# Patient Record
Sex: FEMALE | Race: WHITE | NOT HISPANIC OR LATINO | Employment: OTHER | URBAN - METROPOLITAN AREA
[De-identification: names, ages, dates, MRNs, and addresses within clinical notes are randomized per-mention and may not be internally consistent; named-entity substitution may affect disease eponyms.]

---

## 2023-08-17 ENCOUNTER — HOSPITAL ENCOUNTER (OUTPATIENT)
Dept: DATA CONVERSION | Facility: HOSPITAL | Age: 73
Discharge: HOME | End: 2023-08-17
Payer: MEDICARE

## 2023-08-17 DIAGNOSIS — M79.9 SOFT TISSUE DISORDER, UNSPECIFIED: ICD-10-CM

## 2023-08-31 PROBLEM — N60.91 ATYPICAL LOBULAR HYPERPLASIA OF RIGHT BREAST: Status: ACTIVE | Noted: 2017-01-31

## 2023-08-31 PROBLEM — M47.816 SPONDYLOSIS OF LUMBAR SPINE: Status: ACTIVE | Noted: 2023-08-31

## 2023-08-31 PROBLEM — H34.8192 CENTRAL RETINAL VEIN OCCLUSION (CMS-HCC): Status: ACTIVE | Noted: 2023-08-31

## 2023-08-31 PROBLEM — R92.8 ABNORMAL MAMMOGRAM: Status: ACTIVE | Noted: 2023-08-31

## 2023-08-31 PROBLEM — R93.7 ABNORMAL BONE DENSITY SCREENING: Status: ACTIVE | Noted: 2023-08-31

## 2023-08-31 PROBLEM — G62.9 NEUROPATHY: Status: ACTIVE | Noted: 2023-08-31

## 2023-08-31 PROBLEM — S33.6XXA SACROILIAC (LIGAMENT) SPRAIN: Status: ACTIVE | Noted: 2017-03-01

## 2023-08-31 PROBLEM — E55.9 VITAMIN D DEFICIENCY: Status: ACTIVE | Noted: 2023-08-31

## 2023-08-31 PROBLEM — K76.0 FATTY LIVER: Status: ACTIVE | Noted: 2023-08-31

## 2023-08-31 PROBLEM — H35.89 MACULAR ATROPHY, RETINAL: Status: ACTIVE | Noted: 2023-08-31

## 2023-08-31 PROBLEM — K21.9 GASTROESOPHAGEAL REFLUX DISEASE: Status: ACTIVE | Noted: 2023-08-31

## 2023-08-31 PROBLEM — I10 PRIMARY HYPERTENSION: Status: ACTIVE | Noted: 2023-08-31

## 2023-08-31 PROBLEM — M48.20 BAASTRUP'S SYNDROME: Status: ACTIVE | Noted: 2023-08-31

## 2023-08-31 PROBLEM — H47.20 OPTIC NERVE ATROPHY: Status: ACTIVE | Noted: 2023-08-31

## 2023-08-31 PROBLEM — G43.109 BASILAR MIGRAINE: Status: ACTIVE | Noted: 2023-08-31

## 2023-08-31 PROBLEM — H35.419 RETINAL LATTICE DEGENERATION: Status: ACTIVE | Noted: 2023-08-31

## 2023-08-31 PROBLEM — K22.70 BARRETT'S ESOPHAGUS: Status: ACTIVE | Noted: 2023-08-31

## 2023-08-31 PROBLEM — R73.9 BORDERLINE HYPERGLYCEMIA: Status: ACTIVE | Noted: 2023-08-31

## 2023-08-31 PROBLEM — E78.5 HYPERLIPIDEMIA: Status: ACTIVE | Noted: 2023-08-31

## 2023-08-31 PROBLEM — M47.814 DJD (DEGENERATIVE JOINT DISEASE), THORACIC: Status: ACTIVE | Noted: 2023-08-31

## 2023-08-31 RX ORDER — FLUTICASONE PROPIONATE 50 MCG
1-2 SPRAY, SUSPENSION (ML) NASAL DAILY
COMMUNITY
Start: 2017-05-08

## 2023-08-31 RX ORDER — ERGOCALCIFEROL 1.25 MG/1
1 CAPSULE ORAL
COMMUNITY
Start: 2023-01-23 | End: 2023-12-22 | Stop reason: ALTCHOICE

## 2023-08-31 RX ORDER — ATORVASTATIN CALCIUM 20 MG/1
1 TABLET, FILM COATED ORAL DAILY
COMMUNITY
Start: 2015-02-09 | End: 2024-04-09 | Stop reason: SDUPTHER

## 2023-08-31 RX ORDER — GABAPENTIN 100 MG/1
1 CAPSULE ORAL NIGHTLY
COMMUNITY
Start: 2023-02-02 | End: 2023-12-22 | Stop reason: SDUPTHER

## 2023-08-31 RX ORDER — AMLODIPINE AND BENAZEPRIL HYDROCHLORIDE 5; 10 MG/1; MG/1
1 CAPSULE ORAL DAILY
COMMUNITY
End: 2024-04-09 | Stop reason: SDUPTHER

## 2023-08-31 RX ORDER — OMEPRAZOLE 40 MG/1
1 CAPSULE, DELAYED RELEASE ORAL
COMMUNITY
Start: 2023-02-20 | End: 2023-12-22 | Stop reason: ALTCHOICE

## 2023-08-31 RX ORDER — METOPROLOL SUCCINATE 25 MG/1
25 TABLET, EXTENDED RELEASE ORAL NIGHTLY
COMMUNITY
End: 2024-04-09 | Stop reason: SDUPTHER

## 2023-08-31 RX ORDER — OMEPRAZOLE 20 MG/1
1 TABLET, DELAYED RELEASE ORAL DAILY
COMMUNITY

## 2023-10-09 ENCOUNTER — ANCILLARY PROCEDURE (OUTPATIENT)
Dept: RADIOLOGY | Facility: CLINIC | Age: 73
End: 2023-10-09
Payer: MEDICARE

## 2023-10-09 DIAGNOSIS — M79.671 RIGHT FOOT PAIN: ICD-10-CM

## 2023-10-09 PROCEDURE — 73630 X-RAY EXAM OF FOOT: CPT | Mod: RT,FY

## 2023-10-09 PROCEDURE — 73630 X-RAY EXAM OF FOOT: CPT | Mod: RIGHT SIDE | Performed by: RADIOLOGY

## 2023-10-23 ENCOUNTER — ANCILLARY PROCEDURE (OUTPATIENT)
Dept: RADIOLOGY | Facility: CLINIC | Age: 73
End: 2023-10-23
Payer: MEDICARE

## 2023-10-23 DIAGNOSIS — M79.671 PAIN IN RIGHT FOOT: ICD-10-CM

## 2023-10-23 PROCEDURE — 73630 X-RAY EXAM OF FOOT: CPT | Mod: RT

## 2023-10-23 PROCEDURE — 73630 X-RAY EXAM OF FOOT: CPT | Mod: RIGHT SIDE | Performed by: RADIOLOGY

## 2023-10-25 ENCOUNTER — CLINICAL SUPPORT (OUTPATIENT)
Dept: PRIMARY CARE | Facility: CLINIC | Age: 73
End: 2023-10-25
Payer: MEDICARE

## 2023-10-25 DIAGNOSIS — R73.9 HYPERGLYCEMIA: ICD-10-CM

## 2023-10-25 LAB — POC HEMOGLOBIN A1C: 5.5 % (ref 4.2–6.5)

## 2023-10-27 ENCOUNTER — TELEPHONE (OUTPATIENT)
Dept: PRIMARY CARE | Facility: CLINIC | Age: 73
End: 2023-10-27
Payer: MEDICARE

## 2023-10-27 NOTE — TELEPHONE ENCOUNTER
She wants to stay on it. She is also wondering if you will be send in the increased dose based on her A1C result?

## 2023-10-27 NOTE — TELEPHONE ENCOUNTER
Spoke with patient and she has tried the ginger ale, saltines, bland diet. She has also tried dramamine which sometimes helps. She said that the nausea does subside after 3 days but its getting through those 3 days that is hard. Please advise.

## 2023-10-30 DIAGNOSIS — E11.9 TYPE 2 DIABETES MELLITUS WITHOUT COMPLICATION, WITHOUT LONG-TERM CURRENT USE OF INSULIN (MULTI): ICD-10-CM

## 2023-10-30 RX ORDER — SEMAGLUTIDE 0.68 MG/ML
0.75 INJECTION, SOLUTION SUBCUTANEOUS
Qty: 3 ML | Refills: 1 | Status: SHIPPED | OUTPATIENT
Start: 2023-10-30 | End: 2023-12-22

## 2023-10-30 RX ORDER — SEMAGLUTIDE 0.68 MG/ML
0.5 INJECTION, SOLUTION SUBCUTANEOUS
COMMUNITY
End: 2023-10-30 | Stop reason: SDUPTHER

## 2023-11-21 ENCOUNTER — APPOINTMENT (OUTPATIENT)
Dept: OPHTHALMOLOGY | Facility: CLINIC | Age: 73
End: 2023-11-21
Payer: MEDICARE

## 2023-12-13 ENCOUNTER — OFFICE VISIT (OUTPATIENT)
Dept: OPHTHALMOLOGY | Facility: CLINIC | Age: 73
End: 2023-12-13
Payer: MEDICARE

## 2023-12-13 DIAGNOSIS — H47.20 OPTIC NERVE ATROPHY: ICD-10-CM

## 2023-12-13 DIAGNOSIS — H34.8112 STABLE CENTRAL RETINAL VEIN OCCLUSION OF RIGHT EYE (CMS-HCC): ICD-10-CM

## 2023-12-13 DIAGNOSIS — G43.109 MIGRAINE WITH AURA AND WITHOUT STATUS MIGRAINOSUS, NOT INTRACTABLE: ICD-10-CM

## 2023-12-13 DIAGNOSIS — H35.413 LATTICE DEGENERATION OF RETINA, BILATERAL: Primary | ICD-10-CM

## 2023-12-13 DIAGNOSIS — Z96.1 PSEUDOPHAKIA OF BOTH EYES: ICD-10-CM

## 2023-12-13 DIAGNOSIS — H04.123 DRY EYES: ICD-10-CM

## 2023-12-13 DIAGNOSIS — R73.9 BORDERLINE HYPERGLYCEMIA: ICD-10-CM

## 2023-12-13 PROCEDURE — 99214 OFFICE O/P EST MOD 30 MIN: CPT | Performed by: OPHTHALMOLOGY

## 2023-12-13 PROCEDURE — 92134 CPTRZ OPH DX IMG PST SGM RTA: CPT | Mod: 50 | Performed by: OPHTHALMOLOGY

## 2023-12-13 RX ORDER — EPINEPHRINE 0.3 MG/.3ML
1 INJECTION INTRAMUSCULAR ONCE AS NEEDED
COMMUNITY
Start: 2020-04-29

## 2023-12-13 ASSESSMENT — PATIENT HEALTH QUESTIONNAIRE - PHQ9
SUM OF ALL RESPONSES TO PHQ9 QUESTIONS 1 AND 2: 0
2. FEELING DOWN, DEPRESSED OR HOPELESS: NOT AT ALL
1. LITTLE INTEREST OR PLEASURE IN DOING THINGS: NOT AT ALL

## 2023-12-13 ASSESSMENT — ENCOUNTER SYMPTOMS
EYES NEGATIVE: 0
ALLERGIC/IMMUNOLOGIC NEGATIVE: 0
GASTROINTESTINAL NEGATIVE: 0
DEPRESSION: 0
MUSCULOSKELETAL NEGATIVE: 0
RESPIRATORY NEGATIVE: 0
HEMATOLOGIC/LYMPHATIC NEGATIVE: 0
PSYCHIATRIC NEGATIVE: 0
CONSTITUTIONAL NEGATIVE: 0
CARDIOVASCULAR NEGATIVE: 0
NEUROLOGICAL NEGATIVE: 0
OCCASIONAL FEELINGS OF UNSTEADINESS: 0
ENDOCRINE NEGATIVE: 0
LOSS OF SENSATION IN FEET: 0

## 2023-12-13 ASSESSMENT — REFRACTION_WEARINGRX
OS_SPHERE: +2.50
OD_CYLINDER: -0.50
OD_SPHERE: +2.25
OS_AXIS: 079
OD_AXIS: 134
OS_CYLINDER: -0.50
SPECS_TYPE: READERS

## 2023-12-13 ASSESSMENT — CUP TO DISC RATIO
OD_RATIO: 0.45
OS_RATIO: 0.35

## 2023-12-13 ASSESSMENT — VISUAL ACUITY
OD_SC+: -2
OS_SC: 20/25
METHOD: SNELLEN - SINGLE
OS_SC+: -2
OD_SC: 20/25

## 2023-12-13 ASSESSMENT — PAIN SCALES - GENERAL: PAINLEVEL: 0-NO PAIN

## 2023-12-13 ASSESSMENT — EXTERNAL EXAM - LEFT EYE: OS_EXAM: BROW PTOSIS

## 2023-12-13 ASSESSMENT — TONOMETRY
OD_IOP_MMHG: 16
OS_IOP_MMHG: 15
IOP_METHOD: GOLDMANN APPLANATION

## 2023-12-13 ASSESSMENT — EXTERNAL EXAM - RIGHT EYE: OD_EXAM: BROW PTOSIS

## 2023-12-13 ASSESSMENT — SLIT LAMP EXAM - LIDS
COMMENTS: 1+ BLEPHARITIS
COMMENTS: 1+ BLEPHARITIS

## 2023-12-13 NOTE — LETTER
December 13, 2023    Mary Lou Valerio, APRN-CNP  [address]  [facility], [address]  [facility] [address]     Patient: Uma Middleton   YOB: 1950   Date of Visit: 12/13/2023       Dear Dr. Mary Lou Valerio, APRN-CNP:    Thank you for referring Uma Middleton to me for evaluation. Here is my assessment and plan of care:    Assessment/Plan:  Uma was seen today for annual exam.  Diagnoses and all orders for this visit:  Lattice degeneration of retina, bilateral (Primary)  -     OCT, Retina - OU - Both Eyes  Stable central retinal vein occlusion of right eye  Optic nerve atrophy  Dry eyes  Pseudophakia of both eyes  Migraine with aura and without status migrainosus, not intractable  Borderline hyperglycemia    Below you will find my full exam findings. If you have questions, please do not hesitate to call me. I look forward to following Uma along with you.     No signs of diabetic retinopathy.  F/u in one year.     Sincerely,        Earl Banuelos MD        CC:   No Recipients        Base Eye Exam       Visual Acuity (Snellen - Single)         Right Left Both    Dist sc 20/25 -2 20/25 -2     Near cc   J1+              Tonometry (Goldmann Applanation, 9:12 AM)         Right Left    Pressure 16 15              Pupils         Dark Shape React APD    Right 4 Round 1 None    Left 4 Round 1 None              Extraocular Movement         Right Left     Full Full              Dilation       Both eyes: 1% Tropic 2.5% Phen @ 9:12 AM                  Slit Lamp and Fundus Exam       External Exam         Right Left    External Brow ptosis Brow ptosis              Slit Lamp Exam         Right Left    Lids/Lashes 1+ Blepharitis 1+ Blepharitis    Conjunctiva/Sclera normal bulbar and palepbral conjunctiva normal bulbar and palepbral conjunctiva    Cornea normal epi/stroma/endo and tear film normal epi/stroma/endo and tear film    Anterior Chamber normal anterior chamber, deep and quiet normal  anterior chamber, deep and quiet    Iris pupil miotic pupil miotic    Lens posterior capsule opacification, Centered posterior chamber intraocular lens PC IOL centered w/clear capsule    Anterior Vitreous vitreous floaters vitreous floaters              Fundus Exam         Right Left    Disc collateral vessels normal optic nerve    C/D Ratio 0.45 0.35    Macula micro-aneurysm normal macula    Vessels central retinal vein occlusion normal vessels    Periphery lattice degeneration lattice degeneration                  Refraction       Wearing Rx         Sphere Cylinder Axis    Right +2.25 -0.50 134    Left +2.50 -0.50 079      Type: readers

## 2023-12-13 NOTE — PROGRESS NOTES
Subjective   Patient ID: Uma Middleton is a 73 y.o. female.    Chief Complaint    Annual Exam       HPI    Difficulties driving at night  PATIENT DENIES REFRACTION    Complete and macular exam.  No new changes in health history or meds.  Vision is good and stable.  No new problems or complaints.  Had blepharoplasties about one month ago.      Last edited by Earl Banuelos MD on 12/13/2023  9:47 AM.        No current outpatient medications on file. (Ophthalmology pharm classes)       Current Outpatient Medications (Other)   Medication Sig Dispense Refill    amLODIPine-benazepriL (Lotrel) 5-10 mg capsule Take 1 capsule by mouth once daily.      atorvastatin (Lipitor) 20 mg tablet Take 1 tablet (20 mg) by mouth once daily.      EPINEPHrine (EpiPen 2-Darius) 0.3 mg/0.3 mL injection syringe Inject 0.3 mL (0.3 mg) into the muscle 1 time if needed.      fluticasone (Flonase) 50 mcg/actuation nasal spray Administer 1-2 sprays into each nostril once daily.      gabapentin (Neurontin) 100 mg capsule Take 1 capsule (100 mg) by mouth once daily at bedtime.      metoprolol succinate XL (Toprol-XL) 25 mg 24 hr tablet Take 1 tablet (25 mg) by mouth once daily at bedtime.      omeprazole OTC (PriLOSEC OTC) 20 mg EC tablet Take 1 tablet (20 mg) by mouth once daily.      Ozempic 0.25 mg or 0.5 mg (2 mg/3 mL) pen injector Inject 0.5 mg under the skin 1 (one) time per week. 3 mL 1    ergocalciferol (Vitamin D-2) 1.25 MG (66080 UT) capsule Take 1 capsule (1,250 mcg) by mouth 1 (one) time per week.      omeprazole (PriLOSEC) 40 mg DR capsule Take 1 capsule (40 mg) by mouth 2 times a day before meals. 30 MINUTES BEFORE BREAKFAST AND DINNER         Objective   Base Eye Exam       Visual Acuity (Snellen - Single)         Right Left Both    Dist sc 20/25 -2 20/25 -2     Near cc   J1+              Tonometry (Goldmann Applanation, 9:12 AM)         Right Left    Pressure 16 15              Pupils         Dark Shape React APD    Right 4 Round 1  None    Left 4 Round 1 None              Extraocular Movement         Right Left     Full Full              Dilation       Both eyes: 1% Tropic 2.5% Phen @ 9:12 AM                  Slit Lamp and Fundus Exam       External Exam         Right Left    External Brow ptosis Brow ptosis              Slit Lamp Exam         Right Left    Lids/Lashes 1+ Blepharitis 1+ Blepharitis    Conjunctiva/Sclera normal bulbar and palepbral conjunctiva normal bulbar and palepbral conjunctiva    Cornea normal epi/stroma/endo and tear film normal epi/stroma/endo and tear film    Anterior Chamber normal anterior chamber, deep and quiet normal anterior chamber, deep and quiet    Iris pupil miotic pupil miotic    Lens posterior capsule opacification, Centered posterior chamber intraocular lens PC IOL centered w/clear capsule    Anterior Vitreous vitreous floaters vitreous floaters              Fundus Exam         Right Left    Disc collateral vessels normal optic nerve    C/D Ratio 0.45 0.35    Macula micro-aneurysm normal macula    Vessels central retinal vein occlusion normal vessels    Periphery lattice degeneration lattice degeneration                  Refraction       Wearing Rx         Sphere Cylinder Axis    Right +2.25 -0.50 134    Left +2.50 -0.50 079      Type: readers                    Assessment/Plan   Problem List Items Addressed This Visit          Eye/Vision problems    Borderline hyperglycemia    Stable central retinal vein occlusion of right eye     F/u one year full with oct mac.          Optic nerve atrophy    Dry eyes     Use art tears.          Pseudophakia of both eyes       Other    Migraine with aura and without status migrainosus, not intractable     Other Visit Diagnoses       Lattice degeneration of retina, bilateral    -  Primary    Relevant Orders    OCT, Retina - OU - Both Eyes (Completed)

## 2023-12-14 ENCOUNTER — TELEPHONE (OUTPATIENT)
Dept: PRIMARY CARE | Facility: CLINIC | Age: 73
End: 2023-12-14
Payer: MEDICARE

## 2023-12-14 NOTE — TELEPHONE ENCOUNTER
Patient called and is wondering if you can send in an Rx for the increased dose of Ozempic? She has 3 weeks left of the current dose.

## 2023-12-22 ENCOUNTER — OFFICE VISIT (OUTPATIENT)
Dept: PRIMARY CARE | Facility: CLINIC | Age: 73
End: 2023-12-22
Payer: MEDICARE

## 2023-12-22 VITALS
RESPIRATION RATE: 16 BRPM | WEIGHT: 228.2 LBS | DIASTOLIC BLOOD PRESSURE: 77 MMHG | HEIGHT: 69 IN | SYSTOLIC BLOOD PRESSURE: 114 MMHG | BODY MASS INDEX: 33.8 KG/M2 | OXYGEN SATURATION: 96 % | HEART RATE: 70 BPM

## 2023-12-22 DIAGNOSIS — G62.9 NEUROPATHY: ICD-10-CM

## 2023-12-22 DIAGNOSIS — R41.3 MEMORY CHANGES: ICD-10-CM

## 2023-12-22 DIAGNOSIS — R73.9 HYPERGLYCEMIA: ICD-10-CM

## 2023-12-22 DIAGNOSIS — E11.65 TYPE 2 DIABETES MELLITUS WITH HYPERGLYCEMIA, WITHOUT LONG-TERM CURRENT USE OF INSULIN (MULTI): Primary | ICD-10-CM

## 2023-12-22 LAB — POC HEMOGLOBIN A1C: 5.6 % (ref 4.2–6.5)

## 2023-12-22 PROCEDURE — 1036F TOBACCO NON-USER: CPT

## 2023-12-22 PROCEDURE — 3078F DIAST BP <80 MM HG: CPT

## 2023-12-22 PROCEDURE — 99214 OFFICE O/P EST MOD 30 MIN: CPT

## 2023-12-22 PROCEDURE — 3074F SYST BP LT 130 MM HG: CPT

## 2023-12-22 PROCEDURE — 1157F ADVNC CARE PLAN IN RCRD: CPT

## 2023-12-22 PROCEDURE — 1126F AMNT PAIN NOTED NONE PRSNT: CPT

## 2023-12-22 PROCEDURE — 3044F HG A1C LEVEL LT 7.0%: CPT

## 2023-12-22 PROCEDURE — 1159F MED LIST DOCD IN RCRD: CPT

## 2023-12-22 RX ORDER — GABAPENTIN 100 MG/1
100 CAPSULE ORAL NIGHTLY
Qty: 30 CAPSULE | Refills: 1 | Status: SHIPPED | OUTPATIENT
Start: 2023-12-22

## 2023-12-22 ASSESSMENT — ENCOUNTER SYMPTOMS
LOSS OF SENSATION IN FEET: 0
DEPRESSION: 0
OCCASIONAL FEELINGS OF UNSTEADINESS: 0

## 2023-12-22 ASSESSMENT — PAIN SCALES - GENERAL: PAINLEVEL: 0-NO PAIN

## 2023-12-22 ASSESSMENT — PATIENT HEALTH QUESTIONNAIRE - PHQ9
1. LITTLE INTEREST OR PLEASURE IN DOING THINGS: NOT AT ALL
2. FEELING DOWN, DEPRESSED OR HOPELESS: NOT AT ALL
SUM OF ALL RESPONSES TO PHQ9 QUESTIONS 1 AND 2: 0

## 2023-12-22 ASSESSMENT — COLUMBIA-SUICIDE SEVERITY RATING SCALE - C-SSRS
6. HAVE YOU EVER DONE ANYTHING, STARTED TO DO ANYTHING, OR PREPARED TO DO ANYTHING TO END YOUR LIFE?: NO
1. IN THE PAST MONTH, HAVE YOU WISHED YOU WERE DEAD OR WISHED YOU COULD GO TO SLEEP AND NOT WAKE UP?: NO
2. HAVE YOU ACTUALLY HAD ANY THOUGHTS OF KILLING YOURSELF?: NO

## 2023-12-22 NOTE — PROGRESS NOTES
"Subjective   Patient ID: Uma Middleton is a 73 y.o. female who presents for Follow-up     HPI   Patient did have bilateral blepharoplasty notes she is feeling well.  Denies any issues with chest pain, chest pressure, shortness of breath, constipation, diarrhea, blood in stool, urinary urgency, frequency, blood in urine, muscle weakness in arms and legs, numbness and tingling in fingers or toes.  Denies any falls, urgent care, ER, hospitalization, new diagnoses since she was here last.  She does note that her  tells her she is having a hard time finding her words that he does notice that for herself.  She was referred to neurology and a previous visit for her neuropathy and she will discuss this with the neurologist at her appointment.  She does also note that she has vibration type tingling pain from the center of her chest down to the bottom of her stomach at times and she notices in a high anxiety state.  She says she will sit down and relax and it goes away.  She \"does not believe it is heart related\"     Review of Systems  Review of Systems negative except as noted in HPI and Chief complaint.    Current Outpatient Medications:     amLODIPine-benazepriL (Lotrel) 5-10 mg capsule, Take 1 capsule by mouth once daily., Disp: , Rfl:     atorvastatin (Lipitor) 20 mg tablet, Take 1 tablet (20 mg) by mouth once daily., Disp: , Rfl:     EPINEPHrine (EpiPen 2-Darius) 0.3 mg/0.3 mL injection syringe, Inject 0.3 mL (0.3 mg) into the muscle 1 time if needed., Disp: , Rfl:     fluticasone (Flonase) 50 mcg/actuation nasal spray, Administer 1-2 sprays into each nostril once daily., Disp: , Rfl:     gabapentin (Neurontin) 100 mg capsule, Take 1 capsule (100 mg) by mouth once daily at bedtime., Disp: , Rfl:     metoprolol succinate XL (Toprol-XL) 25 mg 24 hr tablet, Take 1 tablet (25 mg) by mouth once daily at bedtime., Disp: , Rfl:     omeprazole OTC (PriLOSEC OTC) 20 mg EC tablet, Take 1 tablet (20 mg) by mouth once daily., " "Disp: , Rfl:     Ozempic 0.25 mg or 0.5 mg (2 mg/3 mL) pen injector, Inject 0.5 mg under the skin 1 (one) time per week., Disp: 3 mL, Rfl: 1    Objective   /77 (BP Location: Left arm, Patient Position: Sitting, BP Cuff Size: Adult)   Pulse 70   Resp 16   Ht 1.753 m (5' 9\")   Wt 104 kg (228 lb 3.2 oz)   SpO2 96%   BMI 33.70 kg/m²     Physical Exam  Vitals reviewed.   Cardiovascular:      Rate and Rhythm: Normal rate.   Pulmonary:      Effort: Pulmonary effort is normal.      Breath sounds: Normal breath sounds.   Musculoskeletal:         General: Normal range of motion.   Neurological:      General: No focal deficit present.      Mental Status: She is alert and oriented to person, place, and time. Mental status is at baseline.   Psychiatric:         Mood and Affect: Mood normal.         Behavior: Behavior normal.         Thought Content: Thought content normal.         Judgment: Judgment normal.     Assessment/Plan   Diagnoses and all orders for this visit:  Type 2 diabetes mellitus with hyperglycemia, without long-term current use of insulin (CMS/[facility])  Hyperglycemia  -     POCT glycosylated hemoglobin (Hb A1C) manually resulted    Continue current medication.  Continue work on diet - recommend lots of fruits and vegetables, lean protein like chicken, turkey, fish, beans and Greek yogurt. Try to choose healthier carbohydrate options like oatmeal, wheat bread and pasta, sweet potatoes. Limit sugary treats.  Check a fasting sugar first thing in the AM twice daily and keep a log of the results to bring to your next office visit.  Please contact office if your sugars are consistently >140.  Reevaluate in 3 months.   Advanced care planning was discussed with Uma Middleton today. We reviewed code status, Medical Power of , and Living will. Pt has LW and POA  This note was dictated using DRAGON speech recognition software and was corrected for spelling or grammatical errors, but despite proofreading " several typographical errors might be present that might affect the meaning of the content.  Mary Lou Valerio, CNP

## 2023-12-22 NOTE — PATIENT INSTRUCTIONS
Continue current medication.  Continue work on diet - recommend lots of fruits and vegetables, lean protein like chicken, turkey, fish, beans and Greek yogurt. Try to choose healthier carbohydrate options like oatmeal, wheat bread and pasta, sweet potatoes. Limit sugary treats.  Check a fasting sugar first thing in the AM twice daily and keep a log of the results to bring to your next office visit.  Please contact office if your sugars are consistently >140.  Reevaluate in 3 months.

## 2023-12-26 ENCOUNTER — APPOINTMENT (OUTPATIENT)
Dept: PRIMARY CARE | Facility: CLINIC | Age: 73
End: 2023-12-26
Payer: MEDICARE

## 2024-01-25 ENCOUNTER — LAB (OUTPATIENT)
Dept: LAB | Facility: LAB | Age: 74
End: 2024-01-25
Payer: MEDICARE

## 2024-01-25 DIAGNOSIS — R41.3 OTHER AMNESIA: ICD-10-CM

## 2024-01-25 DIAGNOSIS — R47.01 APHASIA: Primary | ICD-10-CM

## 2024-01-25 DIAGNOSIS — R20.2 PARESTHESIA OF SKIN: ICD-10-CM

## 2024-01-25 LAB
CRP SERPL-MCNC: 0.5 MG/DL
ERYTHROCYTE [SEDIMENTATION RATE] IN BLOOD BY WESTERGREN METHOD: 14 MM/H (ref 0–30)
TSH SERPL-ACNC: 1.34 MIU/L (ref 0.44–3.98)
VIT B12 SERPL-MCNC: 291 PG/ML (ref 211–911)

## 2024-01-25 PROCEDURE — 82607 VITAMIN B-12: CPT

## 2024-01-25 PROCEDURE — 86140 C-REACTIVE PROTEIN: CPT

## 2024-01-25 PROCEDURE — 36415 COLL VENOUS BLD VENIPUNCTURE: CPT

## 2024-01-25 PROCEDURE — 85652 RBC SED RATE AUTOMATED: CPT

## 2024-01-25 PROCEDURE — 86038 ANTINUCLEAR ANTIBODIES: CPT

## 2024-01-25 PROCEDURE — 84443 ASSAY THYROID STIM HORMONE: CPT

## 2024-01-26 LAB — ANA SER QL HEP2 SUBST: NEGATIVE

## 2024-01-30 ENCOUNTER — HOSPITAL ENCOUNTER (OUTPATIENT)
Dept: RADIOLOGY | Facility: CLINIC | Age: 74
Discharge: HOME | End: 2024-01-30
Payer: MEDICARE

## 2024-01-30 DIAGNOSIS — R41.3 OTHER AMNESIA: ICD-10-CM

## 2024-01-30 DIAGNOSIS — R20.2 PARESTHESIA OF SKIN: ICD-10-CM

## 2024-01-30 DIAGNOSIS — R47.01 APHASIA: ICD-10-CM

## 2024-01-30 PROCEDURE — 70551 MRI BRAIN STEM W/O DYE: CPT

## 2024-02-20 DIAGNOSIS — E11.65 TYPE 2 DIABETES MELLITUS WITH HYPERGLYCEMIA, WITHOUT LONG-TERM CURRENT USE OF INSULIN (MULTI): ICD-10-CM

## 2024-02-26 ENCOUNTER — TELEPHONE (OUTPATIENT)
Dept: PRIMARY CARE | Facility: CLINIC | Age: 74
End: 2024-02-26
Payer: MEDICARE

## 2024-02-26 DIAGNOSIS — A08.11 NOROVIRUS: Primary | ICD-10-CM

## 2024-02-26 NOTE — TELEPHONE ENCOUNTER
Patient called and has the noro virus, that has been going around, she's had it since Friday, she has been taking immodium but it doesn't seem to be helping. Is there anything that you can prescribe and or something else she can take OTC? I advised that she keep up her fluid intake Gatoraid Zero/Poweraid Zero and water, small sips to decrease any stomach upset.

## 2024-02-28 RX ORDER — BISMUTH SUBSALICYLATE 525 MG/30ML
30 LIQUID ORAL EVERY 4 HOURS PRN
Qty: 360 ML | Refills: 0 | Status: SHIPPED | OUTPATIENT
Start: 2024-02-28 | End: 2024-03-09

## 2024-03-19 ENCOUNTER — APPOINTMENT (OUTPATIENT)
Dept: PRIMARY CARE | Facility: CLINIC | Age: 74
End: 2024-03-19
Payer: MEDICARE

## 2024-04-09 DIAGNOSIS — I10 PRIMARY HYPERTENSION: ICD-10-CM

## 2024-04-09 DIAGNOSIS — E78.2 MIXED HYPERLIPIDEMIA: Primary | ICD-10-CM

## 2024-04-09 RX ORDER — AMLODIPINE AND BENAZEPRIL HYDROCHLORIDE 5; 10 MG/1; MG/1
1 CAPSULE ORAL DAILY
Qty: 90 CAPSULE | Refills: 1 | Status: SHIPPED | OUTPATIENT
Start: 2024-04-09 | End: 2024-10-06

## 2024-04-09 RX ORDER — METOPROLOL SUCCINATE 25 MG/1
25 TABLET, EXTENDED RELEASE ORAL NIGHTLY
Qty: 90 TABLET | Refills: 1 | Status: SHIPPED | OUTPATIENT
Start: 2024-04-09 | End: 2024-10-06

## 2024-04-09 RX ORDER — ATORVASTATIN CALCIUM 20 MG/1
20 TABLET, FILM COATED ORAL DAILY
Qty: 90 TABLET | Refills: 1 | Status: SHIPPED | OUTPATIENT
Start: 2024-04-09 | End: 2024-10-06

## 2024-04-11 ENCOUNTER — APPOINTMENT (OUTPATIENT)
Dept: PRIMARY CARE | Facility: CLINIC | Age: 74
End: 2024-04-11
Payer: MEDICARE

## 2024-04-19 ENCOUNTER — OFFICE VISIT (OUTPATIENT)
Dept: PRIMARY CARE | Facility: CLINIC | Age: 74
End: 2024-04-19
Payer: MEDICARE

## 2024-04-19 ENCOUNTER — LAB (OUTPATIENT)
Dept: LAB | Facility: LAB | Age: 74
End: 2024-04-19
Payer: MEDICARE

## 2024-04-19 VITALS
HEIGHT: 69 IN | OXYGEN SATURATION: 97 % | DIASTOLIC BLOOD PRESSURE: 76 MMHG | SYSTOLIC BLOOD PRESSURE: 135 MMHG | RESPIRATION RATE: 16 BRPM | BODY MASS INDEX: 33.44 KG/M2 | WEIGHT: 225.8 LBS | HEART RATE: 51 BPM

## 2024-04-19 DIAGNOSIS — E78.2 MIXED HYPERLIPIDEMIA: ICD-10-CM

## 2024-04-19 DIAGNOSIS — R06.83 SNORING: ICD-10-CM

## 2024-04-19 DIAGNOSIS — Z12.31 ENCOUNTER FOR SCREENING MAMMOGRAM FOR MALIGNANT NEOPLASM OF BREAST: ICD-10-CM

## 2024-04-19 DIAGNOSIS — Z13.29 THYROID DISORDER SCREENING: ICD-10-CM

## 2024-04-19 DIAGNOSIS — E11.65 TYPE 2 DIABETES MELLITUS WITH HYPERGLYCEMIA, WITHOUT LONG-TERM CURRENT USE OF INSULIN (MULTI): ICD-10-CM

## 2024-04-19 DIAGNOSIS — E55.9 VITAMIN D DEFICIENCY: ICD-10-CM

## 2024-04-19 DIAGNOSIS — Z72.0 TOBACCO USE: Primary | ICD-10-CM

## 2024-04-19 DIAGNOSIS — I71.9 AORTIC ANEURYSM WITHOUT RUPTURE, UNSPECIFIED PORTION OF AORTA (CMS-HCC): ICD-10-CM

## 2024-04-19 DIAGNOSIS — I10 PRIMARY HYPERTENSION: ICD-10-CM

## 2024-04-19 DIAGNOSIS — H34.8112 STABLE CENTRAL RETINAL VEIN OCCLUSION OF RIGHT EYE (CMS-HCC): ICD-10-CM

## 2024-04-19 LAB
25(OH)D3 SERPL-MCNC: 17 NG/ML (ref 31–100)
ALBUMIN SERPL-MCNC: 4.4 G/DL (ref 3.5–5)
ALP BLD-CCNC: 135 U/L (ref 35–125)
ALT SERPL-CCNC: 51 U/L (ref 5–40)
ANION GAP SERPL CALC-SCNC: 12 MMOL/L
AST SERPL-CCNC: 34 U/L (ref 5–40)
BASOPHILS # BLD AUTO: 0.07 X10*3/UL (ref 0–0.1)
BASOPHILS NFR BLD AUTO: 1 %
BILIRUB SERPL-MCNC: 0.5 MG/DL (ref 0.1–1.2)
BUN SERPL-MCNC: 12 MG/DL (ref 8–25)
CALCIUM SERPL-MCNC: 9.3 MG/DL (ref 8.5–10.4)
CHLORIDE SERPL-SCNC: 103 MMOL/L (ref 97–107)
CHOLEST SERPL-MCNC: 185 MG/DL (ref 133–200)
CHOLEST/HDLC SERPL: 3.1 {RATIO}
CO2 SERPL-SCNC: 25 MMOL/L (ref 24–31)
CREAT SERPL-MCNC: 0.8 MG/DL (ref 0.4–1.6)
CREAT UR-MCNC: 335.8 MG/DL
EGFRCR SERPLBLD CKD-EPI 2021: 78 ML/MIN/1.73M*2
EOSINOPHIL # BLD AUTO: 0.12 X10*3/UL (ref 0–0.4)
EOSINOPHIL NFR BLD AUTO: 1.7 %
ERYTHROCYTE [DISTWIDTH] IN BLOOD BY AUTOMATED COUNT: 13.9 % (ref 11.5–14.5)
GLUCOSE SERPL-MCNC: 106 MG/DL (ref 65–99)
HCT VFR BLD AUTO: 44.1 % (ref 36–46)
HDLC SERPL-MCNC: 60 MG/DL
HGB BLD-MCNC: 14 G/DL (ref 12–16)
IMM GRANULOCYTES # BLD AUTO: 0.02 X10*3/UL (ref 0–0.5)
IMM GRANULOCYTES NFR BLD AUTO: 0.3 % (ref 0–0.9)
LDLC SERPL CALC-MCNC: 98 MG/DL (ref 65–130)
LYMPHOCYTES # BLD AUTO: 2.67 X10*3/UL (ref 0.8–3)
LYMPHOCYTES NFR BLD AUTO: 37.2 %
MCH RBC QN AUTO: 28 PG (ref 26–34)
MCHC RBC AUTO-ENTMCNC: 31.7 G/DL (ref 32–36)
MCV RBC AUTO: 88 FL (ref 80–100)
MICROALBUMIN UR-MCNC: 28 MG/L (ref 0–23)
MICROALBUMIN/CREAT UR: 8.3 UG/MG CREAT
MONOCYTES # BLD AUTO: 0.52 X10*3/UL (ref 0.05–0.8)
MONOCYTES NFR BLD AUTO: 7.2 %
NEUTROPHILS # BLD AUTO: 3.78 X10*3/UL (ref 1.6–5.5)
NEUTROPHILS NFR BLD AUTO: 52.6 %
NRBC BLD-RTO: 0 /100 WBCS (ref 0–0)
PLATELET # BLD AUTO: 249 X10*3/UL (ref 150–450)
POC HEMOGLOBIN A1C: 5.7 % (ref 4.2–6.5)
POTASSIUM SERPL-SCNC: 4.1 MMOL/L (ref 3.4–5.1)
PROT SERPL-MCNC: 7.1 G/DL (ref 5.9–7.9)
RBC # BLD AUTO: 5 X10*6/UL (ref 4–5.2)
SODIUM SERPL-SCNC: 140 MMOL/L (ref 133–145)
TRIGL SERPL-MCNC: 133 MG/DL (ref 40–150)
TSH SERPL DL<=0.05 MIU/L-ACNC: 1.32 MIU/L (ref 0.27–4.2)
WBC # BLD AUTO: 7.2 X10*3/UL (ref 4.4–11.3)

## 2024-04-19 PROCEDURE — 99213 OFFICE O/P EST LOW 20 MIN: CPT

## 2024-04-19 PROCEDURE — 82043 UR ALBUMIN QUANTITATIVE: CPT

## 2024-04-19 PROCEDURE — 80053 COMPREHEN METABOLIC PANEL: CPT

## 2024-04-19 PROCEDURE — 36415 COLL VENOUS BLD VENIPUNCTURE: CPT

## 2024-04-19 PROCEDURE — 99497 ADVNCD CARE PLAN 30 MIN: CPT

## 2024-04-19 PROCEDURE — 1159F MED LIST DOCD IN RCRD: CPT

## 2024-04-19 PROCEDURE — 84443 ASSAY THYROID STIM HORMONE: CPT

## 2024-04-19 PROCEDURE — 1126F AMNT PAIN NOTED NONE PRSNT: CPT

## 2024-04-19 PROCEDURE — 82570 ASSAY OF URINE CREATININE: CPT

## 2024-04-19 PROCEDURE — 3075F SYST BP GE 130 - 139MM HG: CPT

## 2024-04-19 PROCEDURE — 1123F ACP DISCUSS/DSCN MKR DOCD: CPT

## 2024-04-19 PROCEDURE — 80061 LIPID PANEL: CPT

## 2024-04-19 PROCEDURE — 82306 VITAMIN D 25 HYDROXY: CPT

## 2024-04-19 PROCEDURE — 3078F DIAST BP <80 MM HG: CPT

## 2024-04-19 PROCEDURE — 1157F ADVNC CARE PLAN IN RCRD: CPT

## 2024-04-19 PROCEDURE — 85025 COMPLETE CBC W/AUTO DIFF WBC: CPT

## 2024-04-19 PROCEDURE — 1036F TOBACCO NON-USER: CPT

## 2024-04-19 PROCEDURE — 1158F ADVNC CARE PLAN TLK DOCD: CPT

## 2024-04-19 PROCEDURE — 83036 HEMOGLOBIN GLYCOSYLATED A1C: CPT

## 2024-04-19 ASSESSMENT — ENCOUNTER SYMPTOMS
LOSS OF SENSATION IN FEET: 0
OCCASIONAL FEELINGS OF UNSTEADINESS: 0
DEPRESSION: 0

## 2024-04-19 ASSESSMENT — COLUMBIA-SUICIDE SEVERITY RATING SCALE - C-SSRS
6. HAVE YOU EVER DONE ANYTHING, STARTED TO DO ANYTHING, OR PREPARED TO DO ANYTHING TO END YOUR LIFE?: NO
2. HAVE YOU ACTUALLY HAD ANY THOUGHTS OF KILLING YOURSELF?: NO
1. IN THE PAST MONTH, HAVE YOU WISHED YOU WERE DEAD OR WISHED YOU COULD GO TO SLEEP AND NOT WAKE UP?: NO

## 2024-04-19 ASSESSMENT — PATIENT HEALTH QUESTIONNAIRE - PHQ9
2. FEELING DOWN, DEPRESSED OR HOPELESS: NOT AT ALL
SUM OF ALL RESPONSES TO PHQ9 QUESTIONS 1 AND 2: 0
1. LITTLE INTEREST OR PLEASURE IN DOING THINGS: NOT AT ALL

## 2024-04-19 ASSESSMENT — PAIN SCALES - GENERAL: PAINLEVEL: 0-NO PAIN

## 2024-04-19 NOTE — PROGRESS NOTES
Subjective   Patient ID: Uma Middleton is a 74 y.o. female who presents for Follow-up.    HPI   Patient denies any falls, urgent care, ER, hospitalization, new diagnoses, surgeries in the past year.  Denies any issues with chest pain, chest pressure, shortness of breath, constipation, diarrhea, blood in stool, urinary urgency, frequency, blood in urine, muscle weakness in arms and legs, numbness or tingling in fingers or toes.  She does not monitor her blood pressures she is no longer monitoring her blood sugars they generally did run 60-90 when she was doing that that is with food she did note she had a few moments where she felt lightheaded/dizzy she would check her blood sugars and it was running low at the time.  She did stop Ozempic at the beginning of March her  was hospitalized for pancreatitis that scared her and she did not want to remain on Ozempic.    Review of Systems  Review of Systems negative except as noted in HPI and Chief complaint.    Current Outpatient Medications:     amLODIPine-benazepriL (Lotrel) 5-10 mg capsule, Take 1 capsule by mouth once daily., Disp: 90 capsule, Rfl: 1    atorvastatin (Lipitor) 20 mg tablet, Take 1 tablet (20 mg) by mouth once daily., Disp: 90 tablet, Rfl: 1    EPINEPHrine (EpiPen 2-Darius) 0.3 mg/0.3 mL injection syringe, Inject 0.3 mL (0.3 mg) into the muscle 1 time if needed., Disp: , Rfl:     fluticasone (Flonase) 50 mcg/actuation nasal spray, Administer 1-2 sprays into each nostril once daily., Disp: , Rfl:     gabapentin (Neurontin) 100 mg capsule, Take 1 capsule (100 mg) by mouth once daily at bedtime., Disp: 30 capsule, Rfl: 1    metoprolol succinate XL (Toprol-XL) 25 mg 24 hr tablet, Take 1 tablet (25 mg) by mouth once daily at bedtime., Disp: 90 tablet, Rfl: 1    omeprazole OTC (PriLOSEC OTC) 20 mg EC tablet, Take 1 tablet (20 mg) by mouth once daily., Disp: , Rfl:     Objective   /76 (BP Location: Left arm, Patient Position: Sitting, BP Cuff Size:  "Adult)   Pulse 51   Resp 16   Ht 1.753 m (5' 9\")   Wt 102 kg (225 lb 12.8 oz)   SpO2 97%   BMI 33.34 kg/m²     Physical Exam  Vitals reviewed.   Cardiovascular:      Rate and Rhythm: Normal rate.   Pulmonary:      Effort: Pulmonary effort is normal.   Musculoskeletal:      Cervical back: Normal range of motion.   Neurological:      General: No focal deficit present.      Mental Status: She is alert.   Psychiatric:         Mood and Affect: Mood normal.       Assessment/Plan   Diagnoses and all orders for this visit:  Tobacco use  Type 2 diabetes mellitus with hyperglycemia, without long-term current use of insulin (Multi)  -     POCT glycosylated hemoglobin (Hb A1C) manually resulted  -     Albumin , Urine Random; Future  Encounter for screening mammogram for malignant neoplasm of breast  -     BI mammo bilateral screening tomosynthesis; Future  Mixed hyperlipidemia  -     Lipid Panel; Future  -     CBC and Auto Differential; Future  -     Comprehensive Metabolic Panel; Future  Primary hypertension  -     CBC and Auto Differential; Future  -     Comprehensive Metabolic Panel; Future  Vitamin D deficiency  -     Vitamin D 25-Hydroxy,Total; Future  Thyroid disorder screening  -     TSH with reflex to Free T4 if abnormal; Future  Aortic aneurysm without rupture, unspecified portion of aorta (CMS-HCC)  Stable central retinal vein occlusion of right eye (CMS-HCC)  Snoring  -     Referral to Adult Sleep Medicine; Future      Time Spent  Prep time on day of patient encounter: 5 minutes  Time spent directly with patient, family or caregiver: 15 minutes  Documentation Time: 5 minutes    LAB Order/ BLOOD TESTS   I have ordered lab work for you to get done. This should be fasting. Nothing to eat or drink after midnight besides black tea,  black coffee, or water. If you do not hear from this office within two days of having your labs done, please call for your results.   I spent more than 3 minutes (but less than 10 " minutes) counseling patient about the need for smoking/tobacco cessation and how I can support efforts when patient is ready to quit.  Discussed nicotinic replacement therapy, Bupropion, Varenicline, hypnosis, support groups, and acupuncture as potential options.  Patient currently has no signs or symptoms of tobacco related disease.    I have referred you to sleep medicine at this time due to snoring and waking up with headaches.  Please make an appointment as soon as possible.  MAMMOGRAM:  I have ordered you a mammogram to be done as soon as you are able.  We will call the results.    Advanced care planning was discussed with Uma Middleton today. We reviewed code status, Medical Power of , and Living will. Pt has LW or POA.     *This note was dictated using DRAGON speech recognition software and was corrected for spelling or grammatical errors, but despite proofreading several typographical errors might be present that might affect the meaning of the content.*  Mary Lou Valerio, CNP

## 2024-04-19 NOTE — PATIENT INSTRUCTIONS
LAB Order/ BLOOD TESTS   I have ordered lab work for you to get done. This should be fasting. Nothing to eat or drink after midnight besides black tea,  black coffee, or water. If you do not hear from this office within two days of having your labs done, please call for your results.

## 2024-04-21 DIAGNOSIS — E55.9 VITAMIN D DEFICIENCY: Primary | ICD-10-CM

## 2024-04-21 RX ORDER — ERGOCALCIFEROL 1.25 MG/1
50000 CAPSULE ORAL
Qty: 12 CAPSULE | Refills: 0 | Status: SHIPPED | OUTPATIENT
Start: 2024-04-21 | End: 2024-07-14

## 2024-05-01 ENCOUNTER — OFFICE VISIT (OUTPATIENT)
Dept: SLEEP MEDICINE | Facility: CLINIC | Age: 74
End: 2024-05-01
Payer: MEDICARE

## 2024-05-01 VITALS
HEART RATE: 63 BPM | HEIGHT: 69 IN | SYSTOLIC BLOOD PRESSURE: 110 MMHG | DIASTOLIC BLOOD PRESSURE: 68 MMHG | BODY MASS INDEX: 32.58 KG/M2 | WEIGHT: 220 LBS | OXYGEN SATURATION: 98 %

## 2024-05-01 DIAGNOSIS — G43.109 MIGRAINE WITH AURA AND WITHOUT STATUS MIGRAINOSUS, NOT INTRACTABLE: ICD-10-CM

## 2024-05-01 DIAGNOSIS — G47.30 SLEEP APNEA, UNSPECIFIED TYPE: Primary | ICD-10-CM

## 2024-05-01 DIAGNOSIS — R06.83 SNORING: ICD-10-CM

## 2024-05-01 DIAGNOSIS — H34.8112 STABLE CENTRAL RETINAL VEIN OCCLUSION OF RIGHT EYE (CMS-HCC): ICD-10-CM

## 2024-05-01 PROCEDURE — 3074F SYST BP LT 130 MM HG: CPT | Performed by: INTERNAL MEDICINE

## 2024-05-01 PROCEDURE — 1160F RVW MEDS BY RX/DR IN RCRD: CPT | Performed by: INTERNAL MEDICINE

## 2024-05-01 PROCEDURE — 3061F NEG MICROALBUMINURIA REV: CPT | Performed by: INTERNAL MEDICINE

## 2024-05-01 PROCEDURE — 1036F TOBACCO NON-USER: CPT | Performed by: INTERNAL MEDICINE

## 2024-05-01 PROCEDURE — 1159F MED LIST DOCD IN RCRD: CPT | Performed by: INTERNAL MEDICINE

## 2024-05-01 PROCEDURE — 99204 OFFICE O/P NEW MOD 45 MIN: CPT | Performed by: INTERNAL MEDICINE

## 2024-05-01 PROCEDURE — 1157F ADVNC CARE PLAN IN RCRD: CPT | Performed by: INTERNAL MEDICINE

## 2024-05-01 PROCEDURE — 3048F LDL-C <100 MG/DL: CPT | Performed by: INTERNAL MEDICINE

## 2024-05-01 PROCEDURE — 3078F DIAST BP <80 MM HG: CPT | Performed by: INTERNAL MEDICINE

## 2024-05-01 PROCEDURE — 1126F AMNT PAIN NOTED NONE PRSNT: CPT | Performed by: INTERNAL MEDICINE

## 2024-05-01 ASSESSMENT — SLEEP AND FATIGUE QUESTIONNAIRES
SLEEP_PROBLEM_INTERFERES_DAILY_ACTIVITIES: NOT AT ALL NOTICEABLE
HOW LIKELY ARE YOU TO NOD OFF OR FALL ASLEEP WHILE LYING DOWN TO REST IN THE AFTERNOON WHEN CIRCUMSTANCES PERMIT: SLIGHT CHANCE OF DOZING
HOW LIKELY ARE YOU TO NOD OFF OR FALL ASLEEP WHILE SITTING AND READING: WOULD NEVER DOZE
HOW LIKELY ARE YOU TO NOD OFF OR FALL ASLEEP WHILE WATCHING TV: WOULD NEVER DOZE
HOW LIKELY ARE YOU TO NOD OFF OR FALL ASLEEP WHILE SITTING QUIETLY AFTER LUNCH WITHOUT ALCOHOL: WOULD NEVER DOZE
WORRIED_DISTRESSED_DUE_TO_SLEEP: NOT AT ALL NOTICEABLE
SITING INACTIVE IN A PUBLIC PLACE LIKE A CLASS ROOM OR A MOVIE THEATER: WOULD NEVER DOZE
SLEEP_PROBLEM_NOTICEABLE_TO_OTHERS: NOT AT ALL NOTICEABLE
HOW LIKELY ARE YOU TO NOD OFF OR FALL ASLEEP WHEN YOU ARE A PASSENGER IN A CAR FOR AN HOUR WITHOUT A BREAK: WOULD NEVER DOZE
HOW LIKELY ARE YOU TO NOD OFF OR FALL ASLEEP IN A CAR, WHILE STOPPED FOR A FEW MINUTES IN TRAFFIC: WOULD NEVER DOZE
SATISFACTION_WITH_CURRENT_SLEEP_PATTERN: VERY SATISFIED
ESS-CHAD TOTAL SCORE: 1
HOW LIKELY ARE YOU TO NOD OFF OR FALL ASLEEP WHILE SITTING AND TALKING TO SOMEONE: WOULD NEVER DOZE
DIFFICULTY_FALLING_ASLEEP: MILD

## 2024-05-01 ASSESSMENT — PAIN SCALES - GENERAL: PAINLEVEL: 0-NO PAIN

## 2024-05-01 NOTE — PROGRESS NOTES
Patient: Uma Middleton    [id number]  : 1950 -- AGE 74 y.o.    Provider: Chao Turpin MD     Location [facility]   Service Date: 2024              [facility]  New Visit Note      Subjective     HISTORY OF PRESENT ILLNESS     The patient's referring provider is: Mary Lou Valerio, APR*    HISTORY OF PRESENT ILLNESS   Uma Middleton is a 74 y.o. female who presents to a [facility] for a sleep medicine evaluation with concerns of Dry Mouth.     The patient  has a past medical history of Adhesive capsulitis of right shoulder (10/21/2013), Central retinal vein occlusion, right eye (CMS-HCC), Dermatochalasis of both upper eyelids, Dry eye syndrome of bilateral lacrimal glands, Elevated blood-pressure reading, without diagnosis of hypertension, Encounter for screening for osteoporosis (2017), GERD (gastroesophageal reflux disease), Inconclusive mammogram (2017), Lattice degeneration of retina, bilateral, Other specified disorders of eustachian tube, bilateral (2017), Otitis media, unspecified, right ear (2015), Personal history of other diseases of the digestive system (2013), Personal history of other diseases of the digestive system, Personal history of other diseases of the respiratory system (10/18/2016), Primary osteoarthritis, unspecified hand (2015), Sacroiliitis, not elsewhere classified (CMS-HCC) (2016), Sciatica, right side (2016), and Unspecified optic atrophy..    PAST SLEEP HISTORY  Patient has the following sleep-related diagnoses:  none . Prior sleep study results: not done    CURRENT HISTORY    On today's visit, the patient reports issues with falling asleep.  She is referred by her primary due to history of central retinal vein occlusion and snoring with suspicion for possible obstructive sleep apnea  She is using afrin and flonase    Sleep schedule  on weekdays  / work days:  Usual Bedtime 11:30 PM  Falls asleep around 1 AM  Wake time 7 AM    Sleep schedule  on weekends/non work days : Same    Naps between 1 to 2 PM    Total sleep time average is 6 hours/day    Preferred sleeping position: Prone      REVIEW OF SYSTEMS     REVIEW OF SYSTEMS  Review of Systems    Sleep-related ROS:  snoring, nocturnal awakenings, nocturia, waking up sweaty/night sweats, sore throat in the morning, dry mouth in the morning, and headaches in the morning    Sleep environment:  Bed partner :  No   Pets in bed :   Yes   Bedroom temperature: BEDROOM TEMP: cool  Noise :   No   Issues with bed comfort :   No     Sleep Initiation: takes 15 minutes to fall asleep  Sleep Maintenance: denies problematic awakenings      RLS Screen:  - Sensations: Patient does not have unusual sensations in their extremities that cause an urge to move them     Sleep-related behaviors:   DENIES  dreams upon falling asleep  hypnagogic/hypnopompic hallucinations  sleep paralysis  symptoms of cataplexy  sleep walking  kicking, punching, or acting out dreams      Daytime Symptoms  On awakening patient reports: morning headaches, morning dry mouth, and morning sore throat    ESS 1   Insomnia Severity Index  1. Difficulty falling asleep: Mild  2. Difficulty staying asleep: None  3. Problems waking up too early: None  4. How SATISFIED/DISSATISFIED are you with your CURRENT sleep pattern?: Very Satisfied  5. How NOTICEABLE to others do you think your sleep problem is in terms of impairing the quality of your life?: Not at all Noticeable  6. How WORRIED/DISTRESSED are you about your current sleep problem?: Not at all Noticeable  7. To what extent do you consider your sleep problem to INTERFERE with your daily functioning (e.g. daytime fatigue, mood, ability to function at work/daily chores, concentration, memory, mood, etc.) CURRENTLY?: Not at all Noticeable  ASHLEE TS: 0-7 = No clinically significant insomnia 8-14 = Subthreshold  insomnia 15-21 = Clinical insomnia (moderate severity) 22-28 = Clinical insomnia (severe): 1     FOSQ 40      ALLERGIES AND MEDICATIONS     ALLERGIES  Allergies   Allergen Reactions    Fire Ant Shortness of breath    Erythromycin Itching     Itching with erythromycin ophthalmic ointment    Tea Tree Oil Hives       MEDICATIONS  Current Outpatient Medications   Medication Sig Dispense Refill    amLODIPine-benazepriL (Lotrel) 5-10 mg capsule Take 1 capsule by mouth once daily. 90 capsule 1    atorvastatin (Lipitor) 20 mg tablet Take 1 tablet (20 mg) by mouth once daily. 90 tablet 1    EPINEPHrine (EpiPen 2-Darius) 0.3 mg/0.3 mL injection syringe Inject 0.3 mL (0.3 mg) into the muscle 1 time if needed.      ergocalciferol (Vitamin D-2) 1.25 MG (72657 UT) capsule Take 1 capsule (50,000 Units) by mouth 1 (one) time per week. 12 capsule 0    fluticasone (Flonase) 50 mcg/actuation nasal spray Administer 1-2 sprays into each nostril once daily.      gabapentin (Neurontin) 100 mg capsule Take 1 capsule (100 mg) by mouth once daily at bedtime. 30 capsule 1    metoprolol succinate XL (Toprol-XL) 25 mg 24 hr tablet Take 1 tablet (25 mg) by mouth once daily at bedtime. 90 tablet 1    omeprazole OTC (PriLOSEC OTC) 20 mg EC tablet Take 1 tablet (20 mg) by mouth once daily.       No current facility-administered medications for this visit.       PAST HISTORY     PAST MEDICAL HISTORY  She  has a past medical history of Adhesive capsulitis of right shoulder (10/21/2013), Central retinal vein occlusion, right eye (CMS-[facility]), Dermatochalasis of both upper eyelids, Dry eye syndrome of bilateral lacrimal glands, Elevated blood-pressure reading, without diagnosis of hypertension, Encounter for screening for osteoporosis (05/08/2017), GERD (gastroesophageal reflux disease), Inconclusive mammogram (01/23/2017), Lattice degeneration of retina, bilateral, Other specified disorders of eustachian tube, bilateral (05/08/2017), Otitis media,  "unspecified, right ear (12/18/2015), Personal history of other diseases of the digestive system (09/30/2013), Personal history of other diseases of the digestive system, Personal history of other diseases of the respiratory system (10/18/2016), Primary osteoarthritis, unspecified hand (02/09/2015), Sacroiliitis, not elsewhere classified (CMS-HCC) (04/26/2016), Sciatica, right side (04/26/2016), and Unspecified optic atrophy.       PAST SURGICAL HISTORY:  Past Surgical History:   Procedure Laterality Date    CATARACT EXTRACTION  02/28/2014    Cataract Surgery    CATARACT EXTRACTION W/  INTRAOCULAR LENS IMPLANT Bilateral     OD 07/18/2013 +15.5D, OS 12/05/2013 +15.5D    OTHER SURGICAL HISTORY  06/25/2013    Treatment Of The Left Ankle    OTHER SURGICAL HISTORY  06/25/2013    History Of Prior Surgery    AR OSTEOTOMY MANDIBLE SEGMENTAL  1969    SHOULDER SURGERY  02/28/2014    Shoulder Surgery       FAMILY HISTORY  Family History   Problem Relation Name Age of Onset    Hypertension Mother Marilia     Heart disease Father Rd     Hypertension Father Rd          SOCIAL HISTORY  She  reports that she has quit smoking. Her smoking use included cigarettes. She has been exposed to tobacco smoke. She has never used smokeless tobacco. She reports current alcohol use. She reports that she does not use drugs. She currently lives with her .         PHYSICAL EXAM   Objective   VITAL SIGNS: /68   Pulse 63   Ht 1.753 m (5' 9\")   Wt 99.8 kg (220 lb)   SpO2 98%   BMI 32.49 kg/m²      CURRENT WEIGHT:   Vitals:    05/01/24 0851   Weight: 99.8 kg (220 lb)      BMI: Body mass index is 32.49 kg/m².   PREVIOUS WEIGHTS:  Wt Readings from Last 3 Encounters:   05/01/24 99.8 kg (220 lb)   04/19/24 102 kg (225 lb 12.8 oz)   12/22/23 104 kg (228 lb 3.2 oz)       Physical Exam  PHYSICAL EXAM: GENERAL: alert pleasant and cooperative no acute distress  edematous nasal turbinates  MODIFIED MACDONALD SCORE: III  MODIFIED " "MALLAMPATI SCORE: III (soft and hard palate and base of uvula visible)  UVULA: midline  TONSILLAR SCORE: 0  TONGUE SCALLOPING: midline  PSYCH EXAM: alert,oriented, in NAD with a full range of affect, normal behavior and no psychotic features      RESULTS/DATA     No results found for: \"IRON\", \"TRANSFERRIN\", \"IRONSAT\", \"TIBC\", \"FERRITIN\"        ASSESSMENT/PLAN     Ms. Middleton is a 74 y.o. female and  has a past medical history of Adhesive capsulitis of right shoulder (10/21/2013), Central retinal vein occlusion, right eye (CMS-HCC), Dermatochalasis of both upper eyelids, Dry eye syndrome of bilateral lacrimal glands, Elevated blood-pressure reading, without diagnosis of hypertension, Encounter for screening for osteoporosis (05/08/2017), GERD (gastroesophageal reflux disease), Inconclusive mammogram (01/23/2017), Lattice degeneration of retina, bilateral, Other specified disorders of eustachian tube, bilateral (05/08/2017), Otitis media, unspecified, right ear (12/18/2015), Personal history of other diseases of the digestive system (09/30/2013), Personal history of other diseases of the digestive system, Personal history of other diseases of the respiratory system (10/18/2016), Primary osteoarthritis, unspecified hand (02/09/2015), Sacroiliitis, not elsewhere classified (CMS-HCC) (04/26/2016), Sciatica, right side (04/26/2016), and Unspecified optic atrophy. She was referred to the [facility] for evaluation of possible sleep apnea    Problem List and Orders  Problem List Items Addressed This Visit             ICD-10-CM    Migraine with aura and without status migrainosus, not intractable G43.109     May be associated with untreated sleep apnea          Relevant Orders    In-Center Sleep Study (Sleep Provider Only)    Stable central retinal vein occlusion of right eye (CMS-HCC) H34.8112     Has been shown to be associated with untreated sleep apnea          Relevant Orders    In-Center " Sleep Study (Sleep Provider Only)    Sleep apnea, unspecified - Primary G47.30     Symptoms and physical exam are suggestive of sleep disordered breathing.    Uma needs further evaluation through sleep testing.    We will follow-up with management options once testing is completed  Uma verbalized understanding  Recommended follow-up 3-4 weeks after testing to discuss results and treatment options          Relevant Orders    In-Center Sleep Study (Sleep Provider Only)    Snoring R06.83     May be associated with untreated sleep apnea

## 2024-05-01 NOTE — ASSESSMENT & PLAN NOTE
Symptoms and physical exam are suggestive of sleep disordered breathing.    Uma needs further evaluation through sleep testing.    We will follow-up with management options once testing is completed  Uma verbalized understanding  Recommended follow-up 3-4 weeks after testing to discuss results and treatment options

## 2024-05-23 ENCOUNTER — TELEMEDICINE (OUTPATIENT)
Dept: PRIMARY CARE | Facility: CLINIC | Age: 74
End: 2024-05-23
Payer: MEDICARE

## 2024-05-23 VITALS — HEIGHT: 69 IN | WEIGHT: 220.6 LBS | BODY MASS INDEX: 32.67 KG/M2

## 2024-05-23 DIAGNOSIS — J01.10 ACUTE NON-RECURRENT FRONTAL SINUSITIS: Primary | ICD-10-CM

## 2024-05-23 PROCEDURE — 3048F LDL-C <100 MG/DL: CPT

## 2024-05-23 PROCEDURE — 1159F MED LIST DOCD IN RCRD: CPT

## 2024-05-23 PROCEDURE — 1036F TOBACCO NON-USER: CPT

## 2024-05-23 PROCEDURE — 99441 PR PHYS/QHP TELEPHONE EVALUATION 5-10 MIN: CPT

## 2024-05-23 PROCEDURE — 3061F NEG MICROALBUMINURIA REV: CPT

## 2024-05-23 PROCEDURE — 1125F AMNT PAIN NOTED PAIN PRSNT: CPT

## 2024-05-23 PROCEDURE — 1160F RVW MEDS BY RX/DR IN RCRD: CPT

## 2024-05-23 PROCEDURE — 1157F ADVNC CARE PLAN IN RCRD: CPT

## 2024-05-23 RX ORDER — AMOXICILLIN AND CLAVULANATE POTASSIUM 875; 125 MG/1; MG/1
875 TABLET, FILM COATED ORAL 2 TIMES DAILY
Qty: 20 TABLET | Refills: 0 | Status: SHIPPED | OUTPATIENT
Start: 2024-05-23 | End: 2024-06-02

## 2024-05-23 ASSESSMENT — COLUMBIA-SUICIDE SEVERITY RATING SCALE - C-SSRS
2. HAVE YOU ACTUALLY HAD ANY THOUGHTS OF KILLING YOURSELF?: NO
6. HAVE YOU EVER DONE ANYTHING, STARTED TO DO ANYTHING, OR PREPARED TO DO ANYTHING TO END YOUR LIFE?: NO
1. IN THE PAST MONTH, HAVE YOU WISHED YOU WERE DEAD OR WISHED YOU COULD GO TO SLEEP AND NOT WAKE UP?: NO

## 2024-05-23 ASSESSMENT — PAIN SCALES - GENERAL: PAINLEVEL: 7

## 2024-05-23 ASSESSMENT — ENCOUNTER SYMPTOMS
OCCASIONAL FEELINGS OF UNSTEADINESS: 0
LOSS OF SENSATION IN FEET: 0
DEPRESSION: 0

## 2024-05-23 ASSESSMENT — PATIENT HEALTH QUESTIONNAIRE - PHQ9
SUM OF ALL RESPONSES TO PHQ9 QUESTIONS 1 AND 2: 0
1. LITTLE INTEREST OR PLEASURE IN DOING THINGS: NOT AT ALL
2. FEELING DOWN, DEPRESSED OR HOPELESS: NOT AT ALL

## 2024-05-23 NOTE — PROGRESS NOTES
"With patients permission, this is a Telemedicine visit with video. Provider located at the office address. Patient located at their home address. The provider and patient participated in this telemedicine encounter.     Subjective   Patient ID: Uma Middleton is a 74 y.o. female who presents for URI    HPI   For 6 or more days patient has had complaints of headache facial pain ear pressure nasal congestion with rhinorrhea postnasal drip cough yellow sputum denies fever, chills, nausea, vomiting, diarrhea, teeth pain, chest congestion, wheezing, shortness of breath.  She is tried Tylenol DayQuil and NyQuil with very little relief.  Review of Systems  Review of Systems negative except as noted in HPI and Chief complaint.    Current Outpatient Medications:     amLODIPine-benazepriL (Lotrel) 5-10 mg capsule, Take 1 capsule by mouth once daily., Disp: 90 capsule, Rfl: 1    atorvastatin (Lipitor) 20 mg tablet, Take 1 tablet (20 mg) by mouth once daily., Disp: 90 tablet, Rfl: 1    EPINEPHrine (EpiPen 2-Darius) 0.3 mg/0.3 mL injection syringe, Inject 0.3 mL (0.3 mg) into the muscle 1 time if needed., Disp: , Rfl:     ergocalciferol (Vitamin D-2) 1.25 MG (99328 UT) capsule, Take 1 capsule (50,000 Units) by mouth 1 (one) time per week., Disp: 12 capsule, Rfl: 0    fluticasone (Flonase) 50 mcg/actuation nasal spray, Administer 1-2 sprays into each nostril once daily., Disp: , Rfl:     gabapentin (Neurontin) 100 mg capsule, Take 1 capsule (100 mg) by mouth once daily at bedtime., Disp: 30 capsule, Rfl: 1    metoprolol succinate XL (Toprol-XL) 25 mg 24 hr tablet, Take 1 tablet (25 mg) by mouth once daily at bedtime., Disp: 90 tablet, Rfl: 1    omeprazole OTC (PriLOSEC OTC) 20 mg EC tablet, Take 1 tablet (20 mg) by mouth once daily., Disp: , Rfl:     Objective   Ht 1.753 m (5' 9\")   Wt 100 kg (220 lb 9.6 oz)   BMI 32.58 kg/m²     Physical Exam  Physical exam not performed due to telemedicine encounter.   Assessment/Plan   Diagnoses " and all orders for this visit:  Acute non-recurrent frontal sinusitis  You are being treated for a sinus infection.  I have sent in the antibiotic ** to your pharmacy.  Please take with food.  Increase rest and fluids. Sleep with head of bed elevated to help with post nasal drip. Use a humidifier next to your bed to help with congestions. Take antibiotics as ordered. Blow your nose often, try not to sniff. Tylenol/motrin as needed for pain/discomfort.   Please call if symptoms worsen or do not improve.  Please go to the ER if you should have chest pain, pressure or extreme shortness of breath.  *This note was dictated using DRAGON speech recognition software and was corrected for spelling or grammatical errors, but despite proofreading several typographical errors might be present that might affect the meaning of the content.*  Mary Lou Vlaerio, CNP,

## 2024-05-23 NOTE — PATIENT INSTRUCTIONS
You are being treated for a sinus infection.  I have sent in the antibiotic ** to your pharmacy.  Please take with food.  Increase rest and fluids. Sleep with head of bed elevated to help with post nasal drip. Use a humidifier next to your bed to help with congestions. Take antibiotics as ordered. Blow your nose often, try not to sniff. Tylenol/motrin as needed for pain/discomfort.   Please call if symptoms worsen or do not improve.  Please go to the ER if you should have chest pain, pressure or extreme shortness of breath.  *This note was dictated using DRAGON speech recognition software and was corrected for spelling or grammatical errors, but despite proofreading several typographical errors might be present that might affect the meaning of the content.*  Mary Lou Valerio, ANGELA,

## 2024-07-01 ENCOUNTER — CLINICAL SUPPORT (OUTPATIENT)
Dept: SLEEP MEDICINE | Facility: HOSPITAL | Age: 74
End: 2024-07-01
Payer: MEDICARE

## 2024-07-01 DIAGNOSIS — H34.8112 STABLE CENTRAL RETINAL VEIN OCCLUSION OF RIGHT EYE (CMS-HCC): ICD-10-CM

## 2024-07-01 DIAGNOSIS — G47.30 SLEEP APNEA, UNSPECIFIED TYPE: ICD-10-CM

## 2024-07-01 DIAGNOSIS — G43.109 MIGRAINE WITH AURA AND WITHOUT STATUS MIGRAINOSUS, NOT INTRACTABLE: ICD-10-CM

## 2024-07-02 NOTE — PROGRESS NOTES
[facility] TECH NOTE:     Patient: Uma Middleton   MRN//AGE: [id number]  1950  74 y.o.   Technologist: JL Garcia   Room: 2   Service Date: 2024        [facility]    TECHNOLOGIST SLEEP STUDY PROCEDURE NOTE:   This sleep study is being conducted according to the policies and procedures outlined by the AAS accreditation standards.  The sleep study procedure and processes involved during this appointment was explained to the patient/patient’s family, questions were answered. The patient/family verbalized understanding.      The patient is a 74 y.o. year old female scheduled for a Diagnostic PSG Split night with montage of:  diagnostic PSG . she arrived for her appointment.      The study that was ultimately completed was a diagnostic PSG.      The full study Was completed.  Patient questionnaires completed?: yes     Consents signed? yes    Initial Fall Risk Screening:     Uma has not fallen in the last 6 months. Uma does not have a fear of falling. She does not need assistance with sitting, standing, or walking. she does not need assistance walking in her home. she does not need assistance in an unfamiliar setting. The patient is not using an assistive device.     Brief Study observations: Patient had a very difficult time falling asleep and staying asleep. Although pt. Did manage to sleep some, no REM sleep was observed. Patient needed use of bathroom twice.     Deviation to order/protocol and reason: no      If PAP, which was preferred mask/pressure/mode: n/a      Other:None    After the procedure, the patient/family was informed to ensure followup with ordering clinician for testing results.      Technologist: JL Garcia

## 2024-07-23 ENCOUNTER — HOSPITAL ENCOUNTER (OUTPATIENT)
Dept: RADIOLOGY | Facility: CLINIC | Age: 74
Discharge: HOME | End: 2024-07-23
Payer: MEDICARE

## 2024-07-23 ENCOUNTER — OFFICE VISIT (OUTPATIENT)
Dept: PRIMARY CARE | Facility: CLINIC | Age: 74
End: 2024-07-23
Payer: MEDICARE

## 2024-07-23 ENCOUNTER — LAB (OUTPATIENT)
Dept: LAB | Facility: LAB | Age: 74
End: 2024-07-23
Payer: MEDICARE

## 2024-07-23 VITALS
SYSTOLIC BLOOD PRESSURE: 136 MMHG | BODY MASS INDEX: 32.05 KG/M2 | WEIGHT: 217 LBS | OXYGEN SATURATION: 99 % | DIASTOLIC BLOOD PRESSURE: 70 MMHG | HEART RATE: 63 BPM

## 2024-07-23 DIAGNOSIS — R07.89 OTHER CHEST PAIN: ICD-10-CM

## 2024-07-23 DIAGNOSIS — R00.0 TACHYCARDIA: ICD-10-CM

## 2024-07-23 DIAGNOSIS — E11.65 TYPE 2 DIABETES MELLITUS WITH HYPERGLYCEMIA, WITHOUT LONG-TERM CURRENT USE OF INSULIN (MULTI): Primary | ICD-10-CM

## 2024-07-23 DIAGNOSIS — I25.110 ATHEROSCLEROSIS OF NATIVE CORONARY ARTERY OF NATIVE HEART WITH UNSTABLE ANGINA PECTORIS (MULTI): Primary | ICD-10-CM

## 2024-07-23 DIAGNOSIS — R68.84 JAW PAIN: ICD-10-CM

## 2024-07-23 DIAGNOSIS — I20.9 ANGINA PECTORIS (CMS-HCC): ICD-10-CM

## 2024-07-23 DIAGNOSIS — E55.9 VITAMIN D DEFICIENCY: ICD-10-CM

## 2024-07-23 LAB
25(OH)D3 SERPL-MCNC: 30 NG/ML (ref 31–100)
CREAT SERPL-MCNC: 0.79 MG/DL (ref 0.5–1.05)
EGFRCR SERPLBLD CKD-EPI 2021: 80 ML/MIN/1.73M*2
POC HEMOGLOBIN A1C: 6.1 % (ref 4.2–6.5)

## 2024-07-23 PROCEDURE — 82306 VITAMIN D 25 HYDROXY: CPT

## 2024-07-23 PROCEDURE — 1125F AMNT PAIN NOTED PAIN PRSNT: CPT

## 2024-07-23 PROCEDURE — 1036F TOBACCO NON-USER: CPT

## 2024-07-23 PROCEDURE — 83036 HEMOGLOBIN GLYCOSYLATED A1C: CPT

## 2024-07-23 PROCEDURE — 3061F NEG MICROALBUMINURIA REV: CPT

## 2024-07-23 PROCEDURE — 36415 COLL VENOUS BLD VENIPUNCTURE: CPT

## 2024-07-23 PROCEDURE — 1160F RVW MEDS BY RX/DR IN RCRD: CPT

## 2024-07-23 PROCEDURE — 1159F MED LIST DOCD IN RCRD: CPT

## 2024-07-23 PROCEDURE — 3048F LDL-C <100 MG/DL: CPT

## 2024-07-23 PROCEDURE — 3078F DIAST BP <80 MM HG: CPT

## 2024-07-23 PROCEDURE — 71260 CT THORAX DX C+: CPT

## 2024-07-23 PROCEDURE — 93010 ELECTROCARDIOGRAM REPORT: CPT

## 2024-07-23 PROCEDURE — 1157F ADVNC CARE PLAN IN RCRD: CPT

## 2024-07-23 PROCEDURE — 2550000001 HC RX 255 CONTRASTS

## 2024-07-23 PROCEDURE — 93005 ELECTROCARDIOGRAM TRACING: CPT

## 2024-07-23 PROCEDURE — 99214 OFFICE O/P EST MOD 30 MIN: CPT

## 2024-07-23 PROCEDURE — 82565 ASSAY OF CREATININE: CPT

## 2024-07-23 PROCEDURE — 3075F SYST BP GE 130 - 139MM HG: CPT

## 2024-07-23 RX ORDER — NITROGLYCERIN 0.4 MG/1
0.4 TABLET SUBLINGUAL EVERY 5 MIN PRN
Qty: 100 TABLET | Refills: 11 | Status: SHIPPED | OUTPATIENT
Start: 2024-07-23 | End: 2025-07-23

## 2024-07-23 ASSESSMENT — ENCOUNTER SYMPTOMS
OCCASIONAL FEELINGS OF UNSTEADINESS: 0
DEPRESSION: 0
LOSS OF SENSATION IN FEET: 0

## 2024-07-23 ASSESSMENT — COLUMBIA-SUICIDE SEVERITY RATING SCALE - C-SSRS
2. HAVE YOU ACTUALLY HAD ANY THOUGHTS OF KILLING YOURSELF?: NO
1. IN THE PAST MONTH, HAVE YOU WISHED YOU WERE DEAD OR WISHED YOU COULD GO TO SLEEP AND NOT WAKE UP?: NO
6. HAVE YOU EVER DONE ANYTHING, STARTED TO DO ANYTHING, OR PREPARED TO DO ANYTHING TO END YOUR LIFE?: NO

## 2024-07-23 ASSESSMENT — PATIENT HEALTH QUESTIONNAIRE - PHQ9
2. FEELING DOWN, DEPRESSED OR HOPELESS: NOT AT ALL
1. LITTLE INTEREST OR PLEASURE IN DOING THINGS: NOT AT ALL
SUM OF ALL RESPONSES TO PHQ9 QUESTIONS 1 AND 2: 0

## 2024-07-23 ASSESSMENT — PAIN SCALES - GENERAL: PAINLEVEL: 8

## 2024-07-23 NOTE — PROGRESS NOTES
Subjective   Patient ID: Uma Middleton is a 74 y.o. female who presents for 3 MONTH FOLLOW-UP.    HPI   Patient denies any falls, urgent care, ER, hospitalization, new diagnoses, surgeries in the past year.  Denies any issues with chest pain, chest pressure, shortness of breath, constipation, diarrhea, blood in stool, urinary urgency, frequency, blood in urine, muscle weakness in arms and legs, numbness or tingling in fingers or toes.  BS 70-80 when has dizzy spell  Chest pain/jaw pain x one month 4-5 days per week. Lasting 3-5 minutes. GI told her to increase Prilosec BID, Sips H2O to feel better. Increased stress.  Most recent episode this morning while getting ready to come here.  Does not believe that it is cardiac related as she has had multiple stress test and followed up with cardiology for the same things in the past.  Last saw GI- virtually February.  Patient notes she will be traveling to [place] for 3 weeks with her cousin on Monday.  Review of Systems  Review of Systems negative except as noted in HPI and Chief complaint.    Current Outpatient Medications:     amLODIPine-benazepriL (Lotrel) 5-10 mg capsule, Take 1 capsule by mouth once daily., Disp: 90 capsule, Rfl: 1    atorvastatin (Lipitor) 20 mg tablet, Take 1 tablet (20 mg) by mouth once daily., Disp: 90 tablet, Rfl: 1    EPINEPHrine (EpiPen 2-Darius) 0.3 mg/0.3 mL injection syringe, Inject 0.3 mL (0.3 mg) into the muscle 1 time if needed., Disp: , Rfl:     fluticasone (Flonase) 50 mcg/actuation nasal spray, Administer 1-2 sprays into each nostril once daily., Disp: , Rfl:     gabapentin (Neurontin) 100 mg capsule, Take 1 capsule (100 mg) by mouth once daily at bedtime., Disp: 30 capsule, Rfl: 1    metoprolol succinate XL (Toprol-XL) 25 mg 24 hr tablet, Take 1 tablet (25 mg) by mouth once daily at bedtime., Disp: 90 tablet, Rfl: 1    omeprazole OTC (PriLOSEC OTC) 20 mg EC tablet, Take 1 tablet (20 mg) by mouth once daily., Disp: , Rfl:     Objective   BP  136/70 (BP Location: Left arm, Patient Position: Sitting, BP Cuff Size: Large adult)   Pulse 63   Wt 98.4 kg (217 lb)   SpO2 99%   BMI 32.05 kg/m²     Physical Exam  Vitals reviewed.   Cardiovascular:      Rate and Rhythm: Normal rate.   Pulmonary:      Effort: Pulmonary effort is normal.   Musculoskeletal:         General: Normal range of motion.      Cervical back: Normal range of motion.   Neurological:      General: No focal deficit present.      Mental Status: She is alert.   Psychiatric:         Mood and Affect: Mood normal.       Assessment/Plan   Diagnoses and all orders for this visit:  Type 2 diabetes mellitus with hyperglycemia, without long-term current use of insulin (Multi)  -     POCT glycosylated hemoglobin (Hb A1C) manually resulted  Other chest pain  -     CT chest w and wo IV contrast; Future  -     ECG 12 lead (Clinic Performed)  -     Creatinine; Future  Jaw pain  -     CT chest w and wo IV contrast; Future  -     Creatinine; Future    Diabetes Type 2  A1C is now 6.1 from 5.7   Continue current medication.  Continue work on diet - recommend lots of fruits and vegetables, lean protein like chicken, turkey, fish, beans and Greek yogurt. Try to choose healthier carbohydrate options like oatmeal, wheat bread and pasta, sweet potatoes. Limit sugary treats.  Check a fasting sugar first thing in the AM once daily and keep a log of the results to bring to your next office visit.  Please contact office if your sugars are consistently >140.  Reevaluate in 3 months.     Chest pain/jaw pain  Stat CT of your chest ordered for ongoing chest pain/jaw pain.  Remain in the area until you hear from this office with the result.  You will need to have blood work done first as I am ordering the test with IV contrast.  If you should have increasing chest pain, shortness of breath, chest pressure I highly advised that you call 911 to be taken to the emergency room.    *This note was dictated using DRAGON speech  recognition software and was corrected for spelling or grammatical errors, but despite proofreading several typographical errors might be present that might affect the meaning of the content.*  Mary Lou Valerio CNP  Advanced care planning was discussed with Uma Middleton today. We reviewed code status, Medical Power of , and Living will. Pt has LW or POA.       Time Spent  Prep time on day of patient encounter: 5 minutes  Time spent directly with patient, family or caregiver: 20 minutes  Documentation Time: 5 minutes

## 2024-07-23 NOTE — PATIENT INSTRUCTIONS
Diabetes Type 2  A1C is now 6.1 from 5.7   Continue current medication.  Continue work on diet - recommend lots of fruits and vegetables, lean protein like chicken, turkey, fish, beans and Greek yogurt. Try to choose healthier carbohydrate options like oatmeal, wheat bread and pasta, sweet potatoes. Limit sugary treats.  Check a fasting sugar first thing in the AM once daily and keep a log of the results to bring to your next office visit.  Please contact office if your sugars are consistently >140.  Reevaluate in 3 months.     Chest pain/jaw pain  Stat CT of your chest ordered for ongoing chest pain/jaw pain.  Remain in the area until you hear from this office with the result.  You will need to have blood work done first as I am ordering the test with IV contrast.  If you should have increasing chest pain, shortness of breath, chest pressure I highly advised that you call 911 to be taken to the emergency room.

## 2024-07-24 DIAGNOSIS — R00.0 TACHYCARDIA, UNSPECIFIED: ICD-10-CM

## 2024-07-24 DIAGNOSIS — R07.89 OTHER CHEST PAIN: Primary | ICD-10-CM

## 2024-07-24 DIAGNOSIS — R68.84 JAW PAIN: ICD-10-CM

## 2024-07-24 DIAGNOSIS — I25.110 ATHEROSCLEROSIS OF NATIVE CORONARY ARTERY OF NATIVE HEART WITH UNSTABLE ANGINA PECTORIS (MULTI): ICD-10-CM

## 2024-07-31 ENCOUNTER — APPOINTMENT (OUTPATIENT)
Dept: CARDIOLOGY | Facility: CLINIC | Age: 74
End: 2024-07-31
Payer: MEDICARE

## 2024-08-25 NOTE — PROGRESS NOTES
Subjective      Chief Complaint   Patient presents with    Establish Care     Referred by Mary Lou Valerio NP for jaw pain, lightheadedness and shortness of breath.    Shortness of Breath        74-year-old female presents for cardiac evaluation.  She was seen in her primary care physician's office about a month ago complaining of chest and jaw pain that she has had in the past.  She mentioned that she has had negative stress test for the same complaint, most recent in 2020.  She had an EKG in the office showing sinus rhythm without any ischemic changes.  She underwent a CT scan of her chest with contrast that only commented on coronary artery calcifications.    She has been having pain in her chest intermittently with radiation to her neck/jaw occasionally, has had it for years but more intense/frequent over the last month. She was given ntg by her PCP which has helped. She has no other associated symptoms         Review of Systems   Constitutional: Negative for diaphoresis, fever, weight gain and weight loss.   Eyes:  Negative for visual disturbance.   Cardiovascular:  Positive for chest pain and dyspnea on exertion. Negative for claudication, irregular heartbeat, leg swelling, near-syncope, orthopnea, palpitations, paroxysmal nocturnal dyspnea and syncope.   Respiratory:  Negative for cough, shortness of breath and wheezing.    Musculoskeletal:  Negative for muscle weakness and myalgias.   Neurological:  Negative for dizziness and weakness.   All other systems reviewed and are negative.       Past Medical History:   Diagnosis Date    Adhesive capsulitis of right shoulder 10/21/2013    Adhesive capsulitis of right shoulder    Central retinal vein occlusion, right eye (CMS-HCC)     Dermatochalasis of both upper eyelids     Dry eye syndrome of bilateral lacrimal glands     Elevated blood-pressure reading, without diagnosis of hypertension     Elevated blood pressure reading without diagnosis of hypertension     Encounter for screening for osteoporosis 05/08/2017    Osteoporosis screening    GERD (gastroesophageal reflux disease)     Inconclusive mammogram 01/23/2017    Breast density    Lattice degeneration of retina, bilateral     Other specified disorders of eustachian tube, bilateral 05/08/2017    Dysfunction of Eustachian tube, bilateral    Otitis media, unspecified, right ear 12/18/2015    Acute right otitis media    Personal history of other diseases of the digestive system 09/30/2013    History of esophageal reflux    Personal history of other diseases of the digestive system     History of cholelithiasis    Personal history of other diseases of the respiratory system 10/18/2016    History of acute bronchitis with bronchospasm    Primary osteoarthritis, unspecified hand 02/09/2015    Osteoarthritis, hand    Sacroiliitis, not elsewhere classified (CMS-HCC) 04/26/2016    Sacroiliitis    Sciatica, right side 04/26/2016    Sciatica, right    Unspecified optic atrophy         Past Surgical History:   Procedure Laterality Date    CATARACT EXTRACTION  02/28/2014    Cataract Surgery    CATARACT EXTRACTION W/  INTRAOCULAR LENS IMPLANT Bilateral     OD 07/18/2013 +15.5D, OS 12/05/2013 +15.5D    OTHER SURGICAL HISTORY  06/25/2013    Treatment Of The Left Ankle    OTHER SURGICAL HISTORY  06/25/2013    History Of Prior Surgery    [place] OSTEOTOMY MANDIBLE SEGMENTAL  1969    SHOULDER SURGERY  02/28/2014    Shoulder Surgery        Social History     Socioeconomic History    Marital status:      Spouse name: Not on file    Number of children: Not on file    Years of education: Not on file    Highest education level: Not on file   Occupational History    Not on file   Tobacco Use    Smoking status: Former     Types: Cigarettes     Passive exposure: Past    Smokeless tobacco: Never    Tobacco comments:     Quit 2011   Vaping Use    Vaping status: Never Used   Substance and Sexual Activity    Alcohol use: Not Currently     Comment:  1 cocktail/ mo maybe    Drug use: Never    Sexual activity: Defer   Other Topics Concern    Not on file   Social History Narrative    Not on file     Social Determinants of Health     Financial Resource Strain: Not on file   Food Insecurity: Not on file   Transportation Needs: Not on file   Physical Activity: Not on file   Stress: Not on file   Social Connections: Not on file   Intimate Partner Violence: Not on file   Housing Stability: Not on file        Family History   Problem Relation Name Age of Onset    Hypertension Mother Marilia     Heart disease Father Rd     Hypertension Father Rd         OBJECTIVE:    Vitals:    08/26/24 1430   BP: 130/60   Pulse: 77   SpO2: 99%        Vitals reviewed.   Constitutional:       Appearance: Normal and healthy appearance. Not in distress.   Pulmonary:      Effort: Pulmonary effort is normal.      Breath sounds: Normal breath sounds.   Cardiovascular:      Normal rate. Regular rhythm. Normal S1. Normal S2.       Murmurs: There is no murmur.      No gallop.  No click.   Pulses:     Intact distal pulses.   Edema:     Peripheral edema absent.   Skin:     General: Skin is warm and dry.   Neurological:      General: No focal deficit present.          Lab Review:   Lab Results   Component Value Date     04/19/2024    K 4.1 04/19/2024     04/19/2024    CO2 25 04/19/2024    BUN 12 04/19/2024    CREATININE 0.79 07/23/2024    GLUCOSE 106 (H) 04/19/2024    CALCIUM 9.3 04/19/2024     Lab Results   Component Value Date    CHOL 185 04/19/2024    TRIG 133 04/19/2024    HDL 60.0 04/19/2024       Lab Results   Component Value Date    LDLCALC 98 04/19/2024        Angina pectoris (CMS-[facility])  Typical features, presumed ASHD. Will arrange for an exercise SPECT ST for further risk stratification    ASHD (arteriosclerotic heart disease)  Presumed, lipid panel reviewed LDL should be <70, atorvastatin to 40mg    Primary hypertension  Controlled, continue current medical therapy

## 2024-08-26 ENCOUNTER — OFFICE VISIT (OUTPATIENT)
Dept: CARDIOLOGY | Facility: CLINIC | Age: 74
End: 2024-08-26
Payer: MEDICARE

## 2024-08-26 VITALS
SYSTOLIC BLOOD PRESSURE: 130 MMHG | HEART RATE: 77 BPM | BODY MASS INDEX: 33.37 KG/M2 | OXYGEN SATURATION: 99 % | WEIGHT: 226 LBS | DIASTOLIC BLOOD PRESSURE: 60 MMHG

## 2024-08-26 DIAGNOSIS — E78.2 MIXED HYPERLIPIDEMIA: ICD-10-CM

## 2024-08-26 DIAGNOSIS — I25.10 ASHD (ARTERIOSCLEROTIC HEART DISEASE): Primary | ICD-10-CM

## 2024-08-26 DIAGNOSIS — I10 PRIMARY HYPERTENSION: ICD-10-CM

## 2024-08-26 DIAGNOSIS — I20.9 ANGINA PECTORIS (CMS-HCC): ICD-10-CM

## 2024-08-26 PROCEDURE — 1036F TOBACCO NON-USER: CPT | Performed by: INTERNAL MEDICINE

## 2024-08-26 PROCEDURE — 3048F LDL-C <100 MG/DL: CPT | Performed by: INTERNAL MEDICINE

## 2024-08-26 PROCEDURE — 99204 OFFICE O/P NEW MOD 45 MIN: CPT | Performed by: INTERNAL MEDICINE

## 2024-08-26 PROCEDURE — 3061F NEG MICROALBUMINURIA REV: CPT | Performed by: INTERNAL MEDICINE

## 2024-08-26 PROCEDURE — 99214 OFFICE O/P EST MOD 30 MIN: CPT | Performed by: INTERNAL MEDICINE

## 2024-08-26 PROCEDURE — 3075F SYST BP GE 130 - 139MM HG: CPT | Performed by: INTERNAL MEDICINE

## 2024-08-26 PROCEDURE — 1159F MED LIST DOCD IN RCRD: CPT | Performed by: INTERNAL MEDICINE

## 2024-08-26 PROCEDURE — 1126F AMNT PAIN NOTED NONE PRSNT: CPT | Performed by: INTERNAL MEDICINE

## 2024-08-26 PROCEDURE — 3078F DIAST BP <80 MM HG: CPT | Performed by: INTERNAL MEDICINE

## 2024-08-26 PROCEDURE — 1157F ADVNC CARE PLAN IN RCRD: CPT | Performed by: INTERNAL MEDICINE

## 2024-08-26 RX ORDER — ATORVASTATIN CALCIUM 40 MG/1
40 TABLET, FILM COATED ORAL DAILY
Qty: 90 TABLET | Refills: 1 | Status: SHIPPED | OUTPATIENT
Start: 2024-08-26 | End: 2024-08-26

## 2024-08-26 RX ORDER — ATORVASTATIN CALCIUM 40 MG/1
40 TABLET, FILM COATED ORAL DAILY
Qty: 90 TABLET | Refills: 3 | Status: SHIPPED | OUTPATIENT
Start: 2024-08-26 | End: 2025-02-22

## 2024-08-26 ASSESSMENT — ENCOUNTER SYMPTOMS
NEAR-SYNCOPE: 0
DIZZINESS: 0
MYALGIAS: 0
WEAKNESS: 0
SYNCOPE: 0
IRREGULAR HEARTBEAT: 0
DYSPNEA ON EXERTION: 1
COUGH: 0
DIAPHORESIS: 0
WHEEZING: 0
PALPITATIONS: 0
PND: 0
WEIGHT LOSS: 0
OCCASIONAL FEELINGS OF UNSTEADINESS: 1
CLAUDICATION: 0
WEIGHT GAIN: 0
DEPRESSION: 0
ORTHOPNEA: 0
LOSS OF SENSATION IN FEET: 0
SHORTNESS OF BREATH: 0
FEVER: 0

## 2024-08-26 ASSESSMENT — PAIN SCALES - GENERAL: PAINLEVEL: 0-NO PAIN

## 2024-08-26 NOTE — ASSESSMENT & PLAN NOTE
Typical features, presumed ASHD. Will arrange for an exercise SPECT ST for further risk stratification

## 2024-09-12 ENCOUNTER — APPOINTMENT (OUTPATIENT)
Dept: SLEEP MEDICINE | Facility: CLINIC | Age: 74
End: 2024-09-12
Payer: MEDICARE

## 2024-09-12 VITALS
DIASTOLIC BLOOD PRESSURE: 76 MMHG | SYSTOLIC BLOOD PRESSURE: 128 MMHG | OXYGEN SATURATION: 98 % | WEIGHT: 220 LBS | HEIGHT: 69 IN | HEART RATE: 63 BPM | BODY MASS INDEX: 32.58 KG/M2

## 2024-09-12 DIAGNOSIS — G47.30 SLEEP APNEA, UNSPECIFIED TYPE: Primary | ICD-10-CM

## 2024-09-12 DIAGNOSIS — G47.00 INSOMNIA, UNSPECIFIED TYPE: ICD-10-CM

## 2024-09-12 DIAGNOSIS — H34.8112 STABLE CENTRAL RETINAL VEIN OCCLUSION OF RIGHT EYE (CMS-HCC): ICD-10-CM

## 2024-09-12 PROCEDURE — 99214 OFFICE O/P EST MOD 30 MIN: CPT | Performed by: INTERNAL MEDICINE

## 2024-09-12 RX ORDER — ZOLPIDEM TARTRATE 5 MG/1
5 TABLET ORAL NIGHTLY PRN
Qty: 1 TABLET | Refills: 0 | Status: SHIPPED | OUTPATIENT
Start: 2024-09-12 | End: 2024-09-13

## 2024-09-12 ASSESSMENT — SLEEP AND FATIGUE QUESTIONNAIRES
HOW LIKELY ARE YOU TO NOD OFF OR FALL ASLEEP WHILE SITTING QUIETLY AFTER LUNCH WITHOUT ALCOHOL: WOULD NEVER DOZE
ESS-CHAD TOTAL SCORE: 3
HOW LIKELY ARE YOU TO NOD OFF OR FALL ASLEEP WHILE SITTING AND TALKING TO SOMEONE: WOULD NEVER DOZE
SITING INACTIVE IN A PUBLIC PLACE LIKE A CLASS ROOM OR A MOVIE THEATER: WOULD NEVER DOZE
HOW LIKELY ARE YOU TO NOD OFF OR FALL ASLEEP WHILE LYING DOWN TO REST IN THE AFTERNOON WHEN CIRCUMSTANCES PERMIT: HIGH CHANCE OF DOZING
HOW LIKELY ARE YOU TO NOD OFF OR FALL ASLEEP WHEN YOU ARE A PASSENGER IN A CAR FOR AN HOUR WITHOUT A BREAK: WOULD NEVER DOZE
HOW LIKELY ARE YOU TO NOD OFF OR FALL ASLEEP WHILE SITTING AND READING: WOULD NEVER DOZE
HOW LIKELY ARE YOU TO NOD OFF OR FALL ASLEEP WHILE WATCHING TV: WOULD NEVER DOZE
HOW LIKELY ARE YOU TO NOD OFF OR FALL ASLEEP IN A CAR, WHILE STOPPED FOR A FEW MINUTES IN TRAFFIC: WOULD NEVER DOZE

## 2024-09-12 ASSESSMENT — PAIN SCALES - GENERAL: PAINLEVEL: 0-NO PAIN

## 2024-09-12 NOTE — PATIENT INSTRUCTIONS
Call  the  Sleep Center ([phone]) to speak with a sleep testing center  to book your overnight sleep study procedure at one of our adult and pediatric-friendly sleep labs. Overnight sleep studies may be scheduled on a weekday or weekend.      We have child-life services on a case-by-case basis at the [facility] location. We also perform daytime testing for shift workers on a case by case basis.     For the study: Bring usual medications and nightly routine items for your sleep study.  You may wish to bring a snack and nightly routine items you feel would be important to help you sleep (e.g. favorite pillow). Bring your sleep logs/requested paperwork to your sleep study appointment.     Results of your sleep study will be given to the ordering clinician. Please contact their office for results or followup as directed by your clinician. For additional information about the sleep medicine services, please call [phone].     Locations for sleep studies are [facility], [place], [place], [place], [place], [place], and [place].

## 2024-09-12 NOTE — ASSESSMENT & PLAN NOTE
She did not sleep at all during her in lab PSG. Will order HSAT and provide a sleep aid. Recommended to not mix with her gabapentin, or alcohol. Also to be aware of potential balance issues and risk for falls with increased potential risk of hip fracture due to falls in geriatric population. Uma verbalized awareness.  Recommend autoPAP 5-15 cmH2O if we due identify ALMA due to eye issues

## 2024-09-12 NOTE — PROGRESS NOTES
"  Subjective   Patient ID: Uma Middleton is a 74 y.o. female who presents for Review Sleep Study Results.  HPI    Prior Sleep History:  5/1/2024: Office Visit: Dr. Chao Turpin: Referred due to central retinal vein occlusion snoring with suspicion of ALMA.  Using yamileth      Current Sleep History:  Uma presents to review the results of their sleep study and discuss management.  7/1/2024: Diagnostic polysomnography: First night effect of the device in bed and TV on until after midnight.  AHI 3% 45.8, AHI 4% 8.7, AYDIN 0 O2 nitin 90%.  Insufficient sleep to get an accurate diagnosis with less than 30 minutes of sleep time.  Poor sleep hygiene was observed during testing  ESS: 3     Review of Systems  Review of systems negative except as per HPI  Objective   /76   Pulse 63   Ht 1.753 m (5' 9\")   Wt 99.8 kg (220 lb)   SpO2 98%   BMI 32.49 kg/m²    PREVIOUS WEIGHTS:  Wt Readings from Last 3 Encounters:   09/12/24 99.8 kg (220 lb)   08/26/24 103 kg (226 lb)   07/23/24 98.4 kg (217 lb)     Physical Exam  PHYSICAL EXAM: GENERAL: alert pleasant and cooperative no acute distress  PSYCH EXAM: alert,oriented, in NAD with a full range of affect, normal behavior and no psychotic features    No results found for: \"IRON\", \"TIBC\", \"FERRITIN\"     Assessment/Plan   Problem List Items Addressed This Visit             ICD-10-CM    Stable central retinal vein occlusion of right eye (CMS-HCC) H34.8112    Sleep apnea, unspecified - Primary G47.30     She did not sleep at all during her in lab PSG. Will order HSAT and provide a sleep aid. Recommended to not mix with her gabapentin, or alcohol. Also to be aware of potential balance issues and risk for falls with increased potential risk of hip fracture due to falls in geriatric population. Uma verbalized awareness.  Recommend autoPAP 5-15 cmH2O if we due identify ALMA due to eye issues         Relevant Orders    Home sleep apnea test (HSAT)    Insomnia G47.00     First night " effect, poor sleep hygiene and delayed circadian rhythm likely contributory to poor sleep during testing         Relevant Medications    zolpidem (Ambien) 5 mg tablet

## 2024-09-12 NOTE — ASSESSMENT & PLAN NOTE
First night effect, poor sleep hygiene and delayed circadian rhythm likely contributory to poor sleep during testing

## 2024-09-13 ENCOUNTER — HOSPITAL ENCOUNTER (OUTPATIENT)
Dept: CARDIOLOGY | Facility: HOSPITAL | Age: 74
Discharge: HOME | End: 2024-09-13
Payer: MEDICARE

## 2024-09-13 ENCOUNTER — HOSPITAL ENCOUNTER (OUTPATIENT)
Dept: RADIOLOGY | Facility: HOSPITAL | Age: 74
Discharge: HOME | End: 2024-09-13
Payer: MEDICARE

## 2024-09-13 DIAGNOSIS — I20.9 ANGINA PECTORIS (CMS-HCC): ICD-10-CM

## 2024-09-13 PROCEDURE — 3430000001 HC RX 343 DIAGNOSTIC RADIOPHARMACEUTICALS: Performed by: INTERNAL MEDICINE

## 2024-09-13 PROCEDURE — 93017 CV STRESS TEST TRACING ONLY: CPT

## 2024-09-13 PROCEDURE — 78452 HT MUSCLE IMAGE SPECT MULT: CPT

## 2024-09-13 PROCEDURE — A9502 TC99M TETROFOSMIN: HCPCS | Performed by: INTERNAL MEDICINE

## 2024-09-24 ENCOUNTER — OFFICE VISIT (OUTPATIENT)
Dept: CARDIOLOGY | Facility: CLINIC | Age: 74
End: 2024-09-24
Payer: MEDICARE

## 2024-09-24 ENCOUNTER — PREP FOR PROCEDURE (OUTPATIENT)
Dept: CARDIOLOGY | Facility: CLINIC | Age: 74
End: 2024-09-24
Payer: MEDICARE

## 2024-09-24 VITALS
DIASTOLIC BLOOD PRESSURE: 80 MMHG | HEART RATE: 64 BPM | SYSTOLIC BLOOD PRESSURE: 122 MMHG | OXYGEN SATURATION: 98 % | BODY MASS INDEX: 33.37 KG/M2 | WEIGHT: 226 LBS

## 2024-09-24 DIAGNOSIS — E78.5 HYPERLIPIDEMIA, UNSPECIFIED HYPERLIPIDEMIA TYPE: ICD-10-CM

## 2024-09-24 DIAGNOSIS — I25.10 ASHD (ARTERIOSCLEROTIC HEART DISEASE): Primary | ICD-10-CM

## 2024-09-24 DIAGNOSIS — I20.9 ANGINA PECTORIS: ICD-10-CM

## 2024-09-24 DIAGNOSIS — R93.1 ABNORMAL FINDINGS ON DIAGNOSTIC IMAGING OF HEART AND CORONARY CIRCULATION: ICD-10-CM

## 2024-09-24 DIAGNOSIS — I20.9 ANGINA PECTORIS: Primary | ICD-10-CM

## 2024-09-24 PROCEDURE — 3048F LDL-C <100 MG/DL: CPT | Performed by: INTERNAL MEDICINE

## 2024-09-24 PROCEDURE — 1157F ADVNC CARE PLAN IN RCRD: CPT | Performed by: INTERNAL MEDICINE

## 2024-09-24 PROCEDURE — 1126F AMNT PAIN NOTED NONE PRSNT: CPT | Performed by: INTERNAL MEDICINE

## 2024-09-24 PROCEDURE — 1159F MED LIST DOCD IN RCRD: CPT | Performed by: INTERNAL MEDICINE

## 2024-09-24 PROCEDURE — 3074F SYST BP LT 130 MM HG: CPT | Performed by: INTERNAL MEDICINE

## 2024-09-24 PROCEDURE — 3061F NEG MICROALBUMINURIA REV: CPT | Performed by: INTERNAL MEDICINE

## 2024-09-24 PROCEDURE — 1036F TOBACCO NON-USER: CPT | Performed by: INTERNAL MEDICINE

## 2024-09-24 PROCEDURE — 3079F DIAST BP 80-89 MM HG: CPT | Performed by: INTERNAL MEDICINE

## 2024-09-24 PROCEDURE — 99212 OFFICE O/P EST SF 10 MIN: CPT | Performed by: INTERNAL MEDICINE

## 2024-09-24 ASSESSMENT — ENCOUNTER SYMPTOMS
DEPRESSION: 0
OCCASIONAL FEELINGS OF UNSTEADINESS: 0
LOSS OF SENSATION IN FEET: 0

## 2024-09-24 ASSESSMENT — PAIN SCALES - GENERAL: PAINLEVEL: 0-NO PAIN

## 2024-09-24 NOTE — PROGRESS NOTES
Subjective      Chief Complaint   Patient presents with    Follow-up     ST results        74-year-old female seen last month for cardiac evaluation.  She was seen in her primary care physician's office about a month ago complaining of chest and jaw pain that she has had in the past.  She mentioned that she has had negative stress test for the same complaint, most recent in 2020.  She had an EKG in the office showing sinus rhythm without any ischemic changes.  She underwent a CT scan of her chest with contrast that only commented on coronary artery calcifications. We arranged for a SPECT ST. I also increased her atorvastatin, goal LDL is <70.         ROS     Past Medical History:   Diagnosis Date    Adhesive capsulitis of right shoulder 10/21/2013    Adhesive capsulitis of right shoulder    Central retinal vein occlusion, right eye (CMS-HCC)     Dermatochalasis of both upper eyelids     Dry eye syndrome of bilateral lacrimal glands     Elevated blood-pressure reading, without diagnosis of hypertension     Elevated blood pressure reading without diagnosis of hypertension    Encounter for screening for osteoporosis 05/08/2017    Osteoporosis screening    GERD (gastroesophageal reflux disease)     Inconclusive mammogram 01/23/2017    Breast density    Lattice degeneration of retina, bilateral     Other specified disorders of eustachian tube, bilateral 05/08/2017    Dysfunction of Eustachian tube, bilateral    Otitis media, unspecified, right ear 12/18/2015    Acute right otitis media    Personal history of other diseases of the digestive system 09/30/2013    History of esophageal reflux    Personal history of other diseases of the digestive system     History of cholelithiasis    Personal history of other diseases of the respiratory system 10/18/2016    History of acute bronchitis with bronchospasm    Primary osteoarthritis, unspecified hand 02/09/2015    Osteoarthritis, hand    Sacroiliitis, not elsewhere classified  (CMS-[facility]) 04/26/2016    Sacroiliitis    Sciatica, right side 04/26/2016    Sciatica, right    Unspecified optic atrophy         Past Surgical History:   Procedure Laterality Date    CATARACT EXTRACTION  02/28/2014    Cataract Surgery    CATARACT EXTRACTION W/  INTRAOCULAR LENS IMPLANT Bilateral     OD 07/18/2013 +15.5D, OS 12/05/2013 +15.5D    OTHER SURGICAL HISTORY  06/25/2013    Treatment Of The Left Ankle    OTHER SURGICAL HISTORY  06/25/2013    History Of Prior Surgery    [place] OSTE[place] MANDIBLE SEGMENTAL  1969    SHOULDER SURGERY  02/28/2014    Shoulder Surgery        Social History     Socioeconomic History    Marital status:      Spouse name: Not on file    Number of children: Not on file    Years of education: Not on file    Highest education level: Not on file   Occupational History    Not on file   Tobacco Use    Smoking status: Former     Types: Cigarettes     Passive exposure: Past    Smokeless tobacco: Never    Tobacco comments:     Quit 2011   Vaping Use    Vaping status: Never Used   Substance and Sexual Activity    Alcohol use: Not Currently     Comment: 1 cocktail/ mo maybe    Drug use: Never    Sexual activity: Defer   Other Topics Concern    Not on file   Social History Narrative    Not on file     Social Determinants of Health     Financial Resource Strain: Not on file   Food Insecurity: Not on file   Transportation Needs: Not on file   Physical Activity: Not on file   Stress: Not on file   Social Connections: Not on file   Intimate Partner Violence: Not on file   Housing Stability: Not on file        Family History   Problem Relation Name Age of Onset    Hypertension Mother Marilia     Heart disease Father Rd     Hypertension Father Rd         OBJECTIVE:    Vitals:    09/24/24 1604   BP: 122/80   Pulse: 64   SpO2: 98%        Physical Exam     Lab Review:   Lab Results   Component Value Date     04/19/2024    K 4.1 04/19/2024     04/19/2024    CO2 25 04/19/2024    BUN 12  04/19/2024    CREATININE 0.79 07/23/2024    GLUCOSE 106 (H) 04/19/2024    CALCIUM 9.3 04/19/2024     Lab Results   Component Value Date    CHOL 185 04/19/2024    TRIG 133 04/19/2024    HDL 60.0 04/19/2024       Lab Results   Component Value Date    LDLCALC 98 04/19/2024        ASHD (arteriosclerotic heart disease)  Presumed based on CAC. Her SPECT ST shows a fixed apical perfusion abnormality with some adjacent reversibility    Angina pectoris (CMS-HCC)  Apical perfusion abnormalities seen on SPECT imaging. CCS class III angina on 2-3 anti-anginals including a BB. I would recommend coronary angiography with possible percutaneous coronary intervention if appropriate.

## 2024-09-24 NOTE — ASSESSMENT & PLAN NOTE
Presumed based on CAC. Her SPECT ST shows a fixed apical perfusion abnormality with some adjacent reversibility

## 2024-09-24 NOTE — ASSESSMENT & PLAN NOTE
Apical perfusion abnormalities seen on SPECT imaging. CCS class III angina on 2-3 anti-anginals including a BB. I would recommend coronary angiography with possible percutaneous coronary intervention if appropriate.

## 2024-09-25 PROBLEM — R93.1 ABNORMAL FINDINGS ON DIAGNOSTIC IMAGING OF HEART AND CORONARY CIRCULATION: Status: ACTIVE | Noted: 2024-09-24

## 2024-10-07 DIAGNOSIS — I10 PRIMARY HYPERTENSION: ICD-10-CM

## 2024-10-08 RX ORDER — METOPROLOL SUCCINATE 25 MG/1
25 TABLET, EXTENDED RELEASE ORAL NIGHTLY
Qty: 90 TABLET | Refills: 1 | Status: SHIPPED | OUTPATIENT
Start: 2024-10-08 | End: 2025-04-06

## 2024-10-08 RX ORDER — AMLODIPINE AND BENAZEPRIL HYDROCHLORIDE 5; 10 MG/1; MG/1
1 CAPSULE ORAL DAILY
Qty: 90 CAPSULE | Refills: 1 | Status: SHIPPED | OUTPATIENT
Start: 2024-10-08 | End: 2025-04-06

## 2024-10-10 NOTE — H&P
History Of Present Illness  Uma Middleton is a 74 y.o. female presenting with history of angina pectoris, ASHD. She was seen in her primary care physician's office complaining of chest and jaw pain. 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 Otherwise no myocardial ischemia, left ventricle is normal sized, EF greater than 65%.  Prior symptoms of chest pain for which she underwent stress test 11/2020 showed Normal stress myocardial perfusion imaging and Well-maintained left ventricular function.  EKG in the office showing sinus rhythm without any ischemic changes. She underwent a CT scan of her chest with contrast that only commented on coronary artery calcifications. Pt endorses chest pain that radiates to neck, intermittent BERKOWITZ and dizziness.     Nuclear stress test 9/13/2024  Impression:  1. No evidence of inducible myocardial ischemia. 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  2. The left ventricle is normal in size.  3. Normal LV wall motion with a post-stress LV EF estimated greater  than 65%.            Past Medical History:  Past Medical History:   Diagnosis Date    Adhesive capsulitis of right shoulder 10/21/2013    Adhesive capsulitis of right shoulder    Central retinal vein occlusion, right eye (CMS-HCC)     Dermatochalasis of both upper eyelids     Dry eye syndrome of bilateral lacrimal glands     Elevated blood-pressure reading, without diagnosis of hypertension     Elevated blood pressure reading without diagnosis of hypertension    Encounter for screening for osteoporosis 05/08/2017    Osteoporosis screening    GERD (gastroesophageal reflux disease)     Inconclusive mammogram 01/23/2017    Breast  density    Lattice degeneration of retina, bilateral     Other specified disorders of eustachian tube, bilateral 05/08/2017    Dysfunction of Eustachian tube, bilateral    Otitis media, unspecified, right ear 12/18/2015    Acute right otitis media    Personal history of other diseases of the digestive system 09/30/2013    History of esophageal reflux    Personal history of other diseases of the digestive system     History of cholelithiasis    Personal history of other diseases of the respiratory system 10/18/2016    History of acute bronchitis with bronchospasm    Primary osteoarthritis, unspecified hand 02/09/2015    Osteoarthritis, hand    Sacroiliitis, not elsewhere classified (CMS-HCC) 04/26/2016    Sacroiliitis    Sciatica, right side 04/26/2016    Sciatica, right    Unspecified optic atrophy         Past Surgical History:  Past Surgical History:   Procedure Laterality Date    CATARACT EXTRACTION  02/28/2014    Cataract Surgery    CATARACT EXTRACTION W/  INTRAOCULAR LENS IMPLANT Bilateral     OD 07/18/2013 +15.5D, OS 12/05/2013 +15.5D    OTHER SURGICAL HISTORY  06/25/2013    Treatment Of The Left Ankle    OTHER SURGICAL HISTORY  06/25/2013    History Of Prior Surgery    OK OSTEOTOMY MANDIBLE SEGMENTAL  1969    SHOULDER SURGERY  02/28/2014    Shoulder Surgery          Social History:   reports that she has quit smoking. Her smoking use included cigarettes. She has been exposed to tobacco smoke. She has never used smokeless tobacco. She reports that she does not currently use alcohol. She reports that she does not use drugs.     Family History:  Family History   Problem Relation Name Age of Onset    Hypertension Mother Marilia     Heart disease Father Rd     Hypertension Father Rd         Allergies:  Allergies   Allergen Reactions    Fire Ant Shortness of breath    Erythromycin Itching     Itching with erythromycin ophthalmic ointment    Tea Tree Oil Hives        Home Medications:  Current Outpatient  Medications   Medication Instructions    amLODIPine-benazepriL (Lotrel) 5-10 mg capsule 1 capsule, oral, Daily    atorvastatin (LIPITOR) 40 mg, oral, Daily    EPINEPHrine (EpiPen 2-Darius) 0.3 mg/0.3 mL injection syringe 1 Syringe, intramuscular, Once as needed    fluticasone (Flonase) 50 mcg/actuation nasal spray 1-2 sprays, Each Nostril, Daily    gabapentin (NEURONTIN) 100 mg, oral, Nightly    metoprolol succinate XL (TOPROL-XL) 25 mg, oral, Nightly    nitroglycerin (NITROSTAT) 0.4 mg, sublingual, Every 5 min PRN, May repeat every 5 minutes for up to 3 doses. If second dose needed please call 911 when you take the second dose.    omeprazole OTC (PriLOSEC OTC) 20 mg EC tablet 1 tablet, oral, Daily    zolpidem (AMBIEN) 5 mg, oral, Nightly PRN       Inpatient Medications:            Review of Systems   Constitutional: Negative.  Negative for fatigue.   HENT: Negative.     Eyes: Negative.    Respiratory:  Positive for shortness of breath (intermittent BERKOWITZ).    Cardiovascular:  Positive for chest pain. Negative for leg swelling.   Gastrointestinal: Negative.    Endocrine: Negative.    Genitourinary: Negative.    Musculoskeletal: Negative.    Skin: Negative.    Allergic/Immunologic: Negative.    Neurological: Negative.    Hematological: Negative.    Psychiatric/Behavioral: Negative.            Physical Exam  Constitutional:       General: She is awake. She is not in acute distress.     Appearance: Normal appearance. She is not ill-appearing.   HENT:      Head: Normocephalic and atraumatic.      Mouth/Throat:      Mouth: Mucous membranes are moist.      Pharynx: Oropharynx is clear.   Cardiovascular:      Rate and Rhythm: Normal rate and regular rhythm.      Pulses:           Radial pulses are 2+ on the right side and 2+ on the left side.        Dorsalis pedis pulses are 2+ on the right side and 2+ on the left side.      Heart sounds: Normal heart sounds. No murmur heard.  Pulmonary:      Effort: Pulmonary effort is normal.       Breath sounds: Normal breath sounds.   Abdominal:      General: Bowel sounds are normal.      Palpations: Abdomen is soft.   Musculoskeletal:         General: Normal range of motion.      Cervical back: Normal range of motion and neck supple.      Right lower leg: No edema.      Left lower leg: No edema.   Skin:     General: Skin is warm and dry.      Capillary Refill: Capillary refill takes less than 2 seconds.   Neurological:      General: No focal deficit present.      Mental Status: She is alert and oriented to person, place, and time. Mental status is at baseline.      Cranial Nerves: Cranial nerves 2-12 are intact.   Psychiatric:         Mood and Affect: Mood and affect normal.         Behavior: Behavior normal.        Sedation Plan    ASA 3     Mallampati class: III.           NPO since 1900 on 10/13/2024    Last Recorded Vitals  Blood pressure 164/78, pulse 78, temperature 36.8 °C (98.3 °F), temperature source Temporal, resp. rate 18, SpO2 98%.         Vitals from the Past 24 Hours  Heart Rate:  [78]   Temp:  [36.8 °C (98.3 °F)]   Resp:  [18]   BP: (164)/(78)   SpO2:  [98 %]          Relevant Results    Labs    CBC:   Recent Labs     10/14/24  0955 04/19/24  1147 01/19/23  1124 09/30/21  1052 10/11/18  1444 11/01/17  1230   WBC 6.2 7.2 6.8 6.8 6.6 8.1   HGB 13.9 14.0 14.0 13.7 13.5 14.0   HCT 42.4 44.1 44.4* 43.4 42.4 43.0    249 274 272 258 300   MCV 86 88 87.1 91 94 88     BMP/CMP:   Recent Labs     07/23/24  1218 04/19/24  1147 01/19/23  1124 05/20/22  1110 09/30/21  1052 10/11/18  1444   NA  --  140 141 142 140 138   K  --  4.1 3.9 4.6 4.4 4.2   CL  --  103 107 106 104 103   BUN  --  12 14 15 14 21   CREATININE 0.79 0.80 0.6 0.8 0.82 0.78   CO2  --  25 19* 25 29 28   CALCIUM  --  9.3 9.4 9.3 9.4 9.6   PROT  --  7.1 7.6 7.6 7.3 7.0   BILITOT  --  0.5 0.4 0.3 0.5 0.4   ALKPHOS  --  135* 137* 116 105 96   ALT  --  51* 40 32 27 22   AST  --  34 30 22 20 17   GLUCOSE  --  106* 136* 111* 99 75     "  Lipid Panel:   Recent Labs     04/19/24  1147 01/19/23  1124 09/30/21  1052 10/11/18  1444   CHOL 185 166 188 168   HDL 60.0 54 68.2 55.4   CHHDL 3.1 3.1 2.8 3.0   VLDL  --   --  27 21   TRIG 133 156* 134 103     Cardiac       No lab exists for component: \"CK\", \"CKMBP\"   Hemoglobin A1C:   Recent Labs     07/23/24  1107 04/19/24  1105 12/22/23  0902 10/25/23  1113 01/19/23  1124 05/20/22  1110 09/30/21  1052 10/20/17  0945   HGBA1C 6.1 5.7 5.6 5.5 6.7* 5.8 5.4 6.0     TSH/ Free T4:   Recent Labs     04/19/24  1147 01/25/24  1040 01/19/23  1124 05/20/22  1110 09/30/21  1052   TSH 1.32 1.34 0.87 1.40 1.04       Past Cardiology Tests (Last 3 Years):    EKG:  No results for input(s): \"ATRRATE\", \"VENTRATE\", \"PRINT\", \"QRSDUR\", \"QTCFRED\", \"QTCCALCB\" in the last 93885 hours.  Encounter Date: 07/23/24   ECG 12 lead (Clinic Performed)    Narrative    NSR possible left atrial enlargement. No change from previous     STUDY:  CT CHEST W IV CONTRAST;  7/23/2024 1:14 pm      INDICATION:  Signs/Symptoms:CHEST PAIN, JAW PAIN.      COMPARISON:  None.      ACCESSION NUMBER(S):  [id number]      ORDERING CLINICIAN:  CLEO RADER      TECHNIQUE:  Helical data acquisition of the chest was obtained  without IV  contrast material.  Images were reformatted in axial, coronal, and  sagittal planes.      FINDINGS:  LUNGS and AIRWAYS:  The lungs are relatively clear without discrete infiltrate.      MEDIASTINUM and LEANA, LOWER NECK AND AXILLA:  The visualized thyroid gland is within normal limits.      No evidence of thoracic lymphadenopathy by CT criteria.      HEART and VESSELS:  The thoracic aorta is of normal course and caliber without  significant atherosclerotic calcification .      Main pulmonary artery and its branches are normal in caliber.      There is moderate coronary artery atherosclerotic calcification  primarily involving the LAD.      The cardiac chambers are not enlarged.      UPPER ABDOMEN:  The visualized " subdiaphragmatic structures demonstrate no remarkable  findings.      CHEST WALL, OSSEOUS STRUCTURES AND OTHER FINDINGS:  There are no suspicious osseous lesions.      IMPRESSION:  1.  Atherosclerotic coronary artery calcification. Otherwise  unremarkable without acute findings.      Signed by: Juan Keen 7/23/2024 1:40 PM  Dictation workstation:   [id number]    Stress Test:    STUDY:  NUCLEAR STRESS TEST;  9/13/2024 9:08 am      INDICATION:  Signs/Symptoms:angina.      ,I20.9 Angina pectoris, unspecified (CMS-[facility])      COMPARISON:  11/24/2020 cardiac stress test      ACCESSION NUMBER(S):  [id number]      ORDERING CLINICIAN:  JUAN ROMERO      TECHNIQUE:  DIVISION OF NUCLEAR MEDICINE  STRESS MYOCARDIAL PERFUSION SCAN, ONE DAY PROTOCOL      The patient received an intravenous injection of 10.8 mCi of Tc-99m  Myoview and resting emission tomographic (SPECT) images of the  myocardium were acquired. The patient then underwent treadmill stress  exercising to 97 % of MPHR and achieving 5.3 METS.  At peak stress an  additional injection of  31.5 mCi of Tc-99m Myoview was administered  and stress phase SPECT images of the myocardium were then acquired.  This acquisition included ECG-gated images to assess and quantify  ventricular function. Low dose CT was acquired for attenuation  correction.      FINDINGS:  Predominantly fixed small-sized moderate to severe perfusion defects  within the apex, extends to apical anterior and apical septal wall  with minimal reversibility in the apical anterior wall, likely  representing prior infarction with jagdish-infarct ischemia.  Otherwise,  both stress and rest studies demonstrate grossly normal perfusion  throughout the remaining left ventricle.      The left ventricle is normal in size.      Gated images demonstrate normal LV wall motion with a post-stress LV  EF estimated greater than 65%.      Attenuation correction CT images demonstrate aortic calcified  atherosclerosis.       IMPRESSION:  1. No evidence of inducible myocardial ischemia. 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  2. The left ventricle is normal in size.  3. Normal LV wall motion with a post-stress LV EF estimated greater  than 65%.      I personally reviewed the images/study and I agree with the findings  as stated.  This study was interpreted at [facility], [place], [place].      MACRO:  None      Signed by: Esteban Blakely 9/13/2024 2:30 PM  Dictation workstation:   [id number]              Nuclear:  NM CARDIAC STRESS REST (MYOCARDIAL PERFUSION MIBI) 11/24/2020    Narrative  MRN: [id number]  Patient Name: TADEO CRUM    STUDY:  CARDIAC STRESS/REST (MYOCARDIAL PERFUSION/MIBI);  11/24/2020 11:25 am    INDICATION:    R07.9 CHEST PAIN. This is a 70 year old female, former smoker, with a  past medical history notable for hypertension, hyperlipidemia. At a  recent outpatient visit with her primary care physician, the patient  reported a 1 week history of exertional substernal chest discomfort  radiating to the jaw.    COMPARISON:  None.    ACCESSION NUMBER(S):  [id number]    ORDERING CLINICIAN:  SANNA REYNA    TECHNIQUE:  DIVISION OF NUCLEAR MEDICINE  STRESS MYOCARDIAL PERFUSION SCAN, ONE DAY PROTOCOL    The patient received an intravenous dose of 10.6 mCi of Tc-99m  Myoview and resting emission tomographic (SPECT) images of the  myocardium were acquired. The patient then exercised via treadmill  stress to  97 % of MPHR and achieved 7.0 METS. At peak stress 33.8  mCi of Tc-99m  Myoview were administered and stress phase SPECT  images of the myocardium were then acquired. These included ECG-gated  images to assess and quantify ventricular function. A low dose, low  resolution CT scan  of the chest was obtained for attenuation purposes.    FINDINGS:  Stress and rest images both demonstrate a normal distribution of  perfusion throughout all LV segments with no sign of ischemia or  infarct.    ECG-gated images demonstrate normal LV size and myocardial  contractility with an LV ejection fraction of 65% (normal above 45  percent).    CT scan of the chest demonstrates vascular calcifications involving  the thoracic aorta and coronary arteries.    Impression  1. Normal stress myocardial perfusion imaging.  2. Well-maintained left ventricular function.      I personally reviewed the images/study and I agree with the findings  as stated. This study was interpreted at [facility], [place], [place].    Metabolic Stress: No results found for this or any previous visit from the past 1800 days.    Cardiac Imaging:  Cardiac Scoring: No results found for this or any previous visit from the past 1800 days.    Cardiac MRI: No results found for this or any previous visit from the past 1800 days.         Assessment/Plan  Assessment/Plan   Principal Problem:    Abnormal findings on diagnostic imaging of heart and coronary circulation  Active Problems:    Angina pectoris      ASHD  Angina pectoris  Apical perfusion abnormalties    Here for left heart cath with Dr. Cueto on 10/14/2024.          NP discussed with Dr. Cueto regarding plan of care/ discharge plan      I spent 35 minutes in the professional and overall care of this patient.      Tiffanie Fernandez, APRN-CNP

## 2024-10-14 ENCOUNTER — HOSPITAL ENCOUNTER (OUTPATIENT)
Facility: HOSPITAL | Age: 74
Setting detail: OUTPATIENT SURGERY
Discharge: HOME | End: 2024-10-14
Attending: INTERNAL MEDICINE | Admitting: INTERNAL MEDICINE
Payer: MEDICARE

## 2024-10-14 VITALS
HEART RATE: 62 BPM | DIASTOLIC BLOOD PRESSURE: 83 MMHG | BODY MASS INDEX: 33.63 KG/M2 | OXYGEN SATURATION: 100 % | RESPIRATION RATE: 15 BRPM | HEIGHT: 69 IN | TEMPERATURE: 98.3 F | SYSTOLIC BLOOD PRESSURE: 161 MMHG | WEIGHT: 227.07 LBS

## 2024-10-14 DIAGNOSIS — I20.9 ANGINA PECTORIS: Primary | ICD-10-CM

## 2024-10-14 DIAGNOSIS — I25.10 ASHD (ARTERIOSCLEROTIC HEART DISEASE): ICD-10-CM

## 2024-10-14 DIAGNOSIS — I25.110 ATHEROSCLEROSIS OF NATIVE CORONARY ARTERY OF NATIVE HEART WITH UNSTABLE ANGINA PECTORIS: ICD-10-CM

## 2024-10-14 DIAGNOSIS — R93.1 ABNORMAL FINDINGS ON DIAGNOSTIC IMAGING OF HEART AND CORONARY CIRCULATION: ICD-10-CM

## 2024-10-14 LAB
ANION GAP SERPL CALCULATED.3IONS-SCNC: 10 MMOL/L (ref 10–20)
BUN SERPL-MCNC: 14 MG/DL (ref 6–23)
CALCIUM SERPL-MCNC: 9.2 MG/DL (ref 8.6–10.3)
CHLORIDE SERPL-SCNC: 108 MMOL/L (ref 98–107)
CO2 SERPL-SCNC: 26 MMOL/L (ref 21–32)
CREAT SERPL-MCNC: 0.75 MG/DL (ref 0.5–1.05)
EGFRCR SERPLBLD CKD-EPI 2021: 85 ML/MIN/1.73M*2
ERYTHROCYTE [DISTWIDTH] IN BLOOD BY AUTOMATED COUNT: 13.3 % (ref 11.5–14.5)
GLUCOSE SERPL-MCNC: 128 MG/DL (ref 74–99)
HCT VFR BLD AUTO: 42.4 % (ref 36–46)
HGB BLD-MCNC: 13.9 G/DL (ref 12–16)
MCH RBC QN AUTO: 28.3 PG (ref 26–34)
MCHC RBC AUTO-ENTMCNC: 32.8 G/DL (ref 32–36)
MCV RBC AUTO: 86 FL (ref 80–100)
NRBC BLD-RTO: 0 /100 WBCS (ref 0–0)
PLATELET # BLD AUTO: 245 X10*3/UL (ref 150–450)
POTASSIUM SERPL-SCNC: 3.6 MMOL/L (ref 3.5–5.3)
RBC # BLD AUTO: 4.92 X10*6/UL (ref 4–5.2)
SODIUM SERPL-SCNC: 140 MMOL/L (ref 136–145)
WBC # BLD AUTO: 6.2 X10*3/UL (ref 4.4–11.3)

## 2024-10-14 PROCEDURE — 2720000007 HC OR 272 NO HCPCS: Performed by: INTERNAL MEDICINE

## 2024-10-14 PROCEDURE — 96373 THER/PROPH/DIAG INJ IA: CPT | Performed by: INTERNAL MEDICINE

## 2024-10-14 PROCEDURE — 2500000004 HC RX 250 GENERAL PHARMACY W/ HCPCS (ALT 636 FOR OP/ED): Performed by: INTERNAL MEDICINE

## 2024-10-14 PROCEDURE — 36415 COLL VENOUS BLD VENIPUNCTURE: CPT | Performed by: NURSE PRACTITIONER

## 2024-10-14 PROCEDURE — 99153 MOD SED SAME PHYS/QHP EA: CPT | Performed by: INTERNAL MEDICINE

## 2024-10-14 PROCEDURE — C1760 CLOSURE DEV, VASC: HCPCS | Performed by: INTERNAL MEDICINE

## 2024-10-14 PROCEDURE — 99152 MOD SED SAME PHYS/QHP 5/>YRS: CPT | Performed by: INTERNAL MEDICINE

## 2024-10-14 PROCEDURE — 80048 BASIC METABOLIC PNL TOTAL CA: CPT | Performed by: NURSE PRACTITIONER

## 2024-10-14 PROCEDURE — C1769 GUIDE WIRE: HCPCS | Performed by: INTERNAL MEDICINE

## 2024-10-14 PROCEDURE — 2550000001 HC RX 255 CONTRASTS: Performed by: INTERNAL MEDICINE

## 2024-10-14 PROCEDURE — C1894 INTRO/SHEATH, NON-LASER: HCPCS | Performed by: INTERNAL MEDICINE

## 2024-10-14 PROCEDURE — 7100000009 HC PHASE TWO TIME - INITIAL BASE CHARGE: Performed by: INTERNAL MEDICINE

## 2024-10-14 PROCEDURE — 2500000005 HC RX 250 GENERAL PHARMACY W/O HCPCS: Performed by: INTERNAL MEDICINE

## 2024-10-14 PROCEDURE — 85027 COMPLETE CBC AUTOMATED: CPT | Performed by: NURSE PRACTITIONER

## 2024-10-14 PROCEDURE — 93458 L HRT ARTERY/VENTRICLE ANGIO: CPT | Performed by: INTERNAL MEDICINE

## 2024-10-14 PROCEDURE — 7100000010 HC PHASE TWO TIME - EACH INCREMENTAL 1 MINUTE: Performed by: INTERNAL MEDICINE

## 2024-10-14 RX ORDER — NITROGLYCERIN 40 MG/100ML
INJECTION INTRAVENOUS AS NEEDED
Status: DISCONTINUED | OUTPATIENT
Start: 2024-10-14 | End: 2024-10-14 | Stop reason: HOSPADM

## 2024-10-14 RX ORDER — VERAPAMIL HYDROCHLORIDE 2.5 MG/ML
INJECTION, SOLUTION INTRAVENOUS AS NEEDED
Status: DISCONTINUED | OUTPATIENT
Start: 2024-10-14 | End: 2024-10-14 | Stop reason: HOSPADM

## 2024-10-14 RX ORDER — MIDAZOLAM HYDROCHLORIDE 1 MG/ML
INJECTION, SOLUTION INTRAMUSCULAR; INTRAVENOUS AS NEEDED
Status: DISCONTINUED | OUTPATIENT
Start: 2024-10-14 | End: 2024-10-14 | Stop reason: HOSPADM

## 2024-10-14 RX ORDER — HEPARIN SODIUM 1000 [USP'U]/ML
INJECTION, SOLUTION INTRAVENOUS; SUBCUTANEOUS AS NEEDED
Status: DISCONTINUED | OUTPATIENT
Start: 2024-10-14 | End: 2024-10-14 | Stop reason: HOSPADM

## 2024-10-14 RX ORDER — FENTANYL CITRATE 50 UG/ML
INJECTION, SOLUTION INTRAMUSCULAR; INTRAVENOUS AS NEEDED
Status: DISCONTINUED | OUTPATIENT
Start: 2024-10-14 | End: 2024-10-14 | Stop reason: HOSPADM

## 2024-10-14 RX ORDER — LIDOCAINE HYDROCHLORIDE 10 MG/ML
INJECTION, SOLUTION EPIDURAL; INFILTRATION; INTRACAUDAL; PERINEURAL AS NEEDED
Status: DISCONTINUED | OUTPATIENT
Start: 2024-10-14 | End: 2024-10-14 | Stop reason: HOSPADM

## 2024-10-14 RX ORDER — ACETAMINOPHEN 325 MG/1
650 TABLET ORAL EVERY 6 HOURS PRN
Status: DISCONTINUED | OUTPATIENT
Start: 2024-10-14 | End: 2024-10-14 | Stop reason: HOSPADM

## 2024-10-14 RX ORDER — NAPROXEN SODIUM 220 MG/1
81 TABLET, FILM COATED ORAL DAILY
Qty: 90 TABLET | Refills: 3 | Status: SHIPPED | OUTPATIENT
Start: 2024-10-14 | End: 2025-10-14

## 2024-10-14 RX ORDER — NAPROXEN SODIUM 220 MG/1
81 TABLET, FILM COATED ORAL DAILY
Qty: 90 TABLET | Refills: 3 | Status: SHIPPED | OUTPATIENT
Start: 2024-10-14 | End: 2024-10-14

## 2024-10-14 ASSESSMENT — COLUMBIA-SUICIDE SEVERITY RATING SCALE - C-SSRS
1. IN THE PAST MONTH, HAVE YOU WISHED YOU WERE DEAD OR WISHED YOU COULD GO TO SLEEP AND NOT WAKE UP?: NO
6. HAVE YOU EVER DONE ANYTHING, STARTED TO DO ANYTHING, OR PREPARED TO DO ANYTHING TO END YOUR LIFE?: NO
2. HAVE YOU ACTUALLY HAD ANY THOUGHTS OF KILLING YOURSELF?: NO

## 2024-10-14 ASSESSMENT — ENCOUNTER SYMPTOMS
GASTROINTESTINAL NEGATIVE: 1
ALLERGIC/IMMUNOLOGIC NEGATIVE: 1
SHORTNESS OF BREATH: 1
MUSCULOSKELETAL NEGATIVE: 1
FATIGUE: 0
HEMATOLOGIC/LYMPHATIC NEGATIVE: 1
CONSTITUTIONAL NEGATIVE: 1
PSYCHIATRIC NEGATIVE: 1
ENDOCRINE NEGATIVE: 1
NEUROLOGICAL NEGATIVE: 1
EYES NEGATIVE: 1

## 2024-10-14 ASSESSMENT — PAIN SCALES - GENERAL: PAINLEVEL_OUTOF10: 0 - NO PAIN

## 2024-10-14 ASSESSMENT — PAIN - FUNCTIONAL ASSESSMENT: PAIN_FUNCTIONAL_ASSESSMENT: 0-10

## 2024-10-14 NOTE — POST-PROCEDURE NOTE
Physician Transition of Care Summary  Invasive Cardiovascular Lab    Procedure Date: 10/14/2024  Attending:    * Juan Cueto - Primary  Resident/Fellow/Other Assistant: Surgeons and Role:  * No surgeons found with a matching role *    Indications:   Pre-op Diagnosis      * Angina pectoris [I20.9]     * Abnormal findings on diagnostic imaging of heart and coronary circulation [R93.1]    Post-procedure diagnosis:   Post-op Diagnosis     * Angina pectoris [I20.9]     * Abnormal findings on diagnostic imaging of heart and coronary circulation [R93.1]    Procedure(s):   Left Heart Cath  13123 - [place] CATH PLMT L HRT & ARTS W/NJX & ANGIO IMG S&I        Procedure Findings:   Significant multivessel and bifurcation disease involving the proximal LAD and first diagonal and second obtuse marginal.  Moderate nonobstructive disease otherwise      Description of the Procedure:   The patient was brought to the Cath Lab in fasting state and prepped and draped in sterile fashion with particular attention to the right wrist.  A formal timeout was performed to ensure proper patient as well as site indication procedure.  1% lidocaine solution was used to anesthetize the area overlying the right radial artery just proximal to wrist.  A standard radial needle was used for access and ultimately upsized to a 6 Barbadian radial sheath.  An intra-arterial admixture of 5 mg verapamil and 200 mcg nitroglycerin was injected via the sheath.  We then proceed with the diagnostic portion exam advancing a 6 Barbadian JR 5 diagnostic catheter over a hydrophilic baby J-wire to the level of the coronary cusps.  The wire was withdrawn and the catheter was aspirated and flushed.  We engaged the catheter into the right coronary artery performed angiography and disengage.  We then remove this catheter and exchanged it in standard exchange fashion for a 6 Barbadian JL 3.5 diagnostic catheter.  The wire was withdrawn and the catheter was aspirated and flushed.  We  then engaged the catheter into the left main performed angiography and disengaged.  We then exchanged this catheter in the same fashion for a 5 Wallisian 3 DRC then a 5 Wallisian JR5 diagnostic catheter for better engagement and angiography of the right coronary artery.  Following this the catheter was removed over a wire and a TR band was placed with adequate hemostasis upon removal of the sheath.    LMCA-large-caliber vessel that bifurcates the LAD left circumflex arteries.  There are luminal irregularities.    LAD-also large caliber vessel.  There is a 40% ostial LAD lesion.  The LAD gives rise to 1 significant branch to diagonal and a recurrent apical branch.  In the proximal LAD prior to the diagonal there is a 70 to 80% lesion.  The first diagonal has 80% ostial disease.  In the body of the first diagonal prior to its bifurcation there is an 80% lesion as well.    RCA-moderate nonobstructive disease throughout this vessel.  There is approximately 40 to 50% ostial disease and I suspect significant vasospasm at the ostium as well.    Complications:   None    Stents/Implants:   Implants       No implant documentation for this case.            Anticoagulation/Antiplatelet Plan:   Aspirin    Estimated Blood Loss:   10 mL    Anesthesia: Moderate Sedation Anesthesia Staff: No anesthesia staff entered.    Any Specimen(s) Removed:   Order Name Source Comment Collection Info Order Time   CBC Blood, Venous  Collected By: Yovana Treadwell RN 10/14/2024  9:27 AM     Release result to [facility]   Immediate        BASIC METABOLIC PANEL Blood, Venous  Collected By: Yovana Treadwell RN 10/14/2024  9:27 AM     Release result to [facility]   Immediate            Disposition:   [facility]      Electronically signed by: Juan Cueto DO, 10/14/2024 1:59 PM

## 2024-10-14 NOTE — DISCHARGE INSTRUCTIONS

## 2024-10-15 ENCOUNTER — OFFICE VISIT (OUTPATIENT)
Dept: CARDIAC SURGERY | Facility: CLINIC | Age: 74
End: 2024-10-15
Payer: MEDICARE

## 2024-10-15 ENCOUNTER — PREP FOR PROCEDURE (OUTPATIENT)
Dept: CARDIAC SURGERY | Facility: CLINIC | Age: 74
End: 2024-10-15

## 2024-10-15 VITALS
HEIGHT: 69 IN | WEIGHT: 228 LBS | RESPIRATION RATE: 18 BRPM | BODY MASS INDEX: 33.77 KG/M2 | OXYGEN SATURATION: 99 % | HEART RATE: 73 BPM | SYSTOLIC BLOOD PRESSURE: 105 MMHG | DIASTOLIC BLOOD PRESSURE: 64 MMHG

## 2024-10-15 DIAGNOSIS — I25.119 CORONARY ARTERY DISEASE INVOLVING NATIVE CORONARY ARTERY OF NATIVE HEART WITH ANGINA PECTORIS: Primary | ICD-10-CM

## 2024-10-15 DIAGNOSIS — R07.89 OTHER CHEST PAIN: ICD-10-CM

## 2024-10-15 DIAGNOSIS — I79.8 OTHER DISORDERS OF ARTERIES, ARTERIOLES AND CAPILLARIES IN DISEASES CLASSIFIED ELSEWHERE: ICD-10-CM

## 2024-10-15 DIAGNOSIS — I25.728 ATHEROSCLEROSIS OF AUTOLOGOUS ARTERY CORONARY ARTERY BYPASS GRAFT(S) WITH OTHER FORMS OF ANGINA PECTORIS: ICD-10-CM

## 2024-10-15 DIAGNOSIS — I25.10 CAD IN NATIVE ARTERY: Primary | ICD-10-CM

## 2024-10-15 DIAGNOSIS — I25.118 CORONARY ARTERY DISEASE OF NATIVE ARTERY OF NATIVE HEART WITH STABLE ANGINA PECTORIS: Primary | ICD-10-CM

## 2024-10-15 PROCEDURE — 3048F LDL-C <100 MG/DL: CPT | Performed by: THORACIC SURGERY (CARDIOTHORACIC VASCULAR SURGERY)

## 2024-10-15 PROCEDURE — 3061F NEG MICROALBUMINURIA REV: CPT | Performed by: THORACIC SURGERY (CARDIOTHORACIC VASCULAR SURGERY)

## 2024-10-15 PROCEDURE — 3008F BODY MASS INDEX DOCD: CPT | Performed by: THORACIC SURGERY (CARDIOTHORACIC VASCULAR SURGERY)

## 2024-10-15 PROCEDURE — 1159F MED LIST DOCD IN RCRD: CPT | Performed by: THORACIC SURGERY (CARDIOTHORACIC VASCULAR SURGERY)

## 2024-10-15 PROCEDURE — 3078F DIAST BP <80 MM HG: CPT | Performed by: THORACIC SURGERY (CARDIOTHORACIC VASCULAR SURGERY)

## 2024-10-15 PROCEDURE — 3074F SYST BP LT 130 MM HG: CPT | Performed by: THORACIC SURGERY (CARDIOTHORACIC VASCULAR SURGERY)

## 2024-10-15 PROCEDURE — 1036F TOBACCO NON-USER: CPT | Performed by: THORACIC SURGERY (CARDIOTHORACIC VASCULAR SURGERY)

## 2024-10-15 PROCEDURE — 1157F ADVNC CARE PLAN IN RCRD: CPT | Performed by: THORACIC SURGERY (CARDIOTHORACIC VASCULAR SURGERY)

## 2024-10-15 PROCEDURE — 1126F AMNT PAIN NOTED NONE PRSNT: CPT | Performed by: THORACIC SURGERY (CARDIOTHORACIC VASCULAR SURGERY)

## 2024-10-15 PROCEDURE — 99214 OFFICE O/P EST MOD 30 MIN: CPT | Performed by: THORACIC SURGERY (CARDIOTHORACIC VASCULAR SURGERY)

## 2024-10-15 PROCEDURE — 99204 OFFICE O/P NEW MOD 45 MIN: CPT | Performed by: THORACIC SURGERY (CARDIOTHORACIC VASCULAR SURGERY)

## 2024-10-15 ASSESSMENT — ENCOUNTER SYMPTOMS
LOSS OF SENSATION IN FEET: 0
DEPRESSION: 0
OCCASIONAL FEELINGS OF UNSTEADINESS: 0

## 2024-10-15 ASSESSMENT — LIFESTYLE VARIABLES
AUDIT-C TOTAL SCORE: 1
HOW OFTEN DO YOU HAVE SIX OR MORE DRINKS ON ONE OCCASION: NEVER
HOW MANY STANDARD DRINKS CONTAINING ALCOHOL DO YOU HAVE ON A TYPICAL DAY: 1 OR 2
HOW OFTEN DO YOU HAVE A DRINK CONTAINING ALCOHOL: MONTHLY OR LESS
SKIP TO QUESTIONS 9-10: 1

## 2024-10-15 ASSESSMENT — PAIN SCALES - GENERAL
PAINLEVEL: 0-NO PAIN
PAINLEVEL_OUTOF10: 0 - NO PAIN

## 2024-10-15 NOTE — PROGRESS NOTES
Subjective   Uma Middleton is a 74 y.o. female referred by Rom for coronary bypass grafting. She reports chest pain. She denies orthopnea and palpitations.      Past Medical History:   Diagnosis Date    Adhesive capsulitis of right shoulder 10/21/2013    Adhesive capsulitis of right shoulder    Central retinal vein occlusion, right eye (CMS-HCC)     Dermatochalasis of both upper eyelids     Dry eye syndrome of bilateral lacrimal glands     Elevated blood-pressure reading, without diagnosis of hypertension     Elevated blood pressure reading without diagnosis of hypertension    Encounter for screening for osteoporosis 05/08/2017    Osteoporosis screening    GERD (gastroesophageal reflux disease)     Inconclusive mammogram 01/23/2017    Breast density    Lattice degeneration of retina, bilateral     Other specified disorders of eustachian tube, bilateral 05/08/2017    Dysfunction of Eustachian tube, bilateral    Otitis media, unspecified, right ear 12/18/2015    Acute right otitis media    Personal history of other diseases of the digestive system 09/30/2013    History of esophageal reflux    Personal history of other diseases of the digestive system     History of cholelithiasis    Personal history of other diseases of the respiratory system 10/18/2016    History of acute bronchitis with bronchospasm    Primary osteoarthritis, unspecified hand 02/09/2015    Osteoarthritis, hand    Sacroiliitis, not elsewhere classified (CMS-HCC) 04/26/2016    Sacroiliitis    Sciatica, right side 04/26/2016    Sciatica, right    Unspecified optic atrophy      Past Surgical History:   Procedure Laterality Date    CATARACT EXTRACTION  02/28/2014    Cataract Surgery    CATARACT EXTRACTION W/  INTRAOCULAR LENS IMPLANT Bilateral     OD 07/18/2013 +15.5D, OS 12/05/2013 +15.5D    OTHER SURGICAL HISTORY  06/25/2013    Treatment Of The Left Ankle    OTHER SURGICAL HISTORY  06/25/2013    History Of Prior Surgery    [place] OSTEOTOMY MANDIBLE  SEGMENTAL  1969    SHOULDER SURGERY  02/28/2014    Shoulder Surgery     Family History   Problem Relation Name Age of Onset    Hypertension Mother Marilia     Heart disease Father Rd     Hypertension Father Rd        Allergies   Allergen Reactions    Fire Ant Shortness of breath    Erythromycin Itching     Itching with erythromycin ophthalmic ointment    Tea Tree Oil Hives         Current Outpatient Medications:     amLODIPine-benazepriL (Lotrel) 5-10 mg capsule, Take 1 capsule by mouth once daily., Disp: 90 capsule, Rfl: 1    aspirin 81 mg chewable tablet, Chew 1 tablet (81 mg) once daily., Disp: 90 tablet, Rfl: 3    atorvastatin (Lipitor) 40 mg tablet, Take 1 tablet (40 mg) by mouth once daily., Disp: 90 tablet, Rfl: 3    EPINEPHrine (EpiPen 2-Darius) 0.3 mg/0.3 mL injection syringe, Inject 0.3 mL (0.3 mg) into the muscle 1 time if needed., Disp: , Rfl:     fluticasone (Flonase) 50 mcg/actuation nasal spray, Administer 1-2 sprays into each nostril once daily., Disp: , Rfl:     gabapentin (Neurontin) 100 mg capsule, Take 1 capsule (100 mg) by mouth once daily at bedtime., Disp: 30 capsule, Rfl: 1    metoprolol succinate XL (Toprol-XL) 25 mg 24 hr tablet, Take 1 tablet (25 mg) by mouth once daily at bedtime., Disp: 90 tablet, Rfl: 1    nitroglycerin (Nitrostat) 0.4 mg SL tablet, Place 1 tablet (0.4 mg) under the tongue every 5 minutes if needed for chest pain. May repeat every 5 minutes for up to 3 doses. If second dose needed please call 911 when you take the second dose., Disp: 100 tablet, Rfl: 11    omeprazole OTC (PriLOSEC OTC) 20 mg EC tablet, Take 1 tablet (20 mg) by mouth once daily., Disp: , Rfl:     zolpidem (Ambien) 5 mg tablet, Take 1 tablet (5 mg) by mouth as needed at bedtime for sleep for up to 1 day., Disp: 1 tablet, Rfl: 0  No current facility-administered medications for this visit.    Review of systems negative .       Objective   Cardiac Studies  Cardiac catheterization revealed anterior  "ischemia.    EF: >50%  Vessels: Single vessel disease: LAD    Physical Exam  General: She is a pleasant female currently in no distress.  /64   Pulse 73   Resp 18   Ht 1.753 m (5' 9\")   Wt 103 kg (228 lb)   SpO2 99%   BMI 33.67 kg/m²    Body mass index is 33.67 kg/m².   HEENT: Normocephalic and atraumatic. PERRLA. EOMs are full. Dentition is unremarkable.  NECK: Supple without thyromegaly, masses, or carotid bruits.  CHEST: Clear.  HEART: Regular rate and rhythm.  ABDOMEN: Soft, flat, nontender without organomegaly or masses.  NEUROLOGIC: Unremarkable.  EXTREMITIES: Unremarkable. Pedal pulses are palpable.    Data Review:       Assessment/Plan   Anum is a 74-year-old patient that has been sent to our outpatient clinic after being diagnosed with single-vessel symptomatic disease.  She has been cath yesterday by Dr. Snyder where a complex LAD diagonal severe lesion was diagnosed.  She was sent to our outpatient care for further evaluation.  She clearly relate that she started having chest and jaw pain for least a few years become increasingly worse in the last few months.  Her primary physician sent her for further evaluation with Dr. Snyder that prompted her left heart cath with the diagnosis of LAD diagonal disease with moderate RCA and a 60% small circumflex lesion.  I think this patient have class I indication for CABG, she may benefit from a mini invasive approach with a LIMA to LAD and if the diagonal is big enough as a Y graft to the diagonal.  We extensive discussed with the patient the risk of the procedure considering the risk of the stroke and conversion and as well the benefit in terms of symptom relief and long-term prognosis and she agreed to proceed.  We will book the surgery for November 8 at the [facility] [place] as a robotic mini invasive CABG x 2.  All the preop test has been ordered.  The patient consented for the surgery.  Problem List Items Addressed This Visit    None      No orders of the " defined types were placed in this encounter.

## 2024-10-15 NOTE — PROGRESS NOTES
Subjective   Patient is a 74 y.o. female who presents with aortic valve disease secondary to { ETIOLOGY:33597}. Current symptoms that the patient reports are {SYMPTOMS:36013}. Echocardiography reveals {ECHO FINDINGS:09070}. Other cardiac history includes {diagnoses; cardiac:79487}. Additional pertinant findings include { FINDINGS:15584}. Cardiac risk factors include {findings; risks cardiac:88907}.    Patient Active Problem List    Diagnosis Date Noted    Insomnia 09/12/2024    ASHD (arteriosclerotic heart disease) 08/26/2024    Jaw pain 07/23/2024    Angina pectoris 07/23/2024    Atherosclerosis of native coronary artery of native heart with unstable angina pectoris 07/23/2024    Tachycardia 07/23/2024    Acute non-recurrent frontal sinusitis 05/23/2024    Sleep apnea, unspecified 05/01/2024    Snoring 05/01/2024    Aortic aneurysm without rupture, unspecified portion of aorta (CMS-HCC) 04/19/2024    Type 2 diabetes mellitus with hyperglycemia, without long-term current use of insulin 12/22/2023    Dry eyes 12/13/2023    Pseudophakia of both eyes 12/13/2023    Abnormal bone density screening 08/31/2023    Abnormal mammogram 08/31/2023    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    Aleman's esophagus 08/31/2023    Migraine with aura and without status migrainosus, not intractable 08/31/2023    Borderline hyperglycemia 08/31/2023    Stable central retinal vein occlusion of right eye (CMS-HCC) 08/31/2023    DJD (degenerative joint disease), thoracic 08/31/2023    Fatty liver 08/31/2023    Gastroesophageal reflux disease 08/31/2023    Hyperlipidemia 08/31/2023    Macular atrophy, retinal 08/31/2023    Neuropathy 08/31/2023    Optic nerve atrophy 08/31/2023    Primary hypertension 08/31/2023    Retinal lattice degeneration 08/31/2023    Spondylosis of lumbar spine 08/31/2023    Vitamin D deficiency 08/31/2023    Sacroiliac (ligament) sprain 03/01/2017    Atypical lobular hyperplasia of right breast 01/31/2017    Abnormal  findings on diagnostic imaging of heart and coronary circulation 09/24/2024     Past Medical History:   Diagnosis Date    Adhesive capsulitis of right shoulder 10/21/2013    Adhesive capsulitis of right shoulder    Central retinal vein occlusion, right eye (CMS-HCC)     Dermatochalasis of both upper eyelids     Dry eye syndrome of bilateral lacrimal glands     Elevated blood-pressure reading, without diagnosis of hypertension     Elevated blood pressure reading without diagnosis of hypertension    Encounter for screening for osteoporosis 05/08/2017    Osteoporosis screening    GERD (gastroesophageal reflux disease)     Inconclusive mammogram 01/23/2017    Breast density    Lattice degeneration of retina, bilateral     Other specified disorders of eustachian tube, bilateral 05/08/2017    Dysfunction of Eustachian tube, bilateral    Otitis media, unspecified, right ear 12/18/2015    Acute right otitis media    Personal history of other diseases of the digestive system 09/30/2013    History of esophageal reflux    Personal history of other diseases of the digestive system     History of cholelithiasis    Personal history of other diseases of the respiratory system 10/18/2016    History of acute bronchitis with bronchospasm    Primary osteoarthritis, unspecified hand 02/09/2015    Osteoarthritis, hand    Sacroiliitis, not elsewhere classified (CMS-HCC) 04/26/2016    Sacroiliitis    Sciatica, right side 04/26/2016    Sciatica, right    Unspecified optic atrophy       Past Surgical History:   Procedure Laterality Date    CATARACT EXTRACTION  02/28/2014    Cataract Surgery    CATARACT EXTRACTION W/  INTRAOCULAR LENS IMPLANT Bilateral     OD 07/18/2013 +15.5D, OS 12/05/2013 +15.5D    OTHER SURGICAL HISTORY  06/25/2013    Treatment Of The Left Ankle    OTHER SURGICAL HISTORY  06/25/2013    History Of Prior Surgery    [place] OSTEOTOMY MANDIBLE SEGMENTAL  1969    SHOULDER SURGERY  02/28/2014    Shoulder Surgery      (Not in a  hospital admission)      Review of systems negative except for ***.      Data Review: {icu labs:90815}    ECG: {EC}

## 2024-10-15 NOTE — H&P (VIEW-ONLY)
Subjective   Uma Middleton is a 74 y.o. female referred by Rom for coronary bypass grafting. She reports chest pain. She denies orthopnea and palpitations.      Past Medical History:   Diagnosis Date    Adhesive capsulitis of right shoulder 10/21/2013    Adhesive capsulitis of right shoulder    Central retinal vein occlusion, right eye (CMS-HCC)     Dermatochalasis of both upper eyelids     Dry eye syndrome of bilateral lacrimal glands     Elevated blood-pressure reading, without diagnosis of hypertension     Elevated blood pressure reading without diagnosis of hypertension    Encounter for screening for osteoporosis 05/08/2017    Osteoporosis screening    GERD (gastroesophageal reflux disease)     Inconclusive mammogram 01/23/2017    Breast density    Lattice degeneration of retina, bilateral     Other specified disorders of eustachian tube, bilateral 05/08/2017    Dysfunction of Eustachian tube, bilateral    Otitis media, unspecified, right ear 12/18/2015    Acute right otitis media    Personal history of other diseases of the digestive system 09/30/2013    History of esophageal reflux    Personal history of other diseases of the digestive system     History of cholelithiasis    Personal history of other diseases of the respiratory system 10/18/2016    History of acute bronchitis with bronchospasm    Primary osteoarthritis, unspecified hand 02/09/2015    Osteoarthritis, hand    Sacroiliitis, not elsewhere classified (CMS-HCC) 04/26/2016    Sacroiliitis    Sciatica, right side 04/26/2016    Sciatica, right    Unspecified optic atrophy      Past Surgical History:   Procedure Laterality Date    CATARACT EXTRACTION  02/28/2014    Cataract Surgery    CATARACT EXTRACTION W/  INTRAOCULAR LENS IMPLANT Bilateral     OD 07/18/2013 +15.5D, OS 12/05/2013 +15.5D    OTHER SURGICAL HISTORY  06/25/2013    Treatment Of The Left Ankle    OTHER SURGICAL HISTORY  06/25/2013    History Of Prior Surgery    [place] OSTEOTOMY MANDIBLE  SEGMENTAL  1969    SHOULDER SURGERY  02/28/2014    Shoulder Surgery     Family History   Problem Relation Name Age of Onset    Hypertension Mother Marilia     Heart disease Father Rd     Hypertension Father Rd        Allergies   Allergen Reactions    Fire Ant Shortness of breath    Erythromycin Itching     Itching with erythromycin ophthalmic ointment    Tea Tree Oil Hives         Current Outpatient Medications:     amLODIPine-benazepriL (Lotrel) 5-10 mg capsule, Take 1 capsule by mouth once daily., Disp: 90 capsule, Rfl: 1    aspirin 81 mg chewable tablet, Chew 1 tablet (81 mg) once daily., Disp: 90 tablet, Rfl: 3    atorvastatin (Lipitor) 40 mg tablet, Take 1 tablet (40 mg) by mouth once daily., Disp: 90 tablet, Rfl: 3    EPINEPHrine (EpiPen 2-Darius) 0.3 mg/0.3 mL injection syringe, Inject 0.3 mL (0.3 mg) into the muscle 1 time if needed., Disp: , Rfl:     fluticasone (Flonase) 50 mcg/actuation nasal spray, Administer 1-2 sprays into each nostril once daily., Disp: , Rfl:     gabapentin (Neurontin) 100 mg capsule, Take 1 capsule (100 mg) by mouth once daily at bedtime., Disp: 30 capsule, Rfl: 1    metoprolol succinate XL (Toprol-XL) 25 mg 24 hr tablet, Take 1 tablet (25 mg) by mouth once daily at bedtime., Disp: 90 tablet, Rfl: 1    nitroglycerin (Nitrostat) 0.4 mg SL tablet, Place 1 tablet (0.4 mg) under the tongue every 5 minutes if needed for chest pain. May repeat every 5 minutes for up to 3 doses. If second dose needed please call 911 when you take the second dose., Disp: 100 tablet, Rfl: 11    omeprazole OTC (PriLOSEC OTC) 20 mg EC tablet, Take 1 tablet (20 mg) by mouth once daily., Disp: , Rfl:     zolpidem (Ambien) 5 mg tablet, Take 1 tablet (5 mg) by mouth as needed at bedtime for sleep for up to 1 day., Disp: 1 tablet, Rfl: 0  No current facility-administered medications for this visit.    Review of systems negative .       Objective   Cardiac Studies  Cardiac catheterization revealed anterior  "ischemia.    EF: >50%  Vessels: Single vessel disease: LAD    Physical Exam  General: She is a pleasant female currently in no distress.  /64   Pulse 73   Resp 18   Ht 1.753 m (5' 9\")   Wt 103 kg (228 lb)   SpO2 99%   BMI 33.67 kg/m²    Body mass index is 33.67 kg/m².   HEENT: Normocephalic and atraumatic. PERRLA. EOMs are full. Dentition is unremarkable.  NECK: Supple without thyromegaly, masses, or carotid bruits.  CHEST: Clear.  HEART: Regular rate and rhythm.  ABDOMEN: Soft, flat, nontender without organomegaly or masses.  NEUROLOGIC: Unremarkable.  EXTREMITIES: Unremarkable. Pedal pulses are palpable.    Data Review:       Assessment/Plan   Anum is a 74-year-old patient that has been sent to our outpatient clinic after being diagnosed with single-vessel symptomatic disease.  She has been cath yesterday by Dr. Snyder where a complex LAD diagonal severe lesion was diagnosed.  She was sent to our outpatient care for further evaluation.  She clearly relate that she started having chest and jaw pain for least a few years become increasingly worse in the last few months.  Her primary physician sent her for further evaluation with Dr. Snyder that prompted her left heart cath with the diagnosis of LAD diagonal disease with moderate RCA and a 60% small circumflex lesion.  I think this patient have class I indication for CABG, she may benefit from a mini invasive approach with a LIMA to LAD and if the diagonal is big enough as a Y graft to the diagonal.  We extensive discussed with the patient the risk of the procedure considering the risk of the stroke and conversion and as well the benefit in terms of symptom relief and long-term prognosis and she agreed to proceed.  We will book the surgery for November 8 at the [facility] [place] as a robotic mini invasive CABG x 2.  All the preop test has been ordered.  The patient consented for the surgery.  Problem List Items Addressed This Visit    None      No orders of the " defined types were placed in this encounter.

## 2024-10-16 ENCOUNTER — TELEPHONE (OUTPATIENT)
Dept: CARDIAC SURGERY | Facility: CLINIC | Age: 74
End: 2024-10-16
Payer: COMMERCIAL

## 2024-10-16 NOTE — TELEPHONE ENCOUNTER
Called pt to review medications preoperatively. Instructed her to stop Lotrel 2 days prior to surgery due to combination of ACE property. Continue all other meds as scheduled.   Reports she takes all her pills in the evening.

## 2024-10-23 ENCOUNTER — OFFICE VISIT (OUTPATIENT)
Dept: PRIMARY CARE | Facility: CLINIC | Age: 74
End: 2024-10-23
Payer: MEDICARE

## 2024-10-23 VITALS
SYSTOLIC BLOOD PRESSURE: 117 MMHG | OXYGEN SATURATION: 97 % | DIASTOLIC BLOOD PRESSURE: 68 MMHG | HEIGHT: 69 IN | BODY MASS INDEX: 34.13 KG/M2 | RESPIRATION RATE: 16 BRPM | HEART RATE: 74 BPM | WEIGHT: 230.4 LBS

## 2024-10-23 DIAGNOSIS — Z00.00 MEDICARE ANNUAL WELLNESS VISIT, SUBSEQUENT: ICD-10-CM

## 2024-10-23 DIAGNOSIS — E11.65 TYPE 2 DIABETES MELLITUS WITH HYPERGLYCEMIA, WITHOUT LONG-TERM CURRENT USE OF INSULIN: ICD-10-CM

## 2024-10-23 DIAGNOSIS — I10 PRIMARY HYPERTENSION: Primary | ICD-10-CM

## 2024-10-23 DIAGNOSIS — K21.9 GASTROESOPHAGEAL REFLUX DISEASE, UNSPECIFIED WHETHER ESOPHAGITIS PRESENT: ICD-10-CM

## 2024-10-23 DIAGNOSIS — E78.2 MIXED HYPERLIPIDEMIA: ICD-10-CM

## 2024-10-23 DIAGNOSIS — E55.9 VITAMIN D DEFICIENCY: ICD-10-CM

## 2024-10-23 DIAGNOSIS — H61.22 IMPACTED CERUMEN OF LEFT EAR: ICD-10-CM

## 2024-10-23 DIAGNOSIS — Z23 ENCOUNTER FOR IMMUNIZATION: ICD-10-CM

## 2024-10-23 LAB — POC HEMOGLOBIN A1C: 6.4 % (ref 4.2–6.5)

## 2024-10-23 PROCEDURE — 3078F DIAST BP <80 MM HG: CPT

## 2024-10-23 PROCEDURE — 1157F ADVNC CARE PLAN IN RCRD: CPT

## 2024-10-23 PROCEDURE — 90662 IIV NO PRSV INCREASED AG IM: CPT

## 2024-10-23 PROCEDURE — 1159F MED LIST DOCD IN RCRD: CPT

## 2024-10-23 PROCEDURE — 1124F ACP DISCUSS-NO DSCNMKR DOCD: CPT

## 2024-10-23 PROCEDURE — 3061F NEG MICROALBUMINURIA REV: CPT

## 2024-10-23 PROCEDURE — G0439 PPPS, SUBSEQ VISIT: HCPCS

## 2024-10-23 PROCEDURE — 3074F SYST BP LT 130 MM HG: CPT

## 2024-10-23 PROCEDURE — 3008F BODY MASS INDEX DOCD: CPT

## 2024-10-23 PROCEDURE — 3048F LDL-C <100 MG/DL: CPT

## 2024-10-23 PROCEDURE — 99215 OFFICE O/P EST HI 40 MIN: CPT

## 2024-10-23 PROCEDURE — 83036 HEMOGLOBIN GLYCOSYLATED A1C: CPT | Mod: MUE

## 2024-10-23 PROCEDURE — 1126F AMNT PAIN NOTED NONE PRSNT: CPT

## 2024-10-23 PROCEDURE — 1036F TOBACCO NON-USER: CPT

## 2024-10-23 ASSESSMENT — PAIN SCALES - GENERAL: PAINLEVEL_OUTOF10: 0-NO PAIN

## 2024-10-23 ASSESSMENT — ENCOUNTER SYMPTOMS
OCCASIONAL FEELINGS OF UNSTEADINESS: 0
DEPRESSION: 0
LOSS OF SENSATION IN FEET: 0

## 2024-10-23 NOTE — ASSESSMENT & PLAN NOTE
Decrease intake of saturated fats, fast food, sweets.  Increase intake of fresh fruit fresh vegetables and lean meats.  Increase healthy fats seeds, nuts, olive oil instead of butter.  walk 150 minutes/week for heart health.

## 2024-10-23 NOTE — ASSESSMENT & PLAN NOTE
HYPERTENSION:   Decrease intake of processed foods, fast foods, lunch meat, canned soups, canned veggies.  Increase intake of fresh fruits, veggies, and lean meats. Monitor blood pressure at home, keep a log and bring this with you to your next appointment. Call the office if your blood pressure runs 150/90 or higher consistently.  Blood Pressure Technique:  Sit quietly in a chair for 5 minutes with back supported and feet on the floor  Then place left arm on a table or armrest so bicep area is at the same level of heart or left breast  Check three times in a row, disregard the highest reading and average the other two

## 2024-10-23 NOTE — ASSESSMENT & PLAN NOTE
Diabetes Type 2  Continue current medication.  Continue work on diet - recommend lots of fruits and vegetables, lean protein like chicken, turkey, fish, beans and Greek yogurt. Try to choose healthier carbohydrate options like oatmeal, wheat bread and pasta, sweet potatoes. Limit sugary treats.

## 2024-10-23 NOTE — PATIENT INSTRUCTIONS
Primary hypertension  HYPERTENSION:   Decrease intake of processed foods, fast foods, lunch meat, canned soups, canned veggies.  Increase intake of fresh fruits, veggies, and lean meats. Monitor blood pressure at home, keep a log and bring this with you to your next appointment. Call the office if your blood pressure runs 150/90 or higher consistently.  Blood Pressure Technique:  Sit quietly in a chair for 5 minutes with back supported and feet on the floor  Then place left arm on a table or armrest so bicep area is at the same level of heart or left breast  Check three times in a row, disregard the highest reading and average the other two    Mixed hyperlipidemia    Decrease intake of saturated fats, fast food, sweets.  Increase intake of fresh fruit fresh vegetables and lean meats.  Increase healthy fats seeds, nuts, olive oil instead of butter.  walk 150 minutes/week for heart health.      Vitamin D deficiency      Type 2 diabetes mellitus with hyperglycemia, without long-term current use of insulin    Diabetes Type 2  Continue current medication.  Continue work on diet - recommend lots of fruits and vegetables, lean protein like chicken, turkey, fish, beans and Greek yogurt. Try to choose healthier carbohydrate options like oatmeal, wheat bread and pasta, sweet potatoes. Limit sugary treats.    Gastroesophageal reflux disease, unspecified whether esophagitis present    Lifestyle interventions:  Avoiding foods that may be irritating such as chocolate, caffeine, alcohol, acid/spicy foods, carbonated beverages, and fatty foods.  Avoiding eating within 3 hours of bedtime. May try raising the head of bed at night.  Try to eat 3-5 smaller meals per day.   Weight loss.  Smoking/nicotine cessation.     Medicare annual wellness visit, subsequent         Encounter for immunization    Orders:    Flu vaccine, trivalent, preservative free, HIGH-DOSE, age 65y+ (Fluzone)    Impacted cerumen of left ear    Orders:    Ear  cerumen removal; Future

## 2024-10-23 NOTE — ASSESSMENT & PLAN NOTE
Lifestyle interventions:  Avoiding foods that may be irritating such as chocolate, caffeine, alcohol, acid/spicy foods, carbonated beverages, and fatty foods.  Avoiding eating within 3 hours of bedtime. May try raising the head of bed at night.  Try to eat 3-5 smaller meals per day.   Weight loss.  Smoking/nicotine cessation.

## 2024-10-23 NOTE — PROGRESS NOTES
Subjective   Reason for Visit: Uma Middleton is an 74 y.o. female here for a Medicare Wellness visit.     Past Medical, Surgical, and Family History reviewed and updated in chart.    Reviewed all medications by prescribing practitioner or clinical pharmacist (such as prescriptions, OTCs, herbal therapies and supplements) and documented in the medical record.    HPI Accompanied by spouse  Patient admits to increasing anxiety. She will be having robotic repair of CAD 11/8/24 with Dr. Alexandra Pulido.   Patient denies any falls, urgent care, ER, hospitalization, new diagnoses, surgeries since they were here last.  Denies any issues with chest pain, chest pressure, shortness of breath, constipation, diarrhea, blood in stool, urinary urgency, frequency, blood in urine, muscle weakness in arms and legs, numbness or tingling in fingers or toes.  BS- Does not monitor  BP does not monitor.     Patient Care Team:  MANUEL Gamino-CNP as PCP - General (Internal Medicine)  Meño Helm MD as Primary Care Provider     Review of Systems  Review of Systems negative except as noted in HPI and Chief complaint.    Current Outpatient Medications:     amLODIPine-benazepriL (Lotrel) 5-10 mg capsule, Take 1 capsule by mouth once daily., Disp: 90 capsule, Rfl: 1    aspirin 81 mg chewable tablet, Chew 1 tablet (81 mg) once daily., Disp: 90 tablet, Rfl: 3    atorvastatin (Lipitor) 40 mg tablet, Take 1 tablet (40 mg) by mouth once daily., Disp: 90 tablet, Rfl: 3    fluticasone (Flonase) 50 mcg/actuation nasal spray, Administer 1-2 sprays into each nostril once daily., Disp: , Rfl:     gabapentin (Neurontin) 100 mg capsule, Take 1 capsule (100 mg) by mouth once daily at bedtime., Disp: 30 capsule, Rfl: 1    metoprolol succinate XL (Toprol-XL) 25 mg 24 hr tablet, Take 1 tablet (25 mg) by mouth once daily at bedtime., Disp: 90 tablet, Rfl: 1    nitroglycerin (Nitrostat) 0.4 mg SL tablet, Place 1 tablet (0.4 mg) under the tongue every 5  "minutes if needed for chest pain. May repeat every 5 minutes for up to 3 doses. If second dose needed please call 911 when you take the second dose., Disp: 100 tablet, Rfl: 11    omeprazole OTC (PriLOSEC OTC) 20 mg EC tablet, Take 1 tablet (20 mg) by mouth once daily., Disp: , Rfl:     Objective   Vitals:  /68 (BP Location: Left arm, Patient Position: Sitting, BP Cuff Size: Adult)   Pulse 74   Resp 16   Ht 1.753 m (5' 9\")   Wt 105 kg (230 lb 6.4 oz)   SpO2 97%   BMI 34.02 kg/m²       Physical Exam  Vitals reviewed.   Constitutional:       General: She is not in acute distress.     Appearance: Normal appearance.   HENT:      Head: Normocephalic.      Right Ear: Tympanic membrane normal.      Left Ear: Tympanic membrane normal. There is impacted cerumen.      Nose: Nose normal.      Mouth/Throat:      Mouth: Mucous membranes are moist.      Pharynx: Oropharynx is clear.   Eyes:      Extraocular Movements: Extraocular movements intact.      Conjunctiva/sclera: Conjunctivae normal.      Pupils: Pupils are equal, round, and reactive to light.   Cardiovascular:      Rate and Rhythm: Normal rate.      Pulses: Normal pulses.      Heart sounds: Normal heart sounds.   Pulmonary:      Effort: Pulmonary effort is normal.      Breath sounds: Normal breath sounds.   Abdominal:      General: Abdomen is flat. Bowel sounds are normal.      Palpations: Abdomen is soft.   Musculoskeletal:         General: Normal range of motion.   Skin:     General: Skin is warm and dry.      Capillary Refill: Capillary refill takes 2 to 3 seconds.   Neurological:      General: No focal deficit present.      Mental Status: She is alert and oriented to person, place, and time. Mental status is at baseline.   Psychiatric:         Mood and Affect: Mood normal.         Behavior: Behavior is cooperative.         Assessment & Plan  Primary hypertension  HYPERTENSION:   Decrease intake of processed foods, fast foods, lunch meat, canned soups, " canned veggies.  Increase intake of fresh fruits, veggies, and lean meats. Monitor blood pressure at home, keep a log and bring this with you to your next appointment. Call the office if your blood pressure runs 150/90 or higher consistently.  Blood Pressure Technique:  Sit quietly in a chair for 5 minutes with back supported and feet on the floor  Then place left arm on a table or armrest so bicep area is at the same level of heart or left breast  Check three times in a row, disregard the highest reading and average the other two    Mixed hyperlipidemia    Decrease intake of saturated fats, fast food, sweets.  Increase intake of fresh fruit fresh vegetables and lean meats.  Increase healthy fats seeds, nuts, olive oil instead of butter.  walk 150 minutes/week for heart health.      Vitamin D deficiency      Type 2 diabetes mellitus with hyperglycemia, without long-term current use of insulin    Diabetes Type 2  Continue current medication.  Continue work on diet - recommend lots of fruits and vegetables, lean protein like chicken, turkey, fish, beans and Greek yogurt. Try to choose healthier carbohydrate options like oatmeal, wheat bread and pasta, sweet potatoes. Limit sugary treats.    Gastroesophageal reflux disease, unspecified whether esophagitis present    Lifestyle interventions:  Avoiding foods that may be irritating such as chocolate, caffeine, alcohol, acid/spicy foods, carbonated beverages, and fatty foods.  Avoiding eating within 3 hours of bedtime. May try raising the head of bed at night.  Try to eat 3-5 smaller meals per day.   Weight loss.  Smoking/nicotine cessation.     Medicare annual wellness visit, subsequent         Encounter for immunization    Orders:    Flu vaccine, trivalent, preservative free, HIGH-DOSE, age 65y+ (Fluzone)    Impacted cerumen of left ear    Orders:    Ear cerumen removal; Future       This note was dictated using DRAGON speech recognition software and was corrected for  spelling or grammatical errors, but despite proofreading several typographical errors might be present that might affect the meaning of the content.  Mary Lou Valerio CNP  Advanced care planning was discussed with Uma Middleton today. We reviewed code status, Medical Power of , and Living will. Pt has LW or POA.

## 2024-10-24 ENCOUNTER — CLINICAL SUPPORT (OUTPATIENT)
Dept: PREADMISSION TESTING | Facility: HOSPITAL | Age: 74
End: 2024-10-24
Payer: MEDICARE

## 2024-10-24 NOTE — CPM/PAT NURSE NOTE
CPM/PAT Nurse Note      Name: Uma Alexandria (Uma Emi)  /Age: 1950/74 y.o.       Past Medical History:   Diagnosis Date    Adhesive capsulitis of right shoulder 10/21/2013    Adhesive capsulitis of right shoulder    Angina pectoris     Anxiety     Aortic aneurysm without rupture, unspecified portion of aorta (CMS-HCC)     ASHD (arteriosclerotic heart disease)     Central retinal vein occlusion, right eye (CMS-HCC)     Chest pain     Cholelithiasis     Dermatochalasis of both upper eyelids     Dry eye syndrome of bilateral lacrimal glands     Elevated blood-pressure reading, without diagnosis of hypertension     Elevated blood pressure reading without diagnosis of hypertension    GERD (gastroesophageal reflux disease)     Hyperlipidemia     Hypertension     Inconclusive mammogram 2017    Breast density    Lattice degeneration of retina, bilateral     Other specified disorders of eustachian tube, bilateral 2017    Dysfunction of Eustachian tube, bilateral    Otitis media, unspecified, right ear 2015    Acute right otitis media    Personal history of other diseases of the respiratory system 10/18/2016    History of acute bronchitis with bronchospasm    Primary osteoarthritis, unspecified hand 2015    Osteoarthritis, hand    Sacroiliitis, not elsewhere classified (CMS-HCC) 2016    Sacroiliitis    Sciatica, right side 2016    Sciatica, right    Snoring     Type 2 diabetes mellitus     A1C 6.1 on 24    Unspecified optic atrophy     Vitamin D deficiency        Past Surgical History:   Procedure Laterality Date    CARDIAC CATHETERIZATION N/A 10/14/2024    Procedure: Left Heart Cath;  Surgeon: Juan Cueto DO;  Location: [facility];  Service: Cardiovascular;  Laterality: N/A;  no pre auth required    CATARACT EXTRACTION      CATARACT EXTRACTION W/  INTRAOCULAR LENS IMPLANT Bilateral     OD 2013 +15.5D, OS 2013 +15.5D    COLONOSCOPY       ESOPHAGOGASTRODUODENOSCOPY      OTHER SURGICAL HISTORY  06/25/2013    Treatment Of The Left Ankle    ID OSTEOTOMY MANDIBLE SEGMENTAL  1969    SHOULDER SURGERY         Patient Sexual activity questions deferred to the physician.    Family History   Problem Relation Name Age of Onset    Hypertension Mother Marilia     Heart disease Father Rd     Hypertension Father Rd        Allergies   Allergen Reactions    Fire Ant Shortness of breath    Erythromycin Itching     Itching with erythromycin ophthalmic ointment    Tea Tree Oil Hives       Prior to Admission medications    Medication Sig Start Date End Date Taking? Authorizing Provider   amLODIPine-benazepriL (Lotrel) 5-10 mg capsule Take 1 capsule by mouth once daily. 10/8/24 4/6/25  ANTHONY Gamino   aspirin 81 mg chewable tablet Chew 1 tablet (81 mg) once daily. 10/14/24 10/14/25  ANTHONY Gonsalves   atorvastatin (Lipitor) 40 mg tablet Take 1 tablet (40 mg) by mouth once daily. 8/26/24 2/22/25  Juan Cueto,    fluticasone (Flonase) 50 mcg/actuation nasal spray Administer 1-2 sprays into each nostril once daily. 5/8/17   Historical Provider, MD   gabapentin (Neurontin) 100 mg capsule Take 1 capsule (100 mg) by mouth once daily at bedtime. 12/22/23   ANTHONY Gamino   metoprolol succinate XL (Toprol-XL) 25 mg 24 hr tablet Take 1 tablet (25 mg) by mouth once daily at bedtime. 10/8/24 4/6/25  ANTHONY Gamino   nitroglycerin (Nitrostat) 0.4 mg SL tablet Place 1 tablet (0.4 mg) under the tongue every 5 minutes if needed for chest pain. May repeat every 5 minutes for up to 3 doses. If second dose needed please call 911 when you take the second dose. 7/23/24 7/23/25  ANTHONY Gamino   omeprazole OTC (PriLOSEC OTC) 20 mg EC tablet Take 1 tablet (20 mg) by mouth once daily.    Historical Provider, MD   EPINEPHrine (EpiPen 2-Darius) 0.3 mg/0.3 mL injection syringe Inject 0.3 mL (0.3 mg) into the muscle 1 time if  needed. 4/29/20 10/23/24  Historical Provider, MD   zolpidem (Ambien) 5 mg tablet Take 1 tablet (5 mg) by mouth as needed at bedtime for sleep for up to 1 day. 9/12/24 10/23/24  Chao Turpin MD        PAT ROS     DASI Risk Score    No data to display       Caprini DVT Assessment      Flowsheet Row Admission (Discharged) from 10/14/2024 in [facility] with Juan Cueto DO   DVT Score 5 filed at 10/14/2024 1409   BMI 31-40 (Obesity) filed at 10/14/2024 1409          Modified Frailty Index    No data to display       CHADS2 Stroke Risk  Current as of 2 hours ago        N/A 3 to 100%: High Risk   2 to < 3%: Medium Risk   0 to < 2%: Low Risk     Last Change: N/A          This score determines the patient's risk of having a stroke if the patient has atrial fibrillation.        This score is not applicable to this patient. Components are not calculated.          Revised Cardiac Risk Index    No data to display       Apfel Simplified Score    No data to display       Risk Analysis Index Results This Encounter    No data found in the last 10 encounters.       Prodigy: High Risk  Total Score: 17              Prodigy Age Score      Prodigy SDB Score          ARISCAT Score for Postoperative Pulmonary Complications    No data to display       Sena Perioperative Risk for Myocardial Infarction or Cardiac Arrest (JANET)    No data to display     Scheduled for MIDCAB, robot-assisted; LIMA and RADIAL- left with Dr. Alexandra Pulido on 11/8/24.    PCP: Mary Lou Valerio, APRN- CNP LOV 10/23/24  Cardiac Surgery: Kale Pulido MD LOV 10/15/24 referred by Rom for coronary bypass grafting. Her primary physician sent her for further evaluation with Dr. Snyder that prompted her left heart cath with the diagnosis of LAD diagonal disease with moderate RCA and a 60% small circumflex lesion.     Cardiology: Juan Cueto DO LOV 9/24/24  Sleep Medicine: Chao Turpin MD LOV 9/12/24 sleep medicine evaluation with  concerns of Dry Mouth. Referred by her primary due to history of central retinal vein occlusion and snoring with suspicion for possible obstructive sleep apnea.       -Cardiac Cath; 10/14/24  Recommendations:  Coronary artery bypass graft surgery.    -Nuclear Stress Test; 9/13/24  Impression   1. No evidence of inducible myocardial ischemia. 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  2. The left ventricle is normal in size.  3. Normal LV wall motion with a post-stress LV EF estimated greater  than 65%.         -ECG; 7/23/24  Normal Sinus Rhythm  Possible left atrial enlargement  Borderline ECG    -Nuclear Stress Test; 11/24/2020  IMPRESSION:  1. Normal stress myocardial perfusion imaging.  2. Well-maintained left ventricular function.    Nurse Plan of Action:    ONLY    Roslyn Barrientos RN  Pre-Admission Testing

## 2024-10-25 ENCOUNTER — HOSPITAL ENCOUNTER (OUTPATIENT)
Dept: RADIOLOGY | Facility: CLINIC | Age: 74
Discharge: HOME | End: 2024-10-25
Payer: MEDICARE

## 2024-10-25 ENCOUNTER — APPOINTMENT (OUTPATIENT)
Dept: CARDIOLOGY | Facility: CLINIC | Age: 74
End: 2024-10-25
Payer: MEDICARE

## 2024-10-25 DIAGNOSIS — I25.119 CORONARY ARTERY DISEASE INVOLVING NATIVE CORONARY ARTERY OF NATIVE HEART WITH ANGINA PECTORIS: ICD-10-CM

## 2024-10-25 DIAGNOSIS — I79.8 OTHER DISORDERS OF ARTERIES, ARTERIOLES AND CAPILLARIES IN DISEASES CLASSIFIED ELSEWHERE: ICD-10-CM

## 2024-10-25 PROCEDURE — 93880 EXTRACRANIAL BILAT STUDY: CPT

## 2024-10-28 ENCOUNTER — PRE-ADMISSION TESTING (OUTPATIENT)
Dept: PREADMISSION TESTING | Facility: HOSPITAL | Age: 74
End: 2024-10-28
Payer: MEDICARE

## 2024-10-28 ENCOUNTER — HOSPITAL ENCOUNTER (OUTPATIENT)
Dept: RADIOLOGY | Facility: HOSPITAL | Age: 74
Discharge: HOME | End: 2024-10-28
Payer: MEDICARE

## 2024-10-28 VITALS
HEIGHT: 69 IN | TEMPERATURE: 97.7 F | DIASTOLIC BLOOD PRESSURE: 73 MMHG | OXYGEN SATURATION: 98 % | SYSTOLIC BLOOD PRESSURE: 127 MMHG | BODY MASS INDEX: 34.02 KG/M2 | WEIGHT: 229.7 LBS

## 2024-10-28 DIAGNOSIS — Z01.818 PREOPERATIVE EXAMINATION: ICD-10-CM

## 2024-10-28 DIAGNOSIS — I25.119 CORONARY ARTERY DISEASE INVOLVING NATIVE CORONARY ARTERY OF NATIVE HEART WITH ANGINA PECTORIS: ICD-10-CM

## 2024-10-28 DIAGNOSIS — R07.89 OTHER CHEST PAIN: ICD-10-CM

## 2024-10-28 DIAGNOSIS — R82.998 OTHER ABNORMAL FINDINGS IN URINE: ICD-10-CM

## 2024-10-28 DIAGNOSIS — Z01.818 PREOPERATIVE EXAMINATION: Primary | ICD-10-CM

## 2024-10-28 LAB
ABO GROUP (TYPE) IN BLOOD: NORMAL
ANTIBODY SCREEN: NORMAL
APPEARANCE UR: CLEAR
APTT PPP: 32 SECONDS (ref 27–38)
BILIRUB UR STRIP.AUTO-MCNC: NEGATIVE MG/DL
COLOR UR: ABNORMAL
GLUCOSE UR STRIP.AUTO-MCNC: NORMAL MG/DL
INR PPP: 1 (ref 0.9–1.1)
KETONES UR STRIP.AUTO-MCNC: NEGATIVE MG/DL
LEUKOCYTE ESTERASE UR QL STRIP.AUTO: ABNORMAL
NITRITE UR QL STRIP.AUTO: NEGATIVE
PH UR STRIP.AUTO: 5 [PH]
PROT UR STRIP.AUTO-MCNC: NEGATIVE MG/DL
PROTHROMBIN TIME: 10.7 SECONDS (ref 9.8–12.8)
RBC # UR STRIP.AUTO: ABNORMAL /UL
RBC #/AREA URNS AUTO: NORMAL /HPF
RH FACTOR (ANTIGEN D): NORMAL
SP GR UR STRIP.AUTO: 1.02
UROBILINOGEN UR STRIP.AUTO-MCNC: NORMAL MG/DL
WBC #/AREA URNS AUTO: NORMAL /HPF

## 2024-10-28 PROCEDURE — 36415 COLL VENOUS BLD VENIPUNCTURE: CPT

## 2024-10-28 PROCEDURE — 99204 OFFICE O/P NEW MOD 45 MIN: CPT | Performed by: NURSE PRACTITIONER

## 2024-10-28 PROCEDURE — 87086 URINE CULTURE/COLONY COUNT: CPT

## 2024-10-28 PROCEDURE — 71046 X-RAY EXAM CHEST 2 VIEWS: CPT

## 2024-10-28 PROCEDURE — 86923 COMPATIBILITY TEST ELECTRIC: CPT

## 2024-10-28 PROCEDURE — 85730 THROMBOPLASTIN TIME PARTIAL: CPT

## 2024-10-28 PROCEDURE — 87081 CULTURE SCREEN ONLY: CPT | Mod: 59

## 2024-10-28 PROCEDURE — 86901 BLOOD TYPING SEROLOGIC RH(D): CPT

## 2024-10-28 PROCEDURE — 81001 URINALYSIS AUTO W/SCOPE: CPT

## 2024-10-28 RX ORDER — CHLORHEXIDINE GLUCONATE ORAL RINSE 1.2 MG/ML
SOLUTION DENTAL
Qty: 473 ML | Refills: 0 | Status: SHIPPED | OUTPATIENT
Start: 2024-10-28

## 2024-10-28 RX ORDER — CHLORHEXIDINE GLUCONATE 40 MG/ML
SOLUTION TOPICAL 2 TIMES DAILY
Qty: 473 ML | Refills: 0 | Status: SHIPPED | OUTPATIENT
Start: 2024-10-28 | End: 2024-11-02

## 2024-10-28 ASSESSMENT — DUKE ACTIVITY SCORE INDEX (DASI)
CAN YOU WALK INDOORS, SUCH AS AROUND YOUR HOUSE: YES
TOTAL_SCORE: 52.95
CAN YOU DO YARD WORK LIKE RAKING LEAVES, WEEDING OR PUSHING A MOWER: YES
CAN YOU PARTICIPATE IN MODERATE RECREATIONAL ACTIVITIES LIKE GOLF, BOWLING, DANCING, DOUBLES TENNIS OR THROWING A BASEBALL OR FOOTBALL: YES
CAN YOU WALK A BLOCK OR TWO ON LEVEL GROUND: YES
CAN YOU PARTICIPATE IN STRENOUS SPORTS LIKE SWIMMING, SINGLES TENNIS, FOOTBALL, BASKETBALL, OR SKIING: YES
CAN YOU TAKE CARE OF YOURSELF (EAT, DRESS, BATHE, OR USE TOILET): YES
CAN YOU DO HEAVY WORK AROUND THE HOUSE LIKE SCRUBBING FLOORS OR LIFTING AND MOVING HEAVY FURNITURE: YES
CAN YOU DO LIGHT WORK AROUND THE HOUSE LIKE DUSTING OR WASHING DISHES: YES
DASI METS SCORE: 9.2
CAN YOU CLIMB A FLIGHT OF STAIRS OR WALK UP A HILL: YES
CAN YOU RUN A SHORT DISTANCE: YES
CAN YOU HAVE SEXUAL RELATIONS: NO
CAN YOU DO MODERATE WORK AROUND THE HOUSE LIKE VACUUMING, SWEEPING FLOORS OR CARRYING GROCERIES: YES

## 2024-10-28 ASSESSMENT — LIFESTYLE VARIABLES: SMOKING_STATUS: NONSMOKER

## 2024-10-28 ASSESSMENT — PAIN - FUNCTIONAL ASSESSMENT: PAIN_FUNCTIONAL_ASSESSMENT: 0-10

## 2024-10-28 ASSESSMENT — ENCOUNTER SYMPTOMS
NECK NEGATIVE: 1
EYES NEGATIVE: 1
RESPIRATORY NEGATIVE: 1
MUSCULOSKELETAL NEGATIVE: 1
CARDIOVASCULAR NEGATIVE: 1
GASTROINTESTINAL NEGATIVE: 1
ENDOCRINE NEGATIVE: 1
CONSTITUTIONAL NEGATIVE: 1
NEUROLOGICAL NEGATIVE: 1

## 2024-10-29 ENCOUNTER — HOSPITAL ENCOUNTER (OUTPATIENT)
Dept: CARDIOLOGY | Facility: CLINIC | Age: 74
Discharge: HOME | End: 2024-10-29
Payer: MEDICARE

## 2024-10-29 DIAGNOSIS — I25.728 ATHEROSCLEROSIS OF AUTOLOGOUS ARTERY CORONARY ARTERY BYPASS GRAFT(S) WITH OTHER FORMS OF ANGINA PECTORIS: ICD-10-CM

## 2024-10-29 DIAGNOSIS — I25.119 CORONARY ARTERY DISEASE INVOLVING NATIVE CORONARY ARTERY OF NATIVE HEART WITH ANGINA PECTORIS: ICD-10-CM

## 2024-10-29 LAB
AORTIC VALVE PEAK VELOCITY: 1.27 M/S
AV PEAK GRADIENT: 6.5 MMHG
AVA (PEAK VEL): 2.06 CM2
BACTERIA UR CULT: NORMAL
EJECTION FRACTION APICAL 4 CHAMBER: 70.9
EJECTION FRACTION: 63 %
LEFT ATRIUM VOLUME AREA LENGTH INDEX BSA: 22 ML/M2
LEFT VENTRICLE INTERNAL DIMENSION DIASTOLE: 4.41 CM (ref 3.5–6)
LEFT VENTRICULAR OUTFLOW TRACT DIAMETER: 1.93 CM
MITRAL VALVE E/A RATIO: 0.7
RIGHT VENTRICLE FREE WALL PEAK S': 11.95 CM/S
RIGHT VENTRICLE PEAK SYSTOLIC PRESSURE: 23.7 MMHG
TRICUSPID ANNULAR PLANE SYSTOLIC EXCURSION: 1.5 CM

## 2024-10-29 PROCEDURE — 93306 TTE W/DOPPLER COMPLETE: CPT

## 2024-10-29 PROCEDURE — 93306 TTE W/DOPPLER COMPLETE: CPT | Performed by: INTERNAL MEDICINE

## 2024-10-30 ENCOUNTER — TELEMEDICINE (OUTPATIENT)
Dept: CARDIAC SURGERY | Facility: HOSPITAL | Age: 74
End: 2024-10-30
Payer: MEDICARE

## 2024-10-30 ENCOUNTER — APPOINTMENT (OUTPATIENT)
Dept: PREADMISSION TESTING | Facility: HOSPITAL | Age: 74
End: 2024-10-30
Payer: MEDICARE

## 2024-10-30 DIAGNOSIS — I25.10 CAD IN NATIVE ARTERY: Primary | ICD-10-CM

## 2024-10-30 LAB — STAPHYLOCOCCUS SPEC CULT: NORMAL

## 2024-10-30 PROCEDURE — 99214 OFFICE O/P EST MOD 30 MIN: CPT | Performed by: STUDENT IN AN ORGANIZED HEALTH CARE EDUCATION/TRAINING PROGRAM

## 2024-10-30 PROCEDURE — 1157F ADVNC CARE PLAN IN RCRD: CPT | Performed by: STUDENT IN AN ORGANIZED HEALTH CARE EDUCATION/TRAINING PROGRAM

## 2024-10-30 PROCEDURE — 3061F NEG MICROALBUMINURIA REV: CPT | Performed by: STUDENT IN AN ORGANIZED HEALTH CARE EDUCATION/TRAINING PROGRAM

## 2024-10-30 PROCEDURE — 3048F LDL-C <100 MG/DL: CPT | Performed by: STUDENT IN AN ORGANIZED HEALTH CARE EDUCATION/TRAINING PROGRAM

## 2024-11-07 RX ORDER — HYDROXYZINE HYDROCHLORIDE 25 MG/1
25 TABLET, FILM COATED ORAL NIGHTLY PRN
Status: ON HOLD | COMMUNITY

## 2024-11-07 RX ORDER — OMEPRAZOLE 40 MG/1
40 CAPSULE, DELAYED RELEASE ORAL DAILY
Status: ON HOLD | COMMUNITY

## 2024-11-07 NOTE — PROGRESS NOTES
Pharmacy Medication History Review    Uma Middleton is a 74 y.o. female who is planned to be admitted for Coronary artery disease of native artery of native heart with stable angina pectoris. Pharmacy called the patient prior to their scheduled procedure and reviewed the patient's [id number] medications for accuracy.    Medications ADDED:  Hydroxyzine HCL 25mg  Omeprazole 40mg  Medications CHANGED:  Omeprazole OTC 20mg to omeprazole 40mg  Medications REMOVED:   Omeprazole OTC 20mg    Please review updated prior to admission medication list and comments regarding how patient may be taking medications differently by going to Admission tab --> Admission Orders --> Admit Orders / Review prior to admission medications.     Preferred pharmacy, last doses of medications, and allergies to be confirmed with patient by nursing the day of procedure.     Sources used to complete the med history include spoke with patient, medication dispense report, oarrs, care everywhere    Below are additional concerns with the patient's PTA list.  Patient states they have gabapentin on hand, but rarely take it. Mostly is used for hand nerve pain. L.F. 12/22/23 #30/30d  Patient states they use their flonase at bedtime  Patient states they take the hydroxyzine at bedtime to help them sleep/calm down. Rx is written to take #1 tablet up to three times a day, but they only take it at bedtime if needed. L.F. 06/26/24 #60/20d  Patient states they take #1 capsule of omeprazole 40mg a day. (Rx states #1BID 40mg L.F. 04/05/24 #180/90d)    Yovana Rhodes   [facility]  Please reach out via Secure Chat for questions

## 2024-11-08 ENCOUNTER — HOSPITAL ENCOUNTER (INPATIENT)
Facility: HOSPITAL | Age: 74
End: 2024-11-08
Attending: STUDENT IN AN ORGANIZED HEALTH CARE EDUCATION/TRAINING PROGRAM | Admitting: STUDENT IN AN ORGANIZED HEALTH CARE EDUCATION/TRAINING PROGRAM
Payer: MEDICARE

## 2024-11-08 ENCOUNTER — APPOINTMENT (OUTPATIENT)
Dept: RADIOLOGY | Facility: HOSPITAL | Age: 74
End: 2024-11-08
Payer: MEDICARE

## 2024-11-08 ENCOUNTER — HOSPITAL ENCOUNTER (OUTPATIENT)
Dept: OPERATING ROOM | Facility: HOSPITAL | Age: 74
Discharge: HOME | End: 2024-11-08

## 2024-11-08 ENCOUNTER — APPOINTMENT (OUTPATIENT)
Dept: CARDIOLOGY | Facility: HOSPITAL | Age: 74
End: 2024-11-08
Payer: MEDICARE

## 2024-11-08 ENCOUNTER — ANESTHESIA (OUTPATIENT)
Dept: OPERATING ROOM | Facility: HOSPITAL | Age: 74
End: 2024-11-08
Payer: MEDICARE

## 2024-11-08 ENCOUNTER — ANESTHESIA EVENT (OUTPATIENT)
Dept: OPERATING ROOM | Facility: HOSPITAL | Age: 74
End: 2024-11-08
Payer: MEDICARE

## 2024-11-08 DIAGNOSIS — D62 ACUTE BLOOD LOSS ANEMIA: ICD-10-CM

## 2024-11-08 DIAGNOSIS — Z95.1 S/P CABG X 1: ICD-10-CM

## 2024-11-08 DIAGNOSIS — I25.110 ATHEROSCLEROTIC HEART DISEASE OF NATIVE CORONARY ARTERY WITH UNSTABLE ANGINA PECTORIS: ICD-10-CM

## 2024-11-08 DIAGNOSIS — G89.18 ACUTE POST-OPERATIVE PAIN: ICD-10-CM

## 2024-11-08 DIAGNOSIS — I25.118 CORONARY ARTERY DISEASE OF NATIVE ARTERY OF NATIVE HEART WITH STABLE ANGINA PECTORIS: Primary | ICD-10-CM

## 2024-11-08 DIAGNOSIS — G89.18 POST-OPERATIVE PAIN: ICD-10-CM

## 2024-11-08 PROBLEM — G47.33 OSA (OBSTRUCTIVE SLEEP APNEA): Status: ACTIVE | Noted: 2024-05-01

## 2024-11-08 LAB
ABO GROUP (TYPE) IN BLOOD: NORMAL
ABO GROUP (TYPE) IN BLOOD: NORMAL
ACT BLD: 101 SEC (ref 82–174)
ACT BLD: 403 SEC (ref 82–174)
ACT BLD: 499 SEC (ref 82–174)
ACT BLD: 93 SEC (ref 82–174)
ALBUMIN SERPL BCP-MCNC: 4.6 G/DL (ref 3.4–5)
ANION GAP BLDA CALCULATED.4IONS-SCNC: 10 MMO/L (ref 10–25)
ANION GAP BLDA CALCULATED.4IONS-SCNC: 11 MMO/L (ref 10–25)
ANION GAP BLDA CALCULATED.4IONS-SCNC: 11 MMO/L (ref 10–25)
ANION GAP BLDA CALCULATED.4IONS-SCNC: 13 MMO/L (ref 10–25)
ANION GAP BLDA CALCULATED.4IONS-SCNC: 13 MMO/L (ref 10–25)
ANION GAP SERPL CALC-SCNC: 15 MMOL/L (ref 10–20)
ANTIBODY SCREEN: NORMAL
APTT PPP: 29 SECONDS (ref 27–38)
BASE EXCESS BLDA CALC-SCNC: -1.2 MMOL/L (ref -2–3)
BASE EXCESS BLDA CALC-SCNC: -1.5 MMOL/L (ref -2–3)
BASE EXCESS BLDA CALC-SCNC: -1.9 MMOL/L (ref -2–3)
BASE EXCESS BLDA CALC-SCNC: -2.1 MMOL/L (ref -2–3)
BASE EXCESS BLDA CALC-SCNC: -2.5 MMOL/L (ref -2–3)
BLOOD EXPIRATION DATE: NORMAL
BODY TEMPERATURE: 37 DEGREES CELSIUS
BUN SERPL-MCNC: 13 MG/DL (ref 6–23)
CA-I BLD-SCNC: 1.14 MMOL/L (ref 1.1–1.33)
CA-I BLDA-SCNC: 1.07 MMOL/L (ref 1.1–1.33)
CA-I BLDA-SCNC: 1.15 MMOL/L (ref 1.1–1.33)
CA-I BLDA-SCNC: 1.15 MMOL/L (ref 1.1–1.33)
CA-I BLDA-SCNC: 1.16 MMOL/L (ref 1.1–1.33)
CA-I BLDA-SCNC: 1.21 MMOL/L (ref 1.1–1.33)
CALCIUM SERPL-MCNC: 8.7 MG/DL (ref 8.6–10.6)
CFT FORM KAOLIN IND BLD RES TEG: 1.2 MIN (ref 0.8–2.1)
CHLORIDE BLDA-SCNC: 102 MMOL/L (ref 98–107)
CHLORIDE BLDA-SCNC: 103 MMOL/L (ref 98–107)
CHLORIDE BLDA-SCNC: 104 MMOL/L (ref 98–107)
CHLORIDE BLDA-SCNC: 104 MMOL/L (ref 98–107)
CHLORIDE BLDA-SCNC: 106 MMOL/L (ref 98–107)
CHLORIDE SERPL-SCNC: 104 MMOL/L (ref 98–107)
CLOT ANGLE.KAOLIN INDUCED BLD RES TEG: 74 DEG (ref 63–78)
CLOT INIT KAO IND P HEP NEUT BLD RES TEG: 6.1 MIN (ref 4.3–8.3)
CLOT INIT KAO IND P HEP NEUT BLD RES TEG: 6.2 MIN (ref 4.6–9.1)
CO2 SERPL-SCNC: 24 MMOL/L (ref 21–32)
COHGB MFR BLDA: 0 %
COHGB MFR BLDA: 0.1 %
COHGB MFR BLDA: 0.2 %
COHGB MFR BLDA: 0.3 %
CREAT SERPL-MCNC: 0.74 MG/DL (ref 0.5–1.05)
DISPENSE STATUS: NORMAL
DO-HGB MFR BLDA: 1.9 % (ref 0–5)
DO-HGB MFR BLDA: 2.5 % (ref 0–5)
DO-HGB MFR BLDA: 3.6 % (ref 0–5)
DO-HGB MFR BLDA: 3.8 % (ref 0–5)
EGFRCR SERPLBLD CKD-EPI 2021: 86 ML/MIN/1.73M*2
ERYTHROCYTE [DISTWIDTH] IN BLOOD BY AUTOMATED COUNT: 13 % (ref 11.5–14.5)
FIBRINOGEN BLD CALC-MCNC: 471 MG/DL (ref 278–581)
FIBRINOGEN PPP-MCNC: 321 MG/DL (ref 200–400)
GLUCOSE BLD MANUAL STRIP-MCNC: 135 MG/DL (ref 74–99)
GLUCOSE BLD MANUAL STRIP-MCNC: 143 MG/DL (ref 74–99)
GLUCOSE BLDA-MCNC: 135 MG/DL (ref 74–99)
GLUCOSE BLDA-MCNC: 166 MG/DL (ref 74–99)
GLUCOSE BLDA-MCNC: 167 MG/DL (ref 74–99)
GLUCOSE BLDA-MCNC: 167 MG/DL (ref 74–99)
GLUCOSE BLDA-MCNC: 172 MG/DL (ref 74–99)
GLUCOSE SERPL-MCNC: 150 MG/DL (ref 74–99)
HCO3 BLDA-SCNC: 23.6 MMOL/L (ref 22–26)
HCO3 BLDA-SCNC: 23.7 MMOL/L (ref 22–26)
HCO3 BLDA-SCNC: 23.7 MMOL/L (ref 22–26)
HCO3 BLDA-SCNC: 25.4 MMOL/L (ref 22–26)
HCO3 BLDA-SCNC: 25.4 MMOL/L (ref 22–26)
HCT VFR BLD AUTO: 34.1 % (ref 36–46)
HCT VFR BLD EST: 33 % (ref 36–46)
HCT VFR BLD EST: 34 % (ref 36–46)
HCT VFR BLD EST: 35 % (ref 36–46)
HCT VFR BLD EST: 35 % (ref 36–46)
HCT VFR BLD EST: 37 % (ref 36–46)
HGB BLD-MCNC: 11.1 G/DL (ref 12–16)
HGB BLDA-MCNC: 11 G/DL (ref 12–16)
HGB BLDA-MCNC: 11 G/DL (ref 12–16)
HGB BLDA-MCNC: 11.4 G/DL (ref 12–16)
HGB BLDA-MCNC: 11.4 G/DL (ref 12–16)
HGB BLDA-MCNC: 11.5 G/DL (ref 12–16)
HGB BLDA-MCNC: 11.7 G/DL (ref 12–16)
HGB BLDA-MCNC: 11.7 G/DL (ref 12–16)
HGB BLDA-MCNC: 12.4 G/DL (ref 12–16)
HGB BLDA-MCNC: 12.4 G/DL (ref 12–16)
INHALED O2 CONCENTRATION: 44 %
INHALED O2 CONCENTRATION: 60 %
INHALED O2 CONCENTRATION: 60 %
INHALED O2 CONCENTRATION: 70 %
INHALED O2 CONCENTRATION: 75 %
INR PPP: 1.1 (ref 0.9–1.1)
LACTATE BLDA-SCNC: 1.1 MMOL/L (ref 0.4–2)
LACTATE BLDA-SCNC: 1.2 MMOL/L (ref 0.4–2)
LACTATE BLDA-SCNC: 1.5 MMOL/L (ref 0.4–2)
LACTATE BLDA-SCNC: 1.6 MMOL/L (ref 0.4–2)
LACTATE BLDA-SCNC: 2.1 MMOL/L (ref 0.4–2)
MA KAOLIN BLD RES TEG: 63 MM (ref 52–69)
MA KAOLIN+TF BLD RES TEG: 65 MM (ref 52–70)
MA TF IND+IIB-IIIA INH BLD RES TEG: 26 MM (ref 15–32)
MAGNESIUM SERPL-MCNC: 2.02 MG/DL (ref 1.6–2.4)
MCH RBC QN AUTO: 28.1 PG (ref 26–34)
MCHC RBC AUTO-ENTMCNC: 32.6 G/DL (ref 32–36)
MCV RBC AUTO: 86 FL (ref 80–100)
METHGB MFR BLDA: 0 % (ref 0–1.5)
NRBC BLD-RTO: 0 /100 WBCS (ref 0–0)
OXYHGB MFR BLDA: 95.9 % (ref 94–98)
OXYHGB MFR BLDA: 95.9 % (ref 94–98)
OXYHGB MFR BLDA: 96.2 % (ref 94–98)
OXYHGB MFR BLDA: 96.2 % (ref 94–98)
OXYHGB MFR BLDA: 97.3 % (ref 94–98)
OXYHGB MFR BLDA: 97.5 % (ref 94–98)
OXYHGB MFR BLDA: 97.5 % (ref 94–98)
OXYHGB MFR BLDA: 98.1 % (ref 94–98)
OXYHGB MFR BLDA: 98.1 % (ref 94–98)
PCO2 BLDA: 39 MM HG (ref 38–42)
PCO2 BLDA: 41 MM HG (ref 38–42)
PCO2 BLDA: 44 MM HG (ref 38–42)
PCO2 BLDA: 54 MM HG (ref 38–42)
PCO2 BLDA: 58 MM HG (ref 38–42)
PH BLDA: 7.25 PH (ref 7.38–7.42)
PH BLDA: 7.28 PH (ref 7.38–7.42)
PH BLDA: 7.34 PH (ref 7.38–7.42)
PH BLDA: 7.37 PH (ref 7.38–7.42)
PH BLDA: 7.39 PH (ref 7.38–7.42)
PHOSPHATE SERPL-MCNC: 3.8 MG/DL (ref 2.5–4.9)
PLATELET # BLD AUTO: 203 X10*3/UL (ref 150–450)
PO2 BLDA: 105 MM HG (ref 85–95)
PO2 BLDA: 163 MM HG (ref 85–95)
PO2 BLDA: 224 MM HG (ref 85–95)
PO2 BLDA: 90 MM HG (ref 85–95)
PO2 BLDA: 90 MM HG (ref 85–95)
POTASSIUM BLDA-SCNC: 3.8 MMOL/L (ref 3.5–5.3)
POTASSIUM BLDA-SCNC: 4.2 MMOL/L (ref 3.5–5.3)
POTASSIUM BLDA-SCNC: 4.5 MMOL/L (ref 3.5–5.3)
POTASSIUM SERPL-SCNC: 4.1 MMOL/L (ref 3.5–5.3)
PRODUCT BLOOD TYPE: 5100
PRODUCT CODE: NORMAL
PROTHROMBIN TIME: 12.1 SECONDS (ref 9.8–12.8)
RBC # BLD AUTO: 3.95 X10*6/UL (ref 4–5.2)
RH FACTOR (ANTIGEN D): NORMAL
RH FACTOR (ANTIGEN D): NORMAL
SAO2 % BLDA: 96 % (ref 94–100)
SAO2 % BLDA: 96 % (ref 94–100)
SAO2 % BLDA: 97 % (ref 94–100)
SAO2 % BLDA: 98 % (ref 94–100)
SAO2 % BLDA: 98 % (ref 94–100)
SODIUM BLDA-SCNC: 134 MMOL/L (ref 136–145)
SODIUM BLDA-SCNC: 135 MMOL/L (ref 136–145)
SODIUM BLDA-SCNC: 136 MMOL/L (ref 136–145)
SODIUM SERPL-SCNC: 139 MMOL/L (ref 136–145)
TEST COMMENT: NORMAL
UNIT ABO: NORMAL
UNIT NUMBER: NORMAL
UNIT RH: NORMAL
UNIT VOLUME: 275
UNIT VOLUME: 279
UNIT VOLUME: 350
UNIT VOLUME: 350
WBC # BLD AUTO: 14.1 X10*3/UL (ref 4.4–11.3)
XM INTEP: NORMAL

## 2024-11-08 PROCEDURE — 71045 X-RAY EXAM CHEST 1 VIEW: CPT | Performed by: RADIOLOGY

## 2024-11-08 PROCEDURE — 2500000004 HC RX 250 GENERAL PHARMACY W/ HCPCS (ALT 636 FOR OP/ED): Performed by: STUDENT IN AN ORGANIZED HEALTH CARE EDUCATION/TRAINING PROGRAM

## 2024-11-08 PROCEDURE — 84132 ASSAY OF SERUM POTASSIUM: CPT | Performed by: NURSE PRACTITIONER

## 2024-11-08 PROCEDURE — 2500000001 HC RX 250 WO HCPCS SELF ADMINISTERED DRUGS (ALT 637 FOR MEDICARE OP): Performed by: NURSE PRACTITIONER

## 2024-11-08 PROCEDURE — 2500000004 HC RX 250 GENERAL PHARMACY W/ HCPCS (ALT 636 FOR OP/ED)

## 2024-11-08 PROCEDURE — 2020000001 HC ICU ROOM DAILY

## 2024-11-08 PROCEDURE — 2720000007 HC OR 272 NO HCPCS: Performed by: STUDENT IN AN ORGANIZED HEALTH CARE EDUCATION/TRAINING PROGRAM

## 2024-11-08 PROCEDURE — 93005 ELECTROCARDIOGRAM TRACING: CPT

## 2024-11-08 PROCEDURE — 2500000005 HC RX 250 GENERAL PHARMACY W/O HCPCS: Performed by: NURSE PRACTITIONER

## 2024-11-08 PROCEDURE — 2500000005 HC RX 250 GENERAL PHARMACY W/O HCPCS: Performed by: STUDENT IN AN ORGANIZED HEALTH CARE EDUCATION/TRAINING PROGRAM

## 2024-11-08 PROCEDURE — 8E0W0CZ ROBOTIC ASSISTED PROCEDURE OF TRUNK REGION, OPEN APPROACH: ICD-10-PCS | Performed by: STUDENT IN AN ORGANIZED HEALTH CARE EDUCATION/TRAINING PROGRAM

## 2024-11-08 PROCEDURE — 3700000002 HC GENERAL ANESTHESIA TIME - EACH INCREMENTAL 1 MINUTE: Performed by: STUDENT IN AN ORGANIZED HEALTH CARE EDUCATION/TRAINING PROGRAM

## 2024-11-08 PROCEDURE — 82810 BLOOD GASES O2 SAT ONLY: CPT

## 2024-11-08 PROCEDURE — 99100 ANES PT EXTEME AGE<1 YR&>70: CPT | Performed by: STUDENT IN AN ORGANIZED HEALTH CARE EDUCATION/TRAINING PROGRAM

## 2024-11-08 PROCEDURE — 82375 ASSAY CARBOXYHB QUANT: CPT

## 2024-11-08 PROCEDURE — 3600000018 HC OR TIME - INITIAL BASE CHARGE - PROCEDURE LEVEL SIX: Performed by: STUDENT IN AN ORGANIZED HEALTH CARE EDUCATION/TRAINING PROGRAM

## 2024-11-08 PROCEDURE — 36415 COLL VENOUS BLD VENIPUNCTURE: CPT | Performed by: STUDENT IN AN ORGANIZED HEALTH CARE EDUCATION/TRAINING PROGRAM

## 2024-11-08 PROCEDURE — P9045 ALBUMIN (HUMAN), 5%, 250 ML: HCPCS | Mod: JZ | Performed by: NURSE PRACTITIONER

## 2024-11-08 PROCEDURE — 71045 X-RAY EXAM CHEST 1 VIEW: CPT

## 2024-11-08 PROCEDURE — 37799 UNLISTED PX VASCULAR SURGERY: CPT | Performed by: NURSE PRACTITIONER

## 2024-11-08 PROCEDURE — 76937 US GUIDE VASCULAR ACCESS: CPT

## 2024-11-08 PROCEDURE — 85347 COAGULATION TIME ACTIVATED: CPT

## 2024-11-08 PROCEDURE — 2500000001 HC RX 250 WO HCPCS SELF ADMINISTERED DRUGS (ALT 637 FOR MEDICARE OP): Performed by: STUDENT IN AN ORGANIZED HEALTH CARE EDUCATION/TRAINING PROGRAM

## 2024-11-08 PROCEDURE — 02100A9 BYPASS CORONARY ARTERY, ONE ARTERY FROM LEFT INTERNAL MAMMARY WITH AUTOLOGOUS ARTERIAL TISSUE, OPEN APPROACH: ICD-10-PCS | Performed by: STUDENT IN AN ORGANIZED HEALTH CARE EDUCATION/TRAINING PROGRAM

## 2024-11-08 PROCEDURE — 85610 PROTHROMBIN TIME: CPT | Performed by: NURSE PRACTITIONER

## 2024-11-08 PROCEDURE — 85027 COMPLETE CBC AUTOMATED: CPT | Performed by: NURSE PRACTITIONER

## 2024-11-08 PROCEDURE — 2500000002 HC RX 250 W HCPCS SELF ADMINISTERED DRUGS (ALT 637 FOR MEDICARE OP, ALT 636 FOR OP/ED): Performed by: NURSE PRACTITIONER

## 2024-11-08 PROCEDURE — 36620 INSERTION CATHETER ARTERY: CPT

## 2024-11-08 PROCEDURE — 3700000001 HC GENERAL ANESTHESIA TIME - INITIAL BASE CHARGE: Performed by: STUDENT IN AN ORGANIZED HEALTH CARE EDUCATION/TRAINING PROGRAM

## 2024-11-08 PROCEDURE — 36556 INSERT NON-TUNNEL CV CATH: CPT

## 2024-11-08 PROCEDURE — 86901 BLOOD TYPING SEROLOGIC RH(D): CPT | Performed by: NURSE PRACTITIONER

## 2024-11-08 PROCEDURE — 82947 ASSAY GLUCOSE BLOOD QUANT: CPT

## 2024-11-08 PROCEDURE — P9045 ALBUMIN (HUMAN), 5%, 250 ML: HCPCS | Mod: JZ,JG

## 2024-11-08 PROCEDURE — 2500000005 HC RX 250 GENERAL PHARMACY W/O HCPCS

## 2024-11-08 PROCEDURE — A33533 PR CABG, ARTERIAL, SINGLE: Performed by: STUDENT IN AN ORGANIZED HEALTH CARE EDUCATION/TRAINING PROGRAM

## 2024-11-08 PROCEDURE — 2780000003 HC OR 278 NO HCPCS: Performed by: STUDENT IN AN ORGANIZED HEALTH CARE EDUCATION/TRAINING PROGRAM

## 2024-11-08 PROCEDURE — A4312 CATH W/O BAG 2-WAY SILICONE: HCPCS | Performed by: STUDENT IN AN ORGANIZED HEALTH CARE EDUCATION/TRAINING PROGRAM

## 2024-11-08 PROCEDURE — 3600000017 HC OR TIME - EACH INCREMENTAL 1 MINUTE - PROCEDURE LEVEL SIX: Performed by: STUDENT IN AN ORGANIZED HEALTH CARE EDUCATION/TRAINING PROGRAM

## 2024-11-08 PROCEDURE — 83735 ASSAY OF MAGNESIUM: CPT | Performed by: NURSE PRACTITIONER

## 2024-11-08 PROCEDURE — 82330 ASSAY OF CALCIUM: CPT | Performed by: NURSE PRACTITIONER

## 2024-11-08 PROCEDURE — 99291 CRITICAL CARE FIRST HOUR: CPT | Performed by: NURSE PRACTITIONER

## 2024-11-08 PROCEDURE — 3600000011 HC PERFUSION TIME - INITIAL BASE CHARGE: Performed by: STUDENT IN AN ORGANIZED HEALTH CARE EDUCATION/TRAINING PROGRAM

## 2024-11-08 PROCEDURE — 85384 FIBRINOGEN ACTIVITY: CPT | Performed by: NURSE PRACTITIONER

## 2024-11-08 PROCEDURE — 99231 SBSQ HOSP IP/OBS SF/LOW 25: CPT

## 2024-11-08 PROCEDURE — 2500000004 HC RX 250 GENERAL PHARMACY W/ HCPCS (ALT 636 FOR OP/ED): Mod: JZ,JG

## 2024-11-08 PROCEDURE — 85384 FIBRINOGEN ACTIVITY: CPT

## 2024-11-08 PROCEDURE — 84132 ASSAY OF SERUM POTASSIUM: CPT

## 2024-11-08 PROCEDURE — 2500000004 HC RX 250 GENERAL PHARMACY W/ HCPCS (ALT 636 FOR OP/ED): Performed by: NURSE PRACTITIONER

## 2024-11-08 PROCEDURE — 3600000012 HC PERFUSION TIME - EACH INCREMENTAL 1 MINUTE: Performed by: STUDENT IN AN ORGANIZED HEALTH CARE EDUCATION/TRAINING PROGRAM

## 2024-11-08 RX ORDER — SODIUM CHLORIDE, SODIUM GLUCONATE, SODIUM ACETATE, POTASSIUM CHLORIDE AND MAGNESIUM CHLORIDE 30; 37; 368; 526; 502 MG/100ML; MG/100ML; MG/100ML; MG/100ML; MG/100ML
INJECTION, SOLUTION INTRAVENOUS CONTINUOUS PRN
Status: DISCONTINUED | OUTPATIENT
Start: 2024-11-08 | End: 2024-11-08

## 2024-11-08 RX ORDER — METHOCARBAMOL 500 MG/1
500 TABLET, FILM COATED ORAL EVERY 8 HOURS SCHEDULED
Status: DISCONTINUED | OUTPATIENT
Start: 2024-11-08 | End: 2024-11-09

## 2024-11-08 RX ORDER — OXYCODONE HYDROCHLORIDE 10 MG/1
10 TABLET ORAL EVERY 4 HOURS PRN
Status: DISPENSED | OUTPATIENT
Start: 2024-11-08

## 2024-11-08 RX ORDER — KETOROLAC TROMETHAMINE 30 MG/ML
15 INJECTION, SOLUTION INTRAMUSCULAR; INTRAVENOUS EVERY 6 HOURS PRN
Status: DISCONTINUED | OUTPATIENT
Start: 2024-11-08 | End: 2024-11-08

## 2024-11-08 RX ORDER — ROPIVACAINE HYDROCHLORIDE 5 MG/ML
INJECTION, SOLUTION EPIDURAL; INFILTRATION; PERINEURAL AS NEEDED
Status: DISCONTINUED | OUTPATIENT
Start: 2024-11-08 | End: 2024-11-08

## 2024-11-08 RX ORDER — SODIUM CHLORIDE, SODIUM LACTATE, POTASSIUM CHLORIDE, CALCIUM CHLORIDE 600; 310; 30; 20 MG/100ML; MG/100ML; MG/100ML; MG/100ML
5 INJECTION, SOLUTION INTRAVENOUS CONTINUOUS
Status: DISCONTINUED | OUTPATIENT
Start: 2024-11-08 | End: 2024-11-08

## 2024-11-08 RX ORDER — ONDANSETRON HYDROCHLORIDE 2 MG/ML
4 INJECTION, SOLUTION INTRAVENOUS EVERY 4 HOURS PRN
Status: DISPENSED | OUTPATIENT
Start: 2024-11-08

## 2024-11-08 RX ORDER — ALBUMIN HUMAN 50 G/1000ML
SOLUTION INTRAVENOUS AS NEEDED
Status: DISCONTINUED | OUTPATIENT
Start: 2024-11-08 | End: 2024-11-08

## 2024-11-08 RX ORDER — INSULIN LISPRO 100 [IU]/ML
0-15 INJECTION, SOLUTION INTRAVENOUS; SUBCUTANEOUS EVERY 4 HOURS
Status: DISCONTINUED | OUTPATIENT
Start: 2024-11-08 | End: 2024-11-10

## 2024-11-08 RX ORDER — PANTOPRAZOLE SODIUM 40 MG/10ML
40 INJECTION, POWDER, LYOPHILIZED, FOR SOLUTION INTRAVENOUS
Status: DISCONTINUED | OUTPATIENT
Start: 2024-11-09 | End: 2024-11-10

## 2024-11-08 RX ORDER — COLCHICINE 0.6 MG/1
0.6 TABLET ORAL 2 TIMES DAILY
Status: DISPENSED | OUTPATIENT
Start: 2024-11-08

## 2024-11-08 RX ORDER — PHENYLEPHRINE HCL IN 0.9% NACL 0.4MG/10ML
SYRINGE (ML) INTRAVENOUS AS NEEDED
Status: DISCONTINUED | OUTPATIENT
Start: 2024-11-08 | End: 2024-11-08

## 2024-11-08 RX ORDER — NALOXONE HYDROCHLORIDE 0.4 MG/ML
0.2 INJECTION, SOLUTION INTRAMUSCULAR; INTRAVENOUS; SUBCUTANEOUS EVERY 5 MIN PRN
Status: ACTIVE | OUTPATIENT
Start: 2024-11-08

## 2024-11-08 RX ORDER — METOPROLOL TARTRATE 25 MG/1
12.5 TABLET, FILM COATED ORAL 2 TIMES DAILY
Status: DISCONTINUED | OUTPATIENT
Start: 2024-11-08 | End: 2024-11-09

## 2024-11-08 RX ORDER — ROCURONIUM BROMIDE 10 MG/ML
INJECTION, SOLUTION INTRAVENOUS AS NEEDED
Status: DISCONTINUED | OUTPATIENT
Start: 2024-11-08 | End: 2024-11-08

## 2024-11-08 RX ORDER — ONDANSETRON HYDROCHLORIDE 2 MG/ML
INJECTION, SOLUTION INTRAVENOUS
Status: COMPLETED
Start: 2024-11-08 | End: 2024-11-08

## 2024-11-08 RX ORDER — PROCHLORPERAZINE EDISYLATE 5 MG/ML
10 INJECTION INTRAMUSCULAR; INTRAVENOUS ONCE
Status: COMPLETED | OUTPATIENT
Start: 2024-11-08 | End: 2024-11-08

## 2024-11-08 RX ORDER — DEXTROSE MONOHYDRATE 100 MG/ML
20 INJECTION, SOLUTION INTRAVENOUS AS NEEDED
Status: DISCONTINUED | OUTPATIENT
Start: 2024-11-08 | End: 2024-11-08

## 2024-11-08 RX ORDER — ONDANSETRON HYDROCHLORIDE 2 MG/ML
4 INJECTION, SOLUTION INTRAVENOUS EVERY 4 HOURS PRN
Status: DISCONTINUED | OUTPATIENT
Start: 2024-11-08 | End: 2024-11-08

## 2024-11-08 RX ORDER — LIDOCAINE HYDROCHLORIDE 20 MG/ML
INJECTION, SOLUTION INFILTRATION; PERINEURAL AS NEEDED
Status: DISCONTINUED | OUTPATIENT
Start: 2024-11-08 | End: 2024-11-08

## 2024-11-08 RX ORDER — CEFAZOLIN 1 G/1
INJECTION, POWDER, FOR SOLUTION INTRAVENOUS AS NEEDED
Status: DISCONTINUED | OUTPATIENT
Start: 2024-11-08 | End: 2024-11-08

## 2024-11-08 RX ORDER — PAPAVERINE HYDROCHLORIDE 30 MG/ML
INJECTION INTRAMUSCULAR; INTRAVENOUS AS NEEDED
Status: DISCONTINUED | OUTPATIENT
Start: 2024-11-08 | End: 2024-11-08 | Stop reason: HOSPADM

## 2024-11-08 RX ORDER — HEPARIN SODIUM 1000 [USP'U]/ML
INJECTION, SOLUTION INTRAVENOUS; SUBCUTANEOUS AS NEEDED
Status: DISCONTINUED | OUTPATIENT
Start: 2024-11-08 | End: 2024-11-08

## 2024-11-08 RX ORDER — SODIUM CHLORIDE 0.9 G/100ML
IRRIGANT IRRIGATION AS NEEDED
Status: DISCONTINUED | OUTPATIENT
Start: 2024-11-08 | End: 2024-11-08 | Stop reason: HOSPADM

## 2024-11-08 RX ORDER — PROTAMINE SULFATE 10 MG/ML
INJECTION, SOLUTION INTRAVENOUS AS NEEDED
Status: DISCONTINUED | OUTPATIENT
Start: 2024-11-08 | End: 2024-11-08

## 2024-11-08 RX ORDER — ATORVASTATIN CALCIUM 80 MG/1
80 TABLET, FILM COATED ORAL DAILY
Status: DISPENSED | OUTPATIENT
Start: 2024-11-08

## 2024-11-08 RX ORDER — PHENYLEPHRINE 10 MG/250 ML(40 MCG/ML)IN 0.9 % SOD.CHLORIDE INTRAVENOUS
CONTINUOUS PRN
Status: DISCONTINUED | OUTPATIENT
Start: 2024-11-08 | End: 2024-11-08

## 2024-11-08 RX ORDER — GLUCAGON 1 MG/ML
1 KIT INJECTION
Status: DISCONTINUED | OUTPATIENT
Start: 2024-11-08 | End: 2024-11-08

## 2024-11-08 RX ORDER — NITROGLYCERIN 20 MG/100ML
INJECTION INTRAVENOUS
Status: COMPLETED
Start: 2024-11-08 | End: 2024-11-08

## 2024-11-08 RX ORDER — AMOXICILLIN 250 MG
2 CAPSULE ORAL 2 TIMES DAILY
Status: DISPENSED | OUTPATIENT
Start: 2024-11-08

## 2024-11-08 RX ORDER — HYDRALAZINE HYDROCHLORIDE 25 MG/1
25 TABLET, FILM COATED ORAL 3 TIMES DAILY
Status: DISPENSED | OUTPATIENT
Start: 2024-11-08

## 2024-11-08 RX ORDER — CEFAZOLIN SODIUM 2 G/100ML
2 INJECTION, SOLUTION INTRAVENOUS EVERY 8 HOURS
Status: DISCONTINUED | OUTPATIENT
Start: 2024-11-08 | End: 2024-11-09

## 2024-11-08 RX ORDER — CALCIUM CHLORIDE INJECTION 100 MG/ML
INJECTION, SOLUTION INTRAVENOUS AS NEEDED
Status: DISCONTINUED | OUTPATIENT
Start: 2024-11-08 | End: 2024-11-08

## 2024-11-08 RX ORDER — PROCHLORPERAZINE EDISYLATE 5 MG/ML
10 INJECTION INTRAMUSCULAR; INTRAVENOUS EVERY 6 HOURS PRN
Status: DISCONTINUED | OUTPATIENT
Start: 2024-11-08 | End: 2024-11-10

## 2024-11-08 RX ORDER — NAPROXEN SODIUM 220 MG/1
81 TABLET, FILM COATED ORAL DAILY
Status: DISCONTINUED | OUTPATIENT
Start: 2024-11-08 | End: 2024-11-10

## 2024-11-08 RX ORDER — MIDAZOLAM HYDROCHLORIDE 1 MG/ML
INJECTION INTRAMUSCULAR; INTRAVENOUS AS NEEDED
Status: DISCONTINUED | OUTPATIENT
Start: 2024-11-08 | End: 2024-11-08

## 2024-11-08 RX ORDER — PANTOPRAZOLE SODIUM 40 MG/1
40 TABLET, DELAYED RELEASE ORAL
Status: DISCONTINUED | OUTPATIENT
Start: 2024-11-09 | End: 2024-11-08

## 2024-11-08 RX ORDER — NITROGLYCERIN 20 MG/100ML
5-200 INJECTION INTRAVENOUS CONTINUOUS
Status: DISCONTINUED | OUTPATIENT
Start: 2024-11-08 | End: 2024-11-09

## 2024-11-08 RX ORDER — ROPIVACAINE IN 0.9% SOD CHL/PF 0.2 %
10 PLASTIC BAG, INJECTION (ML) EPIDURAL CONTINUOUS
Status: ACTIVE | OUTPATIENT
Start: 2024-11-08

## 2024-11-08 RX ORDER — PANTOPRAZOLE SODIUM 40 MG/10ML
40 INJECTION, POWDER, LYOPHILIZED, FOR SOLUTION INTRAVENOUS
Status: DISCONTINUED | OUTPATIENT
Start: 2024-11-09 | End: 2024-11-08

## 2024-11-08 RX ORDER — FENTANYL CITRATE 50 UG/ML
INJECTION, SOLUTION INTRAMUSCULAR; INTRAVENOUS AS NEEDED
Status: DISCONTINUED | OUTPATIENT
Start: 2024-11-08 | End: 2024-11-08

## 2024-11-08 RX ORDER — METHOCARBAMOL 100 MG/ML
1000 INJECTION, SOLUTION INTRAMUSCULAR; INTRAVENOUS EVERY 8 HOURS
Status: DISCONTINUED | OUTPATIENT
Start: 2024-11-08 | End: 2024-11-08

## 2024-11-08 RX ORDER — ACETAMINOPHEN 325 MG/1
650 TABLET ORAL EVERY 6 HOURS
Status: DISCONTINUED | OUTPATIENT
Start: 2024-11-08 | End: 2024-11-09

## 2024-11-08 RX ORDER — HYDROMORPHONE HYDROCHLORIDE 0.2 MG/ML
0.2 INJECTION INTRAMUSCULAR; INTRAVENOUS; SUBCUTANEOUS
Status: DISCONTINUED | OUTPATIENT
Start: 2024-11-08 | End: 2024-11-08

## 2024-11-08 RX ORDER — POLYETHYLENE GLYCOL 3350 17 G/17G
17 POWDER, FOR SOLUTION ORAL DAILY
Status: DISCONTINUED | OUTPATIENT
Start: 2024-11-08 | End: 2024-11-10

## 2024-11-08 RX ORDER — ALBUMIN HUMAN 50 G/1000ML
250 SOLUTION INTRAVENOUS ONCE
Status: COMPLETED | OUTPATIENT
Start: 2024-11-08 | End: 2024-11-08

## 2024-11-08 RX ORDER — METHADONE IN SOD CHLOR,ISO-OSM 10 MG/ML
SYRINGE (ML) INTRAVENOUS AS NEEDED
Status: DISCONTINUED | OUTPATIENT
Start: 2024-11-08 | End: 2024-11-08

## 2024-11-08 RX ORDER — DEXMEDETOMIDINE HYDROCHLORIDE 4 UG/ML
INJECTION, SOLUTION INTRAVENOUS CONTINUOUS PRN
Status: DISCONTINUED | OUTPATIENT
Start: 2024-11-08 | End: 2024-11-08

## 2024-11-08 RX ORDER — HYDROMORPHONE HYDROCHLORIDE 0.2 MG/ML
0.2 INJECTION INTRAMUSCULAR; INTRAVENOUS; SUBCUTANEOUS
Status: DISCONTINUED | OUTPATIENT
Start: 2024-11-08 | End: 2024-11-09

## 2024-11-08 RX ORDER — PANTOPRAZOLE SODIUM 40 MG/1
40 TABLET, DELAYED RELEASE ORAL
Status: DISPENSED | OUTPATIENT
Start: 2024-11-09

## 2024-11-08 RX ORDER — KETOROLAC TROMETHAMINE 30 MG/ML
15 INJECTION, SOLUTION INTRAMUSCULAR; INTRAVENOUS ONCE
Status: COMPLETED | OUTPATIENT
Start: 2024-11-08 | End: 2024-11-08

## 2024-11-08 RX ORDER — DEXTROSE 50 % IN WATER (D50W) INTRAVENOUS SYRINGE
10-50
Status: DISCONTINUED | OUTPATIENT
Start: 2024-11-08 | End: 2024-11-08

## 2024-11-08 RX ORDER — PROPOFOL 10 MG/ML
INJECTION, EMULSION INTRAVENOUS AS NEEDED
Status: DISCONTINUED | OUTPATIENT
Start: 2024-11-08 | End: 2024-11-08

## 2024-11-08 RX ORDER — OXYCODONE HYDROCHLORIDE 5 MG/1
5 TABLET ORAL EVERY 4 HOURS PRN
Status: DISPENSED | OUTPATIENT
Start: 2024-11-08

## 2024-11-08 RX ORDER — HEPARIN SODIUM 5000 [USP'U]/ML
5000 INJECTION, SOLUTION INTRAVENOUS; SUBCUTANEOUS EVERY 8 HOURS
Status: DISPENSED | OUTPATIENT
Start: 2024-11-09

## 2024-11-08 RX ORDER — PROPOFOL 10 MG/ML
0-50 INJECTION, EMULSION INTRAVENOUS CONTINUOUS
Status: CANCELLED | OUTPATIENT
Start: 2024-11-08

## 2024-11-08 RX ORDER — ESMOLOL HYDROCHLORIDE 10 MG/ML
INJECTION INTRAVENOUS AS NEEDED
Status: DISCONTINUED | OUTPATIENT
Start: 2024-11-08 | End: 2024-11-08

## 2024-11-08 RX ORDER — ROPIVACAINE IN 0.9% SOD CHL/PF 0.2 %
PLASTIC BAG, INJECTION (ML) EPIDURAL AS NEEDED
Status: DISCONTINUED | OUTPATIENT
Start: 2024-11-08 | End: 2024-11-08

## 2024-11-08 SDOH — HEALTH STABILITY: MENTAL HEALTH: CURRENT SMOKER: 0

## 2024-11-08 ASSESSMENT — PAIN DESCRIPTION - LOCATION
LOCATION: CHEST
LOCATION: CHEST
LOCATION: BACK
LOCATION: CHEST
LOCATION: BACK

## 2024-11-08 ASSESSMENT — PAIN - FUNCTIONAL ASSESSMENT
PAIN_FUNCTIONAL_ASSESSMENT: 0-10

## 2024-11-08 ASSESSMENT — PAIN SCALES - GENERAL
PAINLEVEL_OUTOF10: 7
PAINLEVEL_OUTOF10: 10 - WORST POSSIBLE PAIN
PAINLEVEL_OUTOF10: 0 - NO PAIN
PAINLEVEL_OUTOF10: 0 - NO PAIN
PAINLEVEL_OUTOF10: 10 - WORST POSSIBLE PAIN
PAIN_LEVEL: 3
PAINLEVEL_OUTOF10: 10 - WORST POSSIBLE PAIN
PAINLEVEL_OUTOF10: 10 - WORST POSSIBLE PAIN
PAINLEVEL_OUTOF10: 5 - MODERATE PAIN
PAINLEVEL_OUTOF10: 7
PAINLEVEL_OUTOF10: 10 - WORST POSSIBLE PAIN

## 2024-11-08 ASSESSMENT — PAIN DESCRIPTION - ORIENTATION
ORIENTATION: LEFT

## 2024-11-08 NOTE — ANESTHESIA PROCEDURE NOTES
Peripheral Block    Patient location during procedure: pre-op  Start time: 11/8/2024 6:33 AM  End time: 11/8/2024 6:46 AM  Reason for block: at surgeon's request and post-op pain management  Staffing  Performed: resident   Authorized by: Lorena Richardson MD    Performed by: Lama Cecille MD  Preanesthetic Checklist  Completed: patient identified, IV checked, site marked, risks and benefits discussed, surgical consent, monitors and equipment checked, pre-op evaluation and timeout performed   Timeout performed at: 11/8/2024 6:32 AM  Peripheral Block  Patient position: sitting  Prep: ChloraPrep  Patient monitoring: heart rate and continuous pulse ox  Block type: ARTI  Injection technique: catheter  Guidance: ultrasound guided  Local infiltration: lidocaine  Infiltration strength: 1 %  Dose: 1 mL  Needle  Needle type: Tuohy   Needle gauge: 22 G  Needle length: 8 cm  Needle localization: ultrasound guidance     image stored in chart  Assessment  Injection assessment: negative aspiration for heme, no paresthesia on injection, incremental injection and local visualized surrounding nerve on ultrasound  Heart rate change: no  Slow fractionated injection: yes  Additional Notes  Erector spinae plane block: Informed consent obtained.  Risks, benefits, and alternatives discussed.  ASA monitors placed, and timeout performed.  Patient positioned, prepped with chlorhexidine, and draped with sterile towels. Anatomical landmarks clearly marked.    Ultrasound guidance used to visualize the erector spine a muscle above the TP/costal junction at T6.  Good visualization of the needle throughout duration of the procedure.  Aspiration was negative. A total of Type of Local:  20 cc of 0.5% ropivacaine was divided and injected unilaterally.  Catheters threaded and secured. Patient tolerated procedure well.    Timeout by Scar SINGER

## 2024-11-08 NOTE — BRIEF OP NOTE
Date: 2024  OR Location: [facility] OR    Name: Uma Middleton : 1950, Age: 74 y.o., MRN: [id number], Sex: female    Diagnosis  Pre-op Diagnosis      * Coronary artery disease of native artery of native heart with stable angina pectoris [I25.118] Post-op Diagnosis     * Coronary artery disease of native artery of native heart with stable angina pectoris [I25.118]     Procedures  MIDCAB, ROBOT-ASSISTED; LIMA AND RADIAL  48935 - MO CABG W/ARTERIAL GRAFT SINGLE ARTERIAL GRAFT      Surgeons      * Judi Arias - Primary    Resident/Fellow/Other Assistant:  Surgeons and Role:     * Marti Cuenca MD - Fellow    Staff:   Circulator: Christiano  Circulator: Joann Garcia Person: Sharifa  Surgical Assistant: Jacky  Surgical Assistant: David Harris Circulator: Lian Harris Scrub: Patt  Surgical Assistant: María    Anesthesia Staff: Anesthesiologist: Alban Bedoya MD  Anesthesia Resident: Shana Galaivz MD; Marilia Jaramillo DO  Perfusionist: Jaime Valdes    Procedure Summary  Anesthesia: Regional, General  ASA: III  Estimated Blood Loss: 50mL  Intra-op Medications:   Administrations occurring from 0650 to 1220 on 24:   Medication Name Total Dose   sodium chloride 0.9 % irrigation solution 1,000 mL   papaverine injection 120 mg   albumin human bottle 5% 1,000 mL   calcium chloride 100 mg/mL syringe 0.5 g   ceFAZolin (Ancef) vial 1 g 2 g   dexmedeTOMIDine 4 mcg/mL in 100 mL NS infusion 61.92 mcg   electrolyte-A (Plasmalyte-A) solution Cannot be calculated   esmolol (Brevibloc) injection 30 mg   fentaNYL (Sublimaze) injection 50 mcg/mL 500 mcg   heparin injection 1,000 units/mL 20,000 Units   LR bolus Cannot be calculated   lidocaine (Xylocaine) injection 2 % 100 mg   methadone (Dolophine) injection 15 mg   midazolam PF (Versed) injection 1 mg/mL 2 mg   norepinephrine (Levophed) 16 mcg/mL syringe for Anesthesia 24 mcg   phenylephrine (Gaetano-Synephrine) 10 mg in sodium chloride 0.9% 250 mL  (0.04 mg/mL) infusion (premix) 1.82 mg   phenylephrine 40 mcg/mL syringe 10 mL 320 mcg   propofol (Diprivan) injection 10 mg/mL 100 mg   protamine injection 140 mg   rocuronium (ZeMuron) 50 mg/5 mL injection 180 mg   ropivacaine 0.2 % (PF) (Naropin) epidural pump infusion in NS 17.5 mL   sugammadex (Bridion) 200 mg/2 mL injection 200 mg              Anesthesia Record               Intraprocedure I/O Totals          Intake    Dexmedetomidine 0.00 mL    The total shown is the total volume documented since Anesthesia Start was filed.    LR bolus 1000.00 mL    electrolyte-A 1500.00 mL    Phenylephrine Drip 0.00 mL    The total shown is the total volume documented since Anesthesia Start was filed.    ropivacaine (PF) in NS cmpd 1000 mg/500 mL (0.2%) 15.00 mL    albumin human bottle 5% 1000.00 mL    Total Intake 3515 mL       Output    Urine 345 mL    Est. Blood Loss 50 mL    NG/OG Tube Output 50 mL    Total Output 445 mL       Net    Net Volume 3070 mL          Specimen: No specimens collected     Findings:   For LIMA-LAD graft:  Flow 30mL/min  PI: 2.1  JOSIE: 58%     Single CT in the lef  pleural space.    Complications:  None; patient tolerated the procedure well.     Disposition: ICU- hemodynamically stable  Condition: stable  Specimens Collected: No specimens collected  Attending Attestation: I was present and scrubbed for the entire procedure.    Judi Arias  Phone Number: [phone]

## 2024-11-08 NOTE — ANESTHESIA PROCEDURE NOTES
Arterial Line:    Date/Time: 11/8/2024 7:55 AM    Staffing  Performed: resident and attending   Authorized by: Alban Bedoya MD    Performed by: Marilia Jaramillo DO    An arterial line was placed. Procedure performed using surface landmarks.in the OR for the following indication(s): continuous blood pressure monitoring and blood sampling needed.    A 20 gauge (size), 5 cm (length), Arrow (type) catheter was placed into the Left brachial artery, secured by Tegadetiffani   Seldingcalderon technique used.  Events:  patient tolerated procedure well with no complications.      Additional notes:  Difficulty passing micropuncture wire at initial radial site- removed and pressure dressing applied. Successful placement left brachial.

## 2024-11-08 NOTE — ANESTHESIA PROCEDURE NOTES
Airway  Date/Time: 11/8/2024 8:01 AM  Urgency: elective    Airway not difficult    Staffing  Performed: resident   Authorized by: Alban Bedoya MD    Performed by: Marilia Jaramillo DO  Patient location during procedure: OR    Indications and Patient Condition  Indications for airway management: anesthesia  Spontaneous Ventilation: absent  Sedation level: deep  Preoxygenated: yes  Patient position: sniffing  Mask difficulty assessment: 1 - vent by mask  Planned trial extubation    Final Airway Details  Final airway type: endotracheal airway      Successful airway: ETT - double lumen left  Cuffed: yes   Successful intubation technique: video laryngoscopy  Facilitating devices/methods: intubating stylet  Endotracheal tube insertion site: oral  Blade: Dago  Blade size: #3  ETT DL size (fr): 37  Cormack-Lehane Classification: grade I - full view of glottis  Placement verified by: bronchoscopy and capnometry   Measured from: lips  ETT to lips (cm): 29  Number of attempts at approach: 1

## 2024-11-08 NOTE — ANESTHESIA POSTPROCEDURE EVALUATION
Patient: Uma Middleton    Procedure Summary       Date: 11/08/24 Room / Location: [facility] OR 18 / [facility] OR    Anesthesia Start: 0720 Anesthesia Stop: 1300    Procedure: MIDCAB, ROBOT-ASSISTED; LIMA AND RADIAL (Left: Chest) Diagnosis:       Coronary artery disease of native artery of native heart with stable angina pectoris      (Coronary artery disease of native artery of native heart with stable angina pectoris [I25.118])    Surgeons: Judi Arias MD Responsible Provider: Alban Bedoya MD    Anesthesia Type: general, regional ASA Status: 3            Anesthesia Type: general, regional    Vitals Value Taken Time   /57 11/08/24 1245   Temp 36.3 °C (97.34 °F) 11/08/24 1300   Pulse 66 11/08/24 1300   Resp 14 11/08/24 1300   SpO2 96 % 11/08/24 1300   Vitals shown include unfiled device data.    Anesthesia Post Evaluation    Patient location during evaluation: ICU  Patient participation: complete - patient participated  Level of consciousness: sleepy but conscious  Pain score: 3  Pain management: adequate  Airway patency: patent  Two or more strategies used to mitigate risk of obstructive sleep apnea  Cardiovascular status: acceptable  Respiratory status: acceptable and face mask  Hydration status: acceptable  Postoperative Nausea and Vomiting: none        There were no known notable events for this encounter.

## 2024-11-08 NOTE — ANESTHESIA PROCEDURE NOTES
Central Venous Line:    Date/Time: 11/8/2024 8:25 AM    A central venous line was placed in the OR for the following indication(s): central venous access and CVP monitoring.  Staffing  Performed: resident   Authorized by: Alban Bedoya MD    Performed by: Marilia Jaramillo DO    Sterility preparation included the following: provider hand hygiene performed prior to central venous catheter insertion, all 5 sterile barriers used (gloves, gown, cap, mask, large sterile drape) during central venous catheter insertion, antiseptic used during central venous catheter insertion and skin prep agent completely dried prior to procedure.  Medical reason for not performing maximal sterile barrier technique: no  The patient was placed in Trendelenburg position.    Right internal jugular vein was prepped.    The site was prepped with Chlorhexidine.  Size: 9 Fr (8 Fr)   Length: 11.5  Catheter type: introducer   Number of Lumens: double lumen    This catheter was not an oximetric catheter.    During the procedure, the following specific steps were taken: target vein identified, needle advanced into vein and blood aspirated and guidewire advanced into vein.  Seldinger technique used.  Procedure performed using ultrasound guidance.  Sterile gel and probe cover used in ultrasound-guided central venous catheter insertion.    Intravenous verification was obtained by ultrasound, venous blood return and manometry.      Post insertion care included: all ports aspirated, all ports flushed easily, guidewire removed intact, Biopatch applied, line sutured in place and dressing applied.    During the procedure the patient experienced: patient tolerated procedure well with no complications.           images stored in chart

## 2024-11-08 NOTE — ANESTHESIA PROCEDURE NOTES
Peripheral IV  Date/Time: 11/8/2024 8:00 AM      Placement  Needle size: 16 G  Laterality: left  Location: wrist  Site prep: chlorhexidine  Technique: anatomical landmarks  Attempts: 1

## 2024-11-08 NOTE — ANESTHESIA PREPROCEDURE EVALUATION
Patient: Uma Middleton    Procedure Information       Date/Time: 11/08/24 0650    Procedure: MIDCAB, ROBOT-ASSISTED; LIMA AND RADIAL (Left)    [place] [facility] [place] [facility]    Surgeons: Judi Arias MD            Relevant Problems   Anesthesia (within normal limits)      Cardiac   (+) ASHD (arteriosclerotic heart disease)   (+) Angina pectoris   (+) Aortic aneurysm without rupture, unspecified portion of aorta (CMS-HCC)   (+) Atherosclerosis of native coronary artery of native heart with unstable angina pectoris   (+) Coronary artery disease of native artery of native heart with stable angina pectoris   (+) Hyperlipidemia   (+) Primary hypertension      Pulmonary   (+) ALMA (obstructive sleep apnea)      GI   (+) Gastroesophageal reflux disease      /Renal (within normal limits)      Liver   (+) Fatty liver      Endocrine   (+) Type 2 diabetes mellitus with hyperglycemia, without long-term current use of insulin      Hematology (within normal limits)      Musculoskeletal   (+) DJD (degenerative joint disease), thoracic   (+) Spondylosis of lumbar spine      HEENT   (+) Acute non-recurrent frontal sinusitis      Nervous   (+) Neuropathy      Digestive   (+) Aleman's esophagus       Clinical information reviewed:   Tobacco  Allergies  Meds   Med Hx  Surg Hx  OB Status  Fam Hx  Soc   Hx        NPO Detail:  NPO/Void Status  Carbohydrate Drink Given Prior to Surgery? : Y  Date of Last Liquid: 11/08/24  Time of Last Liquid: 0300  Date of Last Solid: 11/07/24  Time of Last Solid: 1800  Last Intake Type: Clear fluids; Carbohydrate drink; Solid meal         Vitals:    11/08/24 0602   BP: (!) 184/89   Pulse: 77   Resp: 18   Temp: 36.7 °C (98.1 °F)   SpO2: 98%       Chemistry    Lab Results   Component Value Date/Time     10/14/2024 0955    K 3.6 10/14/2024 0955     (H) 10/14/2024 0955    CO2 26 10/14/2024 0955    BUN 14 10/14/2024 0955    CREATININE 0.75 10/14/2024 0955    Lab  Results   Component Value Date/Time    CALCIUM 9.2 10/14/2024 0955    ALKPHOS 135 (H) 04/19/2024 1147    AST 34 04/19/2024 1147    ALT 51 (H) 04/19/2024 1147    BILITOT 0.5 04/19/2024 1147          Lab Results   Component Value Date/Time    WBC 6.2 10/14/2024 0955    HGB 13.9 10/14/2024 0955    HCT 42.4 10/14/2024 0955     10/14/2024 0955     Lab Results   Component Value Date/Time    PROTIME 10.7 10/28/2024 1337    INR 1.0 10/28/2024 1337     Encounter Date: 07/23/24   ECG 12 lead (Clinic Performed)    Narrative    NSR possible left atrial enlargement. No change from previous       Transthoracic Echo (TTE) Complete    Result Date: 10/29/2024      PHYSICIAN INTERPRETATION: Left Ventricle: The left ventricular systolic function is normal, with a visually estimated ejection fraction of 60-65%. There are no regional left ventricular wall motion abnormalities. The left ventricular cavity size is normal. Spectral Doppler shows an impaired relaxation pattern of left ventricular diastolic filling. Left Atrium: The left atrium is upper limits of normal in size. Right Ventricle: The right ventricle is normal in size. There is normal right ventriclar wall thickness. There is normal right ventricular global systolic function. Right Atrium: The right atrium is normal in size. Aortic Valve: The aortic valve is trileaflet. There is no aortic valve cusp calcification noted. There is no evidence of reduced aortic valve leaflet excursion. There is no evidence of aortic valve regurgitation. The peak instantaneous gradient of the aortic valve is 6.5 mmHg. Mitral Valve: The mitral valve is normal in structure. There is normal mitral valve leaflet mobility. There is mild mitral annular calcification. There is no evidence of mitral valve regurgitation. Tricuspid Valve: The tricuspid valve is structurally normal. There is normal tricuspid valve leaflet mobility. There is trace to mild tricuspid regurgitation. Pulmonic Valve: The  pulmonic valve is structurally normal. There is trace pulmonic valve regurgitation. Pericardium: There is no pericardial effusion noted. There is a pericardial fat pad present. Aorta: The aortic root is normal. The Ao Sinus is 3.00 cm. The Asc Ao is 3.30 cm. Pulmonary Artery: The pulmonary artery is normal in size. The tricuspid regurgitant velocity is 2.28 m/s, and with an estimated right atrial pressure of 3 mmHg, the estimated pulmonary artery pressure is normal with the RVSP at 23.7 mmHg. The estimated PASP is 24 mmHg.    CONCLUSIONS:  1. The left ventricular systolic function is normal, with a visually estimated ejection fraction of 60-65%.  2. Spectral Doppler shows an impaired relaxation pattern of left ventricular diastolic filling.  3. There is normal right ventricular global systolic function.      Physical Exam    Airway  Mallampati: III     Cardiovascular   Rhythm: regular  Rate: normal     Dental    Pulmonary   Breath sounds clear to auscultation     Abdominal   (+) obese             Anesthesia Plan    History of general anesthesia?: yes  History of complications of general anesthesia?: no    ASA 3     general and regional     The patient is not a current smoker.    intravenous induction   Postoperative administration of opioids is intended.  Trial extubation is planned.  Anesthetic plan and risks discussed with patient.  Use of blood products discussed with patient who.    Plan discussed with attending and resident.

## 2024-11-08 NOTE — H&P
CTICU History & Physical    Subjective   HPI:  Anum Middleton is a 74-year-old patient who presents with a diagnosis of complex LAD diagonal severe lesion.  She previously reported chest and jaw pain for least a few years become increasingly worse in the last few months.  Her primary physician sent her for further evaluation that prompted her left heart cath with the diagnosis of LAD diagonal disease with moderate RCA and a 60% small circumflex lesion. Today, she presents to CTICU s/p  Robotic MIDCAB LIMA to LAD.    Cardiac Testing:     TTE:  CONCLUSIONS:   1. The left ventricular systolic function is normal, with a visually estimated ejection fraction of 60-65%.   2. Spectral Doppler shows an impaired relaxation pattern of left ventricular diastolic filling.   3. There is normal right ventricular global systolic function.    LHC:  Coronary Lesion Summary:  Vessel         Stenosis   Vessel Segment  LAD          80% stenosis    proximal  1st Diagonal 80% stenosis     ostial  1st Diagonal 80% stenosis      mid  OM 2         60% stenosis      mid     Procedure/Surgeon: MIDCAB - Arias  Frontliner/Anesthesia: Aureliano  Out of OR Time (document on ventilator card): 1245     OR Course/Issues: L radial hematoma from attempted melissa      CPB time: n/a  Cross clamp time: n/a  Echo Pre/Post: 60%, normal BIV fxn  Chest Tubes/Drains: L pleural CT   Temporary wires location/setting: n/a      Fluids  Crystalloid: n/a  Colloid: 1L albumin  EBL: 50ml  UOP: 400ml     Anesthesia  Intubation: easy ALON  Intravenous Access: RJ MAC   Regional anesthesia: L ARTI cath  Benzodiazepine dose/last administration: 2mg midazolam total  Extubated in OR  Antibiotic time: 0820  Temperature on admission to ICU: 36.3C    Past Medical History:   Diagnosis Date    Adhesive capsulitis of right shoulder 10/21/2013    Adhesive capsulitis of right shoulder    Angina pectoris     Anxiety     Aortic aneurysm without rupture, unspecified portion of aorta (CMS-HCC)      ASHD (arteriosclerotic heart disease)     Central retinal vein occlusion, right eye (CMS-[facility])     Chest pain     Cholelithiasis     Dermatochalasis of both upper eyelids     Dry eye syndrome of bilateral lacrimal glands     Elevated blood-pressure reading, without diagnosis of hypertension     Elevated blood pressure reading without diagnosis of hypertension    GERD (gastroesophageal reflux disease)     Hyperlipidemia     Hypertension     Inconclusive mammogram 01/23/2017    Breast density    Lattice degeneration of retina, bilateral     Other specified disorders of eustachian tube, bilateral 05/08/2017    Dysfunction of Eustachian tube, bilateral    Otitis media, unspecified, right ear 12/18/2015    Acute right otitis media    Personal history of other diseases of the respiratory system 10/18/2016    History of acute bronchitis with bronchospasm    Primary osteoarthritis, unspecified hand 02/09/2015    Osteoarthritis, hand    Sacroiliitis, not elsewhere classified (CMS-HCC) 04/26/2016    Sacroiliitis    Sciatica, right side 04/26/2016    Sciatica, right    Snoring     Type 2 diabetes mellitus     A1C 6.1 on 7/23/24    Unspecified optic atrophy     Vitamin D deficiency      Past Surgical History:   Procedure Laterality Date    CARDIAC CATHETERIZATION N/A 10/14/2024    Procedure: Left Heart Cath;  Surgeon: Juan Cueto DO;  Location: [facility] Cardiac Cath Lab;  Service: Cardiovascular;  Laterality: N/A;  no pre auth required    CATARACT EXTRACTION      CATARACT EXTRACTION W/  INTRAOCULAR LENS IMPLANT Bilateral     OD 07/18/2013 +15.5D, OS 12/05/2013 +15.5D    CHOLECYSTECTOMY      COLONOSCOPY      ESOPHAGOGASTRODUODENOSCOPY      OTHER SURGICAL HISTORY  06/25/2013    Treatment Of The Left Ankle    WA OSTEOTOMY MANDIBLE SEGMENTAL  1969    SHOULDER SURGERY       Medications Prior to Admission   Medication Sig Dispense Refill Last Dose/Taking    amLODIPine-benazepriL (Lotrel) 5-10 mg capsule Take 1 capsule by mouth once  daily. 90 capsule 1 2024 Evening    aspirin 81 mg chewable tablet Chew 1 tablet (81 mg) once daily. 90 tablet 3 2024 Evening    atorvastatin (Lipitor) 40 mg tablet Take 1 tablet (40 mg) by mouth once daily. 90 tablet 3 2024    chlorhexidine (Peridex) 0.12 % solution Swish and spit 15 mL night before surgery and morning of surgery 473 mL 0 2024 Morning    fluticasone (Flonase) 50 mcg/actuation nasal spray Administer 1-2 sprays into each nostril once daily. Uses at bedtime   2024    gabapentin (Neurontin) 100 mg capsule Take 1 capsule (100 mg) by mouth once daily at bedtime. 30 capsule 1 Past Month    hydrOXYzine HCL (Atarax) 25 mg tablet Take 1 tablet (25 mg) by mouth as needed at bedtime for anxiety.   2024 Bedtime    metoprolol succinate XL (Toprol-XL) 25 mg 24 hr tablet Take 1 tablet (25 mg) by mouth once daily at bedtime. 90 tablet 1 2024 Evening    nitroglycerin (Nitrostat) 0.4 mg SL tablet Place 1 tablet (0.4 mg) under the tongue every 5 minutes if needed for chest pain. May repeat every 5 minutes for up to 3 doses. If second dose needed please call 911 when you take the second dose. 100 tablet 11 Past Week    omeprazole (PriLOSEC) 40 mg DR capsule Take 1 capsule (40 mg) by mouth once daily. Do not crush or chew.   2024 Evening    [] chlorhexidine (Hibiclens) 4 % external liquid Apply topically 2 times a day for 5 days. 473 mL 0      Fire ant, Erythromycin, and Tea tree oil  Social History     Tobacco Use    Smoking status: Former     Types: Cigarettes     Passive exposure: Past    Smokeless tobacco: Never    Tobacco comments:     Quit    Vaping Use    Vaping status: Never Used   Substance Use Topics    Alcohol use: Not Currently     Comment: 1 cocktail/ mo maybe    Drug use: Never     Family History   Problem Relation Name Age of Onset    Hypertension Mother Marilia     Heart disease Father Rd     Hypertension Father Rd        Review of Systems:  Unable  to be completed due to immediate postoperative anesthetic recovery status.    Objective   Vitals:  Most Recent:  Vitals:    11/08/24 0602   BP: (!) 184/89   Pulse: 77   Resp: 18   Temp: 36.7 °C (98.1 °F)   SpO2: 98%       24hr Min/Max:  Temp  Min: 36.7 °C (98.1 °F)  Max: 36.7 °C (98.1 °F)  Pulse  Min: 77  Max: 77  BP  Min: 184/89  Max: 184/89  Resp  Min: 18  Max: 18  SpO2  Min: 98 %  Max: 98 %    I/O:  No intake/output data recorded.    LDA:  CVC 11/08/24 Double lumen Right Internal jugular (Active)   Placement Date/Time: 11/08/24 (c) 0825   Hand Hygiene Performed Prior to CVC Insertion: Yes  Site Prep: Chlorhexidine   Site Prep Agent has Completely Dried Before Insertion: Yes  All 5 Sterile Barriers Used (Gloves, Gown, Cap, Mask, Large Sterile Debora...   Number of days: 0       Arterial Line 11/08/24 Left Brachial (Active)   Placement Date/Time: 11/08/24 (c) 0755   Size: 20 G  Orientation: Left  Location: Brachial  Securement Method: Transparent dressing  Patient Tolerance: Tolerated well   Number of days: 0       ETT  37 Fr (Active)   Placement Date/Time: 11/08/24 (c) 0801   Mask Ventilation: Vent by mask  Technique: Video laryngoscopy  ETT Type: ETT - double lumen left  Double Lumen Tube Size: 37 Fr  Cuffed: Yes  Laryngoscope: Dago  Blade Size: 3  Location: Oral  Grade View: ...   Number of days: 0       Urethral Catheter Non-latex 14 Fr. (Active)   Placement Date/Time: 11/08/24 0755   Placed by: KEISHA ZHONG  Hand Hygiene Completed: Yes  Catheter Type: Non-latex  Tube Size (Fr.): 14 Fr.  Catheter Balloon Size: 10 mL  Urine Returned: Yes   Number of days: 0       Physical Exam:   Constitutional: female patient in ICU bed. In no acute distress, but not actively participating in care at this time  Skin: Warm and dry throughout.    Eyes: Anicteric sclera, PERRL, clear  Head/Neck: Right IJ MAC, dressing c,d,i - no hematoma or bleeding  Respiratory/Thorax: Orally intubated.  Clear breath sounds throughout.  "Minimal ETT /oral secretions - clear.   Left pleural chest tube to -20 suction, each to separate atrium, serosanguinous drainage.     Cardiovascular:  RRR. No murmurs appreciated. NSR on tele with HR 90s. Peripheral pulses intact. L chest - dressing CDI    Gastrointestinal: Abdomen soft, nontender, nondistended.  Genitourinary: Santos in place draining darker yellow urine  Musculoskeletal: PITT, no joint edema or erythema noted  Extremities: Palpable radial pulses bilaterally 2+. 1+ pulses to bilateral PT/DP. SCDs to BLE. No clubbing or cyanosis to nailbeds, warm throughout.  No pitting edema.  Left brachial arterial line, dressing c,d,i - no hematoma  Neurological:  does move all 4 extremities, following commands. PERRL - 2mm bilaterally.        Lab Review:            No lab exists for component: \"LABALBU\"      Results from last 7 days   Lab Units 11/08/24  1045   POCT PH, ARTERIAL pH 7.25*   POCT PCO2, ARTERIAL mm Hg 58*   POCT PO2, ARTERIAL mm Hg 90   POCT HCO3 CALCULATED, ARTERIAL mmol/L 25.4   POCT BASE EXCESS, ARTERIAL mmol/L -2.5*       Most recent labs and imaging reviewed.    Daily Risk Screen  Intubated:  Central line:  Santos:    Assessment/Plan     Assessment:  Anum Middleton is a 74-year-old patient who presents with a diagnosis of complex LAD diagonal severe lesion.  She previously reported chest and jaw pain for least a few years become increasingly worse in the last few months.  Her primary physician sent her for further evaluation that prompted her left heart cath with the diagnosis of LAD diagonal disease with moderate RCA and a 60% small circumflex lesion. She presents to CTICU 11/8  s/p  Robotic MIDCAB LIMA to LAD.     Plan:  NEURO:  PMH of migraines and sciatica. Acute post operative pain.   -->  - Serial neuro and pain assessments   - Scheduled Tylenol   - PRN oxycodone  - PRN dilaudid for pain   - L ARTI cath with Ambit pain pump  - PT Consult, OOB to chair as tolerated, chair position if not " tolerated   - CAM ICU score qshift  - Sleep/wake cycle hygiene  - Hold home gabapentin     CV:  Patient has a history of complex LAD lesion and iss now status post Robotic MIDCAB FRYE to LAD Pre/Post EF: 60% Arrived to CTICU on no infusions , no pacing wires.-->  - Maintain goal MAP 70-90  - Volume resuscitate as clinically indicated  - nitroglycerin infusion for HTN  - Start ASA& statin  - Hold home metoprolol, amLODIPine-benazepriL      PULM: History of ALMA.  Chest tube L pleural.-->  - F/u post op CXR  - Arrived extubated   - Wean FiO2 maintaining SpO2 >92%.   - IS q1h and OOB to chair when extubated  - Chest tube to wall suction.    GI:  PMH of GERD & Aleman's esophagus. -->  - On PPI at home, will continue   - NPO, will perform bedside swallow eval in 4 hrs  - Colace/senna BID and miralax BID      :  CSA-JOSE ENRIQUE Risk Score Low.  No history of renal disease, baseline creatinine 0.75. Creatinine stable post-op. Mallory in place and making adequate UOP. -->  - Continue mallory catheter for strict I/Os.  - Goal UOP 0.5ml/kg/hr  - RFP as clinically indicated  - Replete electrolytes per CTICU protocol     ENDO:  PMH of DM2 and obesity A1c: 6.4-->  - Maintain BG <180, insulin per protocol     HEME:  Acute blood loss anemia and thrombocytopenia.-->    - Monitor drain output volume and characteristics  - CBC, coags, and fibrinogen post op and as clinically indicated  - Start ASA 6hrs post-op for CABG  - SQH tomorrow   - SCDs for DVT prophylaxis.  - Last type and screen: 10/28, resent postop     ID:  Afebrile, no current indications of infection. MRSA negative.-->  - Trend temp q4h  - Periop cefazolin x 48hrs     Skin:  No active skin issues.  - preventative Mepilex dressings in place on sacrum and heels  - change preventative Mepilex weekly or more frequently as indicated (when moist/soiled)   - every shift skin assessment per nursing and weekly ICU skin rounds  - moisture barrier to be applied with jagdish care  - active skin  problems addressed with nursing on daily rounds     Proph:  SCDs  PPI     G:  Line  Right IJ MAC w Minimac placed 11/8  Left brachial a-line placed 11/8    F: Family: will update at bedside postoperatively.     A,B,C,D,E,F,G: reviewed    I personally spent 35 minutes of critical care time directly and personally managing the patient exclusive of separately billable procedures.     Dispo: CTICU care for now.  OREN Galeana  CTICU TEAM PHONE [phone]

## 2024-11-08 NOTE — CONSULTS
Uma Middleton is a 74 y.o. year old female patient who presents for Procedure(s):  MIDCAB, ROBOT-ASSISTED; LIMA AND RADIAL with Judi Arias MD on 11/8/2024.  Acute Pain consulted for assistance with pain control.     Anticipated Postop Pain Issues -   Palliative: typically relieved with IV analgesics and regional local anesthetics  Provocative: typically with movement  Quality: typically burning and aching  Radiation: typically none  Severity: typically severe 8-10/10  Timing: typically constant    Past Medical History:   Diagnosis Date    Adhesive capsulitis of right shoulder 10/21/2013    Adhesive capsulitis of right shoulder    Angina pectoris     Anxiety     Aortic aneurysm without rupture, unspecified portion of aorta (CMS-HCC)     ASHD (arteriosclerotic heart disease)     Central retinal vein occlusion, right eye (CMS-HCC)     Chest pain     Cholelithiasis     Dermatochalasis of both upper eyelids     Dry eye syndrome of bilateral lacrimal glands     Elevated blood-pressure reading, without diagnosis of hypertension     Elevated blood pressure reading without diagnosis of hypertension    GERD (gastroesophageal reflux disease)     Hyperlipidemia     Hypertension     Inconclusive mammogram 01/23/2017    Breast density    Lattice degeneration of retina, bilateral     Other specified disorders of eustachian tube, bilateral 05/08/2017    Dysfunction of Eustachian tube, bilateral    Otitis media, unspecified, right ear 12/18/2015    Acute right otitis media    Personal history of other diseases of the respiratory system 10/18/2016    History of acute bronchitis with bronchospasm    Primary osteoarthritis, unspecified hand 02/09/2015    Osteoarthritis, hand    Sacroiliitis, not elsewhere classified (CMS-HCC) 04/26/2016    Sacroiliitis    Sciatica, right side 04/26/2016    Sciatica, right    Snoring     Type 2 diabetes mellitus     A1C 6.1 on 7/23/24    Unspecified optic atrophy     Vitamin D deficiency         Past  Surgical History:   Procedure Laterality Date    CARDIAC CATHETERIZATION N/A 10/14/2024    Procedure: Left Heart Cath;  Surgeon: Juan uCeto DO;  Location: [facility];  Service: Cardiovascular;  Laterality: N/A;  no pre auth required    CATARACT EXTRACTION      CATARACT EXTRACTION W/  INTRAOCULAR LENS IMPLANT Bilateral     OD 07/18/2013 +15.5D, OS 12/05/2013 +15.5D    CHOLECYSTECTOMY      COLONOSCOPY      ESOPHAGOGASTRODUODENOSCOPY      OTHER SURGICAL HISTORY  06/25/2013    Treatment Of The Left Ankle    [place] OSTEOTOMY MANDIBLE SEGMENTAL  1969    SHOULDER SURGERY          Family History   Problem Relation Name Age of Onset    Hypertension Mother Marilia     Heart disease Father [address]     Hypertension Father Rd         Social History     Socioeconomic History    Marital status:      Spouse name: Not on file    Number of children: Not on file    Years of education: Not on file    Highest education level: Not on file   Occupational History    Not on file   Tobacco Use    Smoking status: Former     Types: Cigarettes     Passive exposure: Past    Smokeless tobacco: Never    Tobacco comments:     Quit 2011   Vaping Use    Vaping status: Never Used   Substance and Sexual Activity    Alcohol use: Not Currently     Comment: 1 cocktail/ mo maybe    Drug use: Never    Sexual activity: Defer   Other Topics Concern    Not on file   Social History Narrative    Not on file     Social Drivers of Health     Financial Resource Strain: Not on file   Food Insecurity: Not on file   Transportation Needs: Not on file   Physical Activity: Not on file   Stress: Not on file   Social Connections: Not on file   Intimate Partner Violence: Not on file   Housing Stability: Not on file        Allergies   Allergen Reactions    Fire Ant Shortness of breath    Erythromycin Itching     Itching with erythromycin ophthalmic ointment    Tea Tree Oil Hives         Review of Systems  Gen: No fatigue, anorexia, insomnia, fever.    Eyes: No vision loss, double vision, drainage, eye pain.   ENT: No pharyngitis, dry mouth, no hearing changes or ear discharge  Cardiac: No chest pain, palpitations, syncope, near syncope.   Pulmonary: No shortness of breath, cough, hemoptysis.   Heme/lymph: No swollen glands, fever, bleeding.   GI: No abdominal pain, change in bowel habits, melena, hematemesis, hematochezia, nausea, vomiting, diarrhea.   : No discharge, dysuria, frequency, urgency, hematuria.  Endo: No polyuria or weight loss.   Musculoskeletal: Negative for any pain or loss of ROM/weakness  Skin: No rashes or lesions  Neuro: Normal speech, no numbness or weakness. No gait difficulties  Review of systems is otherwise negative unless stated above or in history of present illness.    Physical Exam:  Constitutional:  no distress, alert and cooperative  Eyes: clear sclera  Head/Neck: No apparent injury, trachea midline  Respiratory/Thorax: Patent airways, thorax symmetric, breathing comfortably  Cardiovascular: no pitting edema  Gastrointestinal: Nondistended  Musculoskeletal: ROM intact  Extremities: no clubbing  Neurological: alert, oliver x4  Psychological: Appropriate affect    Results for orders placed or performed during the hospital encounter of 11/08/24 (from the past 24 hours)   Verify ABO/Rh Group Test (VERAB)   Result Value Ref Range    ABO TYPE O     Rh TYPE POS         Uma Middleton is a 74 y.o. year old female patient who presents for Procedure(s):  MIDCAB, ROBOT-ASSISTED; LIMA AND RADIAL with Judi Arias MD on 11/8/2024. Acute Pain consulted for assistance with pain control.     Plan:    - Left erector spinae block with catheter performed pre-operatively on 11/8/2024  - Ambit ball with Ropivacaine 0.2%/NaCl 0.9% 500mL, Rate 10 cc/hr unilaterally  - Ambit medication will not interfere with pain medication prescribed by the primary team.   - Please be aware of local anesthetic toxic dose and absorption variability before considering  lidocaine patches  - Acute pain service will follow while catheters in place  - Rest of pain management per primary team    Acute Pain Resident  pg [phone]  [phone]

## 2024-11-09 ENCOUNTER — APPOINTMENT (OUTPATIENT)
Dept: RADIOLOGY | Facility: HOSPITAL | Age: 74
End: 2024-11-09
Payer: MEDICARE

## 2024-11-09 PROBLEM — G89.18 POST-OPERATIVE PAIN: Status: ACTIVE | Noted: 2024-11-09

## 2024-11-09 LAB
ALBUMIN SERPL BCP-MCNC: 4.3 G/DL (ref 3.4–5)
ALBUMIN SERPL BCP-MCNC: 4.5 G/DL (ref 3.4–5)
ANION GAP SERPL CALC-SCNC: 11 MMOL/L (ref 10–20)
ANION GAP SERPL CALC-SCNC: 13 MMOL/L (ref 10–20)
BUN SERPL-MCNC: 11 MG/DL (ref 6–23)
BUN SERPL-MCNC: 13 MG/DL (ref 6–23)
CA-I BLD-SCNC: 1.11 MMOL/L (ref 1.1–1.33)
CA-I BLD-SCNC: 1.11 MMOL/L (ref 1.1–1.33)
CALCIUM SERPL-MCNC: 8.4 MG/DL (ref 8.6–10.6)
CALCIUM SERPL-MCNC: 8.7 MG/DL (ref 8.6–10.6)
CHLORIDE SERPL-SCNC: 101 MMOL/L (ref 98–107)
CHLORIDE SERPL-SCNC: 102 MMOL/L (ref 98–107)
CO2 SERPL-SCNC: 27 MMOL/L (ref 21–32)
CO2 SERPL-SCNC: 27 MMOL/L (ref 21–32)
CREAT SERPL-MCNC: 0.65 MG/DL (ref 0.5–1.05)
CREAT SERPL-MCNC: 0.78 MG/DL (ref 0.5–1.05)
EGFRCR SERPLBLD CKD-EPI 2021: 81 ML/MIN/1.73M*2
EGFRCR SERPLBLD CKD-EPI 2021: >90 ML/MIN/1.73M*2
ERYTHROCYTE [DISTWIDTH] IN BLOOD BY AUTOMATED COUNT: 13.2 % (ref 11.5–14.5)
ERYTHROCYTE [DISTWIDTH] IN BLOOD BY AUTOMATED COUNT: 13.2 % (ref 11.5–14.5)
GLUCOSE BLD MANUAL STRIP-MCNC: 119 MG/DL (ref 74–99)
GLUCOSE BLD MANUAL STRIP-MCNC: 120 MG/DL (ref 74–99)
GLUCOSE BLD MANUAL STRIP-MCNC: 125 MG/DL (ref 74–99)
GLUCOSE BLD MANUAL STRIP-MCNC: 127 MG/DL (ref 74–99)
GLUCOSE BLD MANUAL STRIP-MCNC: 143 MG/DL (ref 74–99)
GLUCOSE BLD MANUAL STRIP-MCNC: 146 MG/DL (ref 74–99)
GLUCOSE SERPL-MCNC: 115 MG/DL (ref 74–99)
GLUCOSE SERPL-MCNC: 117 MG/DL (ref 74–99)
HCT VFR BLD AUTO: 30.8 % (ref 36–46)
HCT VFR BLD AUTO: 32.4 % (ref 36–46)
HGB BLD-MCNC: 10.1 G/DL (ref 12–16)
HGB BLD-MCNC: 10.5 G/DL (ref 12–16)
MAGNESIUM SERPL-MCNC: 1.88 MG/DL (ref 1.6–2.4)
MAGNESIUM SERPL-MCNC: 2.36 MG/DL (ref 1.6–2.4)
MCH RBC QN AUTO: 28.1 PG (ref 26–34)
MCH RBC QN AUTO: 28.5 PG (ref 26–34)
MCHC RBC AUTO-ENTMCNC: 32.4 G/DL (ref 32–36)
MCHC RBC AUTO-ENTMCNC: 32.8 G/DL (ref 32–36)
MCV RBC AUTO: 86 FL (ref 80–100)
MCV RBC AUTO: 88 FL (ref 80–100)
NRBC BLD-RTO: 0 /100 WBCS (ref 0–0)
NRBC BLD-RTO: 0 /100 WBCS (ref 0–0)
PHOSPHATE SERPL-MCNC: 2.4 MG/DL (ref 2.5–4.9)
PHOSPHATE SERPL-MCNC: 3 MG/DL (ref 2.5–4.9)
PLATELET # BLD AUTO: 198 X10*3/UL (ref 150–450)
PLATELET # BLD AUTO: 201 X10*3/UL (ref 150–450)
POTASSIUM SERPL-SCNC: 3.6 MMOL/L (ref 3.5–5.3)
POTASSIUM SERPL-SCNC: 3.7 MMOL/L (ref 3.5–5.3)
RBC # BLD AUTO: 3.59 X10*6/UL (ref 4–5.2)
RBC # BLD AUTO: 3.69 X10*6/UL (ref 4–5.2)
SODIUM SERPL-SCNC: 135 MMOL/L (ref 136–145)
SODIUM SERPL-SCNC: 138 MMOL/L (ref 136–145)
WBC # BLD AUTO: 10.6 X10*3/UL (ref 4.4–11.3)
WBC # BLD AUTO: 10.7 X10*3/UL (ref 4.4–11.3)

## 2024-11-09 PROCEDURE — 71045 X-RAY EXAM CHEST 1 VIEW: CPT | Performed by: RADIOLOGY

## 2024-11-09 PROCEDURE — 1100000001 HC PRIVATE ROOM DAILY

## 2024-11-09 PROCEDURE — 2500000002 HC RX 250 W HCPCS SELF ADMINISTERED DRUGS (ALT 637 FOR MEDICARE OP, ALT 636 FOR OP/ED)

## 2024-11-09 PROCEDURE — 2500000004 HC RX 250 GENERAL PHARMACY W/ HCPCS (ALT 636 FOR OP/ED): Performed by: NURSE PRACTITIONER

## 2024-11-09 PROCEDURE — 2500000002 HC RX 250 W HCPCS SELF ADMINISTERED DRUGS (ALT 637 FOR MEDICARE OP, ALT 636 FOR OP/ED): Performed by: NURSE PRACTITIONER

## 2024-11-09 PROCEDURE — 2500000001 HC RX 250 WO HCPCS SELF ADMINISTERED DRUGS (ALT 637 FOR MEDICARE OP): Performed by: NURSE PRACTITIONER

## 2024-11-09 PROCEDURE — 82330 ASSAY OF CALCIUM: CPT | Performed by: NURSE PRACTITIONER

## 2024-11-09 PROCEDURE — 71045 X-RAY EXAM CHEST 1 VIEW: CPT

## 2024-11-09 PROCEDURE — 2500000001 HC RX 250 WO HCPCS SELF ADMINISTERED DRUGS (ALT 637 FOR MEDICARE OP)

## 2024-11-09 PROCEDURE — 37799 UNLISTED PX VASCULAR SURGERY: CPT | Performed by: NURSE PRACTITIONER

## 2024-11-09 PROCEDURE — 97161 PT EVAL LOW COMPLEX 20 MIN: CPT | Mod: GP

## 2024-11-09 PROCEDURE — 99291 CRITICAL CARE FIRST HOUR: CPT | Performed by: NURSE PRACTITIONER

## 2024-11-09 PROCEDURE — 2500000004 HC RX 250 GENERAL PHARMACY W/ HCPCS (ALT 636 FOR OP/ED)

## 2024-11-09 PROCEDURE — 85027 COMPLETE CBC AUTOMATED: CPT | Performed by: NURSE PRACTITIONER

## 2024-11-09 PROCEDURE — 99231 SBSQ HOSP IP/OBS SF/LOW 25: CPT | Performed by: ANESTHESIOLOGY

## 2024-11-09 PROCEDURE — 83735 ASSAY OF MAGNESIUM: CPT | Performed by: NURSE PRACTITIONER

## 2024-11-09 PROCEDURE — 84100 ASSAY OF PHOSPHORUS: CPT | Performed by: NURSE PRACTITIONER

## 2024-11-09 PROCEDURE — 80069 RENAL FUNCTION PANEL: CPT | Performed by: NURSE PRACTITIONER

## 2024-11-09 PROCEDURE — 82947 ASSAY GLUCOSE BLOOD QUANT: CPT

## 2024-11-09 PROCEDURE — 2500000001 HC RX 250 WO HCPCS SELF ADMINISTERED DRUGS (ALT 637 FOR MEDICARE OP): Performed by: STUDENT IN AN ORGANIZED HEALTH CARE EDUCATION/TRAINING PROGRAM

## 2024-11-09 RX ORDER — POTASSIUM CHLORIDE 29.8 MG/ML
40 INJECTION INTRAVENOUS EVERY 6 HOURS PRN
Status: DISCONTINUED | OUTPATIENT
Start: 2024-11-09 | End: 2024-11-10

## 2024-11-09 RX ORDER — MAGNESIUM SULFATE HEPTAHYDRATE 40 MG/ML
2 INJECTION, SOLUTION INTRAVENOUS EVERY 6 HOURS PRN
Status: DISCONTINUED | OUTPATIENT
Start: 2024-11-09 | End: 2024-11-10

## 2024-11-09 RX ORDER — ACETAMINOPHEN 325 MG/1
1000 TABLET ORAL EVERY 8 HOURS
Status: DISCONTINUED | OUTPATIENT
Start: 2024-11-09 | End: 2024-11-09

## 2024-11-09 RX ORDER — ACETAMINOPHEN 10 MG/ML
1000 INJECTION, SOLUTION INTRAVENOUS EVERY 6 HOURS SCHEDULED
Status: DISCONTINUED | OUTPATIENT
Start: 2024-11-09 | End: 2024-11-09

## 2024-11-09 RX ORDER — POTASSIUM CHLORIDE 1.5 G/1.58G
20 POWDER, FOR SOLUTION ORAL EVERY 6 HOURS PRN
Status: DISCONTINUED | OUTPATIENT
Start: 2024-11-09 | End: 2024-11-10

## 2024-11-09 RX ORDER — GABAPENTIN 100 MG/1
100 CAPSULE ORAL 2 TIMES DAILY PRN
Status: DISPENSED | OUTPATIENT
Start: 2024-11-09

## 2024-11-09 RX ORDER — HYDRALAZINE HYDROCHLORIDE 10 MG/1
10 TABLET, FILM COATED ORAL EVERY 6 HOURS PRN
Status: DISPENSED | OUTPATIENT
Start: 2024-11-09

## 2024-11-09 RX ORDER — HYDROMORPHONE HYDROCHLORIDE 0.2 MG/ML
0.2 INJECTION INTRAMUSCULAR; INTRAVENOUS; SUBCUTANEOUS EVERY 2 HOUR PRN
Status: ACTIVE | OUTPATIENT
Start: 2024-11-09

## 2024-11-09 RX ORDER — METOPROLOL TARTRATE 25 MG/1
25 TABLET, FILM COATED ORAL 2 TIMES DAILY
Status: DISCONTINUED | OUTPATIENT
Start: 2024-11-09 | End: 2024-11-09

## 2024-11-09 RX ORDER — METHOCARBAMOL 500 MG/1
500 TABLET, FILM COATED ORAL EVERY 8 HOURS SCHEDULED
Status: DISPENSED | OUTPATIENT
Start: 2024-11-09

## 2024-11-09 RX ORDER — ACETAMINOPHEN 325 MG/1
975 TABLET ORAL EVERY 8 HOURS
Status: DISPENSED | OUTPATIENT
Start: 2024-11-09

## 2024-11-09 RX ORDER — METHOCARBAMOL 100 MG/ML
1000 INJECTION, SOLUTION INTRAMUSCULAR; INTRAVENOUS EVERY 8 HOURS
Status: DISCONTINUED | OUTPATIENT
Start: 2024-11-09 | End: 2024-11-09

## 2024-11-09 RX ORDER — METOPROLOL TARTRATE 25 MG/1
25 TABLET, FILM COATED ORAL 3 TIMES DAILY
Status: DISPENSED | OUTPATIENT
Start: 2024-11-09

## 2024-11-09 RX ORDER — POTASSIUM CHLORIDE 20 MEQ/1
20 TABLET, EXTENDED RELEASE ORAL EVERY 6 HOURS PRN
Status: DISCONTINUED | OUTPATIENT
Start: 2024-11-09 | End: 2024-11-10

## 2024-11-09 RX ORDER — FUROSEMIDE 10 MG/ML
40 INJECTION INTRAMUSCULAR; INTRAVENOUS ONCE
Status: COMPLETED | OUTPATIENT
Start: 2024-11-09 | End: 2024-11-09

## 2024-11-09 RX ORDER — HYDRALAZINE HYDROCHLORIDE 20 MG/ML
10 INJECTION INTRAMUSCULAR; INTRAVENOUS ONCE
Status: COMPLETED | OUTPATIENT
Start: 2024-11-09 | End: 2024-11-09

## 2024-11-09 RX ORDER — CALCIUM GLUCONATE 20 MG/ML
2 INJECTION, SOLUTION INTRAVENOUS EVERY 6 HOURS PRN
Status: DISCONTINUED | OUTPATIENT
Start: 2024-11-09 | End: 2024-11-10

## 2024-11-09 RX ORDER — GABAPENTIN 100 MG/1
100 CAPSULE ORAL AS NEEDED
Status: DISCONTINUED | OUTPATIENT
Start: 2024-11-09 | End: 2024-11-09

## 2024-11-09 RX ORDER — POTASSIUM CHLORIDE 14.9 MG/ML
20 INJECTION INTRAVENOUS EVERY 6 HOURS PRN
Status: DISCONTINUED | OUTPATIENT
Start: 2024-11-09 | End: 2024-11-10

## 2024-11-09 RX ORDER — POTASSIUM CHLORIDE 1.5 G/1.58G
40 POWDER, FOR SOLUTION ORAL EVERY 6 HOURS PRN
Status: DISCONTINUED | OUTPATIENT
Start: 2024-11-09 | End: 2024-11-10

## 2024-11-09 RX ORDER — SODIUM,POTASSIUM PHOSPHATES 280-250MG
1 POWDER IN PACKET (EA) ORAL 4 TIMES DAILY
Status: COMPLETED | OUTPATIENT
Start: 2024-11-09 | End: 2024-11-09

## 2024-11-09 RX ORDER — AMLODIPINE BESYLATE 5 MG/1
5 TABLET ORAL DAILY
Status: DISPENSED | OUTPATIENT
Start: 2024-11-09

## 2024-11-09 RX ORDER — METHOCARBAMOL 500 MG/1
1000 TABLET, FILM COATED ORAL EVERY 8 HOURS SCHEDULED
Status: DISCONTINUED | OUTPATIENT
Start: 2024-11-09 | End: 2024-11-09

## 2024-11-09 RX ORDER — MAGNESIUM SULFATE HEPTAHYDRATE 40 MG/ML
4 INJECTION, SOLUTION INTRAVENOUS EVERY 6 HOURS PRN
Status: DISCONTINUED | OUTPATIENT
Start: 2024-11-09 | End: 2024-11-10

## 2024-11-09 RX ORDER — CALCIUM GLUCONATE 20 MG/ML
1 INJECTION, SOLUTION INTRAVENOUS EVERY 6 HOURS PRN
Status: DISCONTINUED | OUTPATIENT
Start: 2024-11-09 | End: 2024-11-10

## 2024-11-09 RX ORDER — POTASSIUM CHLORIDE 20 MEQ/1
40 TABLET, EXTENDED RELEASE ORAL EVERY 6 HOURS PRN
Status: DISCONTINUED | OUTPATIENT
Start: 2024-11-09 | End: 2024-11-10

## 2024-11-09 ASSESSMENT — PAIN SCALES - GENERAL
PAINLEVEL_OUTOF10: 7
PAINLEVEL_OUTOF10: 2
PAINLEVEL_OUTOF10: 2
PAINLEVEL_OUTOF10: 8
PAINLEVEL_OUTOF10: 0 - NO PAIN
PAINLEVEL_OUTOF10: 5 - MODERATE PAIN
PAINLEVEL_OUTOF10: 8
PAINLEVEL_OUTOF10: 5 - MODERATE PAIN

## 2024-11-09 ASSESSMENT — COGNITIVE AND FUNCTIONAL STATUS - GENERAL
WALKING IN HOSPITAL ROOM: A LITTLE
MOVING TO AND FROM BED TO CHAIR: A LITTLE
TURNING FROM BACK TO SIDE WHILE IN FLAT BAD: A LITTLE
MOVING FROM LYING ON BACK TO SITTING ON SIDE OF FLAT BED WITH BEDRAILS: A LITTLE
STANDING UP FROM CHAIR USING ARMS: A LITTLE
MOBILITY SCORE: 17
CLIMB 3 TO 5 STEPS WITH RAILING: A LOT

## 2024-11-09 ASSESSMENT — PAIN - FUNCTIONAL ASSESSMENT
PAIN_FUNCTIONAL_ASSESSMENT: 0-10

## 2024-11-09 ASSESSMENT — ACTIVITIES OF DAILY LIVING (ADL): ADL_ASSISTANCE: INDEPENDENT

## 2024-11-09 NOTE — HOSPITAL COURSE
Past Medical History:   Diagnosis Date    Adhesive capsulitis of right shoulder 10/21/2013     Adhesive capsulitis of right shoulder    Angina pectoris      Anxiety      Aortic aneurysm without rupture, unspecified portion of aorta (CMS-HCC)      ASHD (arteriosclerotic heart disease)      Central retinal vein occlusion, right eye (CMS-HCC)      Chest pain      Cholelithiasis      Dermatochalasis of both upper eyelids      Dry eye syndrome of bilateral lacrimal glands      Elevated blood-pressure reading, without diagnosis of hypertension       Elevated blood pressure reading without diagnosis of hypertension    GERD (gastroesophageal reflux disease)      Hyperlipidemia      Hypertension      Inconclusive mammogram 01/23/2017     Breast density    Lattice degeneration of retina, bilateral      Other specified disorders of eustachian tube, bilateral 05/08/2017     Dysfunction of Eustachian tube, bilateral    Otitis media, unspecified, right ear 12/18/2015     Acute right otitis media    Personal history of other diseases of the respiratory system 10/18/2016     History of acute bronchitis with bronchospasm    Primary osteoarthritis, unspecified hand 02/09/2015     Osteoarthritis, hand    Sacroiliitis, not elsewhere classified (CMS-HCC) 04/26/2016     Sacroiliitis    Sciatica, right side 04/26/2016     Sciatica, right    Snoring      Type 2 diabetes mellitus       A1C 6.1 on 7/23/24    Unspecified optic atrophy      Vitamin D deficiency           Surgical History         Past Surgical History:   Procedure Laterality Date    CARDIAC CATHETERIZATION N/A 10/14/2024     Procedure: Left Heart Cath;  Surgeon: Juan Cueto DO;  Location: [facility];  Service: Cardiovascular;  Laterality: N/A;  no pre auth required    CATARACT EXTRACTION        CATARACT EXTRACTION W/  INTRAOCULAR LENS IMPLANT Bilateral       OD 07/18/2013 +15.5D, OS 12/05/2013 +15.5D    CHOLECYSTECTOMY        COLONOSCOPY         ESOPHAGOGASTRODUODENOSCOPY        OTHER SURGICAL HISTORY   2013     Treatment Of The Left Ankle    [place] OSTEOTOMY MANDIBLE SEGMENTAL   1969    SHOULDER SURGERY             Prescriptions Prior to Admission           Medications Prior to Admission   Medication Sig Dispense Refill Last Dose/Taking    amLODIPine-benazepriL (Lotrel) 5-10 mg capsule Take 1 capsule by mouth once daily. 90 capsule 1 2024 Evening    aspirin 81 mg chewable tablet Chew 1 tablet (81 mg) once daily. 90 tablet 3 2024 Evening    atorvastatin (Lipitor) 40 mg tablet Take 1 tablet (40 mg) by mouth once daily. 90 tablet 3 2024    chlorhexidine (Peridex) 0.12 % solution Swish and spit 15 mL night before surgery and morning of surgery 473 mL 0 2024 Morning    fluticasone (Flonase) 50 mcg/actuation nasal spray Administer 1-2 sprays into each nostril once daily. Uses at bedtime     2024    gabapentin (Neurontin) 100 mg capsule Take 1 capsule (100 mg) by mouth once daily at bedtime. 30 capsule 1 Past Month    hydrOXYzine HCL (Atarax) 25 mg tablet Take 1 tablet (25 mg) by mouth as needed at bedtime for anxiety.     2024 Bedtime    metoprolol succinate XL (Toprol-XL) 25 mg 24 hr tablet Take 1 tablet (25 mg) by mouth once daily at bedtime. 90 tablet 1 2024 Evening    nitroglycerin (Nitrostat) 0.4 mg SL tablet Place 1 tablet (0.4 mg) under the tongue every 5 minutes if needed for chest pain. May repeat every 5 minutes for up to 3 doses. If second dose needed please call 911 when you take the second dose. 100 tablet 11 Past Week    omeprazole (PriLOSEC) 40 mg DR capsule Take 1 capsule (40 mg) by mouth once daily. Do not crush or chew.     2024 Evening    [] chlorhexidine (Hibiclens) 4 % external liquid Apply topically 2 times a day for 5 days. 473 mL 0           Fire ant, Erythromycin, and Tea tree oil  Social History   Social History            Tobacco Use    Smoking status: Former       Types: Cigarettes        Passive exposure: Past    Smokeless tobacco: Never    Tobacco comments:       Quit 2011   Vaping Use    Vaping status: Never Used   Substance Use Topics    Alcohol use: Not Currently       Comment: 1 cocktail/ mo maybe    Drug use: Never         Family History          Family History   Problem Relation Name Age of Onset    Hypertension Mother Marilia      Heart disease Father Rd      Hypertension Father Rd

## 2024-11-09 NOTE — PROGRESS NOTES
Postop Pain HPI -   Palliative: relieved with IV analgesics and regional local anesthetics  Provocative: movement  Quality:  burning and aching  Radiation:  none  Severity:  4/10  Timing: constant    24-HOUR OPIOID CONSUMPTION:  Tylenol 650 x1  Dilaudid 0.8  Robaxin 500 x1  Oxycodone 20    Scheduled medications  acetaminophen, 650 mg, oral, q6h  aspirin, 81 mg, oral, Daily  atorvastatin, 80 mg, oral, Daily  ceFAZolin, 2 g, intravenous, q8h  colchicine, 0.6 mg, oral, BID  heparin (porcine), 5,000 Units, subcutaneous, q8h  hydrALAZINE, 25 mg, oral, TID  insulin lispro, 0-15 Units, subcutaneous, q4h  methocarbamol, 500 mg, oral, q8h KEISHA  metoprolol tartrate, 12.5 mg, oral, BID  pantoprazole, 40 mg, oral, Daily before breakfast   Or  pantoprazole, 40 mg, intravenous, Daily before breakfast  polyethylene glycol, 17 g, oral, Daily  sennosides-docusate sodium, 2 tablet, oral, BID      Continuous medications  nitroglycerin, 5-200 mcg/min, Last Rate: 140 mcg/min (11/09/24 0500)  ropivacaine (PF) in NS cmpd, 10 mL/hr, Last Rate: 10 mL/hr (11/08/24 1900)      PRN medications  PRN medications: calcium gluconate, calcium gluconate, HYDROmorphone, magnesium sulfate, magnesium sulfate, naloxone, ondansetron, oxyCODONE, oxyCODONE, potassium chloride CR **OR** potassium chloride, potassium chloride CR **OR** potassium chloride, potassium chloride, potassium chloride, prochlorperazine     Physical Exam:  Constitutional:  no distress, alert and cooperative  Eyes: clear sclera  Head/Neck: No apparent injury, trachea midline  Respiratory/Thorax: Patent airways, thorax symmetric, breathing comfortably  Cardiovascular: no pitting edema  Gastrointestinal: Nondistended  Musculoskeletal: ROM intact  Extremities: no clubbing  Neurological: alert, oliver x4  Psychological: Appropriate affect    Results for orders placed or performed during the hospital encounter of 11/08/24 (from the past 24 hours)   Verify ABO/Rh Group Test (VERAB)   Result  Value Ref Range    ABO TYPE O     Rh TYPE POS    Prepare RBC: 4 Units   Result Value Ref Range    PRODUCT CODE W5389O26     Unit Number [id number]     Unit ABO O     Unit RH POS     XM INTEP COMP     Dispense Status XM     Blood Expiration Date 12/6/2024 11:59:00 PM EST     PRODUCT BLOOD TYPE 5100     UNIT VOLUME 350     PRODUCT CODE C8247R63     Unit Number [id number]     Unit ABO O     Unit RH POS     XM INTEP COMP     Dispense Status XM     Blood Expiration Date 12/7/2024 11:59:00 PM EST     PRODUCT BLOOD TYPE 5100     UNIT VOLUME 350     PRODUCT CODE A2733L79     Unit Number [id number]     Unit ABO O     Unit RH POS     XM INTEP COMP     Dispense Status XM     Blood Expiration Date 11/27/2024 11:59:00 PM EST     PRODUCT BLOOD TYPE 5100     UNIT VOLUME 275     PRODUCT CODE I0537C61     Unit Number [id number]     Unit ABO O     Unit RH POS     XM INTEP COMP     Dispense Status XM     Blood Expiration Date 12/3/2024 11:59:00 PM EST     PRODUCT BLOOD TYPE 5100     UNIT VOLUME 279    Blood Gas Arterial Full Panel Unsolicited   Result Value Ref Range    POCT pH, Arterial 7.37 (L) 7.38 - 7.42 pH    POCT pCO2, Arterial 41 38 - 42 mm Hg    POCT pO2, Arterial 163 (H) 85 - 95 mm Hg    POCT SO2, Arterial 98 94 - 100 %    POCT Oxy Hemoglobin, Arterial 97.5 94.0 - 98.0 %    POCT Hematocrit Calculated, Arterial 37.0 36.0 - 46.0 %    POCT Sodium, Arterial 134 (L) 136 - 145 mmol/L    POCT Potassium, Arterial 3.8 3.5 - 5.3 mmol/L    POCT Chloride, Arterial 103 98 - 107 mmol/L    POCT Ionized Calcium, Arterial 1.15 1.10 - 1.33 mmol/L    POCT Glucose, Arterial 135 (H) 74 - 99 mg/dL    POCT Lactate, Arterial 1.6 0.4 - 2.0 mmol/L    POCT Base Excess, Arterial -1.5 -2.0 - 3.0 mmol/L    POCT HCO3 Calculated, Arterial 23.7 22.0 - 26.0 mmol/L    POCT Hemoglobin, Arterial 12.4 12.0 - 16.0 g/dL    POCT Anion Gap, Arterial 11 10 - 25 mmo/L    Patient Temperature 37.0 degrees Celsius    FiO2 60 %   Coox Panel, Arterial  Unsolicited   Result Value Ref Range    POCT Hemoglobin, Arterial 12.4 12.0 - 16.0 g/dL    POCT Oxy Hemoglobin, Arterial 97.5 94.0 - 98.0 %    POCT Carboxyhemoglobin, Arterial 0.0 %    POCT Methemoglobin, Arterial 0.0 0.0 - 1.5 %    POCT Deoxy Hemoglobin, Arterial 2.5 0.0 - 5.0 %   Blood Gas Arterial Full Panel Unsolicited   Result Value Ref Range    POCT pH, Arterial 7.25 (LL) 7.38 - 7.42 pH    POCT pCO2, Arterial 58 (H) 38 - 42 mm Hg    POCT pO2, Arterial 90 85 - 95 mm Hg    POCT SO2, Arterial 96 94 - 100 %    POCT Oxy Hemoglobin, Arterial 96.2 94.0 - 98.0 %    POCT Hematocrit Calculated, Arterial 34.0 (L) 36.0 - 46.0 %    POCT Sodium, Arterial 136 136 - 145 mmol/L    POCT Potassium, Arterial 4.5 3.5 - 5.3 mmol/L    POCT Chloride, Arterial 104 98 - 107 mmol/L    POCT Ionized Calcium, Arterial 1.07 (L) 1.10 - 1.33 mmol/L    POCT Glucose, Arterial 167 (H) 74 - 99 mg/dL    POCT Lactate, Arterial 1.2 0.4 - 2.0 mmol/L    POCT Base Excess, Arterial -2.5 (L) -2.0 - 3.0 mmol/L    POCT HCO3 Calculated, Arterial 25.4 22.0 - 26.0 mmol/L    POCT Hemoglobin, Arterial 11.4 (L) 12.0 - 16.0 g/dL    POCT Anion Gap, Arterial 11 10 - 25 mmo/L    Patient Temperature 37.0 degrees Celsius    FiO2 60 %   Coox Panel, Arterial Unsolicited   Result Value Ref Range    POCT Hemoglobin, Arterial 11.4 (L) 12.0 - 16.0 g/dL    POCT Oxy Hemoglobin, Arterial 96.2 94.0 - 98.0 %    POCT Carboxyhemoglobin, Arterial 0.2 %    POCT Methemoglobin, Arterial 0.0 0.0 - 1.5 %    POCT Deoxy Hemoglobin, Arterial 3.6 0.0 - 5.0 %   Blood Gas Arterial Full Panel Unsolicited   Result Value Ref Range    POCT pH, Arterial 7.28 (L) 7.38 - 7.42 pH    POCT pCO2, Arterial 54 (H) 38 - 42 mm Hg    POCT pO2, Arterial 90 85 - 95 mm Hg    POCT SO2, Arterial 96 94 - 100 %    POCT Oxy Hemoglobin, Arterial 95.9 94.0 - 98.0 %    POCT Hematocrit Calculated, Arterial 35.0 (L) 36.0 - 46.0 %    POCT Sodium, Arterial 136 136 - 145 mmol/L    POCT Potassium, Arterial 4.5 3.5 - 5.3  mmol/L    POCT Chloride, Arterial 102 98 - 107 mmol/L    POCT Ionized Calcium, Arterial 1.21 1.10 - 1.33 mmol/L    POCT Glucose, Arterial 172 (H) 74 - 99 mg/dL    POCT Lactate, Arterial 1.1 0.4 - 2.0 mmol/L    POCT Base Excess, Arterial -1.9 -2.0 - 3.0 mmol/L    POCT HCO3 Calculated, Arterial 25.4 22.0 - 26.0 mmol/L    POCT Hemoglobin, Arterial 11.7 (L) 12.0 - 16.0 g/dL    POCT Anion Gap, Arterial 13 10 - 25 mmo/L    Patient Temperature 37.0 degrees Celsius    FiO2 75 %   Coox Panel, Arterial Unsolicited   Result Value Ref Range    POCT Hemoglobin, Arterial 11.7 (L) 12.0 - 16.0 g/dL    POCT Oxy Hemoglobin, Arterial 95.9 94.0 - 98.0 %    POCT Carboxyhemoglobin, Arterial 0.3 %    POCT Methemoglobin, Arterial 0.0 0.0 - 1.5 %    POCT Deoxy Hemoglobin, Arterial 3.8 0.0 - 5.0 %   Blood Gas Arterial Full Panel Unsolicited   Result Value Ref Range    POCT pH, Arterial 7.39 7.38 - 7.42 pH    POCT pCO2, Arterial 39 38 - 42 mm Hg    POCT pO2, Arterial 224 (H) 85 - 95 mm Hg    POCT SO2, Arterial 98 94 - 100 %    POCT Oxy Hemoglobin, Arterial 98.1 (H) 94.0 - 98.0 %    POCT Hematocrit Calculated, Arterial 33.0 (L) 36.0 - 46.0 %    POCT Sodium, Arterial 136 136 - 145 mmol/L    POCT Potassium, Arterial 4.5 3.5 - 5.3 mmol/L    POCT Chloride, Arterial 104 98 - 107 mmol/L    POCT Ionized Calcium, Arterial 1.15 1.10 - 1.33 mmol/L    POCT Glucose, Arterial 167 (H) 74 - 99 mg/dL    POCT Lactate, Arterial 1.5 0.4 - 2.0 mmol/L    POCT Base Excess, Arterial -1.2 -2.0 - 3.0 mmol/L    POCT HCO3 Calculated, Arterial 23.6 22.0 - 26.0 mmol/L    POCT Hemoglobin, Arterial 11.0 (L) 12.0 - 16.0 g/dL    POCT Anion Gap, Arterial 13 10 - 25 mmo/L    Patient Temperature 37.0 degrees Celsius    FiO2 70 %   Coox Panel, Arterial Unsolicited   Result Value Ref Range    POCT Hemoglobin, Arterial 11.0 (L) 12.0 - 16.0 g/dL    POCT Oxy Hemoglobin, Arterial 98.1 (H) 94.0 - 98.0 %    POCT Carboxyhemoglobin, Arterial 0.1 %    POCT Methemoglobin, Arterial 0.0 0.0 -  1.5 %    POCT Deoxy Hemoglobin, Arterial 1.9 0.0 - 5.0 %   Calcium, Ionized   Result Value Ref Range    POCT Calcium, Ionized 1.14 1.1 - 1.33 mmol/L   Magnesium   Result Value Ref Range    Magnesium 2.02 1.60 - 2.40 mg/dL   Coagulation Screen   Result Value Ref Range    Protime 12.1 9.8 - 12.8 seconds    INR 1.1 0.9 - 1.1    aPTT 29 27 - 38 seconds   Fibrinogen   Result Value Ref Range    Fibrinogen 321 200 - 400 mg/dL   CBC   Result Value Ref Range    WBC 14.1 (H) 4.4 - 11.3 x10*3/uL    nRBC 0.0 0.0 - 0.0 /100 WBCs    RBC 3.95 (L) 4.00 - 5.20 x10*6/uL    Hemoglobin 11.1 (L) 12.0 - 16.0 g/dL    Hematocrit 34.1 (L) 36.0 - 46.0 %    MCV 86 80 - 100 fL    MCH 28.1 26.0 - 34.0 pg    MCHC 32.6 32.0 - 36.0 g/dL    RDW 13.0 11.5 - 14.5 %    Platelets 203 150 - 450 x10*3/uL   Renal Function Panel   Result Value Ref Range    Glucose 150 (H) 74 - 99 mg/dL    Sodium 139 136 - 145 mmol/L    Potassium 4.1 3.5 - 5.3 mmol/L    Chloride 104 98 - 107 mmol/L    Bicarbonate 24 21 - 32 mmol/L    Anion Gap 15 10 - 20 mmol/L    Urea Nitrogen 13 6 - 23 mg/dL    Creatinine 0.74 0.50 - 1.05 mg/dL    eGFR 86 >60 mL/min/1.73m*2    Calcium 8.7 8.6 - 10.6 mg/dL    Phosphorus 3.8 2.5 - 4.9 mg/dL    Albumin 4.6 3.4 - 5.0 g/dL   Blood Gas Arterial Full Panel   Result Value Ref Range    POCT pH, Arterial 7.34 (L) 7.38 - 7.42 pH    POCT pCO2, Arterial 44 (H) 38 - 42 mm Hg    POCT pO2, Arterial 105 (H) 85 - 95 mm Hg    POCT SO2, Arterial 97 94 - 100 %    POCT Oxy Hemoglobin, Arterial 97.3 94.0 - 98.0 %    POCT Hematocrit Calculated, Arterial 35.0 (L) 36.0 - 46.0 %    POCT Sodium, Arterial 135 (L) 136 - 145 mmol/L    POCT Potassium, Arterial 4.2 3.5 - 5.3 mmol/L    POCT Chloride, Arterial 106 98 - 107 mmol/L    POCT Ionized Calcium, Arterial 1.16 1.10 - 1.33 mmol/L    POCT Glucose, Arterial 166 (H) 74 - 99 mg/dL    POCT Lactate, Arterial 2.1 (H) 0.4 - 2.0 mmol/L    POCT Base Excess, Arterial -2.1 (L) -2.0 - 3.0 mmol/L    POCT HCO3 Calculated, Arterial  23.7 22.0 - 26.0 mmol/L    POCT Hemoglobin, Arterial 11.5 (L) 12.0 - 16.0 g/dL    POCT Anion Gap, Arterial 10 10 - 25 mmo/L    Patient Temperature 37.0 degrees Celsius    FiO2 44 %   Type And Screen   Result Value Ref Range    ABO TYPE O     Rh TYPE POS     ANTIBODY SCREEN NEG    POCT GLUCOSE   Result Value Ref Range    POCT Glucose 143 (H) 74 - 99 mg/dL   POCT GLUCOSE   Result Value Ref Range    POCT Glucose 135 (H) 74 - 99 mg/dL   Calcium, Ionized   Result Value Ref Range    POCT Calcium, Ionized 1.11 1.1 - 1.33 mmol/L   CBC   Result Value Ref Range    WBC 10.6 4.4 - 11.3 x10*3/uL    nRBC 0.0 0.0 - 0.0 /100 WBCs    RBC 3.59 (L) 4.00 - 5.20 x10*6/uL    Hemoglobin 10.1 (L) 12.0 - 16.0 g/dL    Hematocrit 30.8 (L) 36.0 - 46.0 %    MCV 86 80 - 100 fL    MCH 28.1 26.0 - 34.0 pg    MCHC 32.8 32.0 - 36.0 g/dL    RDW 13.2 11.5 - 14.5 %    Platelets 201 150 - 450 x10*3/uL   Magnesium   Result Value Ref Range    Magnesium 1.88 1.60 - 2.40 mg/dL   Renal Function Panel   Result Value Ref Range    Glucose 117 (H) 74 - 99 mg/dL    Sodium 135 (L) 136 - 145 mmol/L    Potassium 3.7 3.5 - 5.3 mmol/L    Chloride 101 98 - 107 mmol/L    Bicarbonate 27 21 - 32 mmol/L    Anion Gap 11 10 - 20 mmol/L    Urea Nitrogen 11 6 - 23 mg/dL    Creatinine 0.65 0.50 - 1.05 mg/dL    eGFR >90 >60 mL/min/1.73m*2    Calcium 8.4 (L) 8.6 - 10.6 mg/dL    Phosphorus 3.0 2.5 - 4.9 mg/dL    Albumin 4.5 3.4 - 5.0 g/dL   POCT GLUCOSE   Result Value Ref Range    POCT Glucose 127 (H) 74 - 99 mg/dL   POCT GLUCOSE   Result Value Ref Range    POCT Glucose 125 (H) 74 - 99 mg/dL       - Left erector spinae block with catheter performed pre-operatively on 11/9/2024  - Ambit ball with Ropivacaine 0.2%/NaCl 0.9% 500mL, Rate 10 cc/hr unilaterally- continue today  - Ambit medication will not interfere with pain medication prescribed by the primary team.   - Please be aware of local anesthetic toxic dose and absorption variability before considering lidocaine patches  -  Acute pain service will follow while catheters in place  - Rest of pain management per primary team      Judi Basurto,

## 2024-11-09 NOTE — PROGRESS NOTES
"CTICU Progress Note  Uma Middleton/[id number]    Admit Date: 11/8/2024  Hospital Length of Stay: 1   ICU Length of Stay: 18h   CT SURGEON:     SUBJECTIVE:   No acute events overnight, remains on nitroglycerin infusion for hypertension.     MEDICATIONS  Infusions:  nitroglycerin, Last Rate: 140 mcg/min (11/09/24 0700)  ropivacaine (PF) in NS cmpd, Last Rate: 10 mL/hr (11/08/24 1900)      Scheduled:  acetaminophen, 650 mg, q6h  aspirin, 81 mg, Daily  atorvastatin, 80 mg, Daily  ceFAZolin, 2 g, q8h  colchicine, 0.6 mg, BID  heparin (porcine), 5,000 Units, q8h  hydrALAZINE, 25 mg, TID  insulin lispro, 0-15 Units, q4h  methocarbamol, 500 mg, q8h KEISHA  metoprolol tartrate, 12.5 mg, BID  pantoprazole, 40 mg, Daily before breakfast   Or  pantoprazole, 40 mg, Daily before breakfast  polyethylene glycol, 17 g, Daily  sennosides-docusate sodium, 2 tablet, BID      PRN:  calcium gluconate, 1 g, q6h PRN  calcium gluconate, 2 g, q6h PRN  HYDROmorphone, 0.2 mg, q15 min PRN  magnesium sulfate, 2 g, q6h PRN  magnesium sulfate, 4 g, q6h PRN  naloxone, 0.2 mg, q5 min PRN  ondansetron, 4 mg, q4h PRN  oxyCODONE, 10 mg, q4h PRN  oxyCODONE, 5 mg, q4h PRN  potassium chloride CR, 20 mEq, q6h PRN   Or  potassium chloride, 20 mEq, q6h PRN  potassium chloride CR, 40 mEq, q6h PRN   Or  potassium chloride, 40 mEq, q6h PRN  potassium chloride, 20 mEq, q6h PRN  potassium chloride, 40 mEq, q6h PRN  prochlorperazine, 10 mg, q6h PRN        PHYSICAL EXAM:   Visit Vitals  /57 (BP Location: Left arm, Patient Position: Lying)   Pulse 84   Temp 36.6 °C (97.9 °F)   Resp 16   Ht 1.753 m (5' 9\")   Wt 103 kg (227 lb 8.2 oz)   SpO2 96%   BMI 33.60 kg/m²   OB Status Postmenopausal   Smoking Status Former   BSA 2.24 m²     Wt Readings from Last 5 Encounters:   11/08/24 103 kg (227 lb 8.2 oz)   10/28/24 104 kg (229 lb 11.2 oz)   10/23/24 105 kg (230 lb 6.4 oz)   10/15/24 103 kg (228 lb)   10/14/24 103 kg (227 lb 1.2 oz)     INTAKE/OUTPUT:  I/O last 3 completed " shifts:  In: 5924.2 (57.4 mL/kg) [I.V.:804.8 (7.8 mL/kg); Blood:250; IV Piggyback:4800]  Out: 3340 (32.4 mL/kg) [Urine:2675 (0.7 mL/kg/hr); Emesis/NG output:200; Blood:50; Chest Tube:415]  Weight: 103.2 kg          Vent settings:       Physical Exam:   Constitutional: NAD sitting in bed, pleasant and conversant   Neuro: Neuro exam intact without obvious focal deficit.  RASS 0 and CAM negative.  CV: RRR, SR 70's-80's on monitor.    Pulm: CTAB on RA, chest tube with no airleak and minimal serosang output to -20 suction  : mallory in place with radha yellow urine  GI: S/ND/NT  Extremities: Trace edema, NV exam intact x4  Skin: Post op and chest tube dressings inact.  Psych: WNL    Daily Risk Screen  Intubated: No  Central line: Yes, will remove later today   Mallory:  Yes, will remove later today     Images:     Invasive Hemodynamics:    Most Recent Range Past 24hrs   BP (Art) 136/57 Arterial Line BP 1  Min: 102/48  Max: 237/40   MAP(Art) 83 mmHg Arterial Line MAP 1 (mmHg)   Min: 66 mmHg  Max: 160 mmHg   RA/CVP   No data recorded   PA   No data recorded   PA(mean)   No data recorded   CO   No data recorded   CI   No data recorded   Mixed Venous   No data recorded   SVR    No data recorded         Assessment/Plan   Anum Middleton is a 74-year-old patient who presents with a diagnosis of complex LAD diagonal severe lesion.  She previously reported chest and jaw pain for least a few years become increasingly worse in the last few months.  Her primary physician sent her for further evaluation that prompted her left heart cath with the diagnosis of LAD diagonal disease with moderate RCA and a 60% small circumflex lesion. She presents to CTICU 11/8  s/p  Robotic MIDCAB LIMA to LAD.        Plan:  Plan:  NEURO:  PMH of migraines and sciatica. Acute post operative pain, complains of 8/10 incisional pain this morning.   -->  - Serial neuro and pain assessments   - Scheduled Tylenol and robaxin  - after discussion with acute pain  team, tylenol and robaxin changed to IV for increased bioavailability due to acute surgical pain   - PRN oxycodone  - PRN dilaudid for breakthrough pain Q2 hours   - L ARTI cath with Ambit pain pump, will reach out to acute pain to see if they can re-dose  - PT Consult, OOB to chair as tolerated, chair position if not tolerated   - CAM ICU score qshift  - Sleep/wake cycle hygiene  - Restart home gabapentin, pt takes PRN     CV:  Patient has a history of complex LAD lesion and iss now status post Robotic MIDCAB FRYE to LAD Pre/Post EF: 60% Arrived to CTICU on no infusions , no pacing wires. Persistently hypertensive, requiring nitroglycerine infusion.-->  - Maintain goal MAP 70-90  - Volume resuscitate as clinically indicated  - nitroglycerin infusion for HTN  - Restart amlodipine at 5mg daily  - Continue hydralazine 25mg TID  - Increase metoprolol from 12.5mg BID to 25mg BID  - Start ASA& statin  - Hold home amLODIPine-benazepriL      PULM: History of ALMA.  Chest tube L pleural.-->  - F/u post op CXR  - Arrived extubated   - Wean FiO2 maintaining SpO2 >92%.   - IS q1h and OOB to chair when extubated  - Chest tube to wall suction, dr echavarria wants CT out prior to transfer     GI:  PMH of GERD & Aleman's esophagus. -->  - On PPI at home, will continue   - NPO, will perform bedside swallow eval in 4 hrs  - Colace/senna BID and miralax BID      :  CSA-JOSE ENRIQUE Risk Score Low.  No history of renal disease, baseline creatinine 0.75. Creatinine stable post-op. Mallory in place and making adequate UOP. -->  - Continue mallory catheter for strict I/Os.  - 40 mg lasix IVP ordered  - Goal UOP 0.5ml/kg/hr  - RFP as clinically indicated  - Replete electrolytes per CTICU protocol     ENDO:  PMH of DM2 and obesity A1c: 6.4-->  - Maintain BG <180, insulin per protocol     HEME:  Acute blood loss anemia and thrombocytopenia.-->    - Monitor drain output volume and characteristics  - CBC, coags, and fibrinogen post op and as clinically  indicated  - Continue asa and atorvastatin  - SQH today, 11/9  - SCDs for DVT prophylaxis.  - Last type and screen: 11/8    ID:  Afebrile, no current indications of infection. MRSA negative.-->  - Trend temp q4h  - Periop cefazolin x 48hrs     Skin:  No active skin issues.  - preventative Mepilex dressings in place on sacrum and heels  - change preventative Mepilex weekly or more frequently as indicated (when moist/soiled)   - every shift skin assessment per nursing and weekly ICU skin rounds  - moisture barrier to be applied with jagdish care  - active skin problems addressed with nursing on daily rounds     Proph:  SCDs  PPI  SQH     G:  Line  Right IJ MAC w Minimac placed 11/8  Left brachial a-line placed 11/8      F: Family: will update family at bedside as able        Code status: Full Code        I personally spent 40 minutes of critical care time directly and personally managing the patient exclusive of separately billable procedures.     A,B,C,D,E,F,G: reviewed     Dispo: CTICU care for now.    CTICU TEAM PHONE [phone]   Kassy Lynn, APRN-CNP

## 2024-11-09 NOTE — PROGRESS NOTES
Physical Therapy    Physical Therapy Evaluation    Patient Name: Uma Middleton  MRN: [id number]  Department: [facility] CTICU  Room: 02/02-A  Today's Date: 11/9/2024   Time Calculation  Start Time: 0839  Stop Time: 0906  Time Calculation (min): 27 min    Assessment/Plan   PT Assessment  PT Assessment Results: Decreased strength, Decreased endurance, Impaired balance, Decreased mobility, Pain  Rehab Prognosis: Excellent  Barriers to Discharge: Pain  Evaluation/Treatment Tolerance: Patient tolerated treatment well  Medical Staff Made Aware: Yes  End of Session Communication: Bedside nurse  Assessment Comment: Pt performed bed mobility, functional transfers, and ambulated 40' with CGA. Pt will continue to benefit from skilled PT to improve functional mobility.  End of Session Patient Position: Bed, 3 rail up, Alarm off, not on at start of session  IP OR SWING BED PT PLAN  Inpatient or Swing Bed: Inpatient  PT Plan  Treatment/Interventions: Bed mobility, Transfer training, Gait training, Stair training, Balance training, Strengthening, Endurance training, Therapeutic exercise, Therapeutic activity, Home exercise program  PT Plan: Ongoing PT  PT Frequency: 3 times per week  PT Discharge Recommendations: Low intensity level of continued care  Equipment Recommended upon Discharge: Wheeled walker  PT Recommended Transfer Status: Assist x1  PT - OK to Discharge: Yes    Subjective   General Visit Information:  General  Reason for Referral: presents to CTICU 11/8  s/p  Robotic MIDCAB LIMA to LAD.  Past Medical History Relevant to Rehab: Adhesive capsulitis of right shoulder 10/21/2013    Angina pectoris  Anxiety     Aortic aneurysm without rupture, unspecified portion of aorta (CMS-[facility])     ASHD (arteriosclerotic heart disease)     Central retinal vein occlusion, right eye (CMS-[facility])     Chest pain     Cholelithiasis     Dermatochalasis of both upper eyelids     Dry eye syndrome of bilateral lacrimal glands     Elevated blood-pressure  reading, without diagnosis of hypertension      Elevated blood pressure reading without diagnosis of hypertension   GERD (gastroesophageal reflux disease)     Hyperlipidemia     Hypertension     Inconclusive mammogram 01/23/2017    Breast density   Lattice degeneration of retina, bilateral     Other specified disorders of eustachian tube, bilateral 05/08/2017    Dysfunction of Eustachian tube, bilateral   Otitis media, unspecified, right ear 12/18/2015    Acute right otitis media   Personal history of other diseases of the respiratory system 10/18/2016    History of acute bronchitis with bronchospasm   Primary osteoarthritis, unspecified hand 02/09/2015    Osteoarthritis, hand   Sacroiliitis, not elsewhere classified (CMS-HCC) 04/26/2016    Sacroiliitis   Sciatica, right side 04/26/2016    Sciatica, right   Snoring     Type 2 diabetes mellitus      A1C 6.1 on 7/23/24   Unspecified optic atrophy     Vitamin D deficiency  Prior to Session Communication: Bedside nurse  Patient Position Received: Up in chair, Alarm off, not on at start of session  Preferred Learning Style: auditory, verbal, visual  General Comment: Pt awake and willing to participate in PT evaluation. (Lines: A line, chest tube, tele, 4L O2 NC, [place])  Home Living:  Home Living  Type of Home: House  Lives With: Spouse  Home Adaptive Equipment: None  Home Layout: Two level, Full bath main level, Able to live on main level with bedroom/bathroom, Laundry in basement, Stairs to alternate level without rails  Alternate Level Stairs-Rails: None  Alternate Level Stairs-Number of Steps: 12 to basement  Home Access: Level entry  Bathroom Shower/Tub: Tub/shower unit  Bathroom Equipment: Shower chair with back  Prior Level of Function:  Prior Function Per Pt/Caregiver Report  Level of [place]: Independent with ADLs and functional transfers  Receives Help From: Family  ADL Assistance: Independent  Homemaking Assistance: Independent  Ambulatory Assistance:  Independent  Vocational: Retired  Leisure: Dogs  Prior Function Comments: Drives, no falls  Precautions:  Precautions  Hearing/Visual Limitations: WFL  Medical Precautions: Cardiac precautions, Fall precautions  Precautions Comment: MAP 70-90; SpO2>92%     Vital Signs (Past 2hrs)        Date/Time Vitals Session Patient Position Pulse Resp SpO2 BP MAP (mmHg)    11/09/24 0815 --  --  75  11  97 %  --  --     11/09/24 0830 --  --  83  24  98 %  --  --     11/09/24 0839 Pre PT  Sitting  76  --  98 %  119/59  78     11/09/24 0845 --  --  77  18  97 %  --  --     11/09/24 0900 --  --  89  18  94 %  --  --     11/09/24 0906 Post PT  Lying  86  --  98 %  126/58  83     11/09/24 0915 --  --  79  19  95 %  --  --     11/09/24 0930 --  --  79  13  96 %  --  --     11/09/24 0945 --  --  81  14  93 %  --  --     11/09/24 1000 --  --  80  10  92 %  --  --                         Objective   Pain:  Pain Assessment  Pain Assessment: 0-10  0-10 (Numeric) Pain Score: 7  Pain Type: Surgical pain  Pain Location: Chest  Pain Interventions: Repositioned, Ambulation/increased activity  Response to Interventions: Pt resting in supine at end of session  Cognition:  Cognition  Overall Cognitive Status: Within Functional Limits  Orientation Level: Oriented X4    General Assessments:     Activity Tolerance  Endurance: Tolerates 10 - 20 min exercise with multiple rests  Early Mobility/Exercise Safety Screen: Proceed with mobilization - No exclusion criteria met  Activity Tolerance Comments: Vitals stable during session    Sensation  Light Touch: No apparent deficits    Strength  Strength Comments: Grossly at least 3/5 based on functional mobility  Strength  Strength Comments: Grossly at least 3/5 based on functional mobility    Perception  Inattention/Neglect: Appears intact  Initiation: Appears intact  Motor Planning: Appears intact  Perseveration: Not present      Coordination  Movements are Fluid and Coordinated: Yes    Postural  Control  Postural Control: Within Functional Limits    Static Sitting Balance  Static Sitting-Balance Support: Feet supported, Bilateral upper extremity supported  Static Sitting-Level of Assistance: Close supervision    Static Standing Balance  Static Standing-Balance Support: Bilateral upper extremity supported  Static Standing-Level of Assistance: Contact guard  Functional Assessments:  Bed Mobility  Bed Mobility: Yes  Bed Mobility 1  Bed Mobility 1: Sitting to supine  Level of Assistance 1: Contact guard  Bed Mobility Comments 1: CGA for safety and line management    Transfers  Transfer: Yes  Transfer 1  Transfer From 1: Sit to, Stand to  Transfer to 1: Stand, Sit  Technique 1: Sit to stand, Stand to sit  Transfer Device 1: Walker  Transfer Level of Assistance 1: Contact guard  Trials/Comments 1: CGA for safety and line management; verbal cues for hand placement on FWW    Ambulation/Gait Training  Ambulation/Gait Training Performed: Yes  Ambulation/Gait Training 1  Surface 1: Level tile  Device 1: Rolling walker  Assistance 1: Contact guard  Quality of Gait 1: Decreased step length, Forward flexed posture  Comments/Distance (ft) 1: x40' with CGA with FWW; CGA for safety and line management; pt slightly limited by nausea  Extremity/Trunk Assessments:  Cervical Spine   Cervical Spine: Within Functional Limits  Lumbar Spine   Lumbar Spine : Within Functional Limits    RLE   RLE : Within Functional Limits  LLE   LLE : Within Functional Limits  Outcome Measures:  [facility] Basic Mobility  Turning from your back to your side while in a flat bed without using bedrails: A little  Moving from lying on your back to sitting on the side of a flat bed without using bedrails: A little  Moving to and from bed to chair (including a wheelchair): A little  Standing up from a chair using your arms (e.g. wheelchair or bedside chair): A little  To walk in hospital room: A little  Climbing 3-5 steps with railing: A lot  Basic Mobility -  Total Score: 17    FSS-ICU  Ambulation: Walks <50 feet with any assistance x1 or walks any distance with assistance x2 people  Rolling: Minimal assistance (performs 75% or more of task)  Sitting: Supervision or set-up only  Transfer Sit-to-Stand: Minimal assistance (performs 75% or more of task)  Transfer Supine-to-Sit: Minimal assistance (performs 75% or more of task)  Total Score: 18      Early Mobility/Exercise Safety Screen: Proceed with mobilization - No exclusion criteria met  ICU Mobility Scale: Walking with assistance of 1 person [8]    Encounter Problems       Encounter Problems (Active)       Balance       Pt will demonstrate improved sitting/standing static/dynamic balance activities without LOB via score of 24/28 on the [place] for increase in safety prior to DC.  (Progressing)       Start:  11/09/24    Expected End:  11/23/24               Mobility       Pt will ambulate >200ft with appropriate form, SBA or less, LRAD, and no LOB for safe DC.  (Progressing)       Start:  11/09/24    Expected End:  11/23/24            Pt will tolerate >15 minutes of upright standing activity without seated rest breaks with no changes in vital signs for improved functional mobility.  (Progressing)       Start:  11/09/24    Expected End:  11/23/24            Pt will ambulate up/down 12 stairs without hand rail with CGA or less to safely access their home upon DC.   (Progressing)       Start:  11/09/24    Expected End:  11/23/24               PT Transfers       Pt will perform bed mobility and sit<>stand transfers with SBA or less and use of LRAD to safely DC.  (Progressing)       Start:  11/09/24    Expected End:  11/23/24                   Education Documentation  Precautions, taught by Deepa Izquierdo PT at 11/9/2024 10:09 AM.  Learner: Patient  Readiness: Acceptance  Method: Explanation  Response: Verbalizes Understanding    Body Mechanics, taught by Deepa Izquierdo PT at 11/9/2024 10:09 AM.  Learner:  Patient  Readiness: Acceptance  Method: Explanation  Response: Verbalizes Understanding    Mobility Training, taught by Aurea Espana PT at 11/9/2024 10:09 AM.  Learner: Patient  Readiness: Acceptance  Method: Explanation  Response: Verbalizes Understanding    Education Comments  No comments found.        AUREA ESPANA, PT

## 2024-11-10 ENCOUNTER — APPOINTMENT (OUTPATIENT)
Dept: RADIOLOGY | Facility: HOSPITAL | Age: 74
End: 2024-11-10
Payer: MEDICARE

## 2024-11-10 VITALS
RESPIRATION RATE: 17 BRPM | DIASTOLIC BLOOD PRESSURE: 69 MMHG | HEART RATE: 73 BPM | OXYGEN SATURATION: 93 % | HEIGHT: 69 IN | SYSTOLIC BLOOD PRESSURE: 131 MMHG | TEMPERATURE: 97.9 F | BODY MASS INDEX: 33.7 KG/M2 | WEIGHT: 227.51 LBS

## 2024-11-10 PROBLEM — G89.18 ACUTE POST-OPERATIVE PAIN: Status: ACTIVE | Noted: 2024-11-10

## 2024-11-10 LAB
GLUCOSE BLD MANUAL STRIP-MCNC: 101 MG/DL (ref 74–99)
GLUCOSE BLD MANUAL STRIP-MCNC: 122 MG/DL (ref 74–99)
GLUCOSE BLD MANUAL STRIP-MCNC: 173 MG/DL (ref 74–99)

## 2024-11-10 PROCEDURE — 2500000001 HC RX 250 WO HCPCS SELF ADMINISTERED DRUGS (ALT 637 FOR MEDICARE OP): Performed by: NURSE PRACTITIONER

## 2024-11-10 PROCEDURE — 1200000002 HC GENERAL ROOM WITH TELEMETRY DAILY

## 2024-11-10 PROCEDURE — 82947 ASSAY GLUCOSE BLOOD QUANT: CPT

## 2024-11-10 PROCEDURE — 2500000004 HC RX 250 GENERAL PHARMACY W/ HCPCS (ALT 636 FOR OP/ED): Performed by: NURSE PRACTITIONER

## 2024-11-10 PROCEDURE — 71045 X-RAY EXAM CHEST 1 VIEW: CPT | Performed by: RADIOLOGY

## 2024-11-10 PROCEDURE — 71045 X-RAY EXAM CHEST 1 VIEW: CPT

## 2024-11-10 PROCEDURE — 99231 SBSQ HOSP IP/OBS SF/LOW 25: CPT | Performed by: ANESTHESIOLOGY

## 2024-11-10 PROCEDURE — 2500000001 HC RX 250 WO HCPCS SELF ADMINISTERED DRUGS (ALT 637 FOR MEDICARE OP): Performed by: STUDENT IN AN ORGANIZED HEALTH CARE EDUCATION/TRAINING PROGRAM

## 2024-11-10 PROCEDURE — 2500000001 HC RX 250 WO HCPCS SELF ADMINISTERED DRUGS (ALT 637 FOR MEDICARE OP)

## 2024-11-10 PROCEDURE — 99233 SBSQ HOSP IP/OBS HIGH 50: CPT

## 2024-11-10 RX ORDER — POLYETHYLENE GLYCOL 3350 17 G/17G
17 POWDER, FOR SOLUTION ORAL 2 TIMES DAILY
Status: ACTIVE | OUTPATIENT
Start: 2024-11-10

## 2024-11-10 RX ORDER — FUROSEMIDE 10 MG/ML
40 INJECTION INTRAMUSCULAR; INTRAVENOUS ONCE
Status: DISCONTINUED | OUTPATIENT
Start: 2024-11-10 | End: 2024-11-10

## 2024-11-10 RX ORDER — FUROSEMIDE 40 MG/1
40 TABLET ORAL DAILY
Status: DISPENSED | OUTPATIENT
Start: 2024-11-10

## 2024-11-10 RX ORDER — MULTIVIT-MIN/IRON FUM/FOLIC AC 7.5 MG-4
1 TABLET ORAL DAILY
Status: ACTIVE | OUTPATIENT
Start: 2024-11-10

## 2024-11-10 RX ORDER — ASPIRIN 81 MG/1
81 TABLET ORAL DAILY
Status: DISPENSED | OUTPATIENT
Start: 2024-11-10

## 2024-11-10 RX ORDER — IRON POLYSACCHARIDE COMPLEX 150 MG
150 CAPSULE ORAL DAILY
Status: DISPENSED | OUTPATIENT
Start: 2024-11-10

## 2024-11-10 ASSESSMENT — PAIN SCALES - GENERAL
PAINLEVEL_OUTOF10: 0 - NO PAIN

## 2024-11-10 ASSESSMENT — PAIN - FUNCTIONAL ASSESSMENT
PAIN_FUNCTIONAL_ASSESSMENT: 0-10

## 2024-11-10 NOTE — PROGRESS NOTES
Postop Pain HPI -   Palliative: relieved with IV analgesics and regional local anesthetics  Provocative: movement  Quality:  burning and aching  Radiation:  none  Severity:  0/10  Timing: constant    24-HOUR OPIOID CONSUMPTION:  Tylenol 975 x2  Robaxin 500 x2  Oxycodone 5mg    Scheduled medications  acetaminophen, 975 mg, oral, q8h  amLODIPine, 5 mg, oral, Daily  aspirin, 81 mg, oral, Daily  atorvastatin, 80 mg, oral, Daily  colchicine, 0.6 mg, oral, BID  furosemide, 40 mg, intravenous, Once  heparin (porcine), 5,000 Units, subcutaneous, q8h  hydrALAZINE, 25 mg, oral, TID  insulin lispro, 0-15 Units, subcutaneous, q4h  methocarbamol, 500 mg, oral, q8h KEISHA  metoprolol tartrate, 25 mg, oral, TID  pantoprazole, 40 mg, oral, Daily before breakfast   Or  pantoprazole, 40 mg, intravenous, Daily before breakfast  polyethylene glycol, 17 g, oral, Daily  sennosides-docusate sodium, 2 tablet, oral, BID      Continuous medications  ropivacaine (PF) in NS cmpd, 10 mL/hr, Last Rate: 10 mL/hr (11/08/24 1900)      PRN medications  PRN medications: calcium gluconate, calcium gluconate, gabapentin, hydrALAZINE, HYDROmorphone, magnesium sulfate, magnesium sulfate, naloxone, ondansetron, oxyCODONE, oxyCODONE, potassium chloride CR **OR** potassium chloride, potassium chloride CR **OR** potassium chloride, potassium chloride, potassium chloride, prochlorperazine     Physical Exam:  Constitutional:  no distress, alert and cooperative  Eyes: clear sclera  Head/Neck: No apparent injury, trachea midline  Respiratory/Thorax: Patent airways, thorax symmetric, breathing comfortably  Cardiovascular: no pitting edema  Gastrointestinal: Nondistended  Musculoskeletal: ROM intact  Extremities: no clubbing  Neurological: alert, oliver x4  Psychological: Appropriate affect    Results for orders placed or performed during the hospital encounter of 11/08/24 (from the past 24 hours)   POCT GLUCOSE   Result Value Ref Range    POCT Glucose 146 (H) 74 - 99  mg/dL   POCT GLUCOSE   Result Value Ref Range    POCT Glucose 120 (H) 74 - 99 mg/dL   Renal Function Panel   Result Value Ref Range    Glucose 115 (H) 74 - 99 mg/dL    Sodium 138 136 - 145 mmol/L    Potassium 3.6 3.5 - 5.3 mmol/L    Chloride 102 98 - 107 mmol/L    Bicarbonate 27 21 - 32 mmol/L    Anion Gap 13 10 - 20 mmol/L    Urea Nitrogen 13 6 - 23 mg/dL    Creatinine 0.78 0.50 - 1.05 mg/dL    eGFR 81 >60 mL/min/1.73m*2    Calcium 8.7 8.6 - 10.6 mg/dL    Phosphorus 2.4 (L) 2.5 - 4.9 mg/dL    Albumin 4.3 3.4 - 5.0 g/dL   Magnesium   Result Value Ref Range    Magnesium 2.36 1.60 - 2.40 mg/dL   CBC   Result Value Ref Range    WBC 10.7 4.4 - 11.3 x10*3/uL    nRBC 0.0 0.0 - 0.0 /100 WBCs    RBC 3.69 (L) 4.00 - 5.20 x10*6/uL    Hemoglobin 10.5 (L) 12.0 - 16.0 g/dL    Hematocrit 32.4 (L) 36.0 - 46.0 %    MCV 88 80 - 100 fL    MCH 28.5 26.0 - 34.0 pg    MCHC 32.4 32.0 - 36.0 g/dL    RDW 13.2 11.5 - 14.5 %    Platelets 198 150 - 450 x10*3/uL   Calcium, Ionized   Result Value Ref Range    POCT Calcium, Ionized 1.11 1.1 - 1.33 mmol/L   POCT GLUCOSE   Result Value Ref Range    POCT Glucose 143 (H) 74 - 99 mg/dL   POCT GLUCOSE   Result Value Ref Range    POCT Glucose 119 (H) 74 - 99 mg/dL   POCT GLUCOSE   Result Value Ref Range    POCT Glucose 101 (H) 74 - 99 mg/dL       - Left erector spinae block with catheter performed pre-operatively on 11/10/2024  - Ambit ball with Ropivacaine 0.2%/NaCl 0.9% 500mL, Rate 10 cc/hr unilaterally- pulled today  - Rest of pain management per primary team  - Acute pain team to sign off at this time    Judi Basurto DO

## 2024-11-10 NOTE — PROGRESS NOTES
"CTICU Progress Note  Uma Middleton/[id number]    Admit Date: 11/8/2024  Hospital Length of Stay: 2   ICU Length of Stay: 1d 16h   CT SURGEON:     SUBJECTIVE:   No acute events overnight. Pending floor transfer.    MEDICATIONS  Infusions:  ropivacaine (PF) in NS cmpd, Last Rate: 10 mL/hr (11/08/24 1900)      Scheduled:  acetaminophen, 975 mg, q8h  amLODIPine, 5 mg, Daily  aspirin, 81 mg, Daily  atorvastatin, 80 mg, Daily  colchicine, 0.6 mg, BID  heparin (porcine), 5,000 Units, q8h  hydrALAZINE, 25 mg, TID  insulin lispro, 0-15 Units, q4h  methocarbamol, 500 mg, q8h KEISHA  metoprolol tartrate, 25 mg, TID  pantoprazole, 40 mg, Daily before breakfast   Or  pantoprazole, 40 mg, Daily before breakfast  polyethylene glycol, 17 g, Daily  sennosides-docusate sodium, 2 tablet, BID      PRN:  calcium gluconate, 1 g, q6h PRN  calcium gluconate, 2 g, q6h PRN  gabapentin, 100 mg, BID PRN  hydrALAZINE, 10 mg, q6h PRN  HYDROmorphone, 0.2 mg, q2h PRN  magnesium sulfate, 2 g, q6h PRN  magnesium sulfate, 4 g, q6h PRN  naloxone, 0.2 mg, q5 min PRN  ondansetron, 4 mg, q4h PRN  oxyCODONE, 10 mg, q4h PRN  oxyCODONE, 5 mg, q4h PRN  potassium chloride CR, 20 mEq, q6h PRN   Or  potassium chloride, 20 mEq, q6h PRN  potassium chloride CR, 40 mEq, q6h PRN   Or  potassium chloride, 40 mEq, q6h PRN  potassium chloride, 20 mEq, q6h PRN  potassium chloride, 40 mEq, q6h PRN  prochlorperazine, 10 mg, q6h PRN        PHYSICAL EXAM:   Visit Vitals  /72   Pulse 91   Temp 36.4 °C (97.5 °F) (Temporal)   Resp 20   Ht 1.753 m (5' 9\")   Wt 103 kg (227 lb 8.2 oz)   SpO2 93%   BMI 33.60 kg/m²   OB Status Postmenopausal   Smoking Status Former   BSA 2.24 m²     Wt Readings from Last 5 Encounters:   11/08/24 103 kg (227 lb 8.2 oz)   10/28/24 104 kg (229 lb 11.2 oz)   10/23/24 105 kg (230 lb 6.4 oz)   10/15/24 103 kg (228 lb)   10/14/24 103 kg (227 lb 1.2 oz)     INTAKE/OUTPUT:  I/O last 3 completed shifts:  In: 6461.9 (62.6 mL/kg) [I.V.:1042.6 (10.1 mL/kg); " Blood:250; IV Piggyback:5100]  Out: 4845 (46.9 mL/kg) [Urine:4000 (1.1 mL/kg/hr); Emesis/NG output:200; Blood:50; Chest Tube:595]  Weight: 103.2 kg          Vent settings:       Physical Exam:   Constitutional: NAD sitting in bed, pleasant and conversant   Neuro: Neuro exam intact without obvious focal deficit.  RASS 0 and CAM negative.  CV: RRR, SR 70's-80's on monitor.    Pulm: CTAB on RA  : mallory in place with radha yellow urine  GI: S/ND/NT  Extremities: Trace edema, NV exam intact x4  Skin: Post op dressings inact.  Psych: WNL    Daily Risk Screen  Intubated: No  Central line: Yes, will remove later today   Mallory:  Yes, will remove later today     Images:     Invasive Hemodynamics:    Most Recent Range Past 24hrs   BP (Art) 155/64 Arterial Line BP 1  Min: 109/53  Max: 155/64   MAP(Art) 94 mmHg Arterial Line MAP 1 (mmHg)   Min: 71 mmHg  Max: 126 mmHg   RA/CVP   No data recorded   PA   No data recorded   PA(mean)   No data recorded   CO   No data recorded   CI   No data recorded   Mixed Venous   No data recorded   SVR    No data recorded         Assessment/Plan   Anum Middleton is a 74-year-old patient who presents with a diagnosis of complex LAD diagonal severe lesion.  She previously reported chest and jaw pain for least a few years become increasingly worse in the last few months.  Her primary physician sent her for further evaluation that prompted her left heart cath with the diagnosis of LAD diagonal disease with moderate RCA and a 60% small circumflex lesion. She presents to CTICU 11/8  s/p  Robotic MIDCAB LIMA to LAD.      Plan:  NEURO:  PMH of migraines and sciatica. Acute post operative pain, complains of 8/10 incisional pain this morning.   -->  - Serial neuro and pain assessments   - Scheduled IV Tylenol and robaxin  - PRN oxycodone  - PRN dilaudid for breakthrough pain Q2 hours   - L ARTI cath with Ambit pain pump, will reach out to acute pain to see if they can re-dose  - PT Consult, OOB to chair as  tolerated, chair position if not tolerated   - CAM ICU score qshift  - Sleep/wake cycle hygiene  - Restart home gabapentin, pt takes PRN     CV:  Patient has a history of complex LAD lesion and iss now status post Robotic MIDCAB RFYE to LAD Pre/Post EF: 60% Arrived to CTICU on no infusions , no pacing wires. Persistently hypertensive, requiring nitroglycerine infusion. Now HD stable off nitroglycerine gtt.-->  - Maintain goal MAP 70-90  - Volume resuscitate as clinically indicated  - nitroglycerin infusion for HTN  - Restart amlodipine at 5mg daily  - Continue hydralazine 25mg TID  - Increase metoprolol from 12.5mg BID to 25mg BID  - Start ASA& statin  - Hold home amLODIPine-benazepriL      PULM: History of ALAM.  Chest tube L pleural.-->  - Arrived extubated   - Wean FiO2 maintaining SpO2 >92%.   - IS q1h and OOB to chair when extubated  - Chest tube to wall suction, dr echavarria wants CT out prior to transfer     GI:  PMH of GERD & Aleman's esophagus. -->  - On PPI at home, will continue   - Regular Diet  - Colace/senna BID and miralax BID      :  CSA-JOSE ENRIQUE Risk Score Low.  No history of renal disease, baseline creatinine 0.75. Creatinine stable post-op. Mallory in place and making adequate UOP. -->  - Continue mallory catheter for strict I/Os.  - 40 mg lasix IVP ordered  - Goal UOP 0.5ml/kg/hr  - RFP as clinically indicated  - Replete electrolytes per CTICU protocol     ENDO:  PMH of DM2 and obesity A1c: 6.4-->  - Maintain BG <180, insulin per protocol     HEME:  Acute blood loss anemia and thrombocytopenia.-->    - Monitor drain output volume and characteristics  - CBC, coags, and fibrinogen post op and as clinically indicated  - Continue asa and atorvastatin  - SQH today, 11/9  - SCDs for DVT prophylaxis.  - Last type and screen: 11/8    ID:  Afebrile, no current indications of infection. MRSA negative.-->  - Trend temp q4h  - Periop cefazolin x 48hrs     Skin:  No active skin issues.  - preventative Mepilex dressings  in place on sacrum and heels  - change preventative Mepilex weekly or more frequently as indicated (when moist/soiled)   - every shift skin assessment per nursing and weekly ICU skin rounds  - moisture barrier to be applied with jagdish care  - active skin problems addressed with nursing on daily rounds     Proph:  SCDs  PPI  SQH     G:  Line  Right IJ MAC w Minimac placed 11/8  Left brachial a-line placed 11/8      F: Family: will update family at bedside as able    Code status: Full Code     A,B,C,D,E,F,G: reviewed     Dispo: Transfer to floor    CTICU TEAM PHONE [phone]     Derik Delgadillo MD

## 2024-11-11 ENCOUNTER — APPOINTMENT (OUTPATIENT)
Dept: RADIOLOGY | Facility: HOSPITAL | Age: 74
End: 2024-11-11
Payer: MEDICARE

## 2024-11-11 LAB
ALBUMIN SERPL BCP-MCNC: 3.5 G/DL (ref 3.4–5)
ANION GAP SERPL CALC-SCNC: 10 MMOL/L (ref 10–20)
BUN SERPL-MCNC: 15 MG/DL (ref 6–23)
CALCIUM SERPL-MCNC: 8.5 MG/DL (ref 8.6–10.6)
CHLORIDE SERPL-SCNC: 103 MMOL/L (ref 98–107)
CO2 SERPL-SCNC: 28 MMOL/L (ref 21–32)
CREAT SERPL-MCNC: 0.72 MG/DL (ref 0.5–1.05)
EGFRCR SERPLBLD CKD-EPI 2021: 89 ML/MIN/1.73M*2
EJECTION FRACTION: 65 %
ERYTHROCYTE [DISTWIDTH] IN BLOOD BY AUTOMATED COUNT: 13.6 % (ref 11.5–14.5)
GLUCOSE BLD MANUAL STRIP-MCNC: 111 MG/DL (ref 74–99)
GLUCOSE BLD MANUAL STRIP-MCNC: 129 MG/DL (ref 74–99)
GLUCOSE SERPL-MCNC: 97 MG/DL (ref 74–99)
HCT VFR BLD AUTO: 34.6 % (ref 36–46)
HGB BLD-MCNC: 10.8 G/DL (ref 12–16)
MAGNESIUM SERPL-MCNC: 2.03 MG/DL (ref 1.6–2.4)
MCH RBC QN AUTO: 28.1 PG (ref 26–34)
MCHC RBC AUTO-ENTMCNC: 31.2 G/DL (ref 32–36)
MCV RBC AUTO: 90 FL (ref 80–100)
NRBC BLD-RTO: 0 /100 WBCS (ref 0–0)
PHOSPHATE SERPL-MCNC: 2.2 MG/DL (ref 2.5–4.9)
PLATELET # BLD AUTO: 200 X10*3/UL (ref 150–450)
POTASSIUM SERPL-SCNC: 3.3 MMOL/L (ref 3.5–5.3)
RBC # BLD AUTO: 3.85 X10*6/UL (ref 4–5.2)
SODIUM SERPL-SCNC: 138 MMOL/L (ref 136–145)
WBC # BLD AUTO: 7.7 X10*3/UL (ref 4.4–11.3)

## 2024-11-11 PROCEDURE — 2500000001 HC RX 250 WO HCPCS SELF ADMINISTERED DRUGS (ALT 637 FOR MEDICARE OP): Performed by: NURSE PRACTITIONER

## 2024-11-11 PROCEDURE — 1200000002 HC GENERAL ROOM WITH TELEMETRY DAILY

## 2024-11-11 PROCEDURE — 2500000002 HC RX 250 W HCPCS SELF ADMINISTERED DRUGS (ALT 637 FOR MEDICARE OP, ALT 636 FOR OP/ED): Performed by: NURSE PRACTITIONER

## 2024-11-11 PROCEDURE — 94760 N-INVAS EAR/PLS OXIMETRY 1: CPT

## 2024-11-11 PROCEDURE — 71045 X-RAY EXAM CHEST 1 VIEW: CPT

## 2024-11-11 PROCEDURE — 82947 ASSAY GLUCOSE BLOOD QUANT: CPT

## 2024-11-11 PROCEDURE — 85027 COMPLETE CBC AUTOMATED: CPT | Performed by: NURSE PRACTITIONER

## 2024-11-11 PROCEDURE — 80069 RENAL FUNCTION PANEL: CPT | Performed by: NURSE PRACTITIONER

## 2024-11-11 PROCEDURE — 83735 ASSAY OF MAGNESIUM: CPT | Performed by: NURSE PRACTITIONER

## 2024-11-11 PROCEDURE — 36415 COLL VENOUS BLD VENIPUNCTURE: CPT | Performed by: NURSE PRACTITIONER

## 2024-11-11 PROCEDURE — 2500000004 HC RX 250 GENERAL PHARMACY W/ HCPCS (ALT 636 FOR OP/ED): Performed by: NURSE PRACTITIONER

## 2024-11-11 PROCEDURE — 97165 OT EVAL LOW COMPLEX 30 MIN: CPT | Mod: GO

## 2024-11-11 PROCEDURE — 71045 X-RAY EXAM CHEST 1 VIEW: CPT | Performed by: RADIOLOGY

## 2024-11-11 PROCEDURE — 2500000001 HC RX 250 WO HCPCS SELF ADMINISTERED DRUGS (ALT 637 FOR MEDICARE OP)

## 2024-11-11 PROCEDURE — 2500000005 HC RX 250 GENERAL PHARMACY W/O HCPCS: Performed by: NURSE PRACTITIONER

## 2024-11-11 RX ORDER — POTASSIUM CHLORIDE 20 MEQ/1
40 TABLET, EXTENDED RELEASE ORAL ONCE
Status: COMPLETED | OUTPATIENT
Start: 2024-11-11 | End: 2024-11-11

## 2024-11-11 RX ORDER — COLCHICINE 0.6 MG/1
0.6 TABLET ORAL DAILY
Status: DISCONTINUED | OUTPATIENT
Start: 2024-11-13 | End: 2024-11-12 | Stop reason: HOSPADM

## 2024-11-11 RX ORDER — LOPERAMIDE HYDROCHLORIDE 2 MG/1
2 CAPSULE ORAL ONCE
Status: COMPLETED | OUTPATIENT
Start: 2024-11-11 | End: 2024-11-11

## 2024-11-11 RX ORDER — LIDOCAINE 560 MG/1
1 PATCH PERCUTANEOUS; TOPICAL; TRANSDERMAL DAILY
Status: DISCONTINUED | OUTPATIENT
Start: 2024-11-11 | End: 2024-11-12 | Stop reason: HOSPADM

## 2024-11-11 ASSESSMENT — PAIN SCALES - GENERAL
PAINLEVEL_OUTOF10: 5 - MODERATE PAIN
PAINLEVEL_OUTOF10: 0 - NO PAIN
PAINLEVEL_OUTOF10: 4
PAINLEVEL_OUTOF10: 5 - MODERATE PAIN
PAINLEVEL_OUTOF10: 2
PAINLEVEL_OUTOF10: 5 - MODERATE PAIN

## 2024-11-11 ASSESSMENT — COGNITIVE AND FUNCTIONAL STATUS - GENERAL
DAILY ACTIVITIY SCORE: 23
HELP NEEDED FOR BATHING: A LITTLE

## 2024-11-11 ASSESSMENT — PAIN - FUNCTIONAL ASSESSMENT
PAIN_FUNCTIONAL_ASSESSMENT: 0-10
PAIN_FUNCTIONAL_ASSESSMENT: 0-10

## 2024-11-11 ASSESSMENT — PAIN DESCRIPTION - DESCRIPTORS: DESCRIPTORS: ACHING

## 2024-11-11 ASSESSMENT — PAIN DESCRIPTION - LOCATION: LOCATION: BREAST

## 2024-11-11 ASSESSMENT — ACTIVITIES OF DAILY LIVING (ADL)
BATHING_ASSISTANCE: STAND BY
ADL_ASSISTANCE: INDEPENDENT

## 2024-11-11 ASSESSMENT — PAIN DESCRIPTION - ORIENTATION: ORIENTATION: LEFT

## 2024-11-11 NOTE — PROGRESS NOTES
Occupational Therapy    Evaluation    Patient Name: Uma Middleton  MRN: [id number]  Today's Date: 11/11/2024  Room: [address] [place], [place] [address]  Time Calculation  Start Time: 1218  Stop Time: 1227  Time Calculation (min): 9 min    Assessment  IP OT Assessment  OT Assessment: Pt presents close to PLOF and has needed support at home. Continued skilled OT services are not indicated at this time.  Prognosis: Excellent  Barriers to Discharge: None  Evaluation/Treatment Tolerance: Patient tolerated treatment well  End of Session Communication: Bedside nurse  End of Session Patient Position: Up in chair, Alarm off, not on at start of session  Plan:  Inpatient Plan  No Skilled OT: No acute OT goals identified  OT Frequency: OT eval only  OT Discharge Recommendations: No further acute OT, No OT needed after discharge  OT Recommended Transfer Status: Independent  OT - OK to Discharge: Yes (when deemed medically appropriate)  OT Assessment  OT Assessment Results: Decreased endurance, Decreased functional mobility  Prognosis: Excellent  Barriers to Discharge: None  Evaluation/Treatment Tolerance: Patient tolerated treatment well  Strengths: Ability to acquire knowledge, Access to adaptive/assistive products, Attitude of self, Capable of completing ADLs semi/independent, Coping skills, Housing layout, Premorbid level of function, Support and attitude of living partners  Barriers to Participation: Comorbidities    Subjective   Current Problem:  1. Coronary artery disease of native artery of native heart with stable angina pectoris  Anesthesia Intraoperative Transesophageal Echocardiogram    Anesthesia Intraoperative Transesophageal Echocardiogram      2. Atherosclerotic heart disease of native coronary artery with unstable angina pectoris  Anesthesia Intraoperative Transesophageal Echocardiogram        General:  Reason for Referral: This 75 y/o female w/ dx of complex LAD diagonal severe lesion presents s/p robotic MIDCAB LIMA to LAD 11/8  Past  Medical History Relevant to Rehab: R shoulder adhesive capsulitis, anxiety, arotic aneurysm s/o rupture, ASHD, HTN, GERD, sciatica, T2DM, Cardiac cath 10/2024  Prior to Session Communication: Bedside nurse  Patient Position Received: Up in chair, Alarm off, not on at start of session  Family/Caregiver Present: No  General Comment: Pt up in chair eating lunch on approach. Pleasant and agreeable to OT assessment   Precautions:  Medical Precautions: Cardiac precautions, Fall precautions    Pain:  Pain Assessment  Pain Assessment: 0-10  0-10 (Numeric) Pain Score: 5 - Moderate pain  Pain Type: Acute pain, Surgical pain  Pain Location: Breast  Pain Orientation: Left  Pain Interventions:  (EDU on adaptive clothing if needed for comfort)    Objective   Cognition:  Overall Cognitive Status: Within Functional Limits  Orientation Level: Oriented X4  Insight: Within function limits  Impulsive: Within functional limits  Home Living:  Type of Home: House  Lives With: Spouse  Home Adaptive Equipment: None  Home Layout: Able to live on main level with bedroom/bathroom, Laundry main level  Home Access: Level entry  Bathroom Shower/Tub: Tub/shower unit  Bathroom Toilet: Standard  Bathroom Equipment: Shower chair with back   Prior Function:  Level of [place]: Independent with ADLs and functional transfers  Receives Help From: Family  ADL Assistance: Independent  Homemaking Assistance: Independent  Ambulatory Assistance: Independent  Vocational: Retired  Leisure: Dogs  Hand Dominance: Right  IADL History:  Current License: Yes  Mode of Transportation: Car  ADL:  Eating Assistance: Independent  Grooming Assistance: Independent  Bathing Assistance: Stand by  UE Dressing Assistance: Independent  LE Dressing Assistance: Independent  Toileting Assistance with Device: Independent  Activity Tolerance:  Endurance: Tolerates 10 - 20 min exercise with multiple rests  Bed Mobility/Transfers: Functional Mobility  Functional Mobility  Performed: Yes  Functional Mobility 1  Surface 1: Level tile  Device 1: No device  Assistance 1: Independent  Comments 1: Min household distance chair<>toilet. No LOB or SOB   and Transfers  Transfer: Yes  Transfer 1  Transfer From 1: Sit to  Transfer to 1: Stand  Technique 1: Sit to stand, Stand to sit  Transfer Device 1:  (no AD)  Transfer Level of Assistance 1: Independent  IADL's:   Current License: Yes  Mode of Transportation: Car  Vision: Vision - Basic Assessment  Current Vision: Wears glasses only for reading  Sensation:  Sensation Comment: No apparent deficits  Coordination:  Movements are Fluid and Coordinated: Yes   Hand Function:  Hand Function  Gross Grasp: Functional  Coordination: Functional  Extremities: RUE   RUE : Within Functional Limits, LUE   LUE: Within Functional Limits  Outcome Measures: [facility] Daily Activity  Putting on and taking off regular lower body clothing: None  Bathing (including washing, rinsing, drying): A little  Putting on and taking off regular upper body clothing: None  Toileting, which includes using toilet, bedpan or urinal: None  Taking care of personal grooming such as brushing teeth: None  Eating Meals: None  Daily Activity - Total Score: 23         ,     OT Adult Other Outcome Measures  4AT: 0    Education Documentation  Body Mechanics, taught by Gladis Nunes OT at 11/11/2024 12:35 PM.  Learner: Patient  Readiness: Acceptance  Method: Explanation  Response: Verbalizes Understanding    Precautions, taught by Gladis Nunes OT at 11/11/2024 12:35 PM.  Learner: Patient  Readiness: Acceptance  Method: Explanation  Response: Verbalizes Understanding    ADL Training, taught by Gladis Nunes OT at 11/11/2024 12:35 PM.  Learner: Patient  Readiness: Acceptance  Method: Explanation  Response: Verbalizes Understanding    Education Comments  No comments found.        11/11/24 at 12:36 PM   GLADIS NUNES OT   Rehab Office: [phone]

## 2024-11-11 NOTE — PROGRESS NOTES
"CARDIAC SURGERY DAILY PROGRESS NOTE    Uma Middleton is a 74 y.o. female,  who presents with a diagnosis of complex LAD diagonal severe lesion.  She previously reported chest and jaw pain for least a few years become increasingly worse in the last few months.  Her primary physician sent her for further evaluation that prompted her left heart cath with the diagnosis of LAD diagonal disease with moderate RCA and a 60% small circumflex lesion. Today, she presents to CTICU s/p  Robotic MIDCAB LIMA to LAD.    Operative Procedures Dr Arias on 11/8/2024  MIDCAB, ROBOT-ASSISTED; LIMA to LAD    Echo Pre/Post: 60%, normal BIV fxn  Chest Tubes/Drains: L pleural CT   Temporary wires location/setting: n/a      CTICU Course   Transferred to Lt3 on 10/11/2024      Interval History:   Transferred from ICU 10/11    SUBJECTIVE:  Poor pain control of surgical site.  Added Lidocaine patch    Objective   /68 (BP Location: Left arm, Patient Position: Lying)   Pulse 78   Temp 36.4 °C (97.5 °F) (Temporal)   Resp 17   Ht 1.753 m (5' 9\")   Wt 104 kg (229 lb 3.2 oz)   SpO2 94%   BMI 33.85 kg/m²   0-10 (Numeric) Pain Score: 5 - Moderate pain   3 Day Weight Change: 0.255 kg (9 oz) per day    Intake and Output    Intake/Output Summary (Last 24 hours) at 11/11/2024 1204  Last data filed at 11/11/2024 1148  Gross per 24 hour   Intake 700 ml   Output 700 ml   Net 0 ml       Physical Exam  Physical Exam  Vitals and nursing note reviewed.   HENT:      Head: Normocephalic.      Mouth/Throat:      Mouth: Mucous membranes are moist.   Eyes:      Conjunctiva/sclera: Conjunctivae normal.   Cardiovascular:      Rate and Rhythm: Normal rate and regular rhythm.      Pulses: Normal pulses.      Heart sounds: Normal heart sounds.   Pulmonary:      Effort: Pulmonary effort is normal.      Breath sounds: Normal breath sounds.   Abdominal:      General: Bowel sounds are normal.      Palpations: Abdomen is soft.   Genitourinary:     Comments: Voiding " independently  Musculoskeletal:         General: No swelling. Normal range of motion.      Cervical back: Neck supple.      Left lower leg: Edema present.      Comments: Trace bilateral LE edema   Skin:     General: Skin is warm.      Capillary Refill: Capillary refill takes less than 2 seconds.      Comments: Left chest surgical site intact   Neurological:      General: No focal deficit present.      Mental Status: She is alert and oriented to person, place, and time. Mental status is at baseline.   Psychiatric:         Mood and Affect: Mood normal.         Behavior: Behavior normal.         Medications  Scheduled medications  acetaminophen, 975 mg, oral, q8h  amLODIPine, 5 mg, oral, Daily  aspirin, 81 mg, oral, Daily  atorvastatin, 80 mg, oral, Daily  colchicine, 0.6 mg, oral, BID  furosemide, 40 mg, oral, Daily  heparin (porcine), 5,000 Units, subcutaneous, q8h  hydrALAZINE, 25 mg, oral, TID  iron polysaccharides, 150 mg, oral, Daily  lidocaine, 1 patch, transdermal, Daily  methocarbamol, 500 mg, oral, q8h KEISHA  metoprolol tartrate, 25 mg, oral, TID  multivitamin with minerals, 1 tablet, oral, Daily  oxygen, , inhalation, Continuous - 02/gases  pantoprazole, 40 mg, oral, Daily before breakfast  polyethylene glycol, 17 g, oral, BID  potassium phosphate (monobasic), 500 mg, oral, 4x daily  sennosides-docusate sodium, 2 tablet, oral, BID    PRN medications  PRN medications: gabapentin, hydrALAZINE, HYDROmorphone, naloxone, ondansetron, oxyCODONE, oxyCODONE    Labs  Results for orders placed or performed during the hospital encounter of 11/08/24 (from the past 24 hours)   POCT GLUCOSE   Result Value Ref Range    POCT Glucose 173 (H) 74 - 99 mg/dL   Magnesium   Result Value Ref Range    Magnesium 2.03 1.60 - 2.40 mg/dL   CBC   Result Value Ref Range    WBC 7.7 4.4 - 11.3 x10*3/uL    nRBC 0.0 0.0 - 0.0 /100 WBCs    RBC 3.85 (L) 4.00 - 5.20 x10*6/uL    Hemoglobin 10.8 (L) 12.0 - 16.0 g/dL    Hematocrit 34.6 (L) 36.0 -  46.0 %    MCV 90 80 - 100 fL    MCH 28.1 26.0 - 34.0 pg    MCHC 31.2 (L) 32.0 - 36.0 g/dL    RDW 13.6 11.5 - 14.5 %    Platelets 200 150 - 450 x10*3/uL   Renal Function Panel   Result Value Ref Range    Glucose 97 74 - 99 mg/dL    Sodium 138 136 - 145 mmol/L    Potassium 3.3 (L) 3.5 - 5.3 mmol/L    Chloride 103 98 - 107 mmol/L    Bicarbonate 28 21 - 32 mmol/L    Anion Gap 10 10 - 20 mmol/L    Urea Nitrogen 15 6 - 23 mg/dL    Creatinine 0.72 0.50 - 1.05 mg/dL    eGFR 89 >60 mL/min/1.73m*2    Calcium 8.5 (L) 8.6 - 10.6 mg/dL    Phosphorus 2.2 (L) 2.5 - 4.9 mg/dL    Albumin 3.5 3.4 - 5.0 g/dL   POCT GLUCOSE   Result Value Ref Range    POCT Glucose 111 (H) 74 - 99 mg/dL   POCT GLUCOSE   Result Value Ref Range    POCT Glucose 129 (H) 74 - 99 mg/dL         Imaging:    Intraoperative ONUR results 11/8 /2024   1. The left ventricular systolic function is normal, with a visually estimated ejection fraction of 65%.    2. There is normal right ventricular global systolic function.   POST PROCEDURE REPORT: Patient has a normal sinus rhythm. Patient is on no inotropic support. Global LV function is normal. Global RV function is normal. No evidence of post aortic cannulation dissection. All transesophageal echocardiogram findings discussed with surgeon.        IMPRESSION & PLAN:  POD # 3 s/p MIDCAB, ROBOT-ASSISTED; FRYE to [facility] Dr Arias  - Increase activity/ ambulation; PT/OT  - Encourage IS, C/DB; respiratory therapy; wean O2 as fred[place]ick   - Cardiac rehab referral   - Continue cardiac meds: ASA, BB, statin   - continue colchicine x1 month for robotic MidCAB procedure (0.6mg daily for < 70kg, 0.6mg BID for > 70kg; if concurrently receiving amiodarone reduce dose by 50%)   - Pain and anticonstipation meds  -  chest tubes removed in ICU  - 2v CXR ordered for 11/12  -no epicardial wires  - Tele until discharge  - Optimize nutrition and electrolytes    Rhythm  - Tele: NSR 70s  - Continue BB Metoprolol 25 mg TID   - Adjust medications as  tolerated    Hypertension: home meds: Amlodipine-benazepril 5-10  Systolic (24hrs), Av , Min:116 , Max:142    - continue Metoprolol 25 mg TID, Amlodipine 5, hydralazine 25 mg TID  - additional antihypertensives as needed     Acute Blood Loss Anemia   Recent Labs     24  0620 24  1247 24  0008 24  1254 10/14/24  0955 24  1147 23  1124   HGB 10.8* 10.5* 10.1* 11.1* 13.9 14.0 14.0   HCT 34.6* 32.4* 30.8* 34.1* 42.4 44.1 44.4*   - MV, PO Iron x1mo  - Daily labs, transfuse as indicated    Thrombocytopenia  Recent Labs     24  0620 24  1247 24  0008 24  1254 10/14/24  0955 24  1147 23  1124    198 201 203 245 249 274   - Etiology likely postop/CPB related  - Continue to trend with daily CBCs    Volume/Electrolyte Status: Preop wt Weight: 103 kg (227 lb 8.2 oz)   Vitals:    24 0520   Weight: 104 kg (229 lb 3.2 oz)     - Weight: 104 kg  - Adjust diuresis as needed for postop cardiac surgery hypervolemia- on daily 40 mg PO Lasix   - Replete electrolytes for hypokalemia/hypomagnesemia/hypophosphatemia as needed - got K and Phos   - Daily weights and strict I&Os  - Daily RFP while admitted    Migraines and sciatica  -continue on Robaxin  -pain pump was removed on 11/10 by pain team  -added Lidocaine patch   -continue home dose Gabapentin (patient takes PRN)     ALMA  -aggressive bronchial hygiene  -wean O2 as tolerated- on RA  -not on home CPAP     Hx GERD, Aleman's esophagus  -continue home PPI    DM: not on home meds  Lab Results   Component Value Date    HGBA1C 6.4 10/23/2024     Results from last 7 days   Lab Units 24  1147 24  0735 11/10/24  2004 11/10/24  0738 11/10/24  0454 24  2129 24  1624   POCT GLUCOSE mg/dL 129* 111* 173* 122* 101* 119* 143*   -blood sugar stable  -stopped SSI and accu checks  -glucose check with daily RFP      VTE Prophylaxis: SCDs/TEDs, ambulation, SQ heparin  Code Status: Full  Code    Dispo  - PT/OT recs low intensity  - Would benefit from homecare for cardiac surgery carepath and RN visits  - Anticipate discharge 2-3 days, pending pain control, fluid status, optimized rate and BP  - Will continue to assess discharge needs      MANUEL Carrillo-CNP  Cardiac Surgery [facility]  [facility]  Team Phone [phone]    11/11/2024  12:04 PM

## 2024-11-11 NOTE — PROGRESS NOTES
11/11/24 0951   Discharge Planning   Living Arrangements Spouse/significant other   Support Systems Spouse/significant other   Assistance Needed Independent   Type of Residence Private residence   Do you have animals or pets at home? Yes   Type of Animals or Pets 2 dogs   Who is requesting discharge planning? Provider   Home or Post Acute Services None   Expected Discharge Disposition Home H  ([facility])   Does the patient need discharge transport arranged? No        Previous Home Care: none  DME: none  Pharmacy: [facility]  Falls: no  PCP:  Dr. Dominique  Dialysis: n/a  Met with patient and introduced myself as Care Coordinator and member of the discharge planning team.  Pt is s/p Midcab. She plans to return home at time discharge with her . Discussed home care needs and options and offered a list. Patient prefers to use  Home Care. Care Coordinator will continue to follow for home going needs.

## 2024-11-12 ENCOUNTER — DOCUMENTATION (OUTPATIENT)
Dept: HOME HEALTH SERVICES | Facility: HOME HEALTH | Age: 74
End: 2024-11-12
Payer: MEDICARE

## 2024-11-12 ENCOUNTER — HOME HEALTH ADMISSION (OUTPATIENT)
Dept: HOME HEALTH SERVICES | Facility: HOME HEALTH | Age: 74
End: 2024-11-12
Payer: MEDICARE

## 2024-11-12 ENCOUNTER — APPOINTMENT (OUTPATIENT)
Dept: RADIOLOGY | Facility: HOSPITAL | Age: 74
End: 2024-11-12
Payer: MEDICARE

## 2024-11-12 VITALS
OXYGEN SATURATION: 96 % | HEIGHT: 69 IN | SYSTOLIC BLOOD PRESSURE: 154 MMHG | RESPIRATION RATE: 17 BRPM | HEART RATE: 80 BPM | TEMPERATURE: 97.3 F | BODY MASS INDEX: 33.64 KG/M2 | WEIGHT: 227.1 LBS | DIASTOLIC BLOOD PRESSURE: 76 MMHG

## 2024-11-12 LAB
ALBUMIN SERPL BCP-MCNC: 3.8 G/DL (ref 3.4–5)
ANION GAP SERPL CALC-SCNC: 13 MMOL/L (ref 10–20)
BLOOD EXPIRATION DATE: NORMAL
BUN SERPL-MCNC: 13 MG/DL (ref 6–23)
CALCIUM SERPL-MCNC: 8.8 MG/DL (ref 8.6–10.6)
CHLORIDE SERPL-SCNC: 106 MMOL/L (ref 98–107)
CO2 SERPL-SCNC: 27 MMOL/L (ref 21–32)
CREAT SERPL-MCNC: 0.67 MG/DL (ref 0.5–1.05)
DISPENSE STATUS: NORMAL
EGFRCR SERPLBLD CKD-EPI 2021: >90 ML/MIN/1.73M*2
ERYTHROCYTE [DISTWIDTH] IN BLOOD BY AUTOMATED COUNT: 13.7 % (ref 11.5–14.5)
GLUCOSE BLD MANUAL STRIP-MCNC: 83 MG/DL (ref 74–99)
GLUCOSE SERPL-MCNC: 100 MG/DL (ref 74–99)
HCT VFR BLD AUTO: 36.9 % (ref 36–46)
HGB BLD-MCNC: 11.8 G/DL (ref 12–16)
MAGNESIUM SERPL-MCNC: 1.94 MG/DL (ref 1.6–2.4)
MCH RBC QN AUTO: 27.8 PG (ref 26–34)
MCHC RBC AUTO-ENTMCNC: 32 G/DL (ref 32–36)
MCV RBC AUTO: 87 FL (ref 80–100)
NRBC BLD-RTO: 0 /100 WBCS (ref 0–0)
PHOSPHATE SERPL-MCNC: 3.2 MG/DL (ref 2.5–4.9)
PLATELET # BLD AUTO: 269 X10*3/UL (ref 150–450)
POTASSIUM SERPL-SCNC: 3.6 MMOL/L (ref 3.5–5.3)
PRODUCT BLOOD TYPE: 5100
PRODUCT CODE: NORMAL
RBC # BLD AUTO: 4.25 X10*6/UL (ref 4–5.2)
SODIUM SERPL-SCNC: 142 MMOL/L (ref 136–145)
UNIT ABO: NORMAL
UNIT NUMBER: NORMAL
UNIT RH: NORMAL
UNIT VOLUME: 275
UNIT VOLUME: 279
UNIT VOLUME: 350
UNIT VOLUME: 350
WBC # BLD AUTO: 7.4 X10*3/UL (ref 4.4–11.3)
XM INTEP: NORMAL

## 2024-11-12 PROCEDURE — 2500000001 HC RX 250 WO HCPCS SELF ADMINISTERED DRUGS (ALT 637 FOR MEDICARE OP): Performed by: NURSE PRACTITIONER

## 2024-11-12 PROCEDURE — 80069 RENAL FUNCTION PANEL: CPT | Performed by: NURSE PRACTITIONER

## 2024-11-12 PROCEDURE — 71046 X-RAY EXAM CHEST 2 VIEWS: CPT

## 2024-11-12 PROCEDURE — 85027 COMPLETE CBC AUTOMATED: CPT | Performed by: NURSE PRACTITIONER

## 2024-11-12 PROCEDURE — 36415 COLL VENOUS BLD VENIPUNCTURE: CPT | Performed by: NURSE PRACTITIONER

## 2024-11-12 PROCEDURE — 71046 X-RAY EXAM CHEST 2 VIEWS: CPT | Performed by: RADIOLOGY

## 2024-11-12 PROCEDURE — 2500000001 HC RX 250 WO HCPCS SELF ADMINISTERED DRUGS (ALT 637 FOR MEDICARE OP)

## 2024-11-12 PROCEDURE — 83735 ASSAY OF MAGNESIUM: CPT | Performed by: NURSE PRACTITIONER

## 2024-11-12 PROCEDURE — 2500000004 HC RX 250 GENERAL PHARMACY W/ HCPCS (ALT 636 FOR OP/ED)

## 2024-11-12 PROCEDURE — 2500000005 HC RX 250 GENERAL PHARMACY W/O HCPCS: Performed by: NURSE PRACTITIONER

## 2024-11-12 PROCEDURE — 2500000004 HC RX 250 GENERAL PHARMACY W/ HCPCS (ALT 636 FOR OP/ED): Performed by: NURSE PRACTITIONER

## 2024-11-12 RX ORDER — CLOPIDOGREL BISULFATE 75 MG/1
75 TABLET ORAL DAILY
Status: DISCONTINUED | OUTPATIENT
Start: 2024-11-12 | End: 2024-11-12 | Stop reason: HOSPADM

## 2024-11-12 RX ORDER — METOPROLOL SUCCINATE 25 MG/1
25 TABLET, EXTENDED RELEASE ORAL DAILY
Status: DISCONTINUED | OUTPATIENT
Start: 2024-11-12 | End: 2024-11-12 | Stop reason: HOSPADM

## 2024-11-12 RX ORDER — FUROSEMIDE 10 MG/ML
40 INJECTION INTRAMUSCULAR; INTRAVENOUS ONCE
Status: COMPLETED | OUTPATIENT
Start: 2024-11-12 | End: 2024-11-12

## 2024-11-12 RX ORDER — LIDOCAINE 50 MG/G
1 PATCH TOPICAL DAILY
Qty: 15 PATCH | Refills: 0 | Status: SHIPPED | OUTPATIENT
Start: 2024-11-12

## 2024-11-12 RX ORDER — OXYCODONE HYDROCHLORIDE 5 MG/1
5 TABLET ORAL EVERY 4 HOURS PRN
Qty: 15 TABLET | Refills: 0 | Status: SHIPPED | OUTPATIENT
Start: 2024-11-12 | End: 2024-11-17

## 2024-11-12 RX ORDER — ATORVASTATIN CALCIUM 80 MG/1
80 TABLET, FILM COATED ORAL DAILY
Qty: 30 TABLET | Refills: 0 | Status: SHIPPED | OUTPATIENT
Start: 2024-11-13 | End: 2024-12-13

## 2024-11-12 RX ORDER — CLOPIDOGREL BISULFATE 75 MG/1
75 TABLET ORAL DAILY
Qty: 30 TABLET | Refills: 0 | Status: SHIPPED | OUTPATIENT
Start: 2024-11-12 | End: 2024-12-12

## 2024-11-12 RX ORDER — COLCHICINE 0.6 MG/1
0.6 TABLET ORAL DAILY
Qty: 28 TABLET | Refills: 0 | Status: SHIPPED | OUTPATIENT
Start: 2024-11-13 | End: 2024-12-11

## 2024-11-12 ASSESSMENT — COGNITIVE AND FUNCTIONAL STATUS - GENERAL
DAILY ACTIVITIY SCORE: 22
MOBILITY SCORE: 24
DRESSING REGULAR UPPER BODY CLOTHING: A LITTLE
DRESSING REGULAR LOWER BODY CLOTHING: A LITTLE

## 2024-11-12 ASSESSMENT — PAIN - FUNCTIONAL ASSESSMENT
PAIN_FUNCTIONAL_ASSESSMENT: 0-10

## 2024-11-12 ASSESSMENT — PAIN SCALES - GENERAL
PAINLEVEL_OUTOF10: 5 - MODERATE PAIN
PAINLEVEL_OUTOF10: 4
PAINLEVEL_OUTOF10: 5 - MODERATE PAIN
PAINLEVEL_OUTOF10: 4

## 2024-11-12 NOTE — PROGRESS NOTES
11/12/24 1346   Discharge Planning   Expected Discharge Disposition Home H  ([facility])     [facility] was inormed of patient's plan to discharge today. The confirmed start of care on Thurs/Fri. Cardiac surg NP was notified and approved.

## 2024-11-12 NOTE — PROGRESS NOTES
"CARDIAC SURGERY DAILY PROGRESS NOTE    Uma Middleton is a 74 y.o. female,  who presents with a diagnosis of complex LAD diagonal severe lesion.  She previously reported chest and jaw pain for least a few years become increasingly worse in the last few months.  Her primary physician sent her for further evaluation that prompted her left heart cath with the diagnosis of LAD diagonal disease with moderate RCA and a 60% small circumflex lesion. Today, she presents to CTICU s/p  Robotic MIDCAB LIMA to LAD.    Operative Procedures Dr Arias on 11/8/2024  MIDCAB, ROBOT-ASSISTED; LIMA to LAD    Echo Pre/Post: 60%, normal BIV fxn  Chest Tubes/Drains: L pleural CT   Temporary wires location/setting: n/a      CTICU Course   Transferred to Lt3 on 10/11/2024      Interval History:   Transferred from ICU 10/11  Diarrhea overnight, colchicine dose reduced to daily and received imodium with improvement    SUBJECTIVE:  Incisional site discomfort, otherwise feels well. Excited to go home today.     Objective   /73 (BP Location: Left arm, Patient Position: Lying)   Pulse 70   Temp 36.3 °C (97.3 °F) (Temporal)   Resp 18   Ht 1.753 m (5' 9\")   Wt 103 kg (227 lb 1.6 oz)   SpO2 94%   BMI 33.54 kg/m²   0-10 (Numeric) Pain Score: 5 - Moderate pain   3 Day Weight Change: Unable to Calculate    Intake and Output    Intake/Output Summary (Last 24 hours) at 11/12/2024 0826  Last data filed at 11/12/2024 0515  Gross per 24 hour   Intake 700 ml   Output 400 ml   Net 300 ml       Physical Exam  Physical Exam  Vitals and nursing note reviewed.   HENT:      Head: Normocephalic.      Mouth/Throat:      Mouth: Mucous membranes are moist.   Eyes:      Conjunctiva/sclera: Conjunctivae normal.   Cardiovascular:      Rate and Rhythm: Normal rate and regular rhythm.      Pulses: Normal pulses.      Heart sounds: Normal heart sounds.   Pulmonary:      Effort: Pulmonary effort is normal.      Breath sounds: Normal breath sounds.   Abdominal:      " General: Bowel sounds are normal.      Palpations: Abdomen is soft.   Genitourinary:     Comments: Voiding independently  Musculoskeletal:         General: No swelling. Normal range of motion.      Cervical back: Neck supple.      Right lower leg: No edema.      Left lower leg: No edema.      Comments: Trace bilateral LE edema   Skin:     General: Skin is warm.      Capillary Refill: Capillary refill takes less than 2 seconds.      Comments: Left chest surgical site intact   Neurological:      General: No focal deficit present.      Mental Status: She is alert and oriented to person, place, and time. Mental status is at baseline.   Psychiatric:         Mood and Affect: Mood normal.         Behavior: Behavior normal.         Medications  Scheduled medications  acetaminophen, 975 mg, oral, q8h  amLODIPine, 5 mg, oral, Daily  aspirin, 81 mg, oral, Daily  atorvastatin, 80 mg, oral, Daily  [START ON 11/13/2024] colchicine, 0.6 mg, oral, Daily  furosemide, 40 mg, oral, Daily  heparin (porcine), 5,000 Units, subcutaneous, q8h  hydrALAZINE, 25 mg, oral, TID  iron polysaccharides, 150 mg, oral, Daily  lidocaine, 1 patch, transdermal, Daily  methocarbamol, 500 mg, oral, q8h KEISHA  metoprolol tartrate, 25 mg, oral, TID  multivitamin with minerals, 1 tablet, oral, Daily  oxygen, , inhalation, Continuous - 02/gases  pantoprazole, 40 mg, oral, Daily before breakfast  polyethylene glycol, 17 g, oral, BID  sennosides-docusate sodium, 2 tablet, oral, BID    PRN medications  PRN medications: gabapentin, hydrALAZINE, naloxone, ondansetron, oxyCODONE, oxyCODONE    Labs  Results for orders placed or performed during the hospital encounter of 11/08/24 (from the past 24 hours)   POCT GLUCOSE   Result Value Ref Range    POCT Glucose 129 (H) 74 - 99 mg/dL         Imaging:    Intraoperative ONUR results 11/8 /2024   1. The left ventricular systolic function is normal, with a visually estimated ejection fraction of 65%.    2. There is normal  right ventricular global systolic function.   POST PROCEDURE REPORT: Patient has a normal sinus rhythm. Patient is on no inotropic support. Global LV function is normal. Global RV function is normal. No evidence of post aortic cannulation dissection. All transesophageal echocardiogram findings discussed with surgeon.        IMPRESSION & PLAN:  POD # 4 s/p MIDCAB, ROBOT-ASSISTED; FRYE to LAD [facility] Dr Arias  - Increase activity/ ambulation; PT/OT  - Encourage IS, C/DB; respiratory therapy; wean O2 as frederick   - Cardiac rehab referral   - Continue cardiac meds: ASA, BB, statin   - continue colchicine x1 month for robotic MidCAB procedure (0.6mg daily for < 70kg, 0.6mg BID for > 70kg; if concurrently receiving amiodarone reduce dose by 50%) --reduced to 0.6 mg daily 2/ diarrhea  - Pain and anticonstipation meds  -  chest tubes removed in ICU  - 2v CXR ordered for   - no epicardial wires  - Tele until discharge  - Optimize nutrition and electrolytes    Rhythm  - Tele: NSR 70s  - Continue BB Metoprolol 25 mg TID   - Adjust medications as tolerated    Hypertension: home meds: Amlodipine-benazepril 5-10  Systolic (24hrs), Av , Min:110 , Max:143    - continue Metoprolol 25 mg TID, Amlodipine 5, hydralazine 25 mg TID  - additional antihypertensives as needed     Acute Blood Loss Anemia   Recent Labs     24  0620 24  1247 24  0008 24  1254 10/14/24  0955 24  1147 23  1124   HGB 10.8* 10.5* 10.1* 11.1* 13.9 14.0 14.0   HCT 34.6* 32.4* 30.8* 34.1* 42.4 44.1 44.4*   - MV, PO Iron x1mo  - Daily labs, transfuse as indicated    Thrombocytopenia  Recent Labs     24  0620 24  1247 24  0008 24  1254 10/14/24  0955 24  1147 23  1124    198 201 203 245 249 274   - Etiology likely postop/CPB related  - Continue to trend with daily CBCs    Volume/Electrolyte Status: Preop wt Weight: 103 kg (227 lb 8.2 oz)   Vitals:    24 0231   Weight: 103 kg (227 lb  1.6 oz)     - Weight: 103 kg 104 kg  - Adjust diuresis as needed for postop cardiac surgery hypervolemia- on daily 40 mg PO Lasix   - Hold scheduled PO lasix, 1x lasix 40 mg IV today  - Replete electrolytes for hypokalemia/hypomagnesemia/hypophosphatemia as needed - got K and Phos 11/11  - Daily weights and strict I&Os  - Daily RFP while admitted    Migraines and sciatica  -continue on Robaxin  -pain pump was removed on 11/10 by pain team  -added Lidocaine patch   -continue home dose Gabapentin (patient takes PRN)     ALMA  -aggressive bronchial hygiene  -wean O2 as tolerated- on RA  -not on home CPAP     Hx GERD, Aleman's esophagus  -continue home PPI    DM: not on home meds  Lab Results   Component Value Date    HGBA1C 6.4 10/23/2024     Results from last 7 days   Lab Units 11/11/24  1147 11/11/24  0735 11/10/24  2004 11/10/24  1157 11/10/24  0738 11/10/24  0454 11/09/24  2129   POCT GLUCOSE mg/dL 129* 111* 173* 83 122* 101* 119*   -blood sugar stable  -stopped SSI and accu checks  -glucose check with daily RFP      VTE Prophylaxis: SCDs/TEDs, ambulation, SQ heparin  Code Status: Full Code    Dispo  - PT/OT recs low intensity  - Would benefit from homecare for cardiac surgery carepath and RN visits  - Discharge home with [facility] today      MANUEL Reinoso-CNP  Cardiac Surgery [facility]  [facility]  Team Phone [phone]    11/12/2024  8:26 AM

## 2024-11-12 NOTE — HH CARE COORDINATION
Home Care received a Referral for Nursing and Physical Therapy. We have processed the referral for a Start of Care on 24-48 hrs.     If you have any questions or concerns, please feel free to contact us at [phone]. Follow the prompts, enter your five digit zip code, and you will be directed to your care team on EAST 1.

## 2024-11-12 NOTE — DISCHARGE SUMMARY
Discharge Diagnosis  Coronary artery disease of native artery of native heart with stable angina pectoris    Issues Requiring Follow-Up  Follow up with PCP in 1 week  Follow up with cardiology in 1 month  Follow up with cardiac surgery in 6 weeks    Test Results Pending At Discharge  Pending Labs       No current pending labs.            Hospital Course  Anum Middleton is a 74-year-old patient who presents with a diagnosis of complex LAD diagonal severe lesion.  She previously reported chest and jaw pain for least a few years become increasingly worse in the last few months.  Her primary physician sent her for further evaluation that prompted her left heart cath with the diagnosis of LAD diagonal disease with moderate RCA and a 60% small circumflex lesion. She presented to CTICU on 11/8 s/p  Robotic MIDCAB FRYE to LAD with Dr. Arias. Post-op course was uneventful, she was transferred to cardiac surgery floor on 11/10. Received multimodal pain control including pain blocks with catheters from OR. She had normal cardiac function post-operatively, and will be discharged with ASA indefinitely, plavix x1 year, and colchicine x1 month (pericarditis prophylaxis). Discharging on home metoprolol on amlodipine-benazepril for HTN management. She will return home on room air. CXR with small left pleural effusion with which she is asymptomatic, CXR to be done at cardiac surgery follow-up appt. Diuresed with spot lasix, below admission weight on discharge. Labs satisfactory on discharge. Patient agreeable to return home with [facility].           Past Medical History:   Diagnosis Date    Adhesive capsulitis of right shoulder 10/21/2013     Adhesive capsulitis of right shoulder    Angina pectoris      Anxiety      Aortic aneurysm without rupture, unspecified portion of aorta (CMS-[facility])      ASHD (arteriosclerotic heart disease)      Central retinal vein occlusion, right eye (CMS-[facility])      Chest pain      Cholelithiasis      Dermatochalasis  of both upper eyelids      Dry eye syndrome of bilateral lacrimal glands      Elevated blood-pressure reading, without diagnosis of hypertension       Elevated blood pressure reading without diagnosis of hypertension    GERD (gastroesophageal reflux disease)      Hyperlipidemia      Hypertension      Inconclusive mammogram 01/23/2017     Breast density    Lattice degeneration of retina, bilateral      Other specified disorders of eustachian tube, bilateral 05/08/2017     Dysfunction of Eustachian tube, bilateral    Otitis media, unspecified, right ear 12/18/2015     Acute right otitis media    Personal history of other diseases of the respiratory system 10/18/2016     History of acute bronchitis with bronchospasm    Primary osteoarthritis, unspecified hand 02/09/2015     Osteoarthritis, hand    Sacroiliitis, not elsewhere classified (CMS-HCC) 04/26/2016     Sacroiliitis    Sciatica, right side 04/26/2016     Sciatica, right    Snoring      Type 2 diabetes mellitus       A1C 6.1 on 7/23/24    Unspecified optic atrophy      Vitamin D deficiency           Surgical History         Past Surgical History:   Procedure Laterality Date    CARDIAC CATHETERIZATION N/A 10/14/2024     Procedure: Left Heart Cath;  Surgeon: Juan Cueto DO;  Location: [facility] Cardiac Cath Lab;  Service: Cardiovascular;  Laterality: N/A;  no pre auth required    CATARACT EXTRACTION        CATARACT EXTRACTION W/  INTRAOCULAR LENS IMPLANT Bilateral       OD 07/18/2013 +15.5D, OS 12/05/2013 +15.5D    CHOLECYSTECTOMY        COLONOSCOPY        ESOPHAGOGASTRODUODENOSCOPY        OTHER SURGICAL HISTORY   06/25/2013     Treatment Of The Left Ankle    SC OSTEOTOMY MANDIBLE SEGMENTAL   1969    SHOULDER SURGERY             Prescriptions Prior to Admission           Medications Prior to Admission   Medication Sig Dispense Refill Last Dose/Taking    amLODIPine-benazepriL (Lotrel) 5-10 mg capsule Take 1 capsule by mouth once daily. 90 capsule 1 11/7/2024 Evening     aspirin 81 mg chewable tablet Chew 1 tablet (81 mg) once daily. 90 tablet 3 2024 Evening    atorvastatin (Lipitor) 40 mg tablet Take 1 tablet (40 mg) by mouth once daily. 90 tablet 3 2024    chlorhexidine (Peridex) 0.12 % solution Swish and spit 15 mL night before surgery and morning of surgery 473 mL 0 2024 Morning    fluticasone (Flonase) 50 mcg/actuation nasal spray Administer 1-2 sprays into each nostril once daily. Uses at bedtime     2024    gabapentin (Neurontin) 100 mg capsule Take 1 capsule (100 mg) by mouth once daily at bedtime. 30 capsule 1 Past Month    hydrOXYzine HCL (Atarax) 25 mg tablet Take 1 tablet (25 mg) by mouth as needed at bedtime for anxiety.     2024 Bedtime    metoprolol succinate XL (Toprol-XL) 25 mg 24 hr tablet Take 1 tablet (25 mg) by mouth once daily at bedtime. 90 tablet 1 2024 Evening    nitroglycerin (Nitrostat) 0.4 mg SL tablet Place 1 tablet (0.4 mg) under the tongue every 5 minutes if needed for chest pain. May repeat every 5 minutes for up to 3 doses. If second dose needed please call 911 when you take the second dose. 100 tablet 11 Past Week    omeprazole (PriLOSEC) 40 mg DR capsule Take 1 capsule (40 mg) by mouth once daily. Do not crush or chew.     2024 Evening    [] chlorhexidine (Hibiclens) 4 % external liquid Apply topically 2 times a day for 5 days. 473 mL 0           Fire ant, Erythromycin, and Tea tree oil  Social History   Social History            Tobacco Use    Smoking status: Former       Types: Cigarettes       Passive exposure: Past    Smokeless tobacco: Never    Tobacco comments:       Quit    Vaping Use    Vaping status: Never Used   Substance Use Topics    Alcohol use: Not Currently       Comment: 1 cocktail/ mo maybe    Drug use: Never         Family History          Family History   Problem Relation Name Age of Onset    Hypertension Mother Marilia      Heart disease Father Rd      Hypertension Father  Rd             Pertinent Physical Exam At Time of Discharge  See today's note    Home Medications     Medication List      START taking these medications     clopidogrel 75 mg tablet; Commonly known as: Plavix; Take 1 tablet (75   mg) by mouth once daily.   colchicine 0.6 mg tablet; Take 1 tablet (0.6 mg) by mouth once daily for   28 days. Do not fill before November 13, 2024.; Start taking on: November 13, 2024   oxyCODONE 5 mg immediate release tablet; Commonly known as: Roxicodone;   Take 1 tablet (5 mg) by mouth every 4 hours if needed for moderate pain (4   - 6) or severe pain (7 - 10) for up to 5 days.     CHANGE how you take these medications     atorvastatin 80 mg tablet; Commonly known as: Lipitor; Take 1 tablet (80   mg) by mouth once daily.; Start taking on: November 13, 2024; What   changed: medication strength, how much to take     CONTINUE taking these medications     amLODIPine-benazepriL 5-10 mg capsule; Commonly known as: Lotrel; Take 1   capsule by mouth once daily.   aspirin 81 mg chewable tablet; Chew 1 tablet (81 mg) once daily.   fluticasone 50 mcg/actuation nasal spray; Commonly known as: Flonase   gabapentin 100 mg capsule; Commonly known as: Neurontin; Take 1 capsule   (100 mg) by mouth once daily at bedtime.   hydrOXYzine HCL 25 mg tablet; Commonly known as: Atarax   metoprolol succinate XL 25 mg 24 hr tablet; Commonly known as:   Toprol-XL; Take 1 tablet (25 mg) by mouth once daily at bedtime.   omeprazole 40 mg DR capsule; Commonly known as: PriLOSEC     STOP taking these medications     chlorhexidine 0.12 % solution; Commonly known as: Peridex   chlorhexidine 4 % external liquid; Commonly known as: Hibiclens   nitroglycerin 0.4 mg SL tablet; Commonly known as: Nitrostat       Outpatient Follow-Up  Future Appointments   Date Time Provider Department Center   11/22/2024  1:30 PM SLEEP LAB [facility] SLEEP LAB ROOM [address] [place] [address]   12/23/2024 10:00 AM Earl Banuelos MD [id number] [facility]    4/23/2025 11:00 AM MANUEL Gamino-CNP [id number] [facility]       MANUEL Reinoso-CNP

## 2024-11-12 NOTE — CARE PLAN
Problem: Skin  Goal: Decreased wound size/increased tissue granulation at next dressing change  Outcome: Progressing  Goal: Participates in plan/prevention/treatment measures  Outcome: Progressing  Goal: Prevent/manage excess moisture  Outcome: Progressing  Goal: Prevent/minimize sheer/friction injuries  Outcome: Progressing  Goal: Promote/optimize nutrition  Outcome: Progressing  Goal: Promote skin healing  Outcome: Progressing   The patient's goals for the shift include      The clinical goals for the shift include Patient will remain in HDS throughout shift

## 2024-11-13 ENCOUNTER — PATIENT OUTREACH (OUTPATIENT)
Dept: PRIMARY CARE | Facility: CLINIC | Age: 74
End: 2024-11-13
Payer: MEDICARE

## 2024-11-13 NOTE — PROGRESS NOTES
Discharge Facility: [facility]   Discharge Diagnosis: Coronary artery disease of native artery of native heart with stable angina pectoris; S/P CABG x 1; Post-operative pain   Admission Date: 11/8/2024   Discharge Date: 11/12/2024     PCP Appointment Date: TBD-office tasked to arrange fup with PCP as needed  Specialist Appointment Date:    Follow up with Juan Cueto DO (Cardiology)  Hospital Encounter and Summary Linked: Yes  See discharge assessment below for further details  Engagement  Call Start Time: 0959 (11/13/2024 10:10 AM)    Medications  Medications reviewed with patient/caregiver?: Yes (new meds discussed with patient) (11/13/2024 10:10 AM)  Is the patient having any side effects they believe may be caused by any medication additions or changes?: No (11/13/2024 10:10 AM)  Does the patient have all medications ordered at discharge?: Yes (11/13/2024 10:10 AM)  Care Management Interventions: No intervention needed (11/13/2024 10:10 AM)  Prescription Comments: see med list (plavix; colchicine; oxycodone; lipitor;) (11/13/2024 10:10 AM)  Is the patient taking all medications as directed (includes completed medication regime)?: Yes (11/13/2024 10:10 AM)  Care Management Interventions: Provided patient education (11/13/2024 10:10 AM)  Medication Comments: Discussed importance of taking BP at least once per day and daily weights with patient (11/13/2024 10:10 AM)    Appointments  Does the patient have a primary care provider?: Yes (11/13/2024 10:10 AM)  Care Management Interventions: Educated patient on importance of making appointment; Advised patient to make appointment (11/13/2024 10:10 AM)  Has the patient kept scheduled appointments due by today?: Yes (11/13/2024 10:10 AM)  Care Management Interventions: Advised to schedule with specialist (11/13/2024 10:10 AM)    Self Management  What is the home health agency?: [facility] (11/13/2024 10:10 AM)  Has home health visited the patient within 72  hours of discharge?: Not applicable (< 72 hrs-has number if needed) (11/13/2024 10:10 AM)  What Durable Medical Equipment (DME) was ordered?: n/a (11/13/2024 10:10 AM)    Patient Teaching  Does the patient have access to their discharge instructions?: Yes (11/13/2024 10:10 AM)  Care Management Interventions: Reviewed instructions with patient (11/13/2024 10:10 AM)  What is the patient's perception of their health status since discharge?: Improving (11/13/2024 10:10 AM)  Is the patient/caregiver able to teach back the hierarchy of who to call/visit for symptoms/problems? PCP, Specialist, Home Health nurse, Urgent Care, ED, 911: Yes (11/13/2024 10:10 AM)  Patient/Caregiver Education Comments: Successful transition of care outreach with the patient. The patient reports doing well at home since discharge. New meds/changes were reviewed with the patient during outreach. The patient states having incisional pain that is tolerable at time of call. States she does have tylenol and oxycodone if needed for pain. Patient also states she does get some SOB with exertion but she is still able to complete ADLs by self including making herself breakfast and feeding her dogs. Activity restrictions were reviewed during outreach call. Patient denies further discharge questions/concerns/needs during outreach call. Emphasized that follow-up appts are needed after discharge with PCP and reviewed needed follow-ups with any specialties to assess response to treatment from hospitalization. The patient is aware of my availability for non-emergent concerns. Contact information was provided to the patient. (11/13/2024 10:10 AM)

## 2024-11-15 ENCOUNTER — HOME CARE VISIT (OUTPATIENT)
Dept: HOME HEALTH SERVICES | Facility: HOME HEALTH | Age: 74
End: 2024-11-15
Payer: MEDICARE

## 2024-11-15 ENCOUNTER — LAB (OUTPATIENT)
Dept: LAB | Facility: LAB | Age: 74
End: 2024-11-15
Payer: MEDICARE

## 2024-11-15 VITALS
HEIGHT: 69 IN | DIASTOLIC BLOOD PRESSURE: 72 MMHG | RESPIRATION RATE: 18 BRPM | TEMPERATURE: 98 F | SYSTOLIC BLOOD PRESSURE: 132 MMHG | HEART RATE: 72 BPM | WEIGHT: 225.13 LBS | OXYGEN SATURATION: 97 % | BODY MASS INDEX: 33.35 KG/M2

## 2024-11-15 DIAGNOSIS — Z95.1 S/P CABG X 1: ICD-10-CM

## 2024-11-15 DIAGNOSIS — D62 ACUTE BLOOD LOSS ANEMIA: ICD-10-CM

## 2024-11-15 LAB
ALBUMIN SERPL BCP-MCNC: 3.9 G/DL (ref 3.4–5)
ANION GAP SERPL CALC-SCNC: 18 MMOL/L (ref 10–20)
BUN SERPL-MCNC: 16 MG/DL (ref 6–23)
CALCIUM SERPL-MCNC: 8.6 MG/DL (ref 8.6–10.6)
CHLORIDE SERPL-SCNC: 103 MMOL/L (ref 98–107)
CO2 SERPL-SCNC: 24 MMOL/L (ref 21–32)
CREAT SERPL-MCNC: 0.74 MG/DL (ref 0.5–1.05)
EGFRCR SERPLBLD CKD-EPI 2021: 86 ML/MIN/1.73M*2
ERYTHROCYTE [DISTWIDTH] IN BLOOD BY AUTOMATED COUNT: 14 % (ref 11.5–14.5)
GLUCOSE SERPL-MCNC: 156 MG/DL (ref 74–99)
HCT VFR BLD AUTO: 37.5 % (ref 36–46)
HGB BLD-MCNC: 11.7 G/DL (ref 12–16)
MAGNESIUM SERPL-MCNC: 1.86 MG/DL (ref 1.6–2.4)
MCH RBC QN AUTO: 28 PG (ref 26–34)
MCHC RBC AUTO-ENTMCNC: 31.2 G/DL (ref 32–36)
MCV RBC AUTO: 90 FL (ref 80–100)
NRBC BLD-RTO: 0 /100 WBCS (ref 0–0)
PHOSPHATE SERPL-MCNC: 3 MG/DL (ref 2.5–4.9)
PLATELET # BLD AUTO: 338 X10*3/UL (ref 150–450)
POTASSIUM SERPL-SCNC: 3.6 MMOL/L (ref 3.5–5.3)
RBC # BLD AUTO: 4.18 X10*6/UL (ref 4–5.2)
SODIUM SERPL-SCNC: 141 MMOL/L (ref 136–145)
WBC # BLD AUTO: 8.7 X10*3/UL (ref 4.4–11.3)

## 2024-11-15 PROCEDURE — 1090000002 HH PPS REVENUE DEBIT

## 2024-11-15 PROCEDURE — 1090000001 HH PPS REVENUE CREDIT

## 2024-11-15 PROCEDURE — 0023 HH SOC

## 2024-11-15 PROCEDURE — 80069 RENAL FUNCTION PANEL: CPT

## 2024-11-15 PROCEDURE — 83735 ASSAY OF MAGNESIUM: CPT

## 2024-11-15 PROCEDURE — 169592 NO-PAY CLAIM PROCEDURE

## 2024-11-15 PROCEDURE — 85027 COMPLETE CBC AUTOMATED: CPT

## 2024-11-15 PROCEDURE — G0299 HHS/HOSPICE OF RN EA 15 MIN: HCPCS | Mod: HHH

## 2024-11-16 PROCEDURE — 1090000002 HH PPS REVENUE DEBIT

## 2024-11-16 PROCEDURE — 1090000001 HH PPS REVENUE CREDIT

## 2024-11-17 PROCEDURE — 1090000002 HH PPS REVENUE DEBIT

## 2024-11-17 PROCEDURE — 1090000001 HH PPS REVENUE CREDIT

## 2024-11-17 ASSESSMENT — ENCOUNTER SYMPTOMS
DIZZINESS: 1
MUSCLE WEAKNESS: 1
PAIN LOCATION - PAIN SEVERITY: 6/10
APPETITE LEVEL: FAIR
PERSON REPORTING PAIN: PATIENT
CHANGE IN APPETITE: UNCHANGED
PAIN: 1
PAIN LOCATION: LEFT BREAST
SHORTNESS OF BREATH: 1
FATIGUES EASILY: 1

## 2024-11-17 ASSESSMENT — ACTIVITIES OF DAILY LIVING (ADL)
ENTERING_EXITING_HOME: ONE PERSON
OASIS_M1830: 05

## 2024-11-18 ENCOUNTER — HOME CARE VISIT (OUTPATIENT)
Dept: HOME HEALTH SERVICES | Facility: HOME HEALTH | Age: 74
End: 2024-11-18
Payer: MEDICARE

## 2024-11-18 VITALS
RESPIRATION RATE: 18 BRPM | TEMPERATURE: 98.9 F | DIASTOLIC BLOOD PRESSURE: 81 MMHG | SYSTOLIC BLOOD PRESSURE: 153 MMHG | HEART RATE: 74 BPM | OXYGEN SATURATION: 98 %

## 2024-11-18 PROCEDURE — 1090000001 HH PPS REVENUE CREDIT

## 2024-11-18 PROCEDURE — 1090000002 HH PPS REVENUE DEBIT

## 2024-11-18 PROCEDURE — G0151 HHCP-SERV OF PT,EA 15 MIN: HCPCS | Mod: HHH

## 2024-11-18 SDOH — HEALTH STABILITY: PHYSICAL HEALTH: PHYSICAL EXERCISE: 10

## 2024-11-18 SDOH — HEALTH STABILITY: PHYSICAL HEALTH: EXERCISE TYPE: GIVEN WRITTEN HEP

## 2024-11-18 ASSESSMENT — ENCOUNTER SYMPTOMS
PAIN LOCATION - PAIN DURATION: VARIES
PAIN LOCATION - PAIN FREQUENCY: INTERMITTENT
HIGHEST PAIN SEVERITY IN PAST 24 HOURS: 10/10
PAIN LOCATION: LEFT BREAST
PERSON REPORTING PAIN: PATIENT
PAIN: 1
PAIN LOCATION - RELIEVING FACTORS: REST
PAIN LOCATION - EXACERBATING FACTORS: MVMT
PAIN SEVERITY GOAL: 3/10
SUBJECTIVE PAIN PROGRESSION: WAXING AND WANING
PAIN LOCATION - PAIN QUALITY: ACHING
LOWEST PAIN SEVERITY IN PAST 24 HOURS: 3/10
PAIN LOCATION - PAIN SEVERITY: 6/10

## 2024-11-18 ASSESSMENT — BALANCE ASSESSMENTS
ATTEMPTS TO ARISE: 2 - ABLE TO RISE, ONE ATTEMPT
NUDGED SCORE: 2
NUDGED: 2 - STEADY
IMMEDIATE STANDING BALANCE FIRST 5 SECONDS: 2 - STEADY WITHOUT WALKER OR OTHER SUPPORT
ARISING SCORE: 2
STANDING BALANCE: 2 - NARROW STANCE WITHOUT SUPPORT
ARISES: 2 - ABLE WITHOUT USING ARMS
BALANCE SCORE: 15
EYES CLOSED AT MAXIMUM POSITION NUDGED: 1 - STEADY
SITTING DOWN: 1 - USES ARMS OR NOT SMOOTH MOTION
SITTING BALANCE: 1 - STEADY, SAFE
TURNING 360 DEGREES STEPS: 1 - CONTINUOUS STEPS

## 2024-11-18 ASSESSMENT — GAIT ASSESSMENTS
STEP SYMMETRY: 1 - RIGHT AND LEFT STEP LENGTH APPEAR EQUAL
BALANCE AND GAIT SCORE: 24
PATH SCORE: 1
GAIT SCORE: 9
INITIATION OF GAIT IMMEDIATELY AFTER GO: 1 - NO HESITANCY
STEP CONTINUITY: 1 - STEPS APPEAR CONTINUOUS
TRUNK SCORE: 1
PATH: 1 - MILD/MODERATE DEVIATION OR USES WALKING AID
TRUNK: 1 - NO SWAY BUT FLEXION OF KNEES OR BACK OR SPREADS ARMS WHILE WALKING
WALKING STANCE: 0 - HEELS APART

## 2024-11-18 ASSESSMENT — ACTIVITIES OF DAILY LIVING (ADL)
AMBULATION ASSISTANCE: 1
PHYSICAL TRANSFERS ASSESSED: 1
AMBULATION ASSISTANCE: INDEPENDENT
CURRENT_FUNCTION: INDEPENDENT

## 2024-11-19 PROCEDURE — 1090000001 HH PPS REVENUE CREDIT

## 2024-11-19 PROCEDURE — 1090000002 HH PPS REVENUE DEBIT

## 2024-11-20 DIAGNOSIS — E11.65 TYPE 2 DIABETES MELLITUS WITH HYPERGLYCEMIA, WITHOUT LONG-TERM CURRENT USE OF INSULIN: Primary | ICD-10-CM

## 2024-11-20 PROCEDURE — 1090000001 HH PPS REVENUE CREDIT

## 2024-11-20 PROCEDURE — 1090000002 HH PPS REVENUE DEBIT

## 2024-11-21 ENCOUNTER — TELEMEDICINE CLINICAL SUPPORT (OUTPATIENT)
Dept: CARDIAC SURGERY | Facility: HOSPITAL | Age: 74
End: 2024-11-21
Payer: MEDICARE

## 2024-11-21 ENCOUNTER — HOME CARE VISIT (OUTPATIENT)
Dept: HOME HEALTH SERVICES | Facility: HOME HEALTH | Age: 74
End: 2024-11-21
Payer: MEDICARE

## 2024-11-21 DIAGNOSIS — I25.10 CAD IN NATIVE ARTERY: Primary | ICD-10-CM

## 2024-11-21 PROCEDURE — 1090000002 HH PPS REVENUE DEBIT

## 2024-11-21 PROCEDURE — 1090000001 HH PPS REVENUE CREDIT

## 2024-11-21 PROCEDURE — G0299 HHS/HOSPICE OF RN EA 15 MIN: HCPCS | Mod: HHH

## 2024-11-21 NOTE — PROGRESS NOTES
A telephone visit (audio only) between the patient (at the originating site) and the provider (at the distant site) was utilized to provide this telehealth service.      Patient is having a telephone nurse visit today following hospital discharge on 11/12/24.    Patient is 13 days s/p robotic MIDCAB with Dr. Arias. Patient has soreness and nerve pain in her breast. She is taking Tylenol and Gabapentin prn, as well as using Lidocaine patches. Advised patient to try wearing a support bra to alleviate some of the discomfort.  Patient reports occasional pain in her jaw and chest pain, similar to what she felt prior to surgery. Dr. Arias notified and would like to see patient for office visit next for for further evaluation. Patient advised she can take nitroglycerin if needed per Dr. Arias.  Patient states that incision looks to be healing well. Denies bleeding/draining/odor coming from incision. Appetite has increased since discharge. Denies any complications with constipation. Patient is ambulating often, with rest periods in between. Patient reports a dry cough and some shortness of breath with exertion. Continues to use breathing exercise devices as instructed at discharge.  Reviewed medications and future follow up visits with patient including chest xray to be done prior to P/OP visit.     Patient educated on the importance of increasing level of activity with rest periods in between.  Encouraged continued use of breathing exercise devices to increase lung expansion. Reviewed patient's medications and dosing schedule. Confirmed knowledge of future follow up doctor appointments.    It was pleasure speaking with Ms. Middleton today.

## 2024-11-21 NOTE — OP NOTE
OPERATIVE REPORT     Date: 2024  OR Location: [facility] [place]    Name: Uma Middleton : 1950, Age: 74 y.o., MRN: [id number], Sex: female    Diagnosis  Pre-op Diagnosis      * Coronary artery disease of native artery of native heart with stable angina pectoris [I25.118]  Principal Problem:    Coronary artery disease of native artery of native heart with stable angina pectoris  Active Problems:    ALMA (obstructive sleep apnea)    Post-operative pain    Acute post-operative pain   Post-op Diagnosis     * Coronary artery disease of native artery of native heart with stable angina pectoris [I25.118]  Principal Problem:    Coronary artery disease of native artery of native heart with stable angina pectoris  Active Problems:    ALMA (obstructive sleep apnea)    Post-operative pain    Acute post-operative pain       Procedures  Robotic assisted minimally invasive cabg with LIMA to LAD      Surgeons   Judi Arias MD      Resident/Fellow/Other Assistant:  Surgeons and Role:     * Marti Cuenca MD - Fellow    Procedure Summary  Anesthesia: General  ASA: III  Anesthesia Staff:   Anesthesiologist: Alban Bedoya MD  Anesthesia Resident: Shana Galaviz MD; Marilia Jaramillo DO  Perfusionist: Jaime Valdes    Estimated Blood Loss: 150mL    Specimen: No specimens collected     Staff:   Circulator: Joann Lamas RN; Christiano Rey RN  Relief Circulator: Lian Hadley RN  Relief Scrub: Patt Banerjee RN  Scrub Person: Sharifa Zaidi        Findings: LIMA to LAD flow 30ml/min with PI of 2.1    Indications: Uma Middleton is an 74 y.o. female with hx of coronary artery disease with the most severe disease in the LAD.  She has a 60% circumflex lesion and mild diffuse disease in RCA.  A multidisciplinary discussion was held and the decision was made to proceed with LIMA to LAD.      The risks benefits and alternatives were discussed with the patient and include but are not limited to, bleeding,  infection, injury to other structures, need for re-operation, arrhythmia, respiratory failure, prolonged intubation, stroke, and death.  These were discussed with the patient in detail, all questions were answered and informed consent was obtained.      Procedure Details:     The patient was taken to the operating room by anesthesia, placed supine on the operating table, intubated  with a double lumen tube and placed under general anesthesia.  A central line and melissa were placed. ONUR was performed which confirmed the preoperative findings of reduced EF without significant valvular disease.  The patient was prepped and draped in the usual sterile fashion.  A time out was performed. The patient was placed on single right lung ventilation. Three 8mm robotic ports were placed at the 2nd, 5th and 7th intercostal spaces under direct visualization.  Pneumothorax was induced to 8mmHg and the robot was docked.  The mammary was harvested from the second intercostal space distal to the bifurcation in a skeletonized fashion.  Heparin was administered and the mammary divided distally.  It was tacked in placed along the medial aspect of the pericardium.  The pericardium was opened and the LAD identified. The robot was then undocked.  A small anterior lateral thoracotomy was made over the location of the LAD and the thoratrack retractor was placed. The stabilizer device was inserted and placed around the LAD. The mammary was prepared. It was noted to have excellent flow.  The LAD was opened and a 1.5mm shunt was placed.  The LIMA was anastomosed using a running 7-0 prolene suture.  The LIMA to LAD flows were assess with the flow probe.  There was good flow with a low PI.  The stabilizer was removed and the flows were confirmed and noted to still be appropriate.  Protamine was administered.  Hemostasis was meticulously secured.  A chest tube was placed through the track where the stabilizer device had been inserted.  The fascia  was closed with running 0-vicryl suture.  The subcutaneous tissue was closed in layers with the skin closed with a running 4-0 vicryl suture.  A dry sterile dressing was applied.  All instrument, needle and sponge counts were correct at the end of the case.  The patient was left intubated and transferred to the ICU in stable condition.            Judi Arias MD  Cardiac Surgery  11/21/24  10:30 AM

## 2024-11-22 ENCOUNTER — CLINICAL SUPPORT (OUTPATIENT)
Dept: SLEEP MEDICINE | Facility: HOSPITAL | Age: 74
End: 2024-11-22
Payer: MEDICARE

## 2024-11-22 DIAGNOSIS — G47.30 SLEEP APNEA, UNSPECIFIED TYPE: ICD-10-CM

## 2024-11-22 PROCEDURE — 1090000001 HH PPS REVENUE CREDIT

## 2024-11-22 PROCEDURE — 1090000002 HH PPS REVENUE DEBIT

## 2024-11-22 NOTE — PROGRESS NOTES
Type of Study: HOME SLEEP STUDY - NOMAD     The patient received equipment and instructions for use of the [facility] Nom[facility] HSAT device. The patient was instructed how to apply the effort belts, cannula, thermistor. It was also explained how the Nomad and oximeter components work.  The patient was asked to record their sleep for an 8-hour period.     The patient was informed of their responsibility for the device and acknowledged this by signing the HSAT device contract. The patient was asked to return the device on 11/25/2024 to the Sleep Center.     The patient was instructed to call 911 as usual for any medical- emergencies while at home.  The patient was also given a phone number for troubleshooting when using the device in case there were additional questions.

## 2024-11-23 PROCEDURE — 1090000001 HH PPS REVENUE CREDIT

## 2024-11-23 PROCEDURE — 1090000002 HH PPS REVENUE DEBIT

## 2024-11-24 PROCEDURE — G0180 MD CERTIFICATION HHA PATIENT: HCPCS

## 2024-11-26 NOTE — PROGRESS NOTES
Chief Complaint      HPI:   Ms. Uma Middleton is a 74 y.o. female, who presents for post-operative evaluation.   She is now s/p robotic midcabg with lima to LAD and has overall been recovering well.  She walked easily from the parking garage to clinic without shortness of breath or chest pain.  However, she is here today because she reports she has been having intermittent chest and jaw pain.  She states this is the same pain that she had before surgery and that it never went away after surgery.  It occurs when she bends over or is out in cold weather.  She sometimes takes nitroglycerin when she has the pain and thinks that the pain goes away faster then when she doesn't take it.  She has not taken any nitroglycerin recently.      Past Medical History:   Diagnosis Date    Adhesive capsulitis of right shoulder 10/21/2013    Adhesive capsulitis of right shoulder    Angina pectoris     Anxiety     Aortic aneurysm without rupture, unspecified portion of aorta (CMS-[facility])     ASHD (arteriosclerotic heart disease)     Central retinal vein occlusion, right eye (CMS-[facility])     Chest pain     Cholelithiasis     Dermatochalasis of both upper eyelids     Dry eye syndrome of bilateral lacrimal glands     Elevated blood-pressure reading, without diagnosis of hypertension     Elevated blood pressure reading without diagnosis of hypertension    GERD (gastroesophageal reflux disease)     Hyperlipidemia     Hypertension     Inconclusive mammogram 01/23/2017    Breast density    Lattice degeneration of retina, bilateral     Other specified disorders of eustachian tube, bilateral 05/08/2017    Dysfunction of Eustachian tube, bilateral    Otitis media, unspecified, right ear 12/18/2015    Acute right otitis media    Personal history of other diseases of the respiratory system 10/18/2016    History of acute bronchitis with bronchospasm    Primary osteoarthritis, unspecified hand 02/09/2015    Osteoarthritis, hand    Sacroiliitis, not  elsewhere classified (CMS-[facility]) 04/26/2016    Sacroiliitis    Sciatica, right side 04/26/2016    Sciatica, right    Snoring     Type 2 diabetes mellitus     A1C 6.1 on 7/23/24    Unspecified optic atrophy     Vitamin D deficiency        Past Surgical History:   Procedure Laterality Date    CARDIAC CATHETERIZATION N/A 10/14/2024    Procedure: Left Heart Cath;  Surgeon: Juan Cueto DO;  Location: [facility] Cardiac [facility];  Service: Cardiovascular;  Laterality: N/A;  no pre auth required    CATARACT EXTRACTION      CATARACT EXTRACTION W/  INTRAOCULAR LENS IMPLANT Bilateral     OD 07/18/2013 +15.5D, OS 12/05/2013 +15.5D    CHOLECYSTECTOMY      COLONOSCOPY      ESOPHAGOGASTRODUODENOSCOPY      OTHER SURGICAL HISTORY  06/25/2013    Treatment Of The Left Ankle    ME OSTEOTOMY MANDIBLE SEGMENTAL  1969    SHOULDER SURGERY         Family History   Problem Relation Name Age of Onset    Hypertension Mother Marilia     Heart disease Father Rd     Hypertension Father Rd        Social History     Socioeconomic History    Marital status:      Spouse name: Not on file    Number of children: Not on file    Years of education: Not on file    Highest education level: Not on file   Occupational History    Not on file   Tobacco Use    Smoking status: Former     Types: Cigarettes     Passive exposure: Past    Smokeless tobacco: Never    Tobacco comments:     Quit 2011   Vaping Use    Vaping status: Never Used   Substance and Sexual Activity    Alcohol use: Not Currently     Comment: 1 cocktail/ mo maybe    Drug use: Never    Sexual activity: Defer   Other Topics Concern    Not on file   Social History Narrative    Not on file     Social Drivers of Health     Financial Resource Strain: Not on file   Food Insecurity: Not on file   Transportation Needs: No Transportation Needs (11/15/2024)    OASIS : Transportation     Lack of Transportation (Medical): No     Lack of Transportation (Non-Medical): No     Patient Unable or  Declines to Respond: No   Physical Activity: Not on file   Stress: Not on file   Social Connections: Feeling Socially Integrated (11/15/2024)    OASIS : Social Isolation     Frequency of experiencing loneliness or isolation: Never   Intimate Partner Violence: Not on file   Housing Stability: Not on file       Allergies   Allergen Reactions    Fire Ant Shortness of breath    Erythromycin Itching     Itching with erythromycin ophthalmic ointment    Tea Tree Oil Hives       Outpatient Encounter Medications as of 11/27/2024   Medication Sig Dispense Refill    amLODIPine-benazepriL (Lotrel) 5-10 mg capsule Take 1 capsule by mouth once daily. 90 capsule 1    aspirin 81 mg chewable tablet Chew 1 tablet (81 mg) once daily. 90 tablet 3    atorvastatin (Lipitor) 80 mg tablet Take 1 tablet (80 mg) by mouth once daily. 30 tablet 0    clopidogrel (Plavix) 75 mg tablet Take 1 tablet (75 mg) by mouth once daily. 30 tablet 0    colchicine 0.6 mg tablet Take 1 tablet (0.6 mg) by mouth once daily for 28 days. Do not fill before November 13, 2024. 28 tablet 0    fluticasone (Flonase) 50 mcg/actuation nasal spray Administer 1-2 sprays into each nostril if needed. Uses at bedtime      gabapentin (Neurontin) 100 mg capsule Take 1 capsule (100 mg) by mouth once daily at bedtime. 30 capsule 1    hydrOXYzine HCL (Atarax) 25 mg tablet Take 1 tablet (25 mg) by mouth as needed at bedtime for anxiety.      lidocaine (Lidoderm) 5 % patch Place 1 patch over 12 hours on the skin once daily. Apply to painful area 12 hours per day, remove for 12 hours. 15 patch 0    metoprolol succinate XL (Toprol-XL) 25 mg 24 hr tablet Take 1 tablet (25 mg) by mouth once daily at bedtime. 90 tablet 1    omeprazole (PriLOSEC) 40 mg DR capsule Take 1 capsule (40 mg) by mouth once daily. Do not crush or chew.      traMADol (Ultram) 50 mg tablet Take 1 tablet (50 mg) by mouth if needed for severe pain (7 - 10).       No facility-administered encounter medications  on file as of 11/27/2024.       Physical Exam   General: no acute distress  Cardiovascular: Regular rate and rhythm  Respiratory: symmetrical chest rise and fall, no increased work of breathing  Wound: incisions well healed  Extremities: no edema       EKG done in clinic that shows normal sinus rhythm without any ST changes     Assessment and Plan:    Ms. Uma Middleton is a 74 y.o. female, who is recovering well after surgery but is still endorsing jaw and chest pain.  This pain doesn't seem to be typical type of chest pain as it does not seem to come on with exertion.  For example she has no chest pain while walking the long distance to clinic today and no real shortness of breath.  She had no chest pain at all today.  Her FRYE to LAD graft had good flow of 30ml/min with a PI of 2 at the time of surgery.  She does have a 60% circumflex lesion and diffuse RCA disease that could be contributing.  I think it would be best for her to undergo cardiac cath to determine if there are any issues with her LIMA to LAD graft.     I called Dr. Cueto and left him a message. Will have her scheduled for cath  Will follow up with her after the cath to discuss next steps       Judi Arias MD  11/27/24  4:31 PM

## 2024-11-27 ENCOUNTER — PATIENT OUTREACH (OUTPATIENT)
Dept: PRIMARY CARE | Facility: CLINIC | Age: 74
End: 2024-11-27
Payer: MEDICARE

## 2024-11-27 ENCOUNTER — HOSPITAL ENCOUNTER (OUTPATIENT)
Dept: CARDIOLOGY | Facility: HOSPITAL | Age: 74
Discharge: HOME | End: 2024-11-27
Payer: MEDICARE

## 2024-11-27 ENCOUNTER — OFFICE VISIT (OUTPATIENT)
Dept: CARDIAC SURGERY | Facility: HOSPITAL | Age: 74
End: 2024-11-27
Payer: MEDICARE

## 2024-11-27 VITALS
DIASTOLIC BLOOD PRESSURE: 80 MMHG | BODY MASS INDEX: 33.89 KG/M2 | HEART RATE: 76 BPM | HEIGHT: 69 IN | SYSTOLIC BLOOD PRESSURE: 138 MMHG | WEIGHT: 228.8 LBS | OXYGEN SATURATION: 97 %

## 2024-11-27 DIAGNOSIS — I25.10 CAD IN NATIVE ARTERY: ICD-10-CM

## 2024-11-27 PROCEDURE — 3048F LDL-C <100 MG/DL: CPT | Performed by: STUDENT IN AN ORGANIZED HEALTH CARE EDUCATION/TRAINING PROGRAM

## 2024-11-27 PROCEDURE — 1111F DSCHRG MED/CURRENT MED MERGE: CPT | Performed by: STUDENT IN AN ORGANIZED HEALTH CARE EDUCATION/TRAINING PROGRAM

## 2024-11-27 PROCEDURE — 3075F SYST BP GE 130 - 139MM HG: CPT | Performed by: STUDENT IN AN ORGANIZED HEALTH CARE EDUCATION/TRAINING PROGRAM

## 2024-11-27 PROCEDURE — 1036F TOBACCO NON-USER: CPT | Performed by: STUDENT IN AN ORGANIZED HEALTH CARE EDUCATION/TRAINING PROGRAM

## 2024-11-27 PROCEDURE — 3061F NEG MICROALBUMINURIA REV: CPT | Performed by: STUDENT IN AN ORGANIZED HEALTH CARE EDUCATION/TRAINING PROGRAM

## 2024-11-27 PROCEDURE — 1125F AMNT PAIN NOTED PAIN PRSNT: CPT | Performed by: STUDENT IN AN ORGANIZED HEALTH CARE EDUCATION/TRAINING PROGRAM

## 2024-11-27 PROCEDURE — 99211 OFF/OP EST MAY X REQ PHY/QHP: CPT | Performed by: STUDENT IN AN ORGANIZED HEALTH CARE EDUCATION/TRAINING PROGRAM

## 2024-11-27 PROCEDURE — 3008F BODY MASS INDEX DOCD: CPT | Performed by: STUDENT IN AN ORGANIZED HEALTH CARE EDUCATION/TRAINING PROGRAM

## 2024-11-27 PROCEDURE — 3079F DIAST BP 80-89 MM HG: CPT | Performed by: STUDENT IN AN ORGANIZED HEALTH CARE EDUCATION/TRAINING PROGRAM

## 2024-11-27 PROCEDURE — 1157F ADVNC CARE PLAN IN RCRD: CPT | Performed by: STUDENT IN AN ORGANIZED HEALTH CARE EDUCATION/TRAINING PROGRAM

## 2024-11-27 RX ORDER — TRAMADOL HYDROCHLORIDE 50 MG/1
50 TABLET ORAL AS NEEDED
COMMUNITY

## 2024-11-27 ASSESSMENT — ENCOUNTER SYMPTOMS
OCCASIONAL FEELINGS OF UNSTEADINESS: 0
LOSS OF SENSATION IN FEET: 0
DEPRESSION: 0

## 2024-11-27 ASSESSMENT — PAIN SCALES - GENERAL: PAINLEVEL_OUTOF10: 5

## 2024-11-27 NOTE — PROGRESS NOTES
Call regarding appt. after hospitalization.  At time of outreach call the patient feels as if their condition has (improved) since last visit. States she is still having breast pain from where they took the vein for her graft. Has an appt with cardiac surgery today that she will go to in order to address this as well as ongoing jaw pain she has had. Verbalized understanding of upcoming appt with PCP on 11/29.

## 2024-11-28 ASSESSMENT — ENCOUNTER SYMPTOMS
APPETITE LEVEL: GOOD
DYSPNEA ON EXERTION: 1
PERSON REPORTING PAIN: PATIENT
CHANGE IN APPETITE: UNCHANGED
MUSCLE WEAKNESS: 1
PAIN LOCATION - PAIN SEVERITY: 4/10
PAIN: 1
FATIGUES EASILY: 1
PAIN LOCATION: LEFT BREAST

## 2024-11-29 ENCOUNTER — HOME CARE VISIT (OUTPATIENT)
Dept: HOME HEALTH SERVICES | Facility: HOME HEALTH | Age: 74
End: 2024-11-29
Payer: MEDICARE

## 2024-11-29 ENCOUNTER — HOSPITAL ENCOUNTER (EMERGENCY)
Facility: HOSPITAL | Age: 74
Discharge: HOME | End: 2024-11-29
Attending: EMERGENCY MEDICINE
Payer: MEDICARE

## 2024-11-29 ENCOUNTER — APPOINTMENT (OUTPATIENT)
Dept: PRIMARY CARE | Facility: CLINIC | Age: 74
End: 2024-11-29
Payer: MEDICARE

## 2024-11-29 ENCOUNTER — APPOINTMENT (OUTPATIENT)
Dept: RADIOLOGY | Facility: HOSPITAL | Age: 74
End: 2024-11-29
Payer: MEDICARE

## 2024-11-29 VITALS
RESPIRATION RATE: 15 BRPM | TEMPERATURE: 98.4 F | HEART RATE: 79 BPM | OXYGEN SATURATION: 97 % | WEIGHT: 242.51 LBS | BODY MASS INDEX: 35.92 KG/M2 | DIASTOLIC BLOOD PRESSURE: 79 MMHG | SYSTOLIC BLOOD PRESSURE: 155 MMHG | HEIGHT: 69 IN

## 2024-11-29 DIAGNOSIS — Z79.02 ENCOUNTER FOR CURRENT LONG TERM USE OF ANTIPLATELET DRUG: ICD-10-CM

## 2024-11-29 DIAGNOSIS — S52.502A CLOSED FRACTURE OF DISTAL END OF LEFT RADIUS, UNSPECIFIED FRACTURE MORPHOLOGY, INITIAL ENCOUNTER: Primary | ICD-10-CM

## 2024-11-29 DIAGNOSIS — S09.90XA CLOSED HEAD INJURY, INITIAL ENCOUNTER: ICD-10-CM

## 2024-11-29 PROCEDURE — 70450 CT HEAD/BRAIN W/O DYE: CPT | Performed by: RADIOLOGY

## 2024-11-29 PROCEDURE — 73110 X-RAY EXAM OF WRIST: CPT | Mod: LT

## 2024-11-29 PROCEDURE — 72125 CT NECK SPINE W/O DYE: CPT

## 2024-11-29 PROCEDURE — 29125 APPL SHORT ARM SPLINT STATIC: CPT | Performed by: EMERGENCY MEDICINE

## 2024-11-29 PROCEDURE — 99285 EMERGENCY DEPT VISIT HI MDM: CPT | Mod: 25

## 2024-11-29 PROCEDURE — 73110 X-RAY EXAM OF WRIST: CPT | Mod: LEFT SIDE | Performed by: RADIOLOGY

## 2024-11-29 PROCEDURE — 70450 CT HEAD/BRAIN W/O DYE: CPT

## 2024-11-29 PROCEDURE — 2500000001 HC RX 250 WO HCPCS SELF ADMINISTERED DRUGS (ALT 637 FOR MEDICARE OP): Performed by: EMERGENCY MEDICINE

## 2024-11-29 PROCEDURE — 72125 CT NECK SPINE W/O DYE: CPT | Performed by: RADIOLOGY

## 2024-11-29 RX ORDER — OXYCODONE AND ACETAMINOPHEN 5; 325 MG/1; MG/1
1 TABLET ORAL EVERY 6 HOURS PRN
Qty: 5 TABLET | Refills: 0 | Status: SHIPPED | OUTPATIENT
Start: 2024-11-29 | End: 2024-12-05 | Stop reason: ALTCHOICE

## 2024-11-29 RX ORDER — OXYCODONE AND ACETAMINOPHEN 5; 325 MG/1; MG/1
1 TABLET ORAL ONCE
Status: COMPLETED | OUTPATIENT
Start: 2024-11-29 | End: 2024-11-29

## 2024-11-29 ASSESSMENT — PAIN - FUNCTIONAL ASSESSMENT
PAIN_FUNCTIONAL_ASSESSMENT: 0-10

## 2024-11-29 ASSESSMENT — COLUMBIA-SUICIDE SEVERITY RATING SCALE - C-SSRS
1. IN THE PAST MONTH, HAVE YOU WISHED YOU WERE DEAD OR WISHED YOU COULD GO TO SLEEP AND NOT WAKE UP?: NO
2. HAVE YOU ACTUALLY HAD ANY THOUGHTS OF KILLING YOURSELF?: NO
6. HAVE YOU EVER DONE ANYTHING, STARTED TO DO ANYTHING, OR PREPARED TO DO ANYTHING TO END YOUR LIFE?: NO

## 2024-11-29 ASSESSMENT — LIFESTYLE VARIABLES
EVER FELT BAD OR GUILTY ABOUT YOUR DRINKING: NO
EVER HAD A DRINK FIRST THING IN THE MORNING TO STEADY YOUR NERVES TO GET RID OF A HANGOVER: NO
HAVE PEOPLE ANNOYED YOU BY CRITICIZING YOUR DRINKING: NO
TOTAL SCORE: 0
HAVE YOU EVER FELT YOU SHOULD CUT DOWN ON YOUR DRINKING: NO

## 2024-11-29 ASSESSMENT — PAIN DESCRIPTION - PAIN TYPE: TYPE: ACUTE PAIN

## 2024-11-29 ASSESSMENT — PAIN DESCRIPTION - LOCATION: LOCATION: WRIST

## 2024-11-29 ASSESSMENT — PAIN SCALES - GENERAL
PAINLEVEL_OUTOF10: 9
PAINLEVEL_OUTOF10: 0 - NO PAIN

## 2024-11-29 ASSESSMENT — PAIN DESCRIPTION - FREQUENCY: FREQUENCY: CONSTANT/CONTINUOUS

## 2024-11-29 NOTE — CARE PLAN
Limited Trauma; Fall; while going down a ramp; patient alert w/o respiratory distress; SpO2 96% on Room Air.

## 2024-11-29 NOTE — ED PROVIDER NOTES
EMERGENCY DEPARTMENT ENCOUNTER      Pt Name: Uma Middleton  MRN: [id number]  Birthdate 1950  Date of evaluation: 11/29/2024  ED Provider: Eden Giraldo DO     CHIEF COMPLAINT       Chief Complaint   Patient presents with    Fall     Is on an Anti Coag- not sure which one. Did injury left wrist. Fell while carrying down up some stairs.        HISTORY OF PRESENT ILLNESS    Uma Middleton is a 74 y.o. who presents to the emergency department as a limited trauma activation via private vehicle.  She states prior to arrival she was taking her little dog up to the bathroom when she slipped on the right.  She fell backwards striking her head on the ground.  She also injured her wrist but is uncertain how she did this.  She is currently on Plavix.  There was no loss of consciousness and she was able to get herself up.  She called her son who presented to bring her to the emergency department for evaluation.  She primarily complains of left wrist pain.  Minimal headache.    REVIEW OF SYSTEMS     Focused ROS performed and negative other than as listed in HPI    PAST MEDICAL HISTORY     Past Medical History:   Diagnosis Date    Adhesive capsulitis of right shoulder 10/21/2013    Adhesive capsulitis of right shoulder    Angina pectoris     Anxiety     Aortic aneurysm without rupture, unspecified portion of aorta (CMS-HCC)     ASHD (arteriosclerotic heart disease)     Central retinal vein occlusion, right eye (CMS-[facility])     Chest pain     Cholelithiasis     Dermatochalasis of both upper eyelids     Dry eye syndrome of bilateral lacrimal glands     Elevated blood-pressure reading, without diagnosis of hypertension     Elevated blood pressure reading without diagnosis of hypertension    GERD (gastroesophageal reflux disease)     Hyperlipidemia     Hypertension     Inconclusive mammogram 01/23/2017    Breast density    Lattice degeneration of retina, bilateral     Other specified disorders of eustachian tube, bilateral 05/08/2017     Dysfunction of Eustachian tube, bilateral    Otitis media, unspecified, right ear 12/18/2015    Acute right otitis media    Personal history of other diseases of the respiratory system 10/18/2016    History of acute bronchitis with bronchospasm    Primary osteoarthritis, unspecified hand 02/09/2015    Osteoarthritis, hand    Sacroiliitis, not elsewhere classified (CMS-HCC) 04/26/2016    Sacroiliitis    Sciatica, right side 04/26/2016    Sciatica, right    Snoring     Type 2 diabetes mellitus     A1C 6.1 on 7/23/24    Unspecified optic atrophy     Vitamin D deficiency        SURGICAL HISTORY       Past Surgical History:   Procedure Laterality Date    CARDIAC CATHETERIZATION N/A 10/14/2024    Procedure: Left Heart Cath;  Surgeon: Juan Cueto DO;  Location: [facility] Cardiac Cath Lab;  Service: Cardiovascular;  Laterality: N/A;  no pre auth required    CATARACT EXTRACTION      CATARACT EXTRACTION W/  INTRAOCULAR LENS IMPLANT Bilateral     OD 07/18/2013 +15.5D, OS 12/05/2013 +15.5D    CHOLECYSTECTOMY      COLONOSCOPY      ESOPHAGOGASTRODUODENOSCOPY      OTHER SURGICAL HISTORY  06/25/2013    Treatment Of The Left Ankle    [place] OSTEOTOMY MANDIBLE SEGMENTAL  1969    SHOULDER SURGERY         CURRENT MEDICATIONS       Previous Medications    AMLODIPINE-BENAZEPRIL (LOTREL) 5-10 MG CAPSULE    Take 1 capsule by mouth once daily.    ASPIRIN 81 MG CHEWABLE TABLET    Chew 1 tablet (81 mg) once daily.    ATORVASTATIN (LIPITOR) 80 MG TABLET    Take 1 tablet (80 mg) by mouth once daily.    CLOPIDOGREL (PLAVIX) 75 MG TABLET    Take 1 tablet (75 mg) by mouth once daily.    COLCHICINE 0.6 MG TABLET    Take 1 tablet (0.6 mg) by mouth once daily for 28 days. Do not fill before November 13, 2024.    FLUTICASONE (FLONASE) 50 MCG/ACTUATION NASAL SPRAY    Administer 1-2 sprays into each nostril if needed. Uses at bedtime    GABAPENTIN (NEURONTIN) 100 MG CAPSULE    Take 1 capsule (100 mg) by mouth once daily at bedtime.    HYDROXYZINE HCL (ATARAX)  25 MG TABLET    Take 1 tablet (25 mg) by mouth as needed at bedtime for anxiety.    LIDOCAINE (LIDODERM) 5 % PATCH    Place 1 patch over 12 hours on the skin once daily. Apply to painful area 12 hours per day, remove for 12 hours.    METOPROLOL SUCCINATE XL (TOPROL-XL) 25 MG 24 HR TABLET    Take 1 tablet (25 mg) by mouth once daily at bedtime.    OMEPRAZOLE (PRILOSEC) 40 MG DR CAPSULE    Take 1 capsule (40 mg) by mouth once daily. Do not crush or chew.    TRAMADOL (ULTRAM) 50 MG TABLET    Take 1 tablet (50 mg) by mouth if needed for severe pain (7 - 10).       ALLERGIES     Fire ant, Erythromycin, and Tea tree oil    FAMILY HISTORY       Family History   Problem Relation Name Age of Onset    Hypertension Mother Marilia     Heart disease Father Rd     Hypertension Father Rd         SOCIAL HISTORY       Social History     Socioeconomic History    Marital status:    Tobacco Use    Smoking status: Former     Types: Cigarettes     Passive exposure: Past    Smokeless tobacco: Never    Tobacco comments:     Quit 2011   Vaping Use    Vaping status: Never Used   Substance and Sexual Activity    Alcohol use: Not Currently     Comment: 1 cocktail/ mo maybe    Drug use: Never    Sexual activity: Defer     Social Drivers of Health     Transportation Needs: No Transportation Needs (11/15/2024)    OASIS : Transportation     Lack of Transportation (Medical): No     Lack of Transportation (Non-Medical): No     Patient Unable or Declines to Respond: No   Social Connections: Feeling Socially Integrated (11/15/2024)    OASIS : Social Isolation     Frequency of experiencing loneliness or isolation: Never       PHYSICAL EXAM       ED Triage Vitals [11/29/24 0323]   Temperature Heart Rate Respirations BP   36.9 °C (98.4 °F) 79 16 160/80      Pulse Ox Temp src Heart Rate Source Patient Position   95 % -- -- Sitting      BP Location FiO2 (%)     Right arm --        General: Appears well, no acute distress,  alert  Head: Head atraumatic; normocephalic  Eyes: normal inspection; no icterus  ENT: mucosa moist without lesion  Neck: Normal inspection, no meningeal signs  Resp: Normal breath sounds, no wheeze or crackles; No respiratory distress  Chest Wall: no tenderness or deformity  Heart: Heart rate and rhythm regular; No Murmurs  Abdomen: Soft, Non-tender; No distention, guarding, rigidity, or rebound  MSK: Moves all extremities; No Pedal edema; R wrist -slight dorsal angulation deformity, radial pulse 2+, motor and sensation ulnar, median, radial nerve intact, no tenderness to proximal forearm, elbow, humerus, shoulder, no open wounds or skin tenting  Neuro: Alert; no focal deficits, moves all extremities  Psych: Mood and Affect normal  Skin: Color appropriate; warm; Dry    DIAGNOSTIC RESULTS   Lab and radiology results are independently interpreted unless noted below.  RADIOLOGY (Per Emergency Physician):     Interpretation per the Radiologist below, if available at the time of this note:  CT head wo IV contrast   Final Result        No acute intracranial hemorrhage, mass effect or midline shift.        Nonspecific scattered white matter hypodensities favored to represent   sequela of small vessel ischemia.        Cervical spondylosis without acute loss of vertebral body height or   traumatic malalignment.                  MACRO:   None.        Signed by: Evan Finkelstein 11/29/2024 4:06 AM   Dictation workstation:   [id number]      CT cervical spine wo IV contrast   Final Result        No acute intracranial hemorrhage, mass effect or midline shift.        Nonspecific scattered white matter hypodensities favored to represent   sequela of small vessel ischemia.        Cervical spondylosis without acute loss of vertebral body height or   traumatic malalignment.                  MACRO:   None.        Signed by: Evan Finkelstein 11/29/2024 4:06 AM   Dictation workstation:   [id number]      XR wrist left 3+ views    Final Result   Acute comminuted transverse fracture through the distal radial   metaphysis with question intra-articular extension. Mildly displaced   fracture of the ulnar styloid. Significant soft tissue swelling noted.             MACRO:   None        Signed by: Ronit Guadarrama 11/29/2024 3:47 AM   Dictation workstation:   [id number]        EMERGENCY DEPARTMENT COURSE/MDM   Patient presents with head and wrist injury after a fall.  She is on antiplatelet agent.  She is neurovascularly intact.  There is an obvious deformity to the left wrist concerning for possible distal radius fracture.  X-ray of the handle be obtained and CT of the head and neck is ordered.      ED Course as of 11/29/24 0440   Fri Nov 29, 2024   0356 X-rays were personally interpreted and do demonstrate a fracture through the distal radius and ulna with some slight angulation.  However I do not feel that the risks of procedural sedation and attempt to straighten outweigh the benefits of the slight angulation and did not feel be able to obtain a better anatomic alignment.  Area will be splinted. [EF]   0438 Patient neurovascular intact post splint application.  Head neck CT unremarkable.  Patient is updated on remainder of the results agreeable to plan of discharge.  She is sent with a prescription for pain medicine and referrals to both the patient's personal orthopedic specialist as well as 1 on-call are provided.  She verbalizes understanding and is appreciative of care.  Discharged in stable condition. [EF]      ED Course User Index  [EF] Eden Giraldo DO         Diagnoses as of 11/29/24 0440   Closed fracture of distal end of left radius, unspecified fracture morphology, initial encounter   Closed head injury, initial encounter   Encounter for current long term use of antiplatelet drug       Meds Administered:  Medications   oxyCODONE-acetaminophen (Percocet) 5-325 mg per tablet 1 tablet (1 tablet oral Given 11/29/24 0402)        PROCEDURES   Unless otherwise noted below, none  Splint Application    Performed by: Eden Giraldo DO  Authorized by: Eden Giraldo DO    Consent:     Consent obtained:  Verbal    Consent given by:  Patient  Pre-procedure details:     Distal neurologic exam:  Normal    Distal perfusion: distal pulses strong and brisk capillary refill    Procedure details:     Location:  Wrist    Wrist location:  L wrist    Splint type:  Sugar tong    Supplies:  Elastic bandage, sling, fiberglass and cotton padding  Post-procedure details:     Distal neurologic exam:  Normal    Distal perfusion: brisk capillary refill      Procedure completion:  Tolerated well, no immediate complications    Post-procedure imaging: not applicable    Comments:      Splint applied by medic, neurovascular status personally checked by me after application        FINAL IMPRESSION      1. Closed fracture of distal end of left radius, unspecified fracture morphology, initial encounter    2. Closed head injury, initial encounter    3. Encounter for current long term use of antiplatelet drug          DISPOSITION    Discharge 11/29/2024 03:58:21 AM  As a result of the work-up, the patient was discharged home.  she was informed of her diagnosis and instructed to come back with any concerns or worsening of condition.  she and was agreeable to the plan as discussed above.  she was given the opportunity to ask questions.  All of the patient's questions were answered.    Critical Care time: Not Indicated    (Comment: Please note this report has been produced using speech recognition software and may contain errors related to that system including errors in grammar, punctuation, and spelling, as well as words and phrases that may be inappropriate.  If there are any questions or concerns please feel free to contact the dictating provider for clarification.)    Eden Giraldo DO (electronically signed)  Emergency Medicine Physician    History provided by:  Patient  Limitations to History: None  External Records Reviewed with Brief Summary: Discharge Summary from 11/12/2024 which showed CABG   Social Determinants of Health which Significantly Impact Care: None identified   EKG Independent Interpretation: EKG not obtained  Independent Result Review and Interpretation: Relevant laboratory and radiographic results were reviewed and independently interpreted by myself.  As necessary, they are commented on in the ED Course.  Chronic conditions affecting the patient's care: As documented above in [facility]  The patient was discussed with the following consultants/services: None  Care Considerations: As documented above in [facility]               Eden Giraldo DO  11/29/24 0447

## 2024-12-02 LAB
ATRIAL RATE: 69 BPM
P AXIS: 57 DEGREES
P OFFSET: 200 MS
P ONSET: 150 MS
PR INTERVAL: 136 MS
Q ONSET: 218 MS
QRS COUNT: 11 BEATS
QRS DURATION: 80 MS
QT INTERVAL: 390 MS
QTC CALCULATION(BAZETT): 417 MS
QTC FREDERICIA: 408 MS
R AXIS: 25 DEGREES
T AXIS: 89 DEGREES
T OFFSET: 413 MS
VENTRICULAR RATE: 69 BPM

## 2024-12-02 PROCEDURE — 93005 ELECTROCARDIOGRAM TRACING: CPT

## 2024-12-03 ENCOUNTER — OFFICE VISIT (OUTPATIENT)
Dept: CARDIOLOGY | Facility: CLINIC | Age: 74
End: 2024-12-03
Payer: MEDICARE

## 2024-12-03 VITALS
DIASTOLIC BLOOD PRESSURE: 82 MMHG | SYSTOLIC BLOOD PRESSURE: 150 MMHG | HEART RATE: 84 BPM | BODY MASS INDEX: 33.97 KG/M2 | OXYGEN SATURATION: 96 % | WEIGHT: 230 LBS

## 2024-12-03 DIAGNOSIS — I10 PRIMARY HYPERTENSION: ICD-10-CM

## 2024-12-03 DIAGNOSIS — I20.89 STABLE ANGINA PECTORIS (CMS-HCC): ICD-10-CM

## 2024-12-03 DIAGNOSIS — I25.10 ASHD (ARTERIOSCLEROTIC HEART DISEASE): Primary | ICD-10-CM

## 2024-12-03 DIAGNOSIS — E78.2 MIXED HYPERLIPIDEMIA: ICD-10-CM

## 2024-12-03 PROCEDURE — 3061F NEG MICROALBUMINURIA REV: CPT | Performed by: INTERNAL MEDICINE

## 2024-12-03 PROCEDURE — 3048F LDL-C <100 MG/DL: CPT | Performed by: INTERNAL MEDICINE

## 2024-12-03 PROCEDURE — 3077F SYST BP >= 140 MM HG: CPT | Performed by: INTERNAL MEDICINE

## 2024-12-03 PROCEDURE — 1036F TOBACCO NON-USER: CPT | Performed by: INTERNAL MEDICINE

## 2024-12-03 PROCEDURE — 3079F DIAST BP 80-89 MM HG: CPT | Performed by: INTERNAL MEDICINE

## 2024-12-03 PROCEDURE — 99214 OFFICE O/P EST MOD 30 MIN: CPT | Performed by: INTERNAL MEDICINE

## 2024-12-03 PROCEDURE — 1111F DSCHRG MED/CURRENT MED MERGE: CPT | Performed by: INTERNAL MEDICINE

## 2024-12-03 PROCEDURE — 1159F MED LIST DOCD IN RCRD: CPT | Performed by: INTERNAL MEDICINE

## 2024-12-03 PROCEDURE — 1157F ADVNC CARE PLAN IN RCRD: CPT | Performed by: INTERNAL MEDICINE

## 2024-12-03 RX ORDER — METOPROLOL SUCCINATE 50 MG/1
50 TABLET, EXTENDED RELEASE ORAL DAILY
Qty: 90 TABLET | Refills: 3 | Status: SHIPPED | OUTPATIENT
Start: 2024-12-03

## 2024-12-03 RX ORDER — ISOSORBIDE MONONITRATE 30 MG/1
30 TABLET, EXTENDED RELEASE ORAL DAILY
Qty: 90 TABLET | Refills: 3 | Status: SHIPPED | OUTPATIENT
Start: 2024-12-03 | End: 2025-12-03

## 2024-12-03 RX ORDER — HYDROCODONE BITARTRATE AND ACETAMINOPHEN 5; 325 MG/1; MG/1
1 TABLET ORAL EVERY 6 HOURS PRN
COMMUNITY

## 2024-12-03 ASSESSMENT — ENCOUNTER SYMPTOMS
WEAKNESS: 0
COUGH: 0
ORTHOPNEA: 0
OCCASIONAL FEELINGS OF UNSTEADINESS: 0
DIAPHORESIS: 0
PND: 0
IRREGULAR HEARTBEAT: 0
DIZZINESS: 0
MYALGIAS: 0
LOSS OF SENSATION IN FEET: 0
DEPRESSION: 0
FEVER: 0
SYNCOPE: 0
DYSPNEA ON EXERTION: 0
NEAR-SYNCOPE: 0
CLAUDICATION: 0
WHEEZING: 0
PALPITATIONS: 0
WEIGHT GAIN: 0
SHORTNESS OF BREATH: 0
WEIGHT LOSS: 0

## 2024-12-03 NOTE — PROGRESS NOTES
Subjective      Chief Complaint   Patient presents with    Hospital Follow-up    cabg        74-year-old female whom I saw in September for presumed coronary artery disease based on her coronary artery calcium score.  She was eventually catheterized in response to a positive stress test this revealed significant ostial to proximal LAD disease as well as ostial first and second diagonal disease.  Ultimately the decision was made to refer her for minimally invasive surgical revascularization.  She underwent robotic Mid CAB at [facility] last month.  She is here for follow-up. She broke her left arm on Thanksgiving, was set and casted yesterday. She still has jaw pain.         Review of Systems   Constitutional: Negative for diaphoresis, fever, weight gain and weight loss.   Eyes:  Negative for visual disturbance.   Cardiovascular:  Negative for chest pain, claudication, dyspnea on exertion, irregular heartbeat, leg swelling, near-syncope, orthopnea, palpitations, paroxysmal nocturnal dyspnea and syncope.   Respiratory:  Negative for cough, shortness of breath and wheezing.    Musculoskeletal:  Negative for muscle weakness and myalgias.   Neurological:  Negative for dizziness and weakness.   All other systems reviewed and are negative.       Past Medical History:   Diagnosis Date    Adhesive capsulitis of right shoulder 10/21/2013    Adhesive capsulitis of right shoulder    Angina pectoris     Anxiety     Aortic aneurysm without rupture, unspecified portion of aorta (CMS-[facility])     ASHD (arteriosclerotic heart disease)     Central retinal vein occlusion, right eye (CMS-[facility])     Chest pain     Cholelithiasis     Dermatochalasis of both upper eyelids     Dry eye syndrome of bilateral lacrimal glands     Elevated blood-pressure reading, without diagnosis of hypertension     Elevated blood pressure reading without diagnosis of hypertension    GERD (gastroesophageal reflux disease)     Hyperlipidemia     Hypertension      Inconclusive mammogram 2017    Breast density    Lattice degeneration of retina, bilateral     Other specified disorders of eustachian tube, bilateral 2017    Dysfunction of Eustachian tube, bilateral    Otitis media, unspecified, right ear 2015    Acute right otitis media    Personal history of other diseases of the respiratory system 10/18/2016    History of acute bronchitis with bronchospasm    Primary osteoarthritis, unspecified hand 2015    Osteoarthritis, hand    Sacroiliitis, not elsewhere classified (CMS-HCC) 2016    Sacroiliitis    Sciatica, right side 2016    Sciatica, right    Snoring     Type 2 diabetes mellitus     A1C 6.1 on 24    Unspecified optic atrophy     Vitamin D deficiency         Past Surgical History:   Procedure Laterality Date    CARDIAC CATHETERIZATION N/A 10/14/2024    Procedure: Left Heart Cath;  Surgeon: Juan Cueto DO;  Location: [facility] Cardiac Cath Lab;  Service: Cardiovascular;  Laterality: N/A;  no pre auth required    CATARACT EXTRACTION      CATARACT EXTRACTION W/  INTRAOCULAR LENS IMPLANT Bilateral     OD 2013 +15.5D, OS 2013 +15.5D    CHOLECYSTECTOMY      COLONOSCOPY      ESOPHAGOGASTRODUODENOSCOPY      OTHER SURGICAL HISTORY  2013    Treatment Of The Left Ankle    [place] OSTEOTOMY MANDIBLE SEGMENTAL  1969    SHOULDER SURGERY          Social History     Socioeconomic History    Marital status:      Spouse name: Not on file    Number of children: Not on file    Years of education: Not on file    Highest education level: Not on file   Occupational History    Not on file   Tobacco Use    Smoking status: Former     Current packs/day: 0.00     Average packs/day: 1 pack/day for 15.0 years (15.0 ttl pk-yrs)     Types: Cigarettes     Quit date: 2010     Years since quittin.9     Passive exposure: Past    Smokeless tobacco: Never    Tobacco comments:     Quit    Vaping Use    Vaping status: Never Used   Substance  and Sexual Activity    Alcohol use: Not Currently     Comment: 1 cocktail/ mo maybe    Drug use: Never    Sexual activity: Defer   Other Topics Concern    Not on file   Social History Narrative    Not on file     Social Drivers of Health     Financial Resource Strain: Not on file   Food Insecurity: Not on file   Transportation Needs: No Transportation Needs (11/15/2024)    OASIS : Transportation     Lack of Transportation (Medical): No     Lack of Transportation (Non-Medical): No     Patient Unable or Declines to Respond: No   Physical Activity: Not on file   Stress: Not on file   Social Connections: Feeling Socially Integrated (11/15/2024)    OASIS : Social Isolation     Frequency of experiencing loneliness or isolation: Never   Intimate Partner Violence: Not on file   Housing Stability: Not on file        Family History   Problem Relation Name Age of Onset    Hypertension Mother Marilia     Heart disease Father Rd     Hypertension Father Rd         OBJECTIVE:    Vitals:    12/03/24 1353   BP: 150/82   Pulse: 84   SpO2: 96%        Vitals reviewed.   Constitutional:       Appearance: Normal and healthy appearance. Not in distress.   Pulmonary:      Effort: Pulmonary effort is normal.      Breath sounds: Normal breath sounds.   Cardiovascular:      Normal rate. Regular rhythm. Normal S1. Normal S2.       Murmurs: There is no murmur.      No gallop.  No click.   Pulses:     Intact distal pulses.   Edema:     Peripheral edema absent.   Skin:     General: Skin is warm and dry.   Neurological:      General: No focal deficit present.          Lab Review:   Lab Results   Component Value Date     11/15/2024    K 3.6 11/15/2024     11/15/2024    CO2 24 11/15/2024    BUN 16 11/15/2024    CREATININE 0.74 11/15/2024    GLUCOSE 156 (H) 11/15/2024    CALCIUM 8.6 11/15/2024     Lab Results   Component Value Date    CHOL 185 04/19/2024    TRIG 133 04/19/2024    HDL 60.0 04/19/2024       Lab Results    Component Value Date    LDLCALC 98 04/19/2024        ASHD (arteriosclerotic heart disease)  Continue DAPT for now. Continue high potency statin. Continues to have angina with inconsistent levels of physical activity. She was given a FRYE to LAD which does leave diag disease which I would suspect is creating ischemia. Percutaneous intervention would be technically challenging and may compromise her graft. I would suggest medication titration and cardiac rehab with close followup    Primary hypertension  In pain today. Increasing BB  and adding imdur should help

## 2024-12-03 NOTE — ASSESSMENT & PLAN NOTE
Continue DAPT for now. Continue high potency statin. Continues to have angina with inconsistent levels of physical activity. She was given a FRYE to LAD which does leave diag disease which I would suspect is creating ischemia. Percutaneous intervention would be technically challenging and may compromise her graft. I would suggest medication titration and cardiac rehab with close followup

## 2024-12-04 ENCOUNTER — HOME CARE VISIT (OUTPATIENT)
Dept: HOME HEALTH SERVICES | Facility: HOME HEALTH | Age: 74
End: 2024-12-04
Payer: MEDICARE

## 2024-12-05 ENCOUNTER — TELEMEDICINE (OUTPATIENT)
Dept: PRIMARY CARE | Facility: CLINIC | Age: 74
End: 2024-12-05
Payer: MEDICARE

## 2024-12-05 VITALS — BODY MASS INDEX: 33.34 KG/M2 | HEIGHT: 69 IN | WEIGHT: 225.1 LBS | RESPIRATION RATE: 16 BRPM

## 2024-12-05 DIAGNOSIS — G89.18 ACUTE POST-OPERATIVE PAIN: ICD-10-CM

## 2024-12-05 DIAGNOSIS — G62.9 NEUROPATHY: ICD-10-CM

## 2024-12-05 DIAGNOSIS — Z95.1 S/P CABG X 1: ICD-10-CM

## 2024-12-05 PROCEDURE — 3061F NEG MICROALBUMINURIA REV: CPT

## 2024-12-05 PROCEDURE — 1036F TOBACCO NON-USER: CPT

## 2024-12-05 PROCEDURE — 1157F ADVNC CARE PLAN IN RCRD: CPT

## 2024-12-05 PROCEDURE — 1125F AMNT PAIN NOTED PAIN PRSNT: CPT

## 2024-12-05 PROCEDURE — 1124F ACP DISCUSS-NO DSCNMKR DOCD: CPT

## 2024-12-05 PROCEDURE — 3008F BODY MASS INDEX DOCD: CPT

## 2024-12-05 PROCEDURE — 1111F DSCHRG MED/CURRENT MED MERGE: CPT

## 2024-12-05 PROCEDURE — 3048F LDL-C <100 MG/DL: CPT

## 2024-12-05 PROCEDURE — 1159F MED LIST DOCD IN RCRD: CPT

## 2024-12-05 PROCEDURE — 99442 PR PHYS/QHP TELEPHONE EVALUATION 11-20 MIN: CPT

## 2024-12-05 RX ORDER — LIDOCAINE 50 MG/G
1 PATCH TOPICAL DAILY
Qty: 14 PATCH | Refills: 1 | Status: SHIPPED | OUTPATIENT
Start: 2024-12-05

## 2024-12-05 RX ORDER — CLOPIDOGREL BISULFATE 75 MG/1
75 TABLET ORAL DAILY
Qty: 90 TABLET | Refills: 1 | Status: SHIPPED | OUTPATIENT
Start: 2024-12-05 | End: 2025-06-03

## 2024-12-05 RX ORDER — GABAPENTIN 100 MG/1
100 CAPSULE ORAL NIGHTLY
Qty: 30 CAPSULE | Refills: 1 | Status: SHIPPED | OUTPATIENT
Start: 2024-12-05

## 2024-12-05 RX ORDER — ATORVASTATIN CALCIUM 80 MG/1
80 TABLET, FILM COATED ORAL DAILY
Qty: 90 TABLET | Refills: 1 | Status: SHIPPED | OUTPATIENT
Start: 2024-12-05 | End: 2025-06-03

## 2024-12-05 ASSESSMENT — PATIENT HEALTH QUESTIONNAIRE - PHQ9
2. FEELING DOWN, DEPRESSED OR HOPELESS: NEARLY EVERY DAY
3. TROUBLE FALLING OR STAYING ASLEEP OR SLEEPING TOO MUCH: NEARLY EVERY DAY
1. LITTLE INTEREST OR PLEASURE IN DOING THINGS: NEARLY EVERY DAY
10. IF YOU CHECKED OFF ANY PROBLEMS, HOW DIFFICULT HAVE THESE PROBLEMS MADE IT FOR YOU TO DO YOUR WORK, TAKE CARE OF THINGS AT HOME, OR GET ALONG WITH OTHER PEOPLE: VERY DIFFICULT
5. POOR APPETITE OR OVEREATING: NEARLY EVERY DAY
8. MOVING OR SPEAKING SO SLOWLY THAT OTHER PEOPLE COULD HAVE NOTICED. OR THE OPPOSITE, BEING SO FIGETY OR RESTLESS THAT YOU HAVE BEEN MOVING AROUND A LOT MORE THAN USUAL: NOT AT ALL
SUM OF ALL RESPONSES TO PHQ9 QUESTIONS 1 AND 2: 6
7. TROUBLE CONCENTRATING ON THINGS, SUCH AS READING THE NEWSPAPER OR WATCHING TELEVISION: NEARLY EVERY DAY
4. FEELING TIRED OR HAVING LITTLE ENERGY: NEARLY EVERY DAY
6. FEELING BAD ABOUT YOURSELF - OR THAT YOU ARE A FAILURE OR HAVE LET YOURSELF OR YOUR FAMILY DOWN: NEARLY EVERY DAY

## 2024-12-05 ASSESSMENT — ENCOUNTER SYMPTOMS
LOSS OF SENSATION IN FEET: 0
DEPRESSION: 0
OCCASIONAL FEELINGS OF UNSTEADINESS: 0

## 2024-12-05 ASSESSMENT — COLUMBIA-SUICIDE SEVERITY RATING SCALE - C-SSRS
2. HAVE YOU ACTUALLY HAD ANY THOUGHTS OF KILLING YOURSELF?: NO
1. IN THE PAST MONTH, HAVE YOU WISHED YOU WERE DEAD OR WISHED YOU COULD GO TO SLEEP AND NOT WAKE UP?: NO

## 2024-12-05 ASSESSMENT — PAIN SCALES - GENERAL: PAINLEVEL_OUTOF10: 7

## 2024-12-05 NOTE — PATIENT INSTRUCTIONS
CABG  Follow up with Dr. Cueto as discussed    Continue follow up with Cardiothoracic surgery as scheduled.   Refill sent on Plavix and lipitor  Post op pain  Continue lido patches to chest for pain refief  Continue norco as needed   Apply ice to inner elbow for pain relief   Follow up with ortho as discussed.

## 2024-12-05 NOTE — PROGRESS NOTES
"With patients permission, this is a Telemedicine visit with video. Provider located at the office address. Patient located at their home address. The provider and patient participated in this telemedicine encounter.          Subjective   Patient ID: Uma Middleton is a 74 y.o. female who presents for hospital follow up    HPI   On November 8 pt had robotic  CABG x1 performed. She was discharged from [facility] 11/12  For HISTORY PURPOSES:   \"Hospital Course  Anum Middleton is a 74-year-old patient who presents with a diagnosis of complex LAD diagonal severe lesion.  She previously reported chest and jaw pain for least a few years become increasingly worse in the last few months.  Her primary physician sent her for further evaluation that prompted her left heart cath with the diagnosis of LAD diagonal disease with moderate RCA and a 60% small circumflex lesion. She presented to [facility]ICU on 11/8 s/p  Robotic MIDCAB FRYE to LAD with Dr. Arias. Post-op course was uneventful, she was transferred to cardiac surgery floor on 11/10. Received multimodal pain control including pain blocks with catheters from OR. She had normal cardiac function post-operatively, and will be discharged with ASA indefinitely, plavix x1 year, and colchicine x1 month (pericarditis prophylaxis). Discharging on home metoprolol on amlodipine-benazepril for HTN management. She will return home on room air. CXR with small left pleural effusion with which she is asymptomatic, CXR to be done at cardiac surgery follow-up appt. Diuresed with spot lasix, below admission weight on discharge. Labs satisfactory on discharge. Patient agreeable to return home with [facility].   \"  Followed up with Cardiac Surgery Dr. Arias 11/27/24 Advised follow up with Dr. Cueto for cardiac cath given her continued jaw and chest pain.   Incidentally on 11/29 the patient fell while taking her dog outside, striking her head and back, ultimately fracturing her arm. Reported to  ER She was " "advised of admission while in the ER, pt deferred and opted to call Dr. Tovar the following day. Patient reports she had not heard from his office. She did have an appt on Monday was told by ortho that they do not have time on their schedule for the OR, but offered to reset in the office. Xrays were taken per patient and was informed the bone was set. Patient is currently in a cast and suffering from immense pain. She is taking norco, using lido patches and ice. With little relief. Mentions she can't \"feel the ice through the cast\"  Review of Systems  Review of Systems negative except as noted in HPI and Chief complaint.    Current Outpatient Medications:     amLODIPine-benazepriL (Lotrel) 5-10 mg capsule, Take 1 capsule by mouth once daily., Disp: 90 capsule, Rfl: 1    aspirin 81 mg chewable tablet, Chew 1 tablet (81 mg) once daily., Disp: 90 tablet, Rfl: 3    colchicine 0.6 mg tablet, Take 1 tablet (0.6 mg) by mouth once daily for 28 days. Do not fill before November 13, 2024., Disp: 28 tablet, Rfl: 0    fluticasone (Flonase) 50 mcg/actuation nasal spray, Administer 1-2 sprays into each nostril if needed. Uses at bedtime, Disp: , Rfl:     HYDROcodone-acetaminophen (Norco) 5-325 mg tablet, Take 1 tablet by mouth every 6 hours if needed for severe pain (7 - 10)., Disp: , Rfl:     hydrOXYzine HCL (Atarax) 25 mg tablet, Take 1 tablet (25 mg) by mouth as needed at bedtime for anxiety., Disp: , Rfl:     isosorbide mononitrate ER (Imdur) 30 mg 24 hr tablet, Take 1 tablet (30 mg) by mouth once daily. Do not crush or chew., Disp: 90 tablet, Rfl: 3    metoprolol succinate XL (Toprol-XL) 50 mg 24 hr tablet, Take 1 tablet (50 mg) by mouth once daily., Disp: 90 tablet, Rfl: 3    omeprazole (PriLOSEC) 40 mg DR capsule, Take 1 capsule (40 mg) by mouth once daily. Do not crush or chew., Disp: , Rfl:     traMADol (Ultram) 50 mg tablet, Take 1 tablet (50 mg) by mouth if needed for severe pain (7 - 10)., Disp: , Rfl:     " "atorvastatin (Lipitor) 80 mg tablet, Take 1 tablet (80 mg) by mouth once daily., Disp: 90 tablet, Rfl: 1    clopidogrel (Plavix) 75 mg tablet, Take 1 tablet (75 mg) by mouth once daily., Disp: 90 tablet, Rfl: 1    gabapentin (Neurontin) 100 mg capsule, Take 1 capsule (100 mg) by mouth once daily at bedtime., Disp: 30 capsule, Rfl: 1    lidocaine (Lidoderm) 5 % patch, Place 1 patch over 12 hours on the skin once daily. Apply to painful area 12 hours per day, remove for 12 hours., Disp: 14 patch, Rfl: 1    Objective   Resp 16   Ht 1.753 m (5' 9\")   Wt 102 kg (225 lb 1.6 oz)   BMI 33.24 kg/m²     Physical Exam  Physical exam not performed due to telemedicine encounter.   Assessment/Plan   Diagnoses and all orders for this visit:  S/P CABG x 1  -     atorvastatin (Lipitor) 80 mg tablet; Take 1 tablet (80 mg) by mouth once daily.  -     clopidogrel (Plavix) 75 mg tablet; Take 1 tablet (75 mg) by mouth once daily.  Acute post-operative pain  -     lidocaine (Lidoderm) 5 % patch; Place 1 patch over 12 hours on the skin once daily. Apply to painful area 12 hours per day, remove for 12 hours.  Neuropathy  -     gabapentin (Neurontin) 100 mg capsule; Take 1 capsule (100 mg) by mouth once daily at bedtime.    CABG  Follow up with Dr. Cueto as discussed    Continue follow up with Cardiothoracic surgery as scheduled.   Refill sent on Plavix and lipitor  Post op pain  Continue lido patches to chest for pain refief  Continue norco as needed   Apply ice to inner elbow for pain relief   Follow up with ortho as discussed.     This note was dictated using DRAGON speech recognition software and was corrected for spelling or grammatical errors, but despite proofreading several typographical errors might be present that might affect the meaning of the content.  Mary Lou Valerio CNP  Advanced care planning was discussed with Uma Middleton today. We reviewed code status, Medical Power of , and Living will. Pt has LW or POA. "

## 2024-12-06 DIAGNOSIS — Z95.1 STATUS POST CORONARY ARTERY BYPASS GRAFT: Primary | ICD-10-CM

## 2024-12-06 ASSESSMENT — ENCOUNTER SYMPTOMS
PERSON REPORTING PAIN: PATIENT
DENIES PAIN: 1

## 2024-12-06 ASSESSMENT — ACTIVITIES OF DAILY LIVING (ADL)
OASIS_M1830: 00
HOME_HEALTH_OASIS: 00

## 2024-12-06 NOTE — PROGRESS NOTES
Patient had an inconclusive HSAT sleep study due to patient unable to use the recording unit. The equipment was hurting patient due to recent surgery. Would like to try again once healed.    Patient should be rescheduled for repeat attempt of HSAT or an in-lab sleep study.

## 2024-12-09 DIAGNOSIS — G47.30 SLEEP APNEA, UNSPECIFIED TYPE: Primary | ICD-10-CM

## 2024-12-16 DIAGNOSIS — I25.10 CAD IN NATIVE ARTERY: ICD-10-CM

## 2024-12-16 NOTE — PROGRESS NOTES
[facility]      HPI:    Reviewed at the clinic today.  Patient underwent Robotic MIDCAB on 11/8/24.  Patient last seen on 11/27/24 for intermittent chest and jaw pain.  Dr. Cueto saw patient in follow up for persistent chest pain with plan for medication titration and cardiac rehab.  Today her chest pain has completely resolved. She denies any symptoms at this time.  She does now have a newly fractured wrist that is casted.  She is scheduled for orthopaedic fixation; however, this is at an outpatient surgery center and she is very concerned about having surgery there in such close proximity to her cardiac surgery and I agree with this.             On examination, Incisions are well healed     Chest x-ray today demonstrated clear lung fields bilterally        Assessment/Plan:      Sent letter to ortho clinic stating that it would not be safe for the patient to have her surgery at an outpatient surgery center.  I believe she should have her surgery at [facility] or [facility] where there is access to cardiac surgery/cardiology as well as cardiac anesthesia if needed.    I have reached out to Dr. Galen Laws who was recommended to me to see if he can see her at [facility].   Will contact patient tomorrow with an update on a referral to [facility] orthopaedic surgery  Cardiac rehab evaluation on 12/20  Ok to drive, ok to return to normal activities   Continue to follow up in the long term with cardiology   Does not need to return to clinic, but was instructed to call if any issues arise       ____________________________________________________________  Judi Arias MD  Assistant Professor of Surgery  Division of Cardiac Surgery    [facility]  [facility]

## 2024-12-17 ENCOUNTER — APPOINTMENT (OUTPATIENT)
Dept: OPHTHALMOLOGY | Facility: CLINIC | Age: 74
End: 2024-12-17
Payer: MEDICARE

## 2024-12-18 ENCOUNTER — OFFICE VISIT (OUTPATIENT)
Dept: CARDIAC SURGERY | Facility: HOSPITAL | Age: 74
End: 2024-12-18
Payer: MEDICARE

## 2024-12-18 ENCOUNTER — HOSPITAL ENCOUNTER (OUTPATIENT)
Dept: RADIOLOGY | Facility: HOSPITAL | Age: 74
Discharge: HOME | End: 2024-12-18
Payer: MEDICARE

## 2024-12-18 VITALS
BODY MASS INDEX: 34 KG/M2 | HEART RATE: 81 BPM | HEIGHT: 69 IN | SYSTOLIC BLOOD PRESSURE: 149 MMHG | OXYGEN SATURATION: 96 % | DIASTOLIC BLOOD PRESSURE: 78 MMHG | WEIGHT: 229.6 LBS

## 2024-12-18 DIAGNOSIS — I25.10 CAD IN NATIVE ARTERY: ICD-10-CM

## 2024-12-18 DIAGNOSIS — I25.10 CAD IN NATIVE ARTERY: Primary | ICD-10-CM

## 2024-12-18 PROCEDURE — 3048F LDL-C <100 MG/DL: CPT | Performed by: STUDENT IN AN ORGANIZED HEALTH CARE EDUCATION/TRAINING PROGRAM

## 2024-12-18 PROCEDURE — 3078F DIAST BP <80 MM HG: CPT | Performed by: STUDENT IN AN ORGANIZED HEALTH CARE EDUCATION/TRAINING PROGRAM

## 2024-12-18 PROCEDURE — 3061F NEG MICROALBUMINURIA REV: CPT | Performed by: STUDENT IN AN ORGANIZED HEALTH CARE EDUCATION/TRAINING PROGRAM

## 2024-12-18 PROCEDURE — 71046 X-RAY EXAM CHEST 2 VIEWS: CPT | Performed by: RADIOLOGY

## 2024-12-18 PROCEDURE — 1126F AMNT PAIN NOTED NONE PRSNT: CPT | Performed by: STUDENT IN AN ORGANIZED HEALTH CARE EDUCATION/TRAINING PROGRAM

## 2024-12-18 PROCEDURE — 1159F MED LIST DOCD IN RCRD: CPT | Performed by: STUDENT IN AN ORGANIZED HEALTH CARE EDUCATION/TRAINING PROGRAM

## 2024-12-18 PROCEDURE — 99211 OFF/OP EST MAY X REQ PHY/QHP: CPT | Performed by: STUDENT IN AN ORGANIZED HEALTH CARE EDUCATION/TRAINING PROGRAM

## 2024-12-18 PROCEDURE — 1036F TOBACCO NON-USER: CPT | Performed by: STUDENT IN AN ORGANIZED HEALTH CARE EDUCATION/TRAINING PROGRAM

## 2024-12-18 PROCEDURE — 1157F ADVNC CARE PLAN IN RCRD: CPT | Performed by: STUDENT IN AN ORGANIZED HEALTH CARE EDUCATION/TRAINING PROGRAM

## 2024-12-18 PROCEDURE — 3077F SYST BP >= 140 MM HG: CPT | Performed by: STUDENT IN AN ORGANIZED HEALTH CARE EDUCATION/TRAINING PROGRAM

## 2024-12-18 PROCEDURE — 3008F BODY MASS INDEX DOCD: CPT | Performed by: STUDENT IN AN ORGANIZED HEALTH CARE EDUCATION/TRAINING PROGRAM

## 2024-12-18 PROCEDURE — 71046 X-RAY EXAM CHEST 2 VIEWS: CPT

## 2024-12-18 ASSESSMENT — ENCOUNTER SYMPTOMS
DEPRESSION: 0
OCCASIONAL FEELINGS OF UNSTEADINESS: 0
LOSS OF SENSATION IN FEET: 0

## 2024-12-18 ASSESSMENT — PAIN SCALES - GENERAL: PAINLEVEL_OUTOF10: 0-NO PAIN

## 2024-12-19 DIAGNOSIS — I10 PRIMARY HYPERTENSION: ICD-10-CM

## 2024-12-19 RX ORDER — METOPROLOL SUCCINATE 50 MG/1
50 TABLET, EXTENDED RELEASE ORAL DAILY
Qty: 90 TABLET | Refills: 3 | Status: SHIPPED | OUTPATIENT
Start: 2024-12-19

## 2024-12-20 ENCOUNTER — OFFICE VISIT (OUTPATIENT)
Dept: ORTHOPEDIC SURGERY | Facility: CLINIC | Age: 74
End: 2024-12-20
Payer: MEDICARE

## 2024-12-20 ENCOUNTER — APPOINTMENT (OUTPATIENT)
Dept: CARDIAC REHAB | Facility: CLINIC | Age: 74
End: 2024-12-20
Payer: MEDICARE

## 2024-12-20 ENCOUNTER — HOSPITAL ENCOUNTER (OUTPATIENT)
Dept: RADIOLOGY | Facility: CLINIC | Age: 74
Discharge: HOME | End: 2024-12-20
Payer: MEDICARE

## 2024-12-20 DIAGNOSIS — M25.532 LEFT WRIST PAIN: ICD-10-CM

## 2024-12-20 DIAGNOSIS — M25.532 LEFT WRIST PAIN: Primary | ICD-10-CM

## 2024-12-20 PROCEDURE — 1157F ADVNC CARE PLAN IN RCRD: CPT | Performed by: STUDENT IN AN ORGANIZED HEALTH CARE EDUCATION/TRAINING PROGRAM

## 2024-12-20 PROCEDURE — 99204 OFFICE O/P NEW MOD 45 MIN: CPT | Performed by: STUDENT IN AN ORGANIZED HEALTH CARE EDUCATION/TRAINING PROGRAM

## 2024-12-20 PROCEDURE — 3048F LDL-C <100 MG/DL: CPT | Performed by: STUDENT IN AN ORGANIZED HEALTH CARE EDUCATION/TRAINING PROGRAM

## 2024-12-20 PROCEDURE — 1159F MED LIST DOCD IN RCRD: CPT | Performed by: STUDENT IN AN ORGANIZED HEALTH CARE EDUCATION/TRAINING PROGRAM

## 2024-12-20 PROCEDURE — 1125F AMNT PAIN NOTED PAIN PRSNT: CPT | Performed by: STUDENT IN AN ORGANIZED HEALTH CARE EDUCATION/TRAINING PROGRAM

## 2024-12-20 PROCEDURE — 1036F TOBACCO NON-USER: CPT | Performed by: STUDENT IN AN ORGANIZED HEALTH CARE EDUCATION/TRAINING PROGRAM

## 2024-12-20 PROCEDURE — 3061F NEG MICROALBUMINURIA REV: CPT | Performed by: STUDENT IN AN ORGANIZED HEALTH CARE EDUCATION/TRAINING PROGRAM

## 2024-12-20 PROCEDURE — 73110 X-RAY EXAM OF WRIST: CPT | Mod: LT

## 2024-12-20 ASSESSMENT — PAIN DESCRIPTION - DESCRIPTORS: DESCRIPTORS: TINGLING;NUMBNESS

## 2024-12-20 ASSESSMENT — PAIN SCALES - GENERAL: PAINLEVEL_OUTOF10: 6

## 2024-12-20 ASSESSMENT — PAIN - FUNCTIONAL ASSESSMENT: PAIN_FUNCTIONAL_ASSESSMENT: 0-10

## 2024-12-20 NOTE — PROGRESS NOTES
History of Present Illness   Patient presents today for evaluation of side: left upper extremity pain.    The patient sustained an acute injury on  11/29/2024 .  The patient fell on an outstretched hand.  She had immediate pain into her left wrist at that time.  She went to the emergency department at [facility].  She was found to have a displaced distal radius fracture.  She subsequently followed up outpatient with an orthopedic surgeon with [facility].  An attempt at nonoperative treatment was made and she had a closed reduction and splinting performed in the office.  She noted significant pain.  She recently followed up with her orthopedic surgeon which demonstrated further displaced distal radius fracture.  She was scheduled to undergo an open reduction internal fixation of her left wrist on 12/23/2024 for continued displacement and failure of nonoperative treatment.  She notes continued significant pain.  The patient denies any loss of consciousness or additional significant injuries.      She was planning on having surgery on 12/23/2024 at a surgery center with an orthopedic surgeon.  She was attempting to get cardiology clearance from her cardiologist who has cleared her for surgery but would like it done at either [facility] or [facility].  Her cardiothoracic surgeon does not think that the surgery should be done at a surgery center.  Dr. Judi Arias has reached out to several of the orthopedic hand surgeons at [facility] and has requested her care to be treated at [facility] or [facility].  Her clinical history is significant for a recent robotic cardiac bypass 6 weeks ago.  Her cardiothoracic surgeon has given her clearance to proceed with surgery.  The patient and her  would like to proceed with surgery.    She denied any previous chest pain prior to this recent event.  She had been traveling at the [facility] when she got acute chest pain.     Past Medical History:    Diagnosis Date    Adhesive capsulitis of right shoulder 10/21/2013    Adhesive capsulitis of right shoulder    Angina pectoris     Anxiety     Aortic aneurysm without rupture, unspecified portion of aorta (CMS-HCC)     ASHD (arteriosclerotic heart disease)     Central retinal vein occlusion, right eye (CMS-HCC)     Chest pain     Cholelithiasis     Dermatochalasis of both upper eyelids     Dry eye syndrome of bilateral lacrimal glands     Elevated blood-pressure reading, without diagnosis of hypertension     Elevated blood pressure reading without diagnosis of hypertension    GERD (gastroesophageal reflux disease)     Hyperlipidemia     Hypertension     Inconclusive mammogram 01/23/2017    Breast density    Lattice degeneration of retina, bilateral     Other specified disorders of eustachian tube, bilateral 05/08/2017    Dysfunction of Eustachian tube, bilateral    Otitis media, unspecified, right ear 12/18/2015    Acute right otitis media    Personal history of other diseases of the respiratory system 10/18/2016    History of acute bronchitis with bronchospasm    Primary osteoarthritis, unspecified hand 02/09/2015    Osteoarthritis, hand    Sacroiliitis, not elsewhere classified (CMS-HCC) 04/26/2016    Sacroiliitis    Sciatica, right side 04/26/2016    Sciatica, right    Snoring     Type 2 diabetes mellitus     A1C 6.1 on 7/23/24    Unspecified optic atrophy     Vitamin D deficiency        Medication Documentation Review Audit       Reviewed by Brandee Valerio LPN (Licensed Nurse) on 12/20/24 at 1328      Medication Order Taking? Sig Documenting Provider Last Dose Status   amLODIPine-benazepriL (Lotrel) 5-10 mg capsule [id number] No Take 1 capsule by mouth once daily. Mary Lou Valerio, MANUEL-CNP 11/28/2024 Evening Active   aspirin 81 mg chewable tablet [id number]  Chew 1 tablet (81 mg) once daily. Tiffanie Fernandez, APRN-CNP  Active   atorvastatin (Lipitor) 80 mg tablet [id number]  Take 1 tablet (80 mg) by mouth  once daily. ANTHONY Gamino  Active   clopidogrel (Plavix) 75 mg tablet [id number]  Take 1 tablet (75 mg) by mouth once daily. ANTHONY Gamino  Active   fluticasone (Flonase) 50 mcg/actuation nasal spray [id number] No Administer 1-2 sprays into each nostril if needed. Uses at bedtime Historical Provider, MD Unknown Active   gabapentin (Neurontin) 100 mg capsule [id number]  Take 1 capsule (100 mg) by mouth once daily at bedtime. ANTHONY Gamino  Active   HYDROcodone-acetaminophen (Norco) 5-325 mg tablet [id number]  Take 1 tablet by mouth every 6 hours if needed for severe pain (7 - 10).   Patient not taking: Reported on 12/18/2024    Historical Provider, MD  Active   hydrOXYzine HCL (Atarax) 25 mg tablet [id number] No Take 1 tablet (25 mg) by mouth as needed at bedtime for anxiety. Historical Provider, MD Unknown Active   isosorbide mononitrate ER (Imdur) 30 mg 24 hr tablet [id number]  Take 1 tablet (30 mg) by mouth once daily. Do not crush or chew. Juan Cueto, DO  Active   lidocaine (Lidoderm) 5 % patch [id number]  Place 1 patch over 12 hours on the skin once daily. Apply to painful area 12 hours per day, remove for 12 hours. ANTHONY Gamino  Active     Discontinued 12/19/24 1638   metoprolol succinate XL (Toprol-XL) 50 mg 24 hr tablet [id number]  Take 1 tablet (50 mg) by mouth once daily. Juan Cueto,   Active   omeprazole (PriLOSEC) 40 mg DR capsule [id number] No Take 1 capsule (40 mg) by mouth once daily. Do not crush or chew. Historical Provider, MD 11/28/2024 Evening Active   traMADol (Ultram) 50 mg tablet [id number] No Take 1 tablet (50 mg) by mouth if needed for severe pain (7 - 10). Historical Provider, MD Unknown Active                    Allergies   Allergen Reactions    Fire Ant Shortness of breath    Erythromycin Itching     Itching with erythromycin ophthalmic ointment    Tea Tree Oil Hives       Social History     Socioeconomic History    Marital status:       Spouse name: Not on file    Number of children: Not on file    Years of education: Not on file    Highest education level: Not on file   Occupational History    Not on file   Tobacco Use    Smoking status: Former     Current packs/day: 0.00     Average packs/day: 1 pack/day for 15.0 years (15.0 ttl pk-yrs)     Types: Cigarettes     Quit date: 2010     Years since quittin.9     Passive exposure: Past    Smokeless tobacco: Never    Tobacco comments:     Quit    Vaping Use    Vaping status: Never Used   Substance and Sexual Activity    Alcohol use: Never     Comment: 1 cocktail/ mo maybe    Drug use: Never    Sexual activity: Defer   Other Topics Concern    Not on file   Social History Narrative    Not on file     Social Drivers of Health     Financial Resource Strain: Not on file   Food Insecurity: Not on file   Transportation Needs: No Transportation Needs (2024)    OASIS : Transportation     Lack of Transportation (Medical): No     Lack of Transportation (Non-Medical): No     Patient Unable or Declines to Respond: No   Physical Activity: Not on file   Stress: Not on file   Social Connections: Feeling Socially Integrated (2024)    OASIS : Social Isolation     Frequency of experiencing loneliness or isolation: Never   Intimate Partner Violence: Not on file   Housing Stability: Not on file       Past Surgical History:   Procedure Laterality Date    CARDIAC CATHETERIZATION N/A 10/14/2024    Procedure: Left Heart Cath;  Surgeon: Juan Cueto DO;  Location: [facility] Cardiac Cath Lab;  Service: Cardiovascular;  Laterality: N/A;  no pre auth required    CARDIAC SURGERY  2024    CATARACT EXTRACTION      CATARACT EXTRACTION W/  INTRAOCULAR LENS IMPLANT Bilateral     OD 2013 +15.5D, OS 2013 +15.5D    CHOLECYSTECTOMY      COLONOSCOPY      ESOPHAGOGASTRODUODENOSCOPY      OTHER SURGICAL HISTORY  2013    Treatment Of The Left Ankle    MD OSTEOTOMY MANDIBLE  SEGMENTAL  1969    SHOULDER SURGERY            Review of Systems   GENERAL: Negative  GI: Negative  MUSCULOSKELETAL: See HPI  SKIN: Negative  NEURO:  Negative     Physical Exam:  side: left upper extremity:  Skin healthy to gross inspection, no breakdown  Significant swelling / ecchymosis noted to the distal radius.  Tender to ovation over the distal radius dorsally.  There is a dinner fork deformity present.  Moderate tenderness to the ulnar fovea.  Intact flexion and extension of 1st IP joint and finger abduction  Sensation intact to light touch medial / ulnar and radial nerve distribution   Good cap refill     Imaging  XR of the left wrist was taken and reviewed in office today and compared to previous x-rays dated 11/29/2024.  Approximately 35 degrees of dorsal angulation from neutral.  There is significant shortening.  Primarily extra-articular distal radius fracture     Assessment   Patient with an acute side: left distal radius fracture      Plan:  The patient has a significantly angulated and displaced distal radius fracture.  I had an extensive conversation with the patient and her  about treatment options.  I did discuss that we may try to treat this nonoperatively and this will lead to a malunion.  This malunion however may not be clinically significant or functionally limiting.  I did discuss that patients over 65 can usually tolerate a malunion.  She is a very active 74-year-old who enjoys traveling and continue to be active.  Overall her clinical health is not great as she had a coronary bypass 6 weeks ago.  We discussed surgery on her wrist is worth the associated risks of surgery.  She and her  believe it is worth the associated risk.  She had already been planned to have surgery on Monday, 12/23/2024 by another orthopedic surgeon.  However her cardiothoracic surgeon has given her clearance but does not want this to be performed at a surgery center and would rather have this done in the  hospital setting.  She was subsequently referred to me by her cardiothoracic surgeon to perform the surgery in an hospital setting.  I did discuss with her that we may try to treat this nonoperatively and we could do a corrective malunion surgery if this becomes a symptomatic problem.  She does not want to pursue nonoperative treatment with the possibility of a corrective surgery in the future.  She would like to get this problem treated now as she is continue to have symptomatic pain, continued displacement, and she was already planning on having a surgery which her cardiothoracic surgeon has cleared her for.  She understands the risks of the associated procedure and would continue to like to proceed.  Discussed surgical intervention including pre-op eval, anesthesia, surgical plan including thre risks and benefits.  Discussed anticipated post-operative course and return to activities.    For Surgical Planning:  Diagnosis: Left distal radius fracture  Procedure: Open reduction internal fixation left distal radius fracture  CPT: 98072  Anesthesia: Regional block and general  Duration: 2 hours  Special Equipment Needed: Skeletal dynamics distal radius-Geminus, mini c arm  Medical Notes / PM / DM / PAT needed?:  None  Location: [facility], 12/26/2024  Initial Post Operative Visit: 2 weeks       Follow-up 2 weeks

## 2024-12-20 NOTE — H&P (VIEW-ONLY)
History of Present Illness   Patient presents today for evaluation of side: left upper extremity pain.    The patient sustained an acute injury on  11/29/2024 .  The patient fell on an outstretched hand.  She had immediate pain into her left wrist at that time.  She went to the emergency department at [facility].  She was found to have a displaced distal radius fracture.  She subsequently followed up outpatient with an orthopedic surgeon with [facility].  An attempt at nonoperative treatment was made and she had a closed reduction and splinting performed in the office.  She noted significant pain.  She recently followed up with her orthopedic surgeon which demonstrated further displaced distal radius fracture.  She was scheduled to undergo an open reduction internal fixation of her left wrist on 12/23/2024 for continued displacement and failure of nonoperative treatment.  She notes continued significant pain.  The patient denies any loss of consciousness or additional significant injuries.      She was planning on having surgery on 12/23/2024 at a surgery center with an orthopedic surgeon.  She was attempting to get cardiology clearance from her cardiologist who has cleared her for surgery but would like it done at either [facility] or [facility].  Her cardiothoracic surgeon does not think that the surgery should be done at a surgery center.  Dr. Judi Arias has reached out to several of the orthopedic hand surgeons at [facility] and has requested her care to be treated at [facility] or [facility].  Her clinical history is significant for a recent robotic cardiac bypass 6 weeks ago.  Her cardiothoracic surgeon has given her clearance to proceed with surgery.  The patient and her  would like to proceed with surgery.    She denied any previous chest pain prior to this recent event.  She had been traveling at the [facility] when she got acute chest pain.     Past Medical History:    Diagnosis Date    Adhesive capsulitis of right shoulder 10/21/2013    Adhesive capsulitis of right shoulder    Angina pectoris     Anxiety     Aortic aneurysm without rupture, unspecified portion of aorta (CMS-HCC)     ASHD (arteriosclerotic heart disease)     Central retinal vein occlusion, right eye (CMS-HCC)     Chest pain     Cholelithiasis     Dermatochalasis of both upper eyelids     Dry eye syndrome of bilateral lacrimal glands     Elevated blood-pressure reading, without diagnosis of hypertension     Elevated blood pressure reading without diagnosis of hypertension    GERD (gastroesophageal reflux disease)     Hyperlipidemia     Hypertension     Inconclusive mammogram 01/23/2017    Breast density    Lattice degeneration of retina, bilateral     Other specified disorders of eustachian tube, bilateral 05/08/2017    Dysfunction of Eustachian tube, bilateral    Otitis media, unspecified, right ear 12/18/2015    Acute right otitis media    Personal history of other diseases of the respiratory system 10/18/2016    History of acute bronchitis with bronchospasm    Primary osteoarthritis, unspecified hand 02/09/2015    Osteoarthritis, hand    Sacroiliitis, not elsewhere classified (CMS-HCC) 04/26/2016    Sacroiliitis    Sciatica, right side 04/26/2016    Sciatica, right    Snoring     Type 2 diabetes mellitus     A1C 6.1 on 7/23/24    Unspecified optic atrophy     Vitamin D deficiency        Medication Documentation Review Audit       Reviewed by Brandee Valerio LPN (Licensed Nurse) on 12/20/24 at 1328      Medication Order Taking? Sig Documenting Provider Last Dose Status   amLODIPine-benazepriL (Lotrel) 5-10 mg capsule [id number] No Take 1 capsule by mouth once daily. Mary Lou Valerio, MANUEL-CNP 11/28/2024 Evening Active   aspirin 81 mg chewable tablet [id number]  Chew 1 tablet (81 mg) once daily. Tiffanie Fernandez, APRN-CNP  Active   atorvastatin (Lipitor) 80 mg tablet [id number]  Take 1 tablet (80 mg) by mouth  once daily. ANTHONY Gamino  Active   clopidogrel (Plavix) 75 mg tablet [id number]  Take 1 tablet (75 mg) by mouth once daily. ANTHONY Gamino  Active   fluticasone (Flonase) 50 mcg/actuation nasal spray [id number] No Administer 1-2 sprays into each nostril if needed. Uses at bedtime Historical Provider, MD Unknown Active   gabapentin (Neurontin) 100 mg capsule [id number]  Take 1 capsule (100 mg) by mouth once daily at bedtime. ANTHONY Gamino  Active   HYDROcodone-acetaminophen (Norco) 5-325 mg tablet [id number]  Take 1 tablet by mouth every 6 hours if needed for severe pain (7 - 10).   Patient not taking: Reported on 12/18/2024    Historical Provider, MD  Active   hydrOXYzine HCL (Atarax) 25 mg tablet [id number] No Take 1 tablet (25 mg) by mouth as needed at bedtime for anxiety. Historical Provider, MD Unknown Active   isosorbide mononitrate ER (Imdur) 30 mg 24 hr tablet [id number]  Take 1 tablet (30 mg) by mouth once daily. Do not crush or chew. Juan Cueto, DO  Active   lidocaine (Lidoderm) 5 % patch [id number]  Place 1 patch over 12 hours on the skin once daily. Apply to painful area 12 hours per day, remove for 12 hours. ANTHONY Gamino  Active     Discontinued 12/19/24 1638   metoprolol succinate XL (Toprol-XL) 50 mg 24 hr tablet [id number]  Take 1 tablet (50 mg) by mouth once daily. Juan Cueto,   Active   omeprazole (PriLOSEC) 40 mg DR capsule [id number] No Take 1 capsule (40 mg) by mouth once daily. Do not crush or chew. Historical Provider, MD 11/28/2024 Evening Active   traMADol (Ultram) 50 mg tablet [id number] No Take 1 tablet (50 mg) by mouth if needed for severe pain (7 - 10). Historical Provider, MD Unknown Active                    Allergies   Allergen Reactions    Fire Ant Shortness of breath    Erythromycin Itching     Itching with erythromycin ophthalmic ointment    Tea Tree Oil Hives       Social History     Socioeconomic History    Marital status:       Spouse name: Not on file    Number of children: Not on file    Years of education: Not on file    Highest education level: Not on file   Occupational History    Not on file   Tobacco Use    Smoking status: Former     Current packs/day: 0.00     Average packs/day: 1 pack/day for 15.0 years (15.0 ttl pk-yrs)     Types: Cigarettes     Quit date: 2010     Years since quittin.9     Passive exposure: Past    Smokeless tobacco: Never    Tobacco comments:     Quit    Vaping Use    Vaping status: Never Used   Substance and Sexual Activity    Alcohol use: Never     Comment: 1 cocktail/ mo maybe    Drug use: Never    Sexual activity: Defer   Other Topics Concern    Not on file   Social History Narrative    Not on file     Social Drivers of Health     Financial Resource Strain: Not on file   Food Insecurity: Not on file   Transportation Needs: No Transportation Needs (2024)    OASIS : Transportation     Lack of Transportation (Medical): No     Lack of Transportation (Non-Medical): No     Patient Unable or Declines to Respond: No   Physical Activity: Not on file   Stress: Not on file   Social Connections: Feeling Socially Integrated (2024)    OASIS : Social Isolation     Frequency of experiencing loneliness or isolation: Never   Intimate Partner Violence: Not on file   Housing Stability: Not on file       Past Surgical History:   Procedure Laterality Date    CARDIAC CATHETERIZATION N/A 10/14/2024    Procedure: Left Heart Cath;  Surgeon: Juan Cueto DO;  Location: [facility] Cardiac Cath Lab;  Service: Cardiovascular;  Laterality: N/A;  no pre auth required    CARDIAC SURGERY  2024    CATARACT EXTRACTION      CATARACT EXTRACTION W/  INTRAOCULAR LENS IMPLANT Bilateral     OD 2013 +15.5D, OS 2013 +15.5D    CHOLECYSTECTOMY      COLONOSCOPY      ESOPHAGOGASTRODUODENOSCOPY      OTHER SURGICAL HISTORY  2013    Treatment Of The Left Ankle    OR OSTEOTOMY MANDIBLE  SEGMENTAL  1969    SHOULDER SURGERY            Review of Systems   GENERAL: Negative  GI: Negative  MUSCULOSKELETAL: See HPI  SKIN: Negative  NEURO:  Negative     Physical Exam:  side: left upper extremity:  Skin healthy to gross inspection, no breakdown  Significant swelling / ecchymosis noted to the distal radius.  Tender to ovation over the distal radius dorsally.  There is a dinner fork deformity present.  Moderate tenderness to the ulnar fovea.  Intact flexion and extension of 1st IP joint and finger abduction  Sensation intact to light touch medial / ulnar and radial nerve distribution   Good cap refill     Imaging  XR of the left wrist was taken and reviewed in office today and compared to previous x-rays dated 11/29/2024.  Approximately 35 degrees of dorsal angulation from neutral.  There is significant shortening.  Primarily extra-articular distal radius fracture     Assessment   Patient with an acute side: left distal radius fracture      Plan:  The patient has a significantly angulated and displaced distal radius fracture.  I had an extensive conversation with the patient and her  about treatment options.  I did discuss that we may try to treat this nonoperatively and this will lead to a malunion.  This malunion however may not be clinically significant or functionally limiting.  I did discuss that patients over 65 can usually tolerate a malunion.  She is a very active 74-year-old who enjoys traveling and continue to be active.  Overall her clinical health is not great as she had a coronary bypass 6 weeks ago.  We discussed surgery on her wrist is worth the associated risks of surgery.  She and her  believe it is worth the associated risk.  She had already been planned to have surgery on Monday, 12/23/2024 by another orthopedic surgeon.  However her cardiothoracic surgeon has given her clearance but does not want this to be performed at a surgery center and would rather have this done in the  hospital setting.  She was subsequently referred to me by her cardiothoracic surgeon to perform the surgery in an hospital setting.  I did discuss with her that we may try to treat this nonoperatively and we could do a corrective malunion surgery if this becomes a symptomatic problem.  She does not want to pursue nonoperative treatment with the possibility of a corrective surgery in the future.  She would like to get this problem treated now as she is continue to have symptomatic pain, continued displacement, and she was already planning on having a surgery which her cardiothoracic surgeon has cleared her for.  She understands the risks of the associated procedure and would continue to like to proceed.  Discussed surgical intervention including pre-op eval, anesthesia, surgical plan including thre risks and benefits.  Discussed anticipated post-operative course and return to activities.    For Surgical Planning:  Diagnosis: Left distal radius fracture  Procedure: Open reduction internal fixation left distal radius fracture  CPT: 49484  Anesthesia: Regional block and general  Duration: 2 hours  Special Equipment Needed: Skeletal dynamics distal radius-Geminus, mini c arm  Medical Notes / PM / DM / PAT needed?:  None  Location: [facility], 12/26/2024  Initial Post Operative Visit: 2 weeks       Follow-up 2 weeks

## 2024-12-23 ENCOUNTER — APPOINTMENT (OUTPATIENT)
Dept: OPHTHALMOLOGY | Facility: CLINIC | Age: 74
End: 2024-12-23
Payer: MEDICARE

## 2024-12-23 ENCOUNTER — PRE-ADMISSION TESTING (OUTPATIENT)
Dept: PREADMISSION TESTING | Facility: HOSPITAL | Age: 74
End: 2024-12-23
Payer: MEDICARE

## 2024-12-23 ENCOUNTER — TELEPHONE (OUTPATIENT)
Dept: CARDIOLOGY | Facility: CLINIC | Age: 74
End: 2024-12-23

## 2024-12-23 VITALS
TEMPERATURE: 97.3 F | HEIGHT: 69 IN | WEIGHT: 227 LBS | SYSTOLIC BLOOD PRESSURE: 143 MMHG | DIASTOLIC BLOOD PRESSURE: 77 MMHG | OXYGEN SATURATION: 97 % | HEART RATE: 83 BPM | RESPIRATION RATE: 16 BRPM | BODY MASS INDEX: 33.62 KG/M2

## 2024-12-23 DIAGNOSIS — Z01.818 PRE-OP TESTING: Primary | ICD-10-CM

## 2024-12-23 DIAGNOSIS — M25.532 LEFT WRIST PAIN: ICD-10-CM

## 2024-12-23 PROCEDURE — 99204 OFFICE O/P NEW MOD 45 MIN: CPT | Performed by: PHYSICIAN ASSISTANT

## 2024-12-23 PROCEDURE — 87081 CULTURE SCREEN ONLY: CPT | Mod: TRILAB

## 2024-12-23 ASSESSMENT — DUKE ACTIVITY SCORE INDEX (DASI)
CAN YOU DO MODERATE WORK AROUND THE HOUSE LIKE VACUUMING, SWEEPING FLOORS OR CARRYING GROCERIES: YES
CAN YOU PARTICIPATE IN MODERATE RECREATIONAL ACTIVITIES LIKE GOLF, BOWLING, DANCING, DOUBLES TENNIS OR THROWING A BASEBALL OR FOOTBALL: YES
CAN YOU PARTICIPATE IN STRENOUS SPORTS LIKE SWIMMING, SINGLES TENNIS, FOOTBALL, BASKETBALL, OR SKIING: YES
CAN YOU RUN A SHORT DISTANCE: YES
CAN YOU DO LIGHT WORK AROUND THE HOUSE LIKE DUSTING OR WASHING DISHES: YES
CAN YOU CLIMB A FLIGHT OF STAIRS OR WALK UP A HILL: YES
CAN YOU WALK A BLOCK OR TWO ON LEVEL GROUND: YES
CAN YOU TAKE CARE OF YOURSELF (EAT, DRESS, BATHE, OR USE TOILET): YES
CAN YOU DO YARD WORK LIKE RAKING LEAVES, WEEDING OR PUSHING A MOWER: YES
TOTAL_SCORE: 52.95
CAN YOU DO HEAVY WORK AROUND THE HOUSE LIKE SCRUBBING FLOORS OR LIFTING AND MOVING HEAVY FURNITURE: YES
DASI METS SCORE: 9.2
CAN YOU HAVE SEXUAL RELATIONS: NO
CAN YOU WALK INDOORS, SUCH AS AROUND YOUR HOUSE: YES

## 2024-12-23 ASSESSMENT — ENCOUNTER SYMPTOMS
NEUROLOGICAL NEGATIVE: 1
RESPIRATORY NEGATIVE: 1
GASTROINTESTINAL NEGATIVE: 1
EYES NEGATIVE: 1
CARDIOVASCULAR NEGATIVE: 1
CONSTITUTIONAL NEGATIVE: 1

## 2024-12-23 NOTE — CPM/PAT H&P
"CPM/PAT Evaluation       Name: Uma Middleton (Uma Middleton)  /Age: 1950/74 y.o.     In-Person       Chief Complaint: \"wrist pain\"    HPI  The patient is a 74 year old female.  On 2024 she fell backwards and landed on her left arm.  She developed immediate left wrist pain and swelling.  After several hours she presented to [facility] ED and x-rays showed an acute comminuted transverse fracture through the distal radial metaphysis with question intra-articular extension. Mildly displaced fracture of the ulnar styloid. Significant soft tissue swelling noted.  She was seen by an orthopedic and attempted closed reduction in the office was unsuccessful.  A cast was placed at that time.  She was then seen by Dr. Roberts and surgical intervention is recommended at this time.    Past Medical History:   Diagnosis Date    Adhesive capsulitis of right shoulder 10/21/2013    Adhesive capsulitis of right shoulder    Angina pectoris     Anxiety     Aortic aneurysm without rupture, unspecified portion of aorta (CMS-[facility])     ASHD (arteriosclerotic heart disease)     Aleman esophagus     Central retinal vein occlusion, right eye (CMS-[facility])     Chest pain     Cholelithiasis     Dermatochalasis of both upper eyelids     Dry eye syndrome of bilateral lacrimal glands     Elevated blood-pressure reading, without diagnosis of hypertension     Elevated blood pressure reading without diagnosis of hypertension    GERD (gastroesophageal reflux disease)     Hyperlipidemia     Hypertension     Inconclusive mammogram 2017    Breast density    Lattice degeneration of retina, bilateral     Other specified disorders of eustachian tube, bilateral 2017    Dysfunction of Eustachian tube, bilateral    Otitis media, unspecified, right ear 2015    Acute right otitis media    Personal history of other diseases of the respiratory system 10/18/2016    History of acute bronchitis with bronchospasm    PONV (postoperative " nausea and vomiting)     Primary osteoarthritis, unspecified hand 2015    Osteoarthritis, hand    Sacroiliitis, not elsewhere classified (CMS-HCC) 2016    Sacroiliitis    Sciatica, right side 2016    Sciatica, right    Snoring     Unspecified optic atrophy     Vitamin D deficiency        Past Surgical History:   Procedure Laterality Date    CARDIAC CATHETERIZATION N/A 10/14/2024    Procedure: Left Heart Cath;  Surgeon: Juan Cueto DO;  Location: [facility] Cardiac Cath Lab;  Service: Cardiovascular;  Laterality: N/A;  no pre auth required    CARDIAC SURGERY  2024    CATARACT EXTRACTION      CATARACT EXTRACTION W/  INTRAOCULAR LENS IMPLANT Bilateral     OD 2013 +15.5D, OS 2013 +15.5D    CHOLECYSTECTOMY      COLONOSCOPY      ESOPHAGOGASTRODUODENOSCOPY      OTHER SURGICAL HISTORY  2013    Treatment Of The Left Ankle    NM OSTEOTOMY MANDIBLE SEGMENTAL  1969    SHOULDER SURGERY       Family History   Problem Relation Name Age of Onset    Hypertension Mother Marilia     Heart disease Father Rd     Hypertension Father Rd      Social History     Tobacco Use    Smoking status: Former     Current packs/day: 0.00     Average packs/day: 1 pack/day for 28.0 years (28.0 ttl pk-yrs)     Types: Cigarettes     Start date:      Quit date: 2010     Years since quittin.9     Passive exposure: Past    Smokeless tobacco: Never    Tobacco comments:     Quit    Substance Use Topics    Alcohol use: Never     Comment: 1 cocktail/ mo maybe     Social History     Substance and Sexual Activity   Drug Use Never     Allergies   Allergen Reactions    Fire Ant Shortness of breath    Erythromycin Itching     Itching with erythromycin ophthalmic ointment    Tea Tree Oil Hives       Current Outpatient Medications   Medication Sig Dispense Refill    HYDROcodone-acetaminophen (Norco) 5-325 mg tablet Take 1 tablet by mouth every 6 hours if needed for severe pain (7 - 10).       "amLODIPine-benazepriL (Lotrel) 5-10 mg capsule Take 1 capsule by mouth once daily. 90 capsule 1    aspirin 81 mg chewable tablet Chew 1 tablet (81 mg) once daily. 90 tablet 3    atorvastatin (Lipitor) 80 mg tablet Take 1 tablet (80 mg) by mouth once daily. 90 tablet 1    clopidogrel (Plavix) 75 mg tablet Take 1 tablet (75 mg) by mouth once daily. 90 tablet 1    fluticasone (Flonase) 50 mcg/actuation nasal spray Administer 1-2 sprays into each nostril if needed. Uses at bedtime      gabapentin (Neurontin) 100 mg capsule Take 1 capsule (100 mg) by mouth once daily at bedtime. (Patient taking differently: Take 2 capsules (200 mg) by mouth once daily at bedtime.) 30 capsule 1    hydrOXYzine HCL (Atarax) 25 mg tablet Take 1 tablet (25 mg) by mouth as needed at bedtime for anxiety.      isosorbide mononitrate ER (Imdur) 30 mg 24 hr tablet Take 1 tablet (30 mg) by mouth once daily. Do not crush or chew. 90 tablet 3    lidocaine (Lidoderm) 5 % patch Place 1 patch over 12 hours on the skin once daily. Apply to painful area 12 hours per day, remove for 12 hours. 14 patch 1    metoprolol succinate XL (Toprol-XL) 50 mg 24 hr tablet Take 1 tablet (50 mg) by mouth once daily. 90 tablet 3    omeprazole (PriLOSEC) 40 mg DR capsule Take 1 capsule (40 mg) by mouth once daily. Do not crush or chew.      traMADol (Ultram) 50 mg tablet Take 1 tablet (50 mg) by mouth if needed for severe pain (7 - 10).       No current facility-administered medications for this visit.     Review of Systems   Constitutional: Negative.    HENT: Negative.     Eyes: Negative.    Respiratory: Negative.     Cardiovascular: Negative.    Gastrointestinal: Negative.    Genitourinary: Negative.    Musculoskeletal:         See HPI   Neurological: Negative.      /77   Pulse 83   Temp 36.3 °C (97.3 °F) (Temporal)   Resp 16   Ht 1.753 m (5' 9\")   Wt 103 kg (227 lb)   SpO2 97%   BMI 33.52 kg/m²     Physical Exam  Vitals reviewed.   Constitutional:       " Comments: Overweight     HENT:      Head: Normocephalic and atraumatic.      Mouth/Throat:      Mouth: Mucous membranes are moist.      Pharynx: Oropharynx is clear.   Eyes:      Extraocular Movements: Extraocular movements intact.      Pupils: Pupils are equal, round, and reactive to light.   Cardiovascular:      Rate and Rhythm: Normal rate and regular rhythm.      Heart sounds: Normal heart sounds.   Pulmonary:      Effort: Pulmonary effort is normal.      Breath sounds: Normal breath sounds.   Abdominal:      General: Bowel sounds are normal.      Palpations: Abdomen is soft.   Musculoskeletal:      Comments: Left forearm/wrist cast in place.   Skin:     General: Skin is warm and dry.   Neurological:      General: No focal deficit present.      Mental Status: She is alert and oriented to person, place, and time.   Psychiatric:         Mood and Affect: Mood normal.         Behavior: Behavior normal.        PAT AIRWAY:   Airway:     Mallampati::  III    TM distance::  >3 FB    Neck ROM::  Full   Teeth intact    ASA:  III  DASI SCORE:  52.95  METS SCORE:  9.2  CHAD2 SCORE:  2.8%  REVISED CARDIAC RISK INDEX:  0.4%  STOP BANG SCORE:  3  CAPRINI DVT SCORE:  7  JANET SCORE:  0.72%  ARISCAT SCORE:   1.6%    CBC, RENAL FUNCTION PANEL done 11/15/2024  EKG 11/27/2024    Assessment and Plan:     Left wrist pain:  Open reduction internal fixation left distal radius fracture  CAD - S/pa CABG X 1 11/8/2024 - taking Plavix/ASA, Isosorbide - followed by Dr. Juan Cueto  Hypertension - managed with Lotrel and metoprolol    Delphine Ace PA-C

## 2024-12-23 NOTE — TELEPHONE ENCOUNTER
Surgery on wrist on Thursday, pt asking if and when she should stop taking aspirin and plavix?  Please advise

## 2024-12-23 NOTE — PREPROCEDURE INSTRUCTIONS
PAT DISCHARGE INSTRUCTIONS    Please call the Same Day Surgery (SDS) Department of the hospital where your procedure will be performed after 2:00 PM the day before your surgery. If you are scheduled on a Monday, or a Tuesday following a Monday holiday, you will need to call on the last business day prior to your surgery.    [facility]  [address], [address]  [phone]    [facility]  [address] [place] [address] [phone]    [facility]  [address]  [place] [id number]  [phone]    Please let your surgeon know if:      You develop any open sores, shingles, burning or painful urination as these may increase your risk of an infection.   You no longer wish to have the surgery.   Any other personal circumstances change that may lead to the need to cancel or defer this surgery-such as being sick or getting admitted to any hospital within one week of your planned procedure.    Your contact details change, such as a change of address or phone number.    Starting now:     Please DO NOT drink alcohol or smoke for 24 hours before surgery. It is well known that quitting smoking can make a huge difference to your health and recovery from surgery. The longer you abstain from smoking, the better your chances of a healthy recovery. If you need help with quitting, call 1-800-QUIT-NOW to be connected to a trained counselor who will discuss the best methods to help you quit.     Before your surgery:    Please stop all supplements 7 days prior to surgery. Or as directed by your surgeon.   Please stop taking NSAID pain medicine such as Advil and Motrin 7 days before surgery.    If you develop any fever, cough, cold, rashes, cuts, scratches, scrapes, urinary symptoms or infection anywhere on your body (including teeth and gums) prior to surgery, please call your surgeon’s office as soon as  possible. This may require treatment to reduce the chance of cancellation on the day of surgery.    The day before your surgery:   Get a good night’s rest.  Use the special soap for bathing if you have been instructed to use one.    Scheduled surgery times may change and you will be notified if this occurs - please check your personal voicemail for any updates.     On the morning of surgery:   Wear comfortable, loose fitting clothes which open in the front. Please do not wear moisturizers, creams, lotions, makeup or perfume.    Please bring with you to surgery:   Photo ID and insurance card   Current list of medicines and allergies   Pacemaker/ Defibrillator/Heart stent cards   CPAP machine and mask    Slings/ splints/ crutches   A copy of your complete advanced directive/DHPOA.    Please do NOT bring with you to surgery:   All jewelry and valuables should be left at home.   Prosthetic devices such as contact lenses, hearing aids, dentures, eyelash extensions, hairpins and body piercings must be removed prior to going in to the surgical suite.    After outpatient surgery:   A responsible adult MUST accompany you at the time of discharge and stay with you for 24 hours after your surgery. You may NOT drive yourself home after surgery.    Do not drive, operate machinery, make critical decisions or do activities that require co-ordination or balance until after a night’s sleep.   Do not drink alcoholic beverages for 24 hours.   Instructions for resuming your medications will be provided by your surgeon.    CALL YOUR DOCTOR AFTER SURGERY IF YOU HAVE:     Chills and/or a fever of 101° F or higher.    Redness, swelling, pus or drainage from your surgical wound or a bad smell from the wound.    Lightheadedness, fainting or confusion.    Persistent vomiting (throwing up) and are not able to eat or drink for 12 hours.    Three or more loose, watery bowel movements in 24 hours (diarrhea).   Difficulty or pain while urinating(  after non-urological surgery)    Pain and swelling in your legs, especially if it is only on one side.    Difficulty breathing or are breathing faster than normal.    Any new concerning symptoms.        Preoperative Fasting Guidelines    Why must I stop eating and drinking near surgery time?  With sedation, food or liquid in your stomach can enter your lungs causing serious complications  Increases nausea and vomiting    When do I need to stop eating and drinking before my surgery?  Do not eat any food after midnight the night before your surgery/procedure.  You may have up to 13 ounces of clear liquid until TWO hours before your instructed arrival time to the hospital.  This includes water, black tea/coffee, (no milk or cream) apple juice, and electrolyte drinks (Gatorade)  You may chew gum until TWO hours before your surgery/procedure        Patient Information: Pre-Operative Infection Prevention Measures     Why did I have my nose, under my arms, and groin swabbed?  The purpose of the swab is to identify Staphylococcus aureus inside your nose or on your skin.  The swab was sent to the laboratory for culture.  A positive swab/culture for Staphylococcus aureus is called colonization or carriage.      What is Staphylococcus aureus?  Staphylococcus aureus, also known as “staph”, is a germ found on the skin or in the nose of healthy people.  Sometimes Staphylococcus aureus can get into the body and cause an infection.  This can be minor (such as pimples, boils, or other skin problems).  It might also be serious (such as a blood infection, pneumonia, or a surgical site infection).    What is Staphylococcus aureus colonization or carriage?  Colonization or carriage means that a person has the germ but is not sick from it.  These bacteria can be spread on the hands or when breathing or sneezing.    How is Staphylococcus aureus spread?  It is most often spread by close contact with a person or item that carries  it.    What happens if my culture is positive for Staphylococcus aureus?  Your doctor/medical team will use this information to guide any antibiotic treatment which may be necessary.  Regardless of the culture results, we will clean the inside of your nose with a betadine swab just before you have your surgery.      Will I get an infection if I have Staphylococcus aureus in my nose or on my skin?  Anyone can get an infection with Staphylococcus aureus.  However, the best way to reduce your risk of infection is to follow the instructions provided to you for the use of your CHG soap and dental rinse.        Patient Information: Oral/Dental Rinse    What is oral/dental rinse?   It is a mouthwash. It is a way of cleaning the mouth with a germ-killing solution before your surgery.  The solution contains chlorhexidine, commonly known as CHG.   It is used inside the mouth to kill a bacteria known as Staphylococcus aureus.  Let your doctor know if you are allergic to Chlorhexidine.    Why do I need to use CHG oral/dental rinse?  The CHG oral/dental rinse helps to kill a bacteria in your mouth known as Staphylococcus aureus.     This reduces the risk of infection at the surgical site.      Using your CHG oral/dental rinse  STEPS:  Use your CHG oral/dental rinse after you brush your teeth the night before (at bedtime) and the morning of your surgery.  Follow all directions on your prescription label.    Use the cap on the container to measure 15ml   Swish (gargle if you can) the mouthwash in your mouth for at least 30 seconds, (do not swallow) and spit out  After you use your CHG rinse, do not rinse your mouth with water, drink or eat.  Please refer to the prescription label for the appropriate time to resume oral intake      What side effects might I have using the CHG oral/dental rinse?  CHG rinse will stick to plaque on the teeth.  Brush and floss just before use.  Teeth brushing will help avoid staining of plaque during  use.      Patient Information: Home Preoperative Antibacterial Shower      What is a home preoperative antibacterial shower?  This shower is a way of cleaning the skin with a germ-killing solution before surgery.  The solution contains chlorhexidine, commonly known as CHG.  CHG is a skin cleanser with germ-killing ability.  Let your doctor know if you are allergic to chlorhexidine.    Why do I need to take a preoperative antibacterial shower?  Skin is not sterile.  It is best to try to make your skin as free of germs as possible before surgery.  Proper cleansing with a germ-killing soap before surgery can lower the number of germs on your skin.  This helps to reduce the risk of infection at the surgical site.  Following the instructions listed below will help you prepare your skin for surgery.      How do I use the solution?  Steps:  Begin using your CHG soap 5 days before your scheduled surgery on ________________________.    First, wash and rinse your hair using the CHG soap. Keep CHG soap away from ear canals and eyes.  Rinse completely, do not condition.  Hair extensions should be removed.  Wash your face with your normal soap and rinse.    Apply the CHG solution to a clean wet washcloth.  Turn the water off or move away from the water spray to avoid premature rinsing of the CHG soap as you are applying.   Firmly lather your entire body from the neck down.  Do not use on your face.  Pay special attention to the area(s) where your incision(s) will be located unless they are on your face.  Avoid scrubbing your skin too hard.  The important point is to have the CHG soap sit on your skin for 3 minutes.    When the 3 minutes are up, turn on the water and rinse the CHG solution off your body completely.   DO NOT wash with regular soap after you have used the CHG soap solution  Pat yourself dry with a clean, freshly-laundered towel.  DO NOT apply powders, deodorants, or lotions.  Dress in clean, freshly laundered  nightclothes.    Be sure to sleep with clean, freshly laundered sheets.  Be aware that CHG will cause stains on fabrics; if you wash them with bleach after use.  Rinse your washcloth and other linens that have contact with CHG completely.  Use only non-chlorine detergents to launder the items used.   The morning of surgery is the fifth day.  Repeat the above steps and dress in clean comfortable clothing     Whom should I contact if I have any questions regarding the use of CHG soap?  Call the [facility] at [phone] if you have any questions.              Medication List            Accurate as of December 23, 2024  9:37 AM. Always use your most recent med list.                amLODIPine-benazepriL 5-10 mg capsule  Commonly known as: Lotrel  Take 1 capsule by mouth once daily.  Medication Adjustments for Surgery: Take last dose 1 day (24 hours) before surgery  Notes to patient: Last dose before surgery will be DEC 24 EVENING     aspirin 81 mg chewable tablet  Chew 1 tablet (81 mg) once daily.  Notes to patient: Call surgeon and cardiologist regarding when to stop prior to surgery     atorvastatin 80 mg tablet  Commonly known as: Lipitor  Take 1 tablet (80 mg) by mouth once daily.  Medication Adjustments for Surgery: Take/Use as prescribed  Notes to patient: CONTINUE PER USUAL/TAKE NIGHT BEFORE SURGERY     clopidogrel 75 mg tablet  Commonly known as: Plavix  Take 1 tablet (75 mg) by mouth once daily.  Notes to patient: Call surgeon and cardiologist regarding when to stop prior to surgery     fluticasone 50 mcg/actuation nasal spray  Commonly known as: Flonase  Medication Adjustments for Surgery: Take/Use as prescribed     gabapentin 100 mg capsule  Commonly known as: Neurontin  Take 1 capsule (100 mg) by mouth once daily at bedtime.  Medication Adjustments for Surgery: Take/Use as prescribed  Notes to patient: CONTINUE PER USUAL/TAKE NIGHT BEFORE  SURGERY     HYDROcodone-acetaminophen 5-325 mg tablet  Commonly known as: Norco  Medication Adjustments for Surgery: Take/Use as prescribed     hydrOXYzine HCL 25 mg tablet  Commonly known as: Atarax  Medication Adjustments for Surgery: Do Not take on the morning of surgery  Notes to patient: May take night before surgery per usual     isosorbide mononitrate ER 30 mg 24 hr tablet  Commonly known as: Imdur  Take 1 tablet (30 mg) by mouth once daily. Do not crush or chew.  Medication Adjustments for Surgery: Take/Use as prescribed  Notes to patient: CONTINUE PER USUAL/TAKE NIGHT BEFORE SURGERY     lidocaine 5 % patch  Commonly known as: Lidoderm  Place 1 patch over 12 hours on the skin once daily. Apply to painful area 12 hours per day, remove for 12 hours.  Medication Adjustments for Surgery: Take/Use as prescribed     metoprolol succinate XL 50 mg 24 hr tablet  Commonly known as: Toprol-XL  Take 1 tablet (50 mg) by mouth once daily.  Medication Adjustments for Surgery: Take/Use as prescribed  Notes to patient: CONTINUE PER USUAL/TAKE NIGHT BEFORE SURGERY     omeprazole 40 mg DR capsule  Commonly known as: PriLOSEC  Medication Adjustments for Surgery: Take/Use as prescribed  Notes to patient: CONTINUE PER USUAL/TAKE NIGHT BEFORE SURGERY     traMADol 50 mg tablet  Commonly known as: Ultram  Medication Adjustments for Surgery: Take/Use as prescribed

## 2024-12-24 ENCOUNTER — APPOINTMENT (OUTPATIENT)
Dept: CARDIAC REHAB | Facility: CLINIC | Age: 74
End: 2024-12-24
Payer: MEDICARE

## 2024-12-24 NOTE — TELEPHONE ENCOUNTER
Notified pt she can stop the plavix 5 days prior and continue the aspirin. Ask surgeon when to resume plavix. She voiced understanding

## 2024-12-25 LAB — STAPHYLOCOCCUS SPEC CULT: ABNORMAL

## 2024-12-26 ENCOUNTER — APPOINTMENT (OUTPATIENT)
Dept: CARDIAC REHAB | Facility: CLINIC | Age: 74
End: 2024-12-26
Payer: MEDICARE

## 2024-12-26 ENCOUNTER — APPOINTMENT (OUTPATIENT)
Dept: RADIOLOGY | Facility: HOSPITAL | Age: 74
DRG: 512 | End: 2024-12-26
Payer: MEDICARE

## 2024-12-26 ENCOUNTER — ANESTHESIA EVENT (OUTPATIENT)
Dept: OPERATING ROOM | Facility: HOSPITAL | Age: 74
End: 2024-12-26
Payer: MEDICARE

## 2024-12-26 ENCOUNTER — HOSPITAL ENCOUNTER (INPATIENT)
Facility: HOSPITAL | Age: 74
LOS: 1 days | Discharge: HOME | DRG: 512 | End: 2024-12-27
Attending: STUDENT IN AN ORGANIZED HEALTH CARE EDUCATION/TRAINING PROGRAM | Admitting: STUDENT IN AN ORGANIZED HEALTH CARE EDUCATION/TRAINING PROGRAM
Payer: MEDICARE

## 2024-12-26 ENCOUNTER — ANESTHESIA (OUTPATIENT)
Dept: OPERATING ROOM | Facility: HOSPITAL | Age: 74
End: 2024-12-26
Payer: MEDICARE

## 2024-12-26 DIAGNOSIS — M25.532 LEFT WRIST PAIN: ICD-10-CM

## 2024-12-26 DIAGNOSIS — S52.532A CLOSED COLLES' FRACTURE OF LEFT RADIUS, INITIAL ENCOUNTER: Primary | ICD-10-CM

## 2024-12-26 PROCEDURE — 99100 ANES PT EXTEME AGE<1 YR&>70: CPT | Performed by: ANESTHESIOLOGY

## 2024-12-26 PROCEDURE — A4649 SURGICAL SUPPLIES: HCPCS | Performed by: STUDENT IN AN ORGANIZED HEALTH CARE EDUCATION/TRAINING PROGRAM

## 2024-12-26 PROCEDURE — 25608 OPTX DST RD XART FX/EPI SEP2: CPT | Performed by: STUDENT IN AN ORGANIZED HEALTH CARE EDUCATION/TRAINING PROGRAM

## 2024-12-26 PROCEDURE — 0PSJ04Z REPOSITION LEFT RADIUS WITH INTERNAL FIXATION DEVICE, OPEN APPROACH: ICD-10-PCS | Performed by: STUDENT IN AN ORGANIZED HEALTH CARE EDUCATION/TRAINING PROGRAM

## 2024-12-26 PROCEDURE — 3600000004 HC OR TIME - INITIAL BASE CHARGE - PROCEDURE LEVEL FOUR: Performed by: STUDENT IN AN ORGANIZED HEALTH CARE EDUCATION/TRAINING PROGRAM

## 2024-12-26 PROCEDURE — 2500000004 HC RX 250 GENERAL PHARMACY W/ HCPCS (ALT 636 FOR OP/ED): Performed by: STUDENT IN AN ORGANIZED HEALTH CARE EDUCATION/TRAINING PROGRAM

## 2024-12-26 PROCEDURE — 2500000004 HC RX 250 GENERAL PHARMACY W/ HCPCS (ALT 636 FOR OP/ED): Performed by: ANESTHESIOLOGY

## 2024-12-26 PROCEDURE — A25607 PR OPEN RX DISTAL RADIUS FX, EXTRA-ARTICULAR: Performed by: ANESTHESIOLOGY

## 2024-12-26 PROCEDURE — 1100000001 HC PRIVATE ROOM DAILY

## 2024-12-26 PROCEDURE — 2500000001 HC RX 250 WO HCPCS SELF ADMINISTERED DRUGS (ALT 637 FOR MEDICARE OP): Performed by: ANESTHESIOLOGY

## 2024-12-26 PROCEDURE — 2720000007 HC OR 272 NO HCPCS: Performed by: STUDENT IN AN ORGANIZED HEALTH CARE EDUCATION/TRAINING PROGRAM

## 2024-12-26 PROCEDURE — 9420000001 HC RT PATIENT EDUCATION 5 MIN

## 2024-12-26 PROCEDURE — C1713 ANCHOR/SCREW BN/BN,TIS/BN: HCPCS | Performed by: STUDENT IN AN ORGANIZED HEALTH CARE EDUCATION/TRAINING PROGRAM

## 2024-12-26 PROCEDURE — 2500000004 HC RX 250 GENERAL PHARMACY W/ HCPCS (ALT 636 FOR OP/ED): Performed by: NURSE ANESTHETIST, CERTIFIED REGISTERED

## 2024-12-26 PROCEDURE — 76000 FLUOROSCOPY <1 HR PHYS/QHP: CPT | Mod: LT

## 2024-12-26 PROCEDURE — 3600000009 HC OR TIME - EACH INCREMENTAL 1 MINUTE - PROCEDURE LEVEL FOUR: Performed by: STUDENT IN AN ORGANIZED HEALTH CARE EDUCATION/TRAINING PROGRAM

## 2024-12-26 PROCEDURE — 7100000001 HC RECOVERY ROOM TIME - INITIAL BASE CHARGE: Performed by: STUDENT IN AN ORGANIZED HEALTH CARE EDUCATION/TRAINING PROGRAM

## 2024-12-26 PROCEDURE — A25607 PR OPEN RX DISTAL RADIUS FX, EXTRA-ARTICULAR: Performed by: NURSE ANESTHETIST, CERTIFIED REGISTERED

## 2024-12-26 PROCEDURE — 2500000005 HC RX 250 GENERAL PHARMACY W/O HCPCS: Performed by: NURSE ANESTHETIST, CERTIFIED REGISTERED

## 2024-12-26 PROCEDURE — 2500000005 HC RX 250 GENERAL PHARMACY W/O HCPCS: Performed by: ANESTHESIOLOGY

## 2024-12-26 PROCEDURE — 3700000001 HC GENERAL ANESTHESIA TIME - INITIAL BASE CHARGE: Performed by: STUDENT IN AN ORGANIZED HEALTH CARE EDUCATION/TRAINING PROGRAM

## 2024-12-26 PROCEDURE — 7100000002 HC RECOVERY ROOM TIME - EACH INCREMENTAL 1 MINUTE: Performed by: STUDENT IN AN ORGANIZED HEALTH CARE EDUCATION/TRAINING PROGRAM

## 2024-12-26 PROCEDURE — 2500000005 HC RX 250 GENERAL PHARMACY W/O HCPCS: Performed by: STUDENT IN AN ORGANIZED HEALTH CARE EDUCATION/TRAINING PROGRAM

## 2024-12-26 PROCEDURE — 2780000003 HC OR 278 NO HCPCS: Performed by: STUDENT IN AN ORGANIZED HEALTH CARE EDUCATION/TRAINING PROGRAM

## 2024-12-26 PROCEDURE — 3700000002 HC GENERAL ANESTHESIA TIME - EACH INCREMENTAL 1 MINUTE: Performed by: STUDENT IN AN ORGANIZED HEALTH CARE EDUCATION/TRAINING PROGRAM

## 2024-12-26 PROCEDURE — 2500000001 HC RX 250 WO HCPCS SELF ADMINISTERED DRUGS (ALT 637 FOR MEDICARE OP): Performed by: STUDENT IN AN ORGANIZED HEALTH CARE EDUCATION/TRAINING PROGRAM

## 2024-12-26 PROCEDURE — 2500000002 HC RX 250 W HCPCS SELF ADMINISTERED DRUGS (ALT 637 FOR MEDICARE OP, ALT 636 FOR OP/ED): Performed by: ANESTHESIOLOGY

## 2024-12-26 DEVICE — IMPLANTABLE DEVICE: Type: IMPLANTABLE DEVICE | Site: WRIST | Status: FUNCTIONAL

## 2024-12-26 DEVICE — SCREW, CORTICAL LOCKING, 3.5 X 12MM, TI: Type: IMPLANTABLE DEVICE | Site: WRIST | Status: FUNCTIONAL

## 2024-12-26 DEVICE — PLATE, GEMINUS, FOLAR DISTAL, STANDARD, 3 HOLE, LEFT: Type: IMPLANTABLE DEVICE | Site: WRIST | Status: FUNCTIONAL

## 2024-12-26 DEVICE — SCREW, NON LOCKING, CORTICAL, 3.5 X 12MM, TI: Type: IMPLANTABLE DEVICE | Site: WRIST | Status: FUNCTIONAL

## 2024-12-26 RX ORDER — CEFAZOLIN 1 G/1
INJECTION, POWDER, FOR SOLUTION INTRAVENOUS AS NEEDED
Status: DISCONTINUED | OUTPATIENT
Start: 2024-12-26 | End: 2024-12-26

## 2024-12-26 RX ORDER — SODIUM CHLORIDE, SODIUM LACTATE, POTASSIUM CHLORIDE, CALCIUM CHLORIDE 600; 310; 30; 20 MG/100ML; MG/100ML; MG/100ML; MG/100ML
100 INJECTION, SOLUTION INTRAVENOUS CONTINUOUS
Status: DISCONTINUED | OUTPATIENT
Start: 2024-12-26 | End: 2024-12-26 | Stop reason: HOSPADM

## 2024-12-26 RX ORDER — LIDOCAINE HYDROCHLORIDE 10 MG/ML
0.1 INJECTION, SOLUTION EPIDURAL; INFILTRATION; INTRACAUDAL; PERINEURAL ONCE
Status: DISCONTINUED | OUTPATIENT
Start: 2024-12-26 | End: 2024-12-26 | Stop reason: HOSPADM

## 2024-12-26 RX ORDER — HYDRALAZINE HYDROCHLORIDE 20 MG/ML
5 INJECTION INTRAMUSCULAR; INTRAVENOUS EVERY 30 MIN PRN
Status: DISCONTINUED | OUTPATIENT
Start: 2024-12-26 | End: 2024-12-26 | Stop reason: HOSPADM

## 2024-12-26 RX ORDER — ONDANSETRON 4 MG/1
4 TABLET, FILM COATED ORAL EVERY 8 HOURS PRN
Status: DISCONTINUED | OUTPATIENT
Start: 2024-12-26 | End: 2024-12-27 | Stop reason: HOSPADM

## 2024-12-26 RX ORDER — DOCUSATE SODIUM 100 MG/1
100 CAPSULE, LIQUID FILLED ORAL 2 TIMES DAILY
Status: DISCONTINUED | OUTPATIENT
Start: 2024-12-26 | End: 2024-12-27 | Stop reason: HOSPADM

## 2024-12-26 RX ORDER — KETOROLAC TROMETHAMINE 30 MG/ML
INJECTION, SOLUTION INTRAMUSCULAR; INTRAVENOUS AS NEEDED
Status: DISCONTINUED | OUTPATIENT
Start: 2024-12-26 | End: 2024-12-26

## 2024-12-26 RX ORDER — LABETALOL HYDROCHLORIDE 5 MG/ML
INJECTION, SOLUTION INTRAVENOUS AS NEEDED
Status: DISCONTINUED | OUTPATIENT
Start: 2024-12-26 | End: 2024-12-26

## 2024-12-26 RX ORDER — FLUTICASONE PROPIONATE 50 MCG
1 SPRAY, SUSPENSION (ML) NASAL AS NEEDED
Status: DISCONTINUED | OUTPATIENT
Start: 2024-12-26 | End: 2024-12-27 | Stop reason: HOSPADM

## 2024-12-26 RX ORDER — AMLODIPINE AND BENAZEPRIL HYDROCHLORIDE 5; 10 MG/1; MG/1
1 CAPSULE ORAL DAILY
Status: DISCONTINUED | OUTPATIENT
Start: 2024-12-26 | End: 2024-12-26 | Stop reason: CLARIF

## 2024-12-26 RX ORDER — BUPIVACAINE HCL/EPINEPHRINE 0.5-1:200K
VIAL (ML) INJECTION AS NEEDED
Status: DISCONTINUED | OUTPATIENT
Start: 2024-12-26 | End: 2024-12-26 | Stop reason: HOSPADM

## 2024-12-26 RX ORDER — DIPHENHYDRAMINE HYDROCHLORIDE 50 MG/ML
25 INJECTION INTRAMUSCULAR; INTRAVENOUS ONCE
Status: COMPLETED | OUTPATIENT
Start: 2024-12-26 | End: 2024-12-26

## 2024-12-26 RX ORDER — LABETALOL HYDROCHLORIDE 5 MG/ML
5 INJECTION, SOLUTION INTRAVENOUS ONCE AS NEEDED
Status: DISCONTINUED | OUTPATIENT
Start: 2024-12-26 | End: 2024-12-26 | Stop reason: HOSPADM

## 2024-12-26 RX ORDER — ONDANSETRON HYDROCHLORIDE 2 MG/ML
4 INJECTION, SOLUTION INTRAVENOUS EVERY 8 HOURS PRN
Status: DISCONTINUED | OUTPATIENT
Start: 2024-12-26 | End: 2024-12-27 | Stop reason: HOSPADM

## 2024-12-26 RX ORDER — PROPOFOL 10 MG/ML
INJECTION, EMULSION INTRAVENOUS AS NEEDED
Status: DISCONTINUED | OUTPATIENT
Start: 2024-12-26 | End: 2024-12-26

## 2024-12-26 RX ORDER — PANTOPRAZOLE SODIUM 40 MG/1
40 TABLET, DELAYED RELEASE ORAL
Status: DISCONTINUED | OUTPATIENT
Start: 2024-12-27 | End: 2024-12-27 | Stop reason: HOSPADM

## 2024-12-26 RX ORDER — OXYCODONE HYDROCHLORIDE 5 MG/1
5 TABLET ORAL EVERY 4 HOURS PRN
Status: DISCONTINUED | OUTPATIENT
Start: 2024-12-26 | End: 2024-12-27 | Stop reason: HOSPADM

## 2024-12-26 RX ORDER — HYDROMORPHONE HYDROCHLORIDE 1 MG/ML
INJECTION, SOLUTION INTRAMUSCULAR; INTRAVENOUS; SUBCUTANEOUS AS NEEDED
Status: DISCONTINUED | OUTPATIENT
Start: 2024-12-26 | End: 2024-12-26

## 2024-12-26 RX ORDER — SCOPOLAMINE 1 MG/3D
1 PATCH, EXTENDED RELEASE TRANSDERMAL
Status: DISCONTINUED | OUTPATIENT
Start: 2024-12-26 | End: 2024-12-26 | Stop reason: HOSPADM

## 2024-12-26 RX ORDER — SODIUM CHLORIDE 0.9 G/100ML
INJECTION, SOLUTION IRRIGATION AS NEEDED
Status: DISCONTINUED | OUTPATIENT
Start: 2024-12-26 | End: 2024-12-26 | Stop reason: HOSPADM

## 2024-12-26 RX ORDER — OXYCODONE HYDROCHLORIDE 5 MG/1
5 TABLET ORAL EVERY 4 HOURS PRN
Status: DISCONTINUED | OUTPATIENT
Start: 2024-12-26 | End: 2024-12-26 | Stop reason: HOSPADM

## 2024-12-26 RX ORDER — ONDANSETRON HYDROCHLORIDE 2 MG/ML
4 INJECTION, SOLUTION INTRAVENOUS ONCE AS NEEDED
Status: COMPLETED | OUTPATIENT
Start: 2024-12-26 | End: 2024-12-26

## 2024-12-26 RX ORDER — ACETAMINOPHEN 500 MG
1000 TABLET ORAL EVERY 6 HOURS PRN
Qty: 224 TABLET | Refills: 0 | Status: SHIPPED | OUTPATIENT
Start: 2024-12-26 | End: 2025-01-23

## 2024-12-26 RX ORDER — SCOPOLAMINE 1 MG/3D
1 PATCH, EXTENDED RELEASE TRANSDERMAL ONCE
Status: DISCONTINUED | OUTPATIENT
Start: 2024-12-29 | End: 2024-12-27 | Stop reason: HOSPADM

## 2024-12-26 RX ORDER — ONDANSETRON HYDROCHLORIDE 2 MG/ML
INJECTION, SOLUTION INTRAVENOUS AS NEEDED
Status: DISCONTINUED | OUTPATIENT
Start: 2024-12-26 | End: 2024-12-26

## 2024-12-26 RX ORDER — DIPHENHYDRAMINE HCL 12.5MG/5ML
25 LIQUID (ML) ORAL EVERY 6 HOURS PRN
Status: DISCONTINUED | OUTPATIENT
Start: 2024-12-26 | End: 2024-12-27 | Stop reason: HOSPADM

## 2024-12-26 RX ORDER — GABAPENTIN 100 MG/1
100 CAPSULE ORAL NIGHTLY
Status: DISCONTINUED | OUTPATIENT
Start: 2024-12-26 | End: 2024-12-27 | Stop reason: HOSPADM

## 2024-12-26 RX ORDER — OXYCODONE HYDROCHLORIDE 5 MG/1
5 TABLET ORAL EVERY 6 HOURS PRN
Qty: 28 TABLET | Refills: 0 | Status: SHIPPED | OUTPATIENT
Start: 2024-12-26 | End: 2025-01-02

## 2024-12-26 RX ORDER — BISACODYL 5 MG
10 TABLET, DELAYED RELEASE (ENTERIC COATED) ORAL DAILY PRN
Status: DISCONTINUED | OUTPATIENT
Start: 2024-12-26 | End: 2024-12-27 | Stop reason: HOSPADM

## 2024-12-26 RX ORDER — OXYCODONE HYDROCHLORIDE 5 MG/1
10 TABLET ORAL EVERY 4 HOURS PRN
Status: DISCONTINUED | OUTPATIENT
Start: 2024-12-26 | End: 2024-12-27 | Stop reason: HOSPADM

## 2024-12-26 RX ORDER — POLYETHYLENE GLYCOL 3350 17 G/17G
17 POWDER, FOR SOLUTION ORAL DAILY
Status: DISCONTINUED | OUTPATIENT
Start: 2024-12-26 | End: 2024-12-27 | Stop reason: HOSPADM

## 2024-12-26 RX ORDER — METOCLOPRAMIDE HYDROCHLORIDE 5 MG/ML
10 INJECTION INTRAMUSCULAR; INTRAVENOUS EVERY 6 HOURS PRN
Status: DISCONTINUED | OUTPATIENT
Start: 2024-12-26 | End: 2024-12-27 | Stop reason: HOSPADM

## 2024-12-26 RX ORDER — WATER
100 LIQUID (ML) MISCELLANEOUS
Status: DISCONTINUED | OUTPATIENT
Start: 2024-12-26 | End: 2024-12-27 | Stop reason: HOSPADM

## 2024-12-26 RX ORDER — NALOXONE HYDROCHLORIDE 0.4 MG/ML
0.2 INJECTION, SOLUTION INTRAMUSCULAR; INTRAVENOUS; SUBCUTANEOUS EVERY 5 MIN PRN
Status: DISCONTINUED | OUTPATIENT
Start: 2024-12-26 | End: 2024-12-27 | Stop reason: HOSPADM

## 2024-12-26 RX ORDER — DIPHENHYDRAMINE HYDROCHLORIDE 50 MG/ML
25 INJECTION INTRAMUSCULAR; INTRAVENOUS EVERY 6 HOURS PRN
Status: DISCONTINUED | OUTPATIENT
Start: 2024-12-26 | End: 2024-12-27 | Stop reason: HOSPADM

## 2024-12-26 RX ORDER — NAPROXEN SODIUM 220 MG/1
81 TABLET, FILM COATED ORAL DAILY
Status: DISCONTINUED | OUTPATIENT
Start: 2024-12-27 | End: 2024-12-27 | Stop reason: HOSPADM

## 2024-12-26 RX ORDER — ATORVASTATIN CALCIUM 80 MG/1
80 TABLET, FILM COATED ORAL NIGHTLY
Status: DISCONTINUED | OUTPATIENT
Start: 2024-12-26 | End: 2024-12-27 | Stop reason: HOSPADM

## 2024-12-26 RX ORDER — HYDROXYZINE HYDROCHLORIDE 25 MG/1
25 TABLET, FILM COATED ORAL NIGHTLY PRN
Status: DISCONTINUED | OUTPATIENT
Start: 2024-12-26 | End: 2024-12-27 | Stop reason: HOSPADM

## 2024-12-26 RX ORDER — NAPROXEN SODIUM 220 MG/1
TABLET, FILM COATED ORAL AS NEEDED
Status: DISCONTINUED | OUTPATIENT
Start: 2024-12-26 | End: 2024-12-26

## 2024-12-26 RX ORDER — CEFAZOLIN SODIUM 2 G/100ML
2 INJECTION, SOLUTION INTRAVENOUS EVERY 8 HOURS
Status: COMPLETED | OUTPATIENT
Start: 2024-12-26 | End: 2024-12-27

## 2024-12-26 RX ORDER — LIDOCAINE 560 MG/1
1 PATCH PERCUTANEOUS; TOPICAL; TRANSDERMAL DAILY
Status: DISCONTINUED | OUTPATIENT
Start: 2024-12-26 | End: 2024-12-27 | Stop reason: HOSPADM

## 2024-12-26 RX ORDER — ISOSORBIDE MONONITRATE 30 MG/1
30 TABLET, EXTENDED RELEASE ORAL DAILY
Status: DISCONTINUED | OUTPATIENT
Start: 2024-12-26 | End: 2024-12-27 | Stop reason: HOSPADM

## 2024-12-26 RX ORDER — ALBUTEROL SULFATE 0.83 MG/ML
2.5 SOLUTION RESPIRATORY (INHALATION) ONCE AS NEEDED
Status: DISCONTINUED | OUTPATIENT
Start: 2024-12-26 | End: 2024-12-26 | Stop reason: HOSPADM

## 2024-12-26 RX ORDER — LIDOCAINE HYDROCHLORIDE 20 MG/ML
INJECTION, SOLUTION EPIDURAL; INFILTRATION; INTRACAUDAL; PERINEURAL AS NEEDED
Status: DISCONTINUED | OUTPATIENT
Start: 2024-12-26 | End: 2024-12-26

## 2024-12-26 RX ORDER — METOPROLOL SUCCINATE 50 MG/1
50 TABLET, EXTENDED RELEASE ORAL DAILY
Status: DISCONTINUED | OUTPATIENT
Start: 2024-12-26 | End: 2024-12-27 | Stop reason: HOSPADM

## 2024-12-26 RX ORDER — ACETAMINOPHEN 325 MG/1
650 TABLET ORAL EVERY 6 HOURS SCHEDULED
Status: DISCONTINUED | OUTPATIENT
Start: 2024-12-26 | End: 2024-12-27 | Stop reason: HOSPADM

## 2024-12-26 RX ORDER — CLOPIDOGREL BISULFATE 75 MG/1
75 TABLET ORAL DAILY
Status: DISCONTINUED | OUTPATIENT
Start: 2024-12-27 | End: 2024-12-27 | Stop reason: HOSPADM

## 2024-12-26 RX ORDER — APREPITANT 40 MG/1
CAPSULE ORAL AS NEEDED
Status: DISCONTINUED | OUTPATIENT
Start: 2024-12-26 | End: 2024-12-26

## 2024-12-26 RX ORDER — BISACODYL 10 MG/1
10 SUPPOSITORY RECTAL DAILY PRN
Status: DISCONTINUED | OUTPATIENT
Start: 2024-12-26 | End: 2024-12-27 | Stop reason: HOSPADM

## 2024-12-26 RX ORDER — DIPHENHYDRAMINE HCL 25 MG
25 CAPSULE ORAL EVERY 6 HOURS PRN
Status: DISCONTINUED | OUTPATIENT
Start: 2024-12-26 | End: 2024-12-27 | Stop reason: HOSPADM

## 2024-12-26 RX ORDER — DOCUSATE SODIUM 100 MG/1
100 CAPSULE, LIQUID FILLED ORAL 2 TIMES DAILY
Qty: 60 CAPSULE | Refills: 0 | Status: SHIPPED | OUTPATIENT
Start: 2024-12-26 | End: 2025-01-25

## 2024-12-26 RX ORDER — MIDAZOLAM HYDROCHLORIDE 1 MG/ML
INJECTION INTRAMUSCULAR; INTRAVENOUS AS NEEDED
Status: DISCONTINUED | OUTPATIENT
Start: 2024-12-26 | End: 2024-12-26

## 2024-12-26 RX ORDER — ONDANSETRON 4 MG/1
4 TABLET, FILM COATED ORAL EVERY 6 HOURS PRN
Qty: 12 TABLET | Refills: 0 | Status: SHIPPED | OUTPATIENT
Start: 2024-12-26 | End: 2024-12-29

## 2024-12-26 RX ORDER — AMLODIPINE BESYLATE 5 MG/1
5 TABLET ORAL DAILY
Status: DISCONTINUED | OUTPATIENT
Start: 2024-12-27 | End: 2024-12-27 | Stop reason: HOSPADM

## 2024-12-26 RX ORDER — LISINOPRIL 10 MG/1
10 TABLET ORAL DAILY
Status: DISCONTINUED | OUTPATIENT
Start: 2024-12-27 | End: 2024-12-27 | Stop reason: HOSPADM

## 2024-12-26 RX ORDER — FENTANYL CITRATE 50 UG/ML
INJECTION, SOLUTION INTRAMUSCULAR; INTRAVENOUS AS NEEDED
Status: DISCONTINUED | OUTPATIENT
Start: 2024-12-26 | End: 2024-12-26

## 2024-12-26 RX ORDER — METOCLOPRAMIDE 10 MG/1
10 TABLET ORAL EVERY 6 HOURS PRN
Status: DISCONTINUED | OUTPATIENT
Start: 2024-12-26 | End: 2024-12-27 | Stop reason: HOSPADM

## 2024-12-26 RX ADMIN — HYDROMORPHONE HYDROCHLORIDE 0.5 MG: 1 INJECTION, SOLUTION INTRAMUSCULAR; INTRAVENOUS; SUBCUTANEOUS at 15:36

## 2024-12-26 RX ADMIN — POLYETHYLENE GLYCOL 3350 17 G: 17 POWDER, FOR SOLUTION ORAL at 21:00

## 2024-12-26 RX ADMIN — DIPHENHYDRAMINE HYDROCHLORIDE 25 MG: 50 INJECTION, SOLUTION INTRAMUSCULAR; INTRAVENOUS at 16:36

## 2024-12-26 RX ADMIN — PROMETHAZINE HYDROCHLORIDE 6.25 MG: 25 INJECTION INTRAMUSCULAR; INTRAVENOUS at 15:55

## 2024-12-26 RX ADMIN — ONDANSETRON 4 MG: 2 INJECTION, SOLUTION INTRAMUSCULAR; INTRAVENOUS at 15:24

## 2024-12-26 RX ADMIN — CEFAZOLIN SODIUM 2 G: 2 INJECTION, SOLUTION INTRAVENOUS at 21:06

## 2024-12-26 RX ADMIN — ACETAMINOPHEN 650 MG: 325 TABLET, FILM COATED ORAL at 21:02

## 2024-12-26 RX ADMIN — DOCUSATE SODIUM 100 MG: 100 CAPSULE, LIQUID FILLED ORAL at 21:00

## 2024-12-26 RX ADMIN — METOPROLOL SUCCINATE 50 MG: 50 TABLET, EXTENDED RELEASE ORAL at 21:00

## 2024-12-26 RX ADMIN — GABAPENTIN 100 MG: 100 CAPSULE ORAL at 21:00

## 2024-12-26 RX ADMIN — ATORVASTATIN CALCIUM 80 MG: 80 TABLET, FILM COATED ORAL at 21:00

## 2024-12-26 RX ADMIN — OXYCODONE HYDROCHLORIDE 5 MG: 5 TABLET ORAL at 17:35

## 2024-12-26 RX ADMIN — Medication 3 L/MIN: at 16:13

## 2024-12-26 RX ADMIN — HYDROMORPHONE HYDROCHLORIDE 0.5 MG: 1 INJECTION, SOLUTION INTRAMUSCULAR; INTRAVENOUS; SUBCUTANEOUS at 15:25

## 2024-12-26 RX ADMIN — SCOPOLAMINE 1 PATCH: 1 SYSTEM TRANSDERMAL at 16:36

## 2024-12-26 RX ADMIN — ISOSORBIDE MONONITRATE 30 MG: 30 TABLET, EXTENDED RELEASE ORAL at 21:00

## 2024-12-26 RX ADMIN — Medication 100 ML: at 21:08

## 2024-12-26 SDOH — ECONOMIC STABILITY: FOOD INSECURITY: WITHIN THE PAST 12 MONTHS, THE FOOD YOU BOUGHT JUST DIDN'T LAST AND YOU DIDN'T HAVE MONEY TO GET MORE.: NEVER TRUE

## 2024-12-26 SDOH — SOCIAL STABILITY: SOCIAL INSECURITY: ARE THERE ANY APPARENT SIGNS OF INJURIES/BEHAVIORS THAT COULD BE RELATED TO ABUSE/NEGLECT?: NO

## 2024-12-26 SDOH — SOCIAL STABILITY: SOCIAL INSECURITY
WITHIN THE LAST YEAR, HAVE YOU BEEN RAPED OR FORCED TO HAVE ANY KIND OF SEXUAL ACTIVITY BY YOUR PARTNER OR EX-PARTNER?: NO

## 2024-12-26 SDOH — ECONOMIC STABILITY: INCOME INSECURITY: IN THE PAST 12 MONTHS HAS THE ELECTRIC, GAS, OIL, OR WATER COMPANY THREATENED TO SHUT OFF SERVICES IN YOUR HOME?: NO

## 2024-12-26 SDOH — SOCIAL STABILITY: SOCIAL INSECURITY: WITHIN THE LAST YEAR, HAVE YOU BEEN HUMILIATED OR EMOTIONALLY ABUSED IN OTHER WAYS BY YOUR PARTNER OR EX-PARTNER?: NO

## 2024-12-26 SDOH — SOCIAL STABILITY: SOCIAL INSECURITY: WITHIN THE LAST YEAR, HAVE YOU BEEN AFRAID OF YOUR PARTNER OR EX-PARTNER?: NO

## 2024-12-26 SDOH — SOCIAL STABILITY: SOCIAL INSECURITY: HAVE YOU HAD THOUGHTS OF HARMING ANYONE ELSE?: YES

## 2024-12-26 SDOH — ECONOMIC STABILITY: FOOD INSECURITY: WITHIN THE PAST 12 MONTHS, YOU WORRIED THAT YOUR FOOD WOULD RUN OUT BEFORE YOU GOT THE MONEY TO BUY MORE.: NEVER TRUE

## 2024-12-26 SDOH — SOCIAL STABILITY: SOCIAL INSECURITY
WITHIN THE LAST YEAR, HAVE YOU BEEN KICKED, HIT, SLAPPED, OR OTHERWISE PHYSICALLY HURT BY YOUR PARTNER OR EX-PARTNER?: NO

## 2024-12-26 SDOH — SOCIAL STABILITY: SOCIAL INSECURITY: DO YOU FEEL UNSAFE GOING BACK TO THE PLACE WHERE YOU ARE LIVING?: NO

## 2024-12-26 SDOH — SOCIAL STABILITY: SOCIAL INSECURITY: HAS ANYONE EVER THREATENED TO HURT YOUR FAMILY OR YOUR PETS?: NO

## 2024-12-26 SDOH — SOCIAL STABILITY: SOCIAL INSECURITY: DOES ANYONE TRY TO KEEP YOU FROM HAVING/CONTACTING OTHER FRIENDS OR DOING THINGS OUTSIDE YOUR HOME?: NO

## 2024-12-26 SDOH — SOCIAL STABILITY: SOCIAL INSECURITY: DO YOU FEEL ANYONE HAS EXPLOITED OR TAKEN ADVANTAGE OF YOU FINANCIALLY OR OF YOUR PERSONAL PROPERTY?: NO

## 2024-12-26 SDOH — SOCIAL STABILITY: SOCIAL INSECURITY: HAVE YOU HAD ANY THOUGHTS OF HARMING ANYONE ELSE?: NO

## 2024-12-26 SDOH — SOCIAL STABILITY: SOCIAL INSECURITY: WERE YOU ABLE TO COMPLETE ALL THE BEHAVIORAL HEALTH SCREENINGS?: YES

## 2024-12-26 SDOH — SOCIAL STABILITY: SOCIAL INSECURITY: ABUSE: ADULT

## 2024-12-26 SDOH — SOCIAL STABILITY: SOCIAL INSECURITY: ARE YOU OR HAVE YOU BEEN THREATENED OR ABUSED PHYSICALLY, EMOTIONALLY, OR SEXUALLY BY ANYONE?: NO

## 2024-12-26 ASSESSMENT — LIFESTYLE VARIABLES
SKIP TO QUESTIONS 9-10: 1
AUDIT-C TOTAL SCORE: 0
AUDIT-C TOTAL SCORE: 0
HOW OFTEN DO YOU HAVE A DRINK CONTAINING ALCOHOL: NEVER
HOW OFTEN DO YOU HAVE 6 OR MORE DRINKS ON ONE OCCASION: NEVER
HOW MANY STANDARD DRINKS CONTAINING ALCOHOL DO YOU HAVE ON A TYPICAL DAY: PATIENT DOES NOT DRINK

## 2024-12-26 ASSESSMENT — PAIN SCALES - GENERAL
PAINLEVEL_OUTOF10: 10 - WORST POSSIBLE PAIN
PAINLEVEL_OUTOF10: 8
PAINLEVEL_OUTOF10: 10 - WORST POSSIBLE PAIN
PAINLEVEL_OUTOF10: 4
PAINLEVEL_OUTOF10: 5 - MODERATE PAIN
PAINLEVEL_OUTOF10: 8
PAINLEVEL_OUTOF10: 7
PAINLEVEL_OUTOF10: 10 - WORST POSSIBLE PAIN
PAINLEVEL_OUTOF10: 10 - WORST POSSIBLE PAIN
PAINLEVEL_OUTOF10: 8
PAINLEVEL_OUTOF10: 8
PAINLEVEL_OUTOF10: 6
PAINLEVEL_OUTOF10: 8
PAIN_LEVEL: 5
PAINLEVEL_OUTOF10: 7
PAINLEVEL_OUTOF10: 8
PAINLEVEL_OUTOF10: 10 - WORST POSSIBLE PAIN

## 2024-12-26 ASSESSMENT — PAIN - FUNCTIONAL ASSESSMENT
PAIN_FUNCTIONAL_ASSESSMENT: 0-10
PAIN_FUNCTIONAL_ASSESSMENT: 0-10
PAIN_FUNCTIONAL_ASSESSMENT: UNABLE TO SELF-REPORT
PAIN_FUNCTIONAL_ASSESSMENT: UNABLE TO SELF-REPORT
PAIN_FUNCTIONAL_ASSESSMENT: 0-10
PAIN_FUNCTIONAL_ASSESSMENT: UNABLE TO SELF-REPORT

## 2024-12-26 ASSESSMENT — COGNITIVE AND FUNCTIONAL STATUS - GENERAL
PERSONAL GROOMING: A LITTLE
CLIMB 3 TO 5 STEPS WITH RAILING: A LITTLE
HELP NEEDED FOR BATHING: A LITTLE
MOBILITY SCORE: 18
TURNING FROM BACK TO SIDE WHILE IN FLAT BAD: A LITTLE
WALKING IN HOSPITAL ROOM: A LITTLE
MOVING TO AND FROM BED TO CHAIR: A LITTLE
TOILETING: A LITTLE
EATING MEALS: A LITTLE
PATIENT BASELINE BEDBOUND: NO
STANDING UP FROM CHAIR USING ARMS: A LITTLE
DRESSING REGULAR UPPER BODY CLOTHING: A LITTLE
DAILY ACTIVITIY SCORE: 18
MOVING FROM LYING ON BACK TO SITTING ON SIDE OF FLAT BED WITH BEDRAILS: A LITTLE
DRESSING REGULAR LOWER BODY CLOTHING: A LITTLE

## 2024-12-26 ASSESSMENT — PAIN DESCRIPTION - ORIENTATION
ORIENTATION: LEFT
ORIENTATION: LEFT

## 2024-12-26 ASSESSMENT — PAIN DESCRIPTION - LOCATION
LOCATION: ARM
LOCATION: ARM

## 2024-12-26 ASSESSMENT — PAIN DESCRIPTION - DESCRIPTORS
DESCRIPTORS: SHOOTING;ACHING
DESCRIPTORS: ACHING

## 2024-12-26 ASSESSMENT — ACTIVITIES OF DAILY LIVING (ADL)
LACK_OF_TRANSPORTATION: NO
JUDGMENT_ADEQUATE_SAFELY_COMPLETE_DAILY_ACTIVITIES: YES
HEARING - LEFT EAR: FUNCTIONAL
TOILETING: INDEPENDENT
BATHING: INDEPENDENT
WALKS IN HOME: INDEPENDENT
FEEDING YOURSELF: INDEPENDENT
LACK_OF_TRANSPORTATION: NO
ADEQUATE_TO_COMPLETE_ADL: YES
PATIENT'S MEMORY ADEQUATE TO SAFELY COMPLETE DAILY ACTIVITIES?: YES
DRESSING YOURSELF: INDEPENDENT
GROOMING: INDEPENDENT
HEARING - RIGHT EAR: FUNCTIONAL

## 2024-12-26 ASSESSMENT — PATIENT HEALTH QUESTIONNAIRE - PHQ9
1. LITTLE INTEREST OR PLEASURE IN DOING THINGS: NOT AT ALL
2. FEELING DOWN, DEPRESSED OR HOPELESS: NOT AT ALL
SUM OF ALL RESPONSES TO PHQ9 QUESTIONS 1 & 2: 0

## 2024-12-26 NOTE — BRIEF OP NOTE
Date: 2024  OR Location: [facility] OR    Name: Uma Middleton, : 1950, Age: 74 y.o., MRN: [id number], Sex: female    Diagnosis  Pre-op Diagnosis      * Left wrist pain [M25.532] Post-op Diagnosis     * Left wrist pain [M25.532]     Procedures  Open reduction internal fixation left extra-articular distal radius fracture, with 22 modifier for delayed presentation resulting in significant callus formation and early malunion    Surgeons      * Will B Beucler - Primary    Resident/Fellow/Other Assistant:  Surgeons and Role:     * Lex Harris MD - Resident - Assisting    Staff:   Circulator: Sameera  Scrub Person: Sriram Garcia Person: Huber  Circulator: Emmanuel    Anesthesia Staff: Anesthesiologist: Huey Gastelum MD; Mario Mcbride MD  CRNA: MANUEL Leon-SHAYLA    Procedure Summary  Anesthesia: General  ASA: III  Estimated Blood Loss: 50 mL  Intra-op Medications:   Administrations occurring from 1200 to 1430 on 24:   Medication Name Total Dose   sodium chloride 0.9 % irrigation solution 1,000 mL   BUPivacaine HCl (Marcaine) 0.5 % (5 mg/mL) 20 mL, lidocaine (Xylocaine) 20 mL syringe 10 mL   ceFAZolin (Ancef) vial 1 g 2 g   dexAMETHasone (Decadron) injection 4 mg/mL 4 mg   fentaNYL (Sublimaze) injection 50 mcg/mL 200 mcg   HYDROmorphone (Dilaudid) injection 1 mg/mL 0.8 mg   labetalol (Normodyne,Trandate) injection 30 mg   LR bolus Cannot be calculated   lidocaine PF (Xylocaine-MPF) local injection 2 % 40 mg   lubricating eye drops ophthalmic solution 2 drop   midazolam PF (Versed) injection 1 mg/mL 2 mg   propofol (Diprivan) injection 10 mg/mL 200 mg              Anesthesia Record               Intraprocedure I/O Totals          Intake    LR bolus 700.00 mL    Total Intake 700 mL       Output    Urine 0 mL    Est. Blood Loss 50 mL    Total Output 50 mL       Net    Net Volume 650 mL          Specimen: No specimens collected     Findings: See operative dictation    Plan:  - Discharge home from PACU  --> admit postop for pain control  - NWB LUE in volar resting splint  - Periop Ancef  - Resume home AC tomorrow  - All discharge instructions/scripts complete    Complications:  None; patient tolerated the procedure well.     Disposition: PACU - hemodynamically stable.  Condition: stable  Specimens Collected: No specimens collected  Attending Attestation:     Gallo Samuel  Phone Number: [phone]

## 2024-12-26 NOTE — ANESTHESIA PROCEDURE NOTES
Airway  Date/Time: 12/26/2024 12:18 PM  Urgency: elective    Airway not difficult    Staffing  Performed: CRNA   Authorized by: Mario Mcbride MD    Performed by: MANUEL Leon-SHAYLA  Patient location during procedure: OR    Indications and Patient Condition  Indications for airway management: anesthesia  Spontaneous ventilation: present  Sedation level: minimal  Preoxygenated: yes  Patient position: sniffing  MILS maintained throughout  Mask difficulty assessment: 1 - vent by mask    Final Airway Details  Final airway type: supraglottic airway      Successful airway: Supraglottic airway: I Gel.  Size 4     Number of attempts at approach: 1    Additional Comments  Short chin small mouth

## 2024-12-26 NOTE — DISCHARGE INSTRUCTIONS
Post-Operative Instructions  Dr. Gallo [phone]    Dressing:  You have a bandage or splint covering your operative site.    Do not remove the dressing until your next scheduled appointment with your surgeon or therapist. Keep your dressing clean and dry. The dressing will be removed at that appointment.     Post Anesthesia Instructions:  If you received general anesthesia or IV sedation for your procedure for the next 24 hours: No driving, no drinking alcohol, no sleeping aids, no important decision making, and have an adult with you for 24 hours.    Showering:  You may shower following surgery but please adhere to above instructions regarding the dressing. If showering with bandage/splint in place please ensure that it is kept dry and covered while bathing. There are commercially available cast bags that can be used to protect your dressing while showering. If using garbage bags please make sure that there are no holes in the bag and that the bag has been sealed above the dressing. If the bandage gets wet you must contact your surgeon's office to make arrangements to be seen to have the bandage changed.     Ice/Elevation:  The application of ice to your surgical site after surgery will help with pain control and swelling. This can be very effective for 48-72 hours after surgery. Please be careful to avoid getting bandage wet from a leaky ice bag. Please be advised that the ice may need to be applied for longer periods of time for the cooling effect to penetrate the post-operative dressing.     Elevation of the operative site above the level of the heart as much as possible for the first 48-72 hours after surgery. Use your sling if you have been provided one while standing or walking. If your fingers are not included in the dressing you are encouraged to attempt finger range of motion as this will help with your hand swelling and ultimate recovery.    Pain Medication:  If you received a regional  anesthetic on the day of your surgery your arm and hand may be numb for up to 24 hours after surgery. It is important to wear your sling while the block is still effective in order to protect your arm. It is advisable to take pain medications prior to going to sleep in case the regional anesthesia medication wears off while you are sleeping.    Take your pain medications as directed. Narcotic pain medications can cause lethargy, nausea and constipation. Please contact your surgeon's office and discontinue the medication if these symptoms become problematic. Eating a regular diet, drinking fluids and walking can help with constipation from these medications. Avoid alcohol consumption and driving while taking narcotic pain medications.     You may resume your anticoagulant on postoperative day 1 at your regular dose.    Additional pain control options:     You are encouraged to take over the counter medications like Advil / Motrin / Ibuprofen / Aleve in addition to your prescribed pain medications after surgery.    Smoking/Tobacco:  Tobacco use is known to interfere with wound and fracture healing and increase post-operative pain. It can also increase your risk of poor outcomes following surgery. Please do not use tobacco or nicotine products following your surgery. This includes smokeless tobacco, or nicotine replacement products (patches, gum, etc.).    Driving:  It is not advisable to operate a vehicle while using narcotic pain medications. Additionally, please be advised that you are likely to have challenges operating a car or motorcycle while you are still in your postoperative dressing and this could increase your risk of being involved in an accident and being cited for driving while physically impaired.     Warning Signs:  Observe your arm/hand and incision site (if visible) for increased redness, inflammation, drainage, odor or pain that is unrelieved by rest, elevation or medication. Please contact your  surgeon's office immediately if you develop any of these issues or if you develop a fever greater than 101°.    Follow Up Appointments:  You do not yet have a post-operative appointment scheduled. Please contact Dr. Samuel's office at [phone] to schedule this appointment.

## 2024-12-26 NOTE — ANESTHESIA POSTPROCEDURE EVALUATION
Patient: Uma Middleton    Procedure Summary       Date: 12/26/24 Room / Location: [facility] OR 04 / [facility] OR    Anesthesia Start: 1209 Anesthesia Stop: 1521    Procedure: Left Distal Radius Fracture Open Reduction Internal Fixation (Left: Wrist) Diagnosis:       Left wrist pain      (Left wrist pain [M25.532])    Surgeons: Gallo Samuel DO Responsible Provider: Huey Gastelum MD    Anesthesia Type: general ASA Status: 3            Anesthesia Type: general    Vitals Value Taken Time   /58 12/26/24 1519   Temp 37.0 12/26/24 1527   Pulse 62 12/26/24 1527   Resp 16 12/26/24 1527   SpO2 94 % 12/26/24 1527   Vitals shown include unfiled device data.    Anesthesia Post Evaluation    Patient location during evaluation: bedside  Patient participation: complete - patient cannot participate  Level of consciousness: awake  Pain score: 5  Pain management: adequate  Airway patency: patent  Cardiovascular status: acceptable and hemodynamically stable  Respiratory status: acceptable and nonlabored ventilation  Hydration status: acceptable  Postoperative Nausea and Vomiting: none        No notable events documented.  Pain being treated in PACU.

## 2024-12-26 NOTE — ANESTHESIA PREPROCEDURE EVALUATION
Patient: Uma Middleton    Procedure Information       Anesthesia Start Date/Time: 12/26/24 1209    Procedure: Left Distal Radius Fracture Open Reduction Internal Fixation (Left: Wrist) - Nerve block- single shot    Location: [facility] / [facility]    Surgeons: Gallo B Beucler, DO          75 yo F hx CAD s/p robotic midCAB on 11/8 (LIMA to LAD; on plavix and ASA; did not take ASA today though so will give dose preop), Aleman's, HTN, Dm2, ALMA. Pt last had Plavix 4 days ago and in on concomitant ASA    Relevant Problems   Cardiac   (+) ASHD (arteriosclerotic heart disease)   (+) Angina pectoris   (+) Aortic aneurysm without rupture, unspecified portion of aorta (CMS-HCC)   (+) Atherosclerosis of native coronary artery of native heart with unstable angina pectoris   (+) Coronary artery disease of native artery of native heart with stable angina pectoris   (+) Hyperlipidemia   (+) Primary hypertension      Pulmonary   (+) ALMA (obstructive sleep apnea)      GI   (+) Gastroesophageal reflux disease      Liver   (+) Fatty liver      Endocrine   (+) Type 2 diabetes mellitus with hyperglycemia, without long-term current use of insulin      Musculoskeletal   (+) Spondylosis of lumbar spine      HEENT   (+) Acute non-recurrent frontal sinusitis       Clinical information reviewed:   Tobacco  Allergies  Meds   Med Hx  Surg Hx   Fam Hx  Soc Hx        NPO Detail:  NPO/Void Status  Date of Last Liquid: 12/26/24  Time of Last Liquid: 0300  Date of Last Solid: 12/25/24  Time of Last Solid: 1900  Last Intake Type: Clear fluids  Time of Last Void: 1029         Physical Exam    Airway  Mallampati: III  TM distance: >3 FB  Neck ROM: full     Cardiovascular   Rate: normal     Dental - normal exam     Pulmonary - normal exam     Abdominal            Anesthesia Plan    History of general anesthesia?: yes  History of complications of general anesthesia?: no    ASA 3     general   (Pt last had Plavix 4 days ago and in on  concomitant ASA.  Per NOE guidelines will defer brachial plexus block.  Discussed with surgeon who will administer local)  intravenous induction   Postoperative administration of opioids is intended.  Anesthetic plan and risks discussed with patient.

## 2024-12-26 NOTE — INTERVAL H&P NOTE
H&P reviewed. The patient was examined and there are no changes to the H&P.    Patient was seen and evaluated in the preoperative holding area with her  present we discussed the planned operation.  She would still like to proceed with the surgery.  We discussed the risks.  All questions were answered.  Okay to proceed with surgery      The indications for surgical versus nonsurgical management of the condition have been advised, including the inherent risks, benefits, prognosis of the condition.  The risks of surgery include, but are not limited to, pain, bleeding, blood clots, infection, tendon damage, nerve damage, vessel damage, and need for further surgery.  The risks also include wound healing problems, decreased long-term functionality of the wrist and hand, tendon irritation, tendon rupture, and possible need for therapy for an optimum outcome.  There is a risk of complex regional pain syndrome, incomplete recovery, incomplete relief or worsening of symptoms, and recurrence. We also discussed that medical complications are possible during and after surgery related to either anesthesia or the surgical procedure itself. Specific discussion of the risks of the proposed anesthetic plan will be discussed by the patient's anesthesia provider. All risks were reviewed in layman´s terms. The general plan for the postoperative rehabilitation course, including possible need for therapy, was reviewed at great length. The patient was given ample time to ask appropriate questions and appeared to be satisfied with the answers provided. All questions were answered and no guarantees have been given regarding the patient's condition and treatment recommendations.

## 2024-12-27 ENCOUNTER — APPOINTMENT (OUTPATIENT)
Dept: CARDIOLOGY | Facility: HOSPITAL | Age: 74
DRG: 512 | End: 2024-12-27
Payer: MEDICARE

## 2024-12-27 VITALS
SYSTOLIC BLOOD PRESSURE: 104 MMHG | HEIGHT: 69 IN | TEMPERATURE: 97.4 F | RESPIRATION RATE: 18 BRPM | BODY MASS INDEX: 33.63 KG/M2 | WEIGHT: 227.07 LBS | DIASTOLIC BLOOD PRESSURE: 48 MMHG | HEART RATE: 63 BPM | OXYGEN SATURATION: 96 %

## 2024-12-27 LAB
ANION GAP SERPL CALC-SCNC: 10 MMOL/L (ref 10–20)
BUN SERPL-MCNC: 17 MG/DL (ref 6–23)
CALCIUM SERPL-MCNC: 8.5 MG/DL (ref 8.6–10.3)
CHLORIDE SERPL-SCNC: 103 MMOL/L (ref 98–107)
CO2 SERPL-SCNC: 26 MMOL/L (ref 21–32)
CREAT SERPL-MCNC: 0.8 MG/DL (ref 0.5–1.05)
EGFRCR SERPLBLD CKD-EPI 2021: 78 ML/MIN/1.73M*2
ERYTHROCYTE [DISTWIDTH] IN BLOOD BY AUTOMATED COUNT: 13.5 % (ref 11.5–14.5)
GLUCOSE SERPL-MCNC: 133 MG/DL (ref 74–99)
HCT VFR BLD AUTO: 31.7 % (ref 36–46)
HGB BLD-MCNC: 10.7 G/DL (ref 12–16)
MCH RBC QN AUTO: 27.9 PG (ref 26–34)
MCHC RBC AUTO-ENTMCNC: 33.8 G/DL (ref 32–36)
MCV RBC AUTO: 83 FL (ref 80–100)
NRBC BLD-RTO: 0 /100 WBCS (ref 0–0)
PLATELET # BLD AUTO: 239 X10*3/UL (ref 150–450)
POTASSIUM SERPL-SCNC: 4.1 MMOL/L (ref 3.5–5.3)
RBC # BLD AUTO: 3.83 X10*6/UL (ref 4–5.2)
SODIUM SERPL-SCNC: 135 MMOL/L (ref 136–145)
WBC # BLD AUTO: 9.8 X10*3/UL (ref 4.4–11.3)

## 2024-12-27 PROCEDURE — 2500000005 HC RX 250 GENERAL PHARMACY W/O HCPCS: Performed by: STUDENT IN AN ORGANIZED HEALTH CARE EDUCATION/TRAINING PROGRAM

## 2024-12-27 PROCEDURE — 36415 COLL VENOUS BLD VENIPUNCTURE: CPT | Performed by: STUDENT IN AN ORGANIZED HEALTH CARE EDUCATION/TRAINING PROGRAM

## 2024-12-27 PROCEDURE — 97165 OT EVAL LOW COMPLEX 30 MIN: CPT | Mod: GO

## 2024-12-27 PROCEDURE — 85027 COMPLETE CBC AUTOMATED: CPT | Performed by: STUDENT IN AN ORGANIZED HEALTH CARE EDUCATION/TRAINING PROGRAM

## 2024-12-27 PROCEDURE — 9420000001 HC RT PATIENT EDUCATION 5 MIN

## 2024-12-27 PROCEDURE — 2500000001 HC RX 250 WO HCPCS SELF ADMINISTERED DRUGS (ALT 637 FOR MEDICARE OP): Performed by: PHARMACIST

## 2024-12-27 PROCEDURE — 80048 BASIC METABOLIC PNL TOTAL CA: CPT | Performed by: STUDENT IN AN ORGANIZED HEALTH CARE EDUCATION/TRAINING PROGRAM

## 2024-12-27 PROCEDURE — 2500000004 HC RX 250 GENERAL PHARMACY W/ HCPCS (ALT 636 FOR OP/ED): Mod: JZ | Performed by: STUDENT IN AN ORGANIZED HEALTH CARE EDUCATION/TRAINING PROGRAM

## 2024-12-27 PROCEDURE — 2500000001 HC RX 250 WO HCPCS SELF ADMINISTERED DRUGS (ALT 637 FOR MEDICARE OP): Performed by: STUDENT IN AN ORGANIZED HEALTH CARE EDUCATION/TRAINING PROGRAM

## 2024-12-27 PROCEDURE — 93005 ELECTROCARDIOGRAM TRACING: CPT

## 2024-12-27 PROCEDURE — 97535 SELF CARE MNGMENT TRAINING: CPT | Mod: GO

## 2024-12-27 RX ADMIN — Medication 100 ML: at 11:14

## 2024-12-27 RX ADMIN — Medication 100 ML: at 12:33

## 2024-12-27 RX ADMIN — POLYETHYLENE GLYCOL 3350 17 G: 17 POWDER, FOR SOLUTION ORAL at 08:35

## 2024-12-27 RX ADMIN — METOPROLOL SUCCINATE 50 MG: 50 TABLET, EXTENDED RELEASE ORAL at 08:36

## 2024-12-27 RX ADMIN — ISOSORBIDE MONONITRATE 30 MG: 30 TABLET, EXTENDED RELEASE ORAL at 08:35

## 2024-12-27 RX ADMIN — Medication 100 ML: at 14:23

## 2024-12-27 RX ADMIN — CLOPIDOGREL 75 MG: 75 TABLET ORAL at 08:36

## 2024-12-27 RX ADMIN — Medication 100 ML: at 08:36

## 2024-12-27 RX ADMIN — ASPIRIN 81 MG 81 MG: 81 TABLET ORAL at 08:35

## 2024-12-27 RX ADMIN — OXYCODONE HYDROCHLORIDE 5 MG: 5 TABLET ORAL at 06:11

## 2024-12-27 RX ADMIN — DOCUSATE SODIUM 100 MG: 100 CAPSULE, LIQUID FILLED ORAL at 08:35

## 2024-12-27 RX ADMIN — ACETAMINOPHEN 650 MG: 325 TABLET, FILM COATED ORAL at 11:49

## 2024-12-27 RX ADMIN — Medication 100 ML: at 06:00

## 2024-12-27 RX ADMIN — CEFAZOLIN SODIUM 2 G: 2 INJECTION, SOLUTION INTRAVENOUS at 06:00

## 2024-12-27 RX ADMIN — OXYCODONE HYDROCHLORIDE 5 MG: 5 TABLET ORAL at 10:15

## 2024-12-27 RX ADMIN — AMLODIPINE BESYLATE 5 MG: 5 TABLET ORAL at 08:35

## 2024-12-27 RX ADMIN — HYDROMORPHONE HYDROCHLORIDE 0.5 MG: 1 INJECTION, SOLUTION INTRAMUSCULAR; INTRAVENOUS; SUBCUTANEOUS at 12:41

## 2024-12-27 RX ADMIN — ACETAMINOPHEN 650 MG: 325 TABLET, FILM COATED ORAL at 05:59

## 2024-12-27 RX ADMIN — Medication 100 ML: at 09:00

## 2024-12-27 RX ADMIN — LISINOPRIL 10 MG: 10 TABLET ORAL at 08:35

## 2024-12-27 RX ADMIN — Medication 100 ML: at 11:50

## 2024-12-27 RX ADMIN — Medication 100 ML: at 08:45

## 2024-12-27 RX ADMIN — Medication 100 ML: at 10:19

## 2024-12-27 RX ADMIN — OXYCODONE HYDROCHLORIDE 5 MG: 5 TABLET ORAL at 02:28

## 2024-12-27 RX ADMIN — PANTOPRAZOLE SODIUM 40 MG: 40 TABLET, DELAYED RELEASE ORAL at 05:59

## 2024-12-27 SDOH — HEALTH STABILITY: MENTAL HEALTH: HOW MANY DRINKS CONTAINING ALCOHOL DO YOU HAVE ON A TYPICAL DAY WHEN YOU ARE DRINKING?: 1 OR 2

## 2024-12-27 SDOH — ECONOMIC STABILITY: FOOD INSECURITY: WITHIN THE PAST 12 MONTHS, YOU WORRIED THAT YOUR FOOD WOULD RUN OUT BEFORE YOU GOT THE MONEY TO BUY MORE.: NEVER TRUE

## 2024-12-27 SDOH — ECONOMIC STABILITY: HOUSING INSECURITY: AT ANY TIME IN THE PAST 12 MONTHS, WERE YOU HOMELESS OR LIVING IN A SHELTER (INCLUDING NOW)?: NO

## 2024-12-27 SDOH — SOCIAL STABILITY: SOCIAL INSECURITY: ARE YOU MARRIED, WIDOWED, DIVORCED, SEPARATED, NEVER MARRIED, OR LIVING WITH A PARTNER?: MARRIED

## 2024-12-27 SDOH — HEALTH STABILITY: MENTAL HEALTH: HOW OFTEN DO YOU HAVE SIX OR MORE DRINKS ON ONE OCCASION?: NEVER

## 2024-12-27 SDOH — ECONOMIC STABILITY: TRANSPORTATION INSECURITY: IN THE PAST 12 MONTHS, HAS LACK OF TRANSPORTATION KEPT YOU FROM MEDICAL APPOINTMENTS OR FROM GETTING MEDICATIONS?: NO

## 2024-12-27 SDOH — ECONOMIC STABILITY: FOOD INSECURITY: HOW HARD IS IT FOR YOU TO PAY FOR THE VERY BASICS LIKE FOOD, HOUSING, MEDICAL CARE, AND HEATING?: NOT HARD AT ALL

## 2024-12-27 SDOH — ECONOMIC STABILITY: FOOD INSECURITY: WITHIN THE PAST 12 MONTHS, THE FOOD YOU BOUGHT JUST DIDN'T LAST AND YOU DIDN'T HAVE MONEY TO GET MORE.: NEVER TRUE

## 2024-12-27 SDOH — ECONOMIC STABILITY: HOUSING INSECURITY: IN THE PAST 12 MONTHS, HOW MANY TIMES HAVE YOU MOVED WHERE YOU WERE LIVING?: 0

## 2024-12-27 SDOH — ECONOMIC STABILITY: INCOME INSECURITY: IN THE PAST 12 MONTHS HAS THE ELECTRIC, GAS, OIL, OR WATER COMPANY THREATENED TO SHUT OFF SERVICES IN YOUR HOME?: NO

## 2024-12-27 SDOH — ECONOMIC STABILITY: HOUSING INSECURITY: IN THE LAST 12 MONTHS, WAS THERE A TIME WHEN YOU WERE NOT ABLE TO PAY THE MORTGAGE OR RENT ON TIME?: NO

## 2024-12-27 SDOH — HEALTH STABILITY: MENTAL HEALTH: HOW OFTEN DO YOU HAVE A DRINK CONTAINING ALCOHOL?: MONTHLY OR LESS

## 2024-12-27 ASSESSMENT — COGNITIVE AND FUNCTIONAL STATUS - GENERAL
DRESSING REGULAR UPPER BODY CLOTHING: A LITTLE
EATING MEALS: A LITTLE
WALKING IN HOSPITAL ROOM: A LITTLE
DRESSING REGULAR LOWER BODY CLOTHING: A LITTLE
MOVING FROM LYING ON BACK TO SITTING ON SIDE OF FLAT BED WITH BEDRAILS: A LITTLE
EATING MEALS: A LITTLE
DRESSING REGULAR UPPER BODY CLOTHING: A LITTLE
TURNING FROM BACK TO SIDE WHILE IN FLAT BAD: A LITTLE
CLIMB 3 TO 5 STEPS WITH RAILING: A LITTLE
DAILY ACTIVITIY SCORE: 18
HELP NEEDED FOR BATHING: A LITTLE
HELP NEEDED FOR BATHING: A LITTLE
MOVING TO AND FROM BED TO CHAIR: A LITTLE
DAILY ACTIVITIY SCORE: 20
PERSONAL GROOMING: A LITTLE
STANDING UP FROM CHAIR USING ARMS: A LITTLE
TOILETING: A LITTLE
DRESSING REGULAR LOWER BODY CLOTHING: A LITTLE
MOBILITY SCORE: 18

## 2024-12-27 ASSESSMENT — ACTIVITIES OF DAILY LIVING (ADL)
LACK_OF_TRANSPORTATION: NO
ADL_ASSISTANCE: INDEPENDENT
EFFECT OF PAIN ON DAILY ACTIVITIES: OT TO TOLERANCE
HOME_MANAGEMENT_TIME_ENTRY: 17

## 2024-12-27 ASSESSMENT — PAIN SCALES - GENERAL
PAINLEVEL_OUTOF10: 2
PAINLEVEL_OUTOF10: 7
PAINLEVEL_OUTOF10: 2
PAINLEVEL_OUTOF10: 3
PAINLEVEL_OUTOF10: 2
PAINLEVEL_OUTOF10: 6

## 2024-12-27 ASSESSMENT — PAIN DESCRIPTION - LOCATION
LOCATION: WRIST

## 2024-12-27 ASSESSMENT — LIFESTYLE VARIABLES
AUDIT-C TOTAL SCORE: 1
SKIP TO QUESTIONS 9-10: 1

## 2024-12-27 ASSESSMENT — PAIN SCALES - WONG BAKER
WONGBAKER_NUMERICALRESPONSE: HURTS LITTLE BIT
WONGBAKER_NUMERICALRESPONSE: HURTS LITTLE BIT
WONGBAKER_NUMERICALRESPONSE: HURTS LITTLE MORE
WONGBAKER_NUMERICALRESPONSE: HURTS LITTLE MORE

## 2024-12-27 ASSESSMENT — PAIN - FUNCTIONAL ASSESSMENT
PAIN_FUNCTIONAL_ASSESSMENT: 0-10

## 2024-12-27 ASSESSMENT — PAIN DESCRIPTION - ORIENTATION
ORIENTATION: LEFT

## 2024-12-27 ASSESSMENT — PAIN SCALES - PAIN ASSESSMENT IN ADVANCED DEMENTIA (PAINAD): TOTALSCORE: COLD APPLIED

## 2024-12-27 NOTE — PROGRESS NOTES
"Orthopedic Surgery Inpatient Progress Note    Subjective   NAEO, VSS. Pain fairly well controlled, complaining of swelling and occasional sharp pains in LUE. No N/T.     Objective   Physical Exam:  - Constitutional: No acute distress, cooperative  - Eyes: EOM grossly intact  - Head/Neck: Trachea midline  - Respiratory/Thorax: Normal work of breathing  - Cardiovascular: RRR on peripheral palpation  - Gastrointestinal: Nondistended  - Psychological: Appropriate mood/behavior  - Skin: Warm and dry. Additional findings in musculoskeletal evaluation  - Musculoskeletal LUE:   Splint c/d/I no strikethrough   SILT to fingers   Wiggling all fingers in splint   <2 sec cap refill   Compartments soft/compressible    Last Recorded Vitals  Blood pressure (!) 106/46, pulse 56, temperature 36.7 °C (98 °F), temperature source Temporal, resp. rate 18, height 1.753 m (5' 9\"), weight 103 kg (227 lb 1.2 oz), SpO2 94%.  Intake/Output last 3 Shifts:  I/O last 3 completed shifts:  In: 1100 (10.7 mL/kg) [P.O.:200; IV Piggyback:900]  Out: 50 (0.5 mL/kg) [Blood:50]  Weight: 103 kg     Relevant Results      Scheduled medications  acetaminophen, 650 mg, oral, q6h KEISHA  amLODIPine, 5 mg, oral, Daily  aspirin, 81 mg, oral, Daily  atorvastatin, 80 mg, oral, Nightly  clopidogrel, 75 mg, oral, Daily  docusate sodium, 100 mg, oral, BID  gabapentin, 100 mg, oral, Nightly  isosorbide mononitrate ER, 30 mg, oral, Daily  lidocaine, 1 patch, transdermal, Daily  lisinopril, 10 mg, oral, Daily  metoprolol succinate XL, 50 mg, oral, Daily  oral hydration, 100 mL, oral, q1h while awake  pantoprazole, 40 mg, oral, Daily before breakfast  polyethylene glycol, 17 g, oral, Daily  [START ON 12/29/2024] scopolamine, 1 patch, transdermal, Once      Continuous medications  oxygen, 2 L/min      PRN medications  PRN medications: benzocaine-menthol, bisacodyl, bisacodyl, diphenhydrAMINE **OR** diphenhydrAMINE **OR** diphenhydrAMINE, fluticasone, HYDROmorphone, " hydrOXYzine HCL, metoclopramide **OR** metoclopramide, naloxone, ondansetron **OR** ondansetron, oxyCODONE, oxyCODONE  Results for orders placed or performed during the hospital encounter of 12/26/24 (from the past 24 hours)   CBC   Result Value Ref Range    WBC 9.8 4.4 - 11.3 x10*3/uL    nRBC 0.0 0.0 - 0.0 /100 WBCs    RBC 3.83 (L) 4.00 - 5.20 x10*6/uL    Hemoglobin 10.7 (L) 12.0 - 16.0 g/dL    Hematocrit 31.7 (L) 36.0 - 46.0 %    MCV 83 80 - 100 fL    MCH 27.9 26.0 - 34.0 pg    MCHC 33.8 32.0 - 36.0 g/dL    RDW 13.5 11.5 - 14.5 %    Platelets 239 150 - 450 x10*3/uL       Assessment/Plan   Assessment:   74 y.o. female s/p ORIF L distal radius with Dr. Samuel on 12/26.    Plan:  - Weightbearing Status: NWB LUE in splint  - Precautions: None  - Imaging: None  - Pain: Tylenol, oxycodone, dilaudid  - Perioperative ABx: Ancef 2g q8h  - DVT PPx: SCDs, OK to resume home anticoagulation today  - FEN: MIVFs; HLIV with good PO intake  - Diet: Clear liquid diet. Advance as tolerated if no nausea and + bowel sounds  - Pulm: IS every 2 hrs, maintain O2 sat >92%  - Other consults: OT    Dispo: DC home today    This patient was discussed with attending, Dr. Samuel.    [facility]  Orthopedic Surgery PGY-4

## 2024-12-27 NOTE — NURSING NOTE
Discharge instructions provided using teachback method.  Patient's health-related risk factors discussed with patient.  Patient educated to look for worsening signs and symptoms and educated to seek medical attention if experiencing medical emergency.  Patient aware of needs to follow up with outpatient clinics as scheduled. Home going meds reviewed with patient.  Patient verbalized understanding of disposition and discharge instructions.  All questions answered to patient's satisfaction and within nursing scope of practice.  Vitals stable; IV(s) removed by bedside nurse.

## 2024-12-27 NOTE — CARE PLAN
The patient's goals for the shift include      The clinical goals for the shift include remain free from injury and hds through out shift      Problem: Pain - Adult  Goal: Verbalizes/displays adequate comfort level or baseline comfort level  Outcome: Progressing     Problem: Safety - Adult  Goal: Free from fall injury  Outcome: Progressing     Problem: Discharge Planning  Goal: Discharge to home or other facility with appropriate resources  Outcome: Progressing     Problem: Chronic Conditions and Co-morbidities  Goal: Patient's chronic conditions and co-morbidity symptoms are monitored and maintained or improved  Outcome: Progressing

## 2024-12-27 NOTE — PROGRESS NOTES
Occupational Therapy    Evaluation/Treatment    Patient Name: Uma Middleton  MRN: [id number]  Department: [place] [id number]  Room: [address] [place] [address]  Today's Date: 12/27/24  Time Calculation  Start Time: 0859  Stop Time: 0931  Time Calculation (min): 32 min       Assessment:  OT Assessment: Pt educated in safety at home during IADLs and cooking tasks in order to maintain L UE NWB precautions. Pt completed dressing tasks with some assist, cues for safety and  1-handed techniques required. Pt verbalized good carryover of education at end of session.  Prognosis: Good  Barriers to Discharge Home: No anticipated barriers  Evaluation/Treatment Tolerance: Patient tolerated treatment well  Medical Staff Made Aware: Yes  End of Session Communication: Bedside nurse  End of Session Patient Position: Bed, 3 rail up, Alarm off, not on at start of session (RN aware)  OT Assessment Results: Decreased ADL status, Decreased upper extremity range of motion  Prognosis: Good  Barriers to Discharge: None  Evaluation/Treatment Tolerance: Patient tolerated treatment well  Medical Staff Made Aware: Yes  Strengths: Access to adaptive/assistive products, Ability to acquire knowledge, Attitude of self, Capable of completing ADLs semi/independent, Living arrangement secure, Physical health  Barriers to Participation: Comorbidities  Plan:  Treatment Interventions: ADL retraining, Functional transfer training, UE strengthening/ROM, Compensatory technique education  OT Frequency: 2 times per week  OT Discharge Recommendations: No OT needed after discharge  OT Recommended Transfer Status: Assist of 1  OT - OK to Discharge: Yes  Treatment Interventions: ADL retraining, Functional transfer training, UE strengthening/ROM, Compensatory technique education    Subjective   Current Problem:  1. Closed Colles' fracture of left radius, initial encounter  ondansetron (Zofran) 4 mg tablet    docusate sodium (Colace) 100 mg capsule    oxyCODONE (Roxicodone) 5 mg immediate release  tablet    acetaminophen (Tylenol) 500 mg tablet      2. Left wrist pain  FL less than 1 hour    FL less than 1 hour        General:   OT Received On: 12/27/24  General  Reason for Referral: s/p ORIF L distal radius  Referred By: Lex Harris MD  Past Medical History Relevant to Rehab:   Past Medical History:   Diagnosis Date    Adhesive capsulitis of right shoulder 10/21/2013    Adhesive capsulitis of right shoulder    Angina pectoris     Anxiety     Aortic aneurysm without rupture, unspecified portion of aorta (CMS-HCC)     ASHD (arteriosclerotic heart disease)     Aleman esophagus     Central retinal vein occlusion, right eye (CMS-HCC)     Chest pain     Cholelithiasis     Dermatochalasis of both upper eyelids     Dry eye syndrome of bilateral lacrimal glands     Elevated blood-pressure reading, without diagnosis of hypertension     Elevated blood pressure reading without diagnosis of hypertension    GERD (gastroesophageal reflux disease)     Hyperlipidemia     Hypertension     Inconclusive mammogram 01/23/2017    Breast density    Lattice degeneration of retina, bilateral     Other specified disorders of eustachian tube, bilateral 05/08/2017    Dysfunction of Eustachian tube, bilateral    Otitis media, unspecified, right ear 12/18/2015    Acute right otitis media    Personal history of other diseases of the respiratory system 10/18/2016    History of acute bronchitis with bronchospasm    PONV (postoperative nausea and vomiting)     Primary osteoarthritis, unspecified hand 02/09/2015    Osteoarthritis, hand    Sacroiliitis, not elsewhere classified (CMS-HCC) 04/26/2016    Sacroiliitis    Sciatica, right side 04/26/2016    Sciatica, right    Snoring     Unspecified optic atrophy     Vitamin D deficiency        Prior to Session Communication: Bedside nurse  Patient Position Received: Bed, 3 rail up, Alarm off, not on at start of session  Preferred Learning Style: visual, verbal, kinesthetic, auditory  General  Comment: Pt agreeable to OT, pt indep with ambulation   Precautions:  UE Weight Bearing Status: Left Non-Weight Bearing  Precautions Comment: pt in splint    BP: 130/53   Pain:  Pain Assessment  Pain Assessment: 0-10  0-10 (Numeric) Pain Score: 2  Pain Type: Surgical pain  Pain Location: Wrist  Pain Orientation: Left  Effect of Pain on Daily Activities: OT to tolerance  Pain Interventions: Cold applied  Response to Interventions: Content/relaxed    Objective   Cognition:  Overall Cognitive Status: Within Functional Limits  Orientation Level: Disoriented X4  Memory: Within Funtional Limits  Problem Solving: Within Functional Limits  Safety/Judgement: Within Functional Limits  Insight: Within function limits           Home Living:  Type of Home: House  Lives With: Spouse  Home Adaptive Equipment: Walker rolling or standard  Home Layout: One level  Home Access: Level entry  Prior Function:  Level of [place]: Independent with ADLs and functional transfers, Independent with homemaking with ambulation  Receives Help From: Family (spouse)  ADL Assistance: Independent (spouse has been assistly recently, past 4 weeks due to radius fx)  Homemaking Assistance: Independent  Ambulatory Assistance: Independent (no AD)  Leisure: traveling, two dogs  Hand Dominance: Right  Prior Function Comments: x1 fall in November- reason for admission     ADL:  UE Dressing Assistance: Modified independent (Device)  LE Dressing Assistance: Minimal  Activities of Daily Living: UE Dressing  UE Dressing Level of Assistance: Minimum assistance  UE Dressing Where Assessed: Edge of bed (sitting and standing from the bed)  UE Dressing Comments: pt donned bra with assist to hook in the back; pt able to dalila shirt, VC's to thread L UE into shirt due to L surgical arm.    LE Dressing  LE Dressing: Yes  Pants Level of Assistance: Minimum assistance (pt able to thread B LE's into pants, assist to pull up on L side)  Sock Level of Assistance: Modified  independent (pt EOB to don socks with R hand)  LE Dressing Comments: pt donned underwear sitting EOB with R hand, SUP for safety during task, no LOB while sitting  Activity Tolerance:  Endurance: Endurance does not limit participation in activity  Functional Standing Tolerance:     Bed Mobility/Transfers: Bed Mobility  Bed Mobility: Yes  Bed Mobility 1  Bed Mobility 1: Supine to sitting, Sitting to supine  Level of Assistance 1: Modified independent  Bed Mobility Comments 1: pt able to perform while maintaining L UE NWB    Transfers  Transfer: Yes  Transfer 1  Technique 1: Sit to stand, Stand to sit  Transfer Level of Assistance 1:  (close SUP progressed to mod I, pt able to maintain precautions)  Trials/Comments 1: x5 completed      Functional Mobility:  Functional Mobility  Functional Mobility Performed: Yes  Functional Mobility 1  Surface 1: Level tile  Device 1: No device  Assistance 1: Distant supervision  Comments 1: pt ambulated with no AD, No LOB  Sitting Balance:  Static Sitting Balance  Static Sitting-Balance Support: Feet supported (L UE pillow support for comfort)  Static Sitting-Level of Assistance: Modified Independent  Dynamic Sitting Balance  Dynamic Sitting-Balance Support: Feet supported  Dynamic Sitting-Level of Assistance: Close supervision     Vision:Vision - Basic Assessment  Current Vision: No visual deficits  Sensation:  Light Touch: No apparent deficits  Strength:  Strength Comments: R UE WFL. L UE NT due to surgical side     Perception:  Inattention/Neglect: Appears intact  Coordination:  Movements are Fluid and Coordinated: Yes (L UE in splint)   Hand Function:  Hand Function  Gross Grasp: Functional  Coordination: Functional  Extremities: RUE   RUE : Within Functional Limits, LUE   LUE: Within Functional Limits (grossly WFL, not formally tested- surgical side), RLE   RLE : Within Functional Limits, and LLE   LLE : Within Functional Limits    Outcome Measures: [facility] Daily Activity  Putting  on and taking off regular lower body clothing: A little  Bathing (including washing, rinsing, drying): A little  Putting on and taking off regular upper body clothing: A little  Toileting, which includes using toilet, bedpan or urinal: None  Taking care of personal grooming such as brushing teeth: None  Eating Meals: A little  Daily Activity - Total Score: 20      Education Documentation  Home Exercise Program, taught by Lata Goins OT at 12/27/2024 10:25 AM.  Learner: Patient  Readiness: Acceptance  Method: Explanation, Demonstration  Response: Verbalizes Understanding, Demonstrated Understanding    Body Mechanics, taught by Lata Goins OT at 12/27/2024 10:25 AM.  Learner: Patient  Readiness: Acceptance  Method: Explanation, Demonstration  Response: Verbalizes Understanding, Demonstrated Understanding    Precautions, taught by Lata Goins OT at 12/27/2024 10:25 AM.  Learner: Patient  Readiness: Acceptance  Method: Explanation, Demonstration  Response: Verbalizes Understanding, Demonstrated Understanding    ADL Training, taught by Lata Goins OT at 12/27/2024 10:25 AM.  Learner: Patient  Readiness: Acceptance  Method: Explanation, Demonstration  Response: Verbalizes Understanding, Demonstrated Understanding    Education Comments  No comments found.             Goals:  Encounter Problems       Encounter Problems (Active)       ADLs       Patient will perform UB and LB bathing with modified independent level of assistance.       Start:  12/27/24    Expected End:  01/10/25            Patient with complete upper body dressing with modified independent level of assistance donning and doffing all UE clothes with one handed techniques.       Start:  12/27/24    Expected End:  01/10/25            Patient with complete lower body dressing with modified independent level of assistance donning and doffing all LE clothes.       Start:  12/27/24    Expected End:  01/10/25            Patient will complete daily  grooming tasks brushing teeth and washing face/hair with modified independent level of assistance and PRN adaptive equipment while standing.       Start:  12/27/24    Expected End:  01/10/25               MOBILITY       Patient will perform Functional mobility mod  Household distances/Community Distances with modified independent level of assistance and least restrictive device in order to improve safety and functional mobility.       Start:  12/27/24    Expected End:  01/10/25

## 2024-12-27 NOTE — PROGRESS NOTES
12/27/24 1207   Discharge Planning   Living Arrangements Spouse/significant other   Support Systems Spouse/significant other   Assistance Needed none   Type of Residence Private residence   Number of Stairs to Enter Residence 1   Number of Stairs Within Residence 0   Do you have animals or pets at home? Yes   Type of Animals or Pets 2 dogs   Who is requesting discharge planning? Provider   Home or Post Acute Services None   Expected Discharge Disposition Home   Does the patient need discharge transport arranged? No   Financial Resource Strain   How hard is it for you to pay for the very basics like food, housing, medical care, and heating? Not hard   Housing Stability   In the last 12 months, was there a time when you were not able to pay the mortgage or rent on time? N   In the past 12 months, how many times have you moved where you were living? 0   At any time in the past 12 months, were you homeless or living in a shelter (including now)? N   Transportation Needs   In the past 12 months, has lack of transportation kept you from medical appointments or from getting medications? no   In the past 12 months, has lack of transportation kept you from meetings, work, or from getting things needed for daily living? No   Stroke Family Assessment   Stroke Family Assessment Needed No   Intensity of Service   Intensity of Service 0-30 min     12/27/24 1208  Met with patient at bedside to discuss discharge planning.  Patient lives at home with her  in a house.  She is independent with ADLs and drives at baseline. She states that she has not driven since her heart surgery a few months ago.  Her  provides her transportation currently. She does not use any assistive devices for ambulation.  She plans on returning home at discharge.  She denies any HHC, DME, or discharge needs.  She will notify the [facility] should any needs arise.  ADOD today.  Mary Brower RN [facility]

## 2024-12-27 NOTE — NURSING NOTE
Interdisciplinary team present: NP, PT, NM, CC, SW, Orthopedic Coordinator, and bedside RN.  Pain - controlled  Nausea - none  Discharge barrier - OT, medical clearance  Discharge plan - Home   Discharge date/time - today

## 2024-12-27 NOTE — PROGRESS NOTES
Physical Therapy                 Therapy Communication Note - Screen & DC    Patient Name: Uma Middleton  MRN: [id number]  Department: [facility]   Room: [address] [place] [address]  Today's Date: 12/27/2024     Discipline: Physical Therapy      Other (Comment) (Patient up ad moe independently per OT; no acute PT needs identified at this time.  DC acute PT eval.)

## 2024-12-29 NOTE — DISCHARGE SUMMARY
Orthopaedic Surgery Discharge Summary  Date of discharge: 12/27/24  Discharge Diagnosis  Left wrist pain    Issues Requiring Follow-Up  Routine Postoperative Followup    Test Results Pending At Discharge  Pending Labs       No current pending labs.            Hospital Course  74 year-old female who presented with L distal radius fx indicated for ORIF L distal radius. Patient is now s/p ORIF L distal radius on 12/26 with Dr. Samuel. On the day of surgery, patient was identified in the pre-operative holding area and agreeable to proceed with surgery. Written consent was obtained.  Please see operative note for further details of this procedure. Patient received 24 hours of jagdish-operative antibiotics. Patient recovered in the PACU before transfer to a regular nursing floor. Patient was started on oxycodone and tylenol for pain control. On the day of discharge, patient was afebrile with stable vital signs. Patient was neurovascularly intact at time of discharge. Patient will follow-up with Dr. Samuel in 2 weeks for post-operative visit.     Home Medications     Medication List      START taking these medications     acetaminophen 500 mg tablet; Commonly known as: Tylenol; Take 2 tablets   (1,000 mg) by mouth every 6 hours if needed for mild pain (1 - 3) for up   to 28 days.   docusate sodium 100 mg capsule; Commonly known as: Colace; Take 1   capsule (100 mg) by mouth 2 times a day.   ondansetron 4 mg tablet; Commonly known as: Zofran; Take 1 tablet (4 mg)   by mouth every 6 hours if needed for nausea or vomiting for up to 3 days.   oxyCODONE 5 mg immediate release tablet; Commonly known as: Roxicodone;   Take 1 tablet (5 mg) by mouth every 6 hours if needed for severe pain (7 -   10) for up to 7 days.     CONTINUE taking these medications     amLODIPine-benazepriL 5-10 mg capsule; Commonly known as: Lotrel; Take 1   capsule by mouth once daily.   aspirin 81 mg chewable tablet; Chew 1 tablet (81 mg) once daily.    atorvastatin 80 mg tablet; Commonly known as: Lipitor; Take 1 tablet (80   mg) by mouth once daily.   clopidogrel 75 mg tablet; Commonly known as: Plavix; Take 1 tablet (75   mg) by mouth once daily.   fluticasone 50 mcg/actuation nasal spray; Commonly known as: Flonase   gabapentin 100 mg capsule; Commonly known as: Neurontin; Take 1 capsule   (100 mg) by mouth once daily at bedtime.   hydrOXYzine HCL 25 mg tablet; Commonly known as: Atarax   isosorbide mononitrate ER 30 mg 24 hr tablet; Commonly known as: Imdur;   Take 1 tablet (30 mg) by mouth once daily. Do not crush or chew.   lidocaine 5 % patch; Commonly known as: Lidoderm; Place 1 patch over 12   hours on the skin once daily. Apply to painful area 12 hours per day,   remove for 12 hours.   metoprolol succinate XL 50 mg 24 hr tablet; Commonly known as:   Toprol-XL; Take 1 tablet (50 mg) by mouth once daily.   omeprazole 40 mg DR capsule; Commonly known as: PriLOSEC   traMADol 50 mg tablet; Commonly known as: Ultram     STOP taking these medications     HYDROcodone-acetaminophen 5-325 mg tablet; Commonly known as: Norco       Outpatient Follow-Up  Future Appointments   Date Time Provider Department Center   1/23/2025 10:00 AM Earl Banuelos MD [id number] [facility]   2/4/2025 10:30 AM Juan Cueto DO [id number] [facility]   3/6/2025  1:00 PM ANTHONY Gamino [id number] [facility]   4/23/2025 11:00 AM ANTHONY Gamino [id number] [facility]       Jose Duque MD  Orthopaedic Surgery, PGY-2  Available by Epic Chat  12/29/24  8:25 AM

## 2024-12-31 ENCOUNTER — APPOINTMENT (OUTPATIENT)
Dept: CARDIAC REHAB | Facility: CLINIC | Age: 74
End: 2024-12-31
Payer: MEDICARE

## 2025-01-02 ENCOUNTER — APPOINTMENT (OUTPATIENT)
Dept: CARDIAC REHAB | Facility: CLINIC | Age: 75
End: 2025-01-02
Payer: MEDICARE

## 2025-01-03 ENCOUNTER — APPOINTMENT (OUTPATIENT)
Dept: RADIOLOGY | Facility: HOSPITAL | Age: 75
End: 2025-01-03
Payer: MEDICARE

## 2025-01-03 ENCOUNTER — HOSPITAL ENCOUNTER (EMERGENCY)
Facility: HOSPITAL | Age: 75
Discharge: HOME | End: 2025-01-03
Payer: MEDICARE

## 2025-01-03 ENCOUNTER — OFFICE VISIT (OUTPATIENT)
Dept: URGENT CARE | Age: 75
End: 2025-01-03
Payer: MEDICARE

## 2025-01-03 VITALS
OXYGEN SATURATION: 97 % | TEMPERATURE: 97.2 F | DIASTOLIC BLOOD PRESSURE: 79 MMHG | HEART RATE: 89 BPM | RESPIRATION RATE: 18 BRPM | BODY MASS INDEX: 32.58 KG/M2 | WEIGHT: 220 LBS | HEIGHT: 69 IN | SYSTOLIC BLOOD PRESSURE: 164 MMHG

## 2025-01-03 VITALS
HEIGHT: 69 IN | BODY MASS INDEX: 32.58 KG/M2 | WEIGHT: 220 LBS | SYSTOLIC BLOOD PRESSURE: 172 MMHG | RESPIRATION RATE: 18 BRPM | TEMPERATURE: 97.7 F | DIASTOLIC BLOOD PRESSURE: 86 MMHG | OXYGEN SATURATION: 96 % | HEART RATE: 84 BPM

## 2025-01-03 DIAGNOSIS — Z98.890 S/P WRIST SURGERY: Primary | ICD-10-CM

## 2025-01-03 DIAGNOSIS — M25.431 SWELLING OF RIGHT WRIST: ICD-10-CM

## 2025-01-03 DIAGNOSIS — M79.89 LEFT ARM SWELLING: Primary | ICD-10-CM

## 2025-01-03 DIAGNOSIS — G62.9 NEUROPATHY: ICD-10-CM

## 2025-01-03 PROCEDURE — 99284 EMERGENCY DEPT VISIT MOD MDM: CPT | Mod: 25

## 2025-01-03 PROCEDURE — 93971 EXTREMITY STUDY: CPT

## 2025-01-03 PROCEDURE — 93971 EXTREMITY STUDY: CPT | Mod: FOREIGN READ | Performed by: RADIOLOGY

## 2025-01-03 PROCEDURE — 29125 APPL SHORT ARM SPLINT STATIC: CPT | Mod: LT

## 2025-01-03 PROCEDURE — 2500000001 HC RX 250 WO HCPCS SELF ADMINISTERED DRUGS (ALT 637 FOR MEDICARE OP): Performed by: PHYSICIAN ASSISTANT

## 2025-01-03 RX ORDER — GABAPENTIN 100 MG/1
200 CAPSULE ORAL 3 TIMES DAILY
Qty: 42 CAPSULE | Refills: 0 | Status: SHIPPED | OUTPATIENT
Start: 2025-01-03 | End: 2025-01-07

## 2025-01-03 RX ORDER — OXYCODONE HYDROCHLORIDE 5 MG/1
5 TABLET ORAL ONCE
Status: COMPLETED | OUTPATIENT
Start: 2025-01-03 | End: 2025-01-03

## 2025-01-03 RX ORDER — ACETAMINOPHEN 325 MG/1
975 TABLET ORAL ONCE
Status: COMPLETED | OUTPATIENT
Start: 2025-01-03 | End: 2025-01-03

## 2025-01-03 RX ADMIN — OXYCODONE 5 MG: 5 TABLET ORAL at 18:45

## 2025-01-03 RX ADMIN — ACETAMINOPHEN 975 MG: 325 TABLET, FILM COATED ORAL at 18:45

## 2025-01-03 ASSESSMENT — PAIN DESCRIPTION - ORIENTATION: ORIENTATION: LEFT

## 2025-01-03 ASSESSMENT — PAIN SCALES - GENERAL: PAINLEVEL_OUTOF10: 6

## 2025-01-03 ASSESSMENT — PAIN DESCRIPTION - PAIN TYPE: TYPE: ACUTE PAIN

## 2025-01-03 ASSESSMENT — PAIN - FUNCTIONAL ASSESSMENT: PAIN_FUNCTIONAL_ASSESSMENT: 0-10

## 2025-01-03 NOTE — PROGRESS NOTES
Subjective   Patient ID: Uma Middleton is a 74 y.o. female. They present today with a chief complaint of Arm Pain (Left ).    History of Present Illness  Patient is a pleasant 74-year-old white female, past medical history of CAD, hypertension, hyperlipidemia, status post CABG in remote past, presenting to the clinic for chief complaint of concern for postsurgical swelling in her right upper extremity.  Patient states she had surgery performed on her wrist fracture the day after [place] 8 days ago.  She been in the clinic out of concern for persistent pain and swelling associated the extremity with associated numbness and tingling in the hand.  She is reporting a blister that formed on the dorsum of her hand as well that she had to pop.  States they called the surgeon's office today who advised to report to the urgent care for further evaluation and assessment.  Patient denies any new trauma or injury.  She denies any fever or chills.  She does report moderate to severe pain that her oxycodone is not touching at home.      Arm Pain      Past Medical History  Allergies as of 01/03/2025 - Reviewed 01/03/2025   Allergen Reaction Noted    Fire ant Shortness of breath 04/27/2020    Erythromycin Itching 11/15/2023    Tea tree oil Hives 02/23/2017       (Not in a hospital admission)         Past Medical History:   Diagnosis Date    Adhesive capsulitis of right shoulder 10/21/2013    Adhesive capsulitis of right shoulder    Angina pectoris     Anxiety     Aortic aneurysm without rupture, unspecified portion of aorta (CMS-[facility])     ASHD (arteriosclerotic heart disease)     Aleman esophagus     Central retinal vein occlusion, right eye (CMS-[facility])     Chest pain     Cholelithiasis     Dermatochalasis of both upper eyelids     Dry eye syndrome of bilateral lacrimal glands     Elevated blood-pressure reading, without diagnosis of hypertension     Elevated blood pressure reading without diagnosis of hypertension    GERD  (gastroesophageal reflux disease)     Hyperlipidemia     Hypertension     Inconclusive mammogram 01/23/2017    Breast density    Lattice degeneration of retina, bilateral     Other specified disorders of eustachian tube, bilateral 05/08/2017    Dysfunction of Eustachian tube, bilateral    Otitis media, unspecified, right ear 12/18/2015    Acute right otitis media    Personal history of other diseases of the respiratory system 10/18/2016    History of acute bronchitis with bronchospasm    PONV (postoperative nausea and vomiting)     Primary osteoarthritis, unspecified hand 02/09/2015    Osteoarthritis, hand    Sacroiliitis, not elsewhere classified (CMS-HCC) 04/26/2016    Sacroiliitis    Sciatica, right side 04/26/2016    Sciatica, right    Snoring     Unspecified optic atrophy     Vitamin D deficiency        Past Surgical History:   Procedure Laterality Date    CARDIAC CATHETERIZATION N/A 10/14/2024    Procedure: Left Heart Cath;  Surgeon: Juan Cueto DO;  Location: [facility] Cardiac Cath Lab;  Service: Cardiovascular;  Laterality: N/A;  no pre auth required    CARDIAC SURGERY  11/08/2024    CATARACT EXTRACTION      CATARACT EXTRACTION W/  INTRAOCULAR LENS IMPLANT Bilateral     OD 07/18/2013 +15.5D, OS 12/05/2013 +15.5D    CHOLECYSTECTOMY      COLONOSCOPY      ESOPHAGOGASTRODUODENOSCOPY      OTHER SURGICAL HISTORY  06/25/2013    Treatment Of The Left Ankle    PA OSTEOTOMY MANDIBLE SEGMENTAL  1969    SHOULDER SURGERY          reports that she quit smoking about 15 years ago. Her smoking use included cigarettes. She started smoking about 43 years ago. She has a 28 pack-year smoking history. She has been exposed to tobacco smoke. She has never used smokeless tobacco. She reports that she does not drink alcohol and does not use drugs.    Review of Systems  Review of Systems                               Objective    Vitals:    01/03/25 1537   BP: 164/79   Pulse: 89   Resp: 18   Temp: 36.2 °C (97.2 °F)   TempSrc: Temporal  "  SpO2: 97%   Weight: 99.8 kg (220 lb)   Height: 1.753 m (5' 9\")     No LMP recorded. Patient is postmenopausal.    Physical Exam  General: Vitals Noted. No distress. Normocephalic.     HEENT: TMs normal, EOMI, normal conjunctiva, patent nares, Normal OP    Neck: Supple with no adenopathy.     Cardiac: Regular Rate and Rhythm. No murmur.     Pulmonary: Equal breath sounds bilaterally. No wheezes, rhonchi, or rales.    Abdomen: Soft, non-tender, with normal bowel sounds.     Musculoskeletal: Evaluation of the right upper extremity does reveal diffuse edema throughout the entire right hand.  There is a cast in place extending from mid hand up to mid forearm.  Cast was not removed at this time.  There is some associated erythema noted extending up the paroxysmal upper extremity.  She does have good cap refill.  Good sensation in the fingers.  Moves all extremities, no effusion, no edema.     Skin: No obvious rashes.  Procedures    Point of Care Test & Imaging Results from this visit    No results found.    Diagnostic study results (if any) were reviewed by Calvin Damon PA-C.    Assessment/Plan   Allergies, medications, history, and pertinent labs/EKGs/Imaging reviewed by Calvin Damon PA-C.     Medical Decision Making  Patient was seen eval in the clinic with chief complaint of right upper extremity swelling related to her surgery.  On exam patient is nontoxic-appearing resting comfortably no acute distress.  Vital signs are stable, afebrile.  Chest is clear, heart is regular, belly is diffusely soft and nontender peer evaluation of right upper extremity as above.   I feel patient warrants further evaluation in the emergency department for blood work and x-ray imaging to assess for underlying acute osteomyelitis.  I have minimal concern for acute compartment syndrome at this time however I do feel a deeper evaluation is indicated at this time.  I had a very since a conversation with the patient  " at bedside regarding my impression and reasons report to the ED and both expressed understanding and agreement plan of care.      Orders and Diagnoses  There are no diagnoses linked to this encounter.      Medical Admin Record      Follow Up Instructions  No follow-ups on file.    Patient disposition: Home    Electronically signed by Calvin Damon PA-C  3:57 PM

## 2025-01-03 NOTE — ED PROVIDER NOTES
HPI   Chief Complaint   Patient presents with    Upper Extremity Issue       Is a 74-year-old female coming in for left upper extremity swelling.  She did recently have orthopedic surgery 1 week ago.  She has had a splint on.  She went to an urgent care because of the swelling and they advised her to come here.  Patient has swelling to the distal area exposed from the splint.  She reports that she has had pain to the area.  She is on oxycodone as well as Tylenol and gabapentin for her discomfort.  She has not had any fevers or chills.  No skin color changes other than swelling to the exposed skin.  Prior to her surgery patient did have open heart surgery.  She has had some shortness of breath however that has been occurring since the open heart surgery.      History provided by:  Patient and spouse          Patient History   Past Medical History:   Diagnosis Date    Adhesive capsulitis of right shoulder 10/21/2013    Adhesive capsulitis of right shoulder    Angina pectoris     Anxiety     Aortic aneurysm without rupture, unspecified portion of aorta (CMS-[facility])     ASHD (arteriosclerotic heart disease)     Aleman esophagus     Central retinal vein occlusion, right eye (CMS-[facility])     Chest pain     Cholelithiasis     Dermatochalasis of both upper eyelids     Dry eye syndrome of bilateral lacrimal glands     Elevated blood-pressure reading, without diagnosis of hypertension     Elevated blood pressure reading without diagnosis of hypertension    GERD (gastroesophageal reflux disease)     Hyperlipidemia     Hypertension     Inconclusive mammogram 01/23/2017    Breast density    Lattice degeneration of retina, bilateral     Other specified disorders of eustachian tube, bilateral 05/08/2017    Dysfunction of Eustachian tube, bilateral    Otitis media, unspecified, right ear 12/18/2015    Acute right otitis media    Personal history of other diseases of the respiratory system 10/18/2016    History of acute bronchitis with  bronchospasm    PONV (postoperative nausea and vomiting)     Primary osteoarthritis, unspecified hand 02/09/2015    Osteoarthritis, hand    Sacroiliitis, not elsewhere classified (CMS-HCC) 04/26/2016    Sacroiliitis    Sciatica, right side 04/26/2016    Sciatica, right    Snoring     Unspecified optic atrophy     Vitamin D deficiency      Past Surgical History:   Procedure Laterality Date    CARDIAC CATHETERIZATION N/A 10/14/2024    Procedure: Left Heart Cath;  Surgeon: Juan Cueto DO;  Location: [facility] Cardiac Cath Lab;  Service: Cardiovascular;  Laterality: N/A;  no pre auth required    CARDIAC SURGERY  11/08/2024    CATARACT EXTRACTION      CATARACT EXTRACTION W/  INTRAOCULAR LENS IMPLANT Bilateral     OD 07/18/2013 +15.5D, OS 12/05/2013 +15.5D    CHOLECYSTECTOMY      COLONOSCOPY      ESOPHAGOGASTRODUODENOSCOPY      OTHER SURGICAL HISTORY  06/25/2013    Treatment Of The Left Ankle    RI OSTEOTOMY MANDIBLE SEGMENTAL  1969    SHOULDER SURGERY       Family History   Problem Relation Name Age of Onset    Hypertension Mother Marilia     Heart disease Father Rd     Hypertension Father Rd      Social History     Tobacco Use    Smoking status: Former     Current packs/day: 0.00     Average packs/day: 1 pack/day for 28.0 years (28.0 ttl pk-yrs)     Types: Cigarettes     Start date: 1982     Quit date: 1/1/2010     Years since quitting: 15.0     Passive exposure: Past    Smokeless tobacco: Never    Tobacco comments:     Quit 2011   Vaping Use    Vaping status: Never Used   Substance Use Topics    Alcohol use: Never     Comment: 1 cocktail/ mo maybe    Drug use: Never       Physical Exam   ED Triage Vitals [01/03/25 1714]   Temperature Heart Rate Respirations BP   36.5 °C (97.7 °F) 84 18 172/86      Pulse Ox Temp Source Heart Rate Source Patient Position   96 % Temporal Monitor Sitting      BP Location FiO2 (%)     Left arm --       Physical Exam  Vitals and nursing note reviewed.   Constitutional:       General:  She is not in acute distress.     Appearance: Normal appearance. She is not toxic-appearing.   HENT:      Head: Normocephalic and atraumatic.      Nose: Nose normal.      Mouth/Throat:      Mouth: Mucous membranes are moist.      Pharynx: Oropharynx is clear.   Eyes:      Extraocular Movements: Extraocular movements intact.      Conjunctiva/sclera: Conjunctivae normal.      Pupils: Pupils are equal, round, and reactive to light.   Cardiovascular:      Rate and Rhythm: Regular rhythm.      Pulses: Normal pulses.      Heart sounds: Normal heart sounds.   Pulmonary:      Effort: Pulmonary effort is normal. No respiratory distress.      Breath sounds: Normal breath sounds.   Abdominal:      General: Abdomen is flat. Bowel sounds are normal.      Palpations: Abdomen is soft.      Tenderness: There is no abdominal tenderness.   Musculoskeletal:         General: Normal range of motion.      Cervical back: Normal range of motion and neck supple.      Comments: Patient does have left upper extremity swelling after the splint was removed.  Surgical site to the radial side of the left anterior forearm at the distal forearm does appear well-healing without drainage or discharge.  There is no warmth or erythema to the left upper extremity at the hand wrist or forearm.   Skin:     General: Skin is warm and dry.      Coloration: Skin is not jaundiced or pale.      Findings: No bruising.   Neurological:      General: No focal deficit present.      Mental Status: She is alert and oriented to person, place, and time. Mental status is at baseline.   Psychiatric:         Mood and Affect: Mood normal.         Behavior: Behavior normal.           ED Course & MDM   Diagnoses as of 01/03/25 1836   Left arm swelling                 No data recorded     D[place]rell Coma Scale Score: 15 (01/03/25 1716 : David Parrish RN)                           Medical Decision Making  Summary:  Medical Decision Making:   Patient presented as described in  HPI. Patient case including ROS, PE, and treatment and plan discussed with ED attending if attached as cosigner. Results from labs and or imaging included below if completed. Uma Middleton  is a 74 y.o. coming in for Patient presents with:  Upper Extremity Issue  .  Upon assessment of the patient and she was sent here from urgent care due to concerns of osteomyelitis.  Patient does not have any fevers.  No drainage.  No signs of infection to the area.  Ultrasound is completed to rule out DVT.  Ultrasound showed no concerning findings.  Patient reports that she did not take any pain medication today including Tylenol or ibuprofen.  Due to this patient will be placed into a repeat splint as the original one was removed.  Patient will be placed into a volar splint and she will be given oxycodone and Tylenol for discomfort.  She will be discharged and vies follow-up with her orthopedics surgeon as scheduled on Tuesday.  There are no signs of infection.  A volar splint was applied by nursing staff.  Afterwards area was evaluated by myself and neurovascularly is intact.  Patient complains of even  very light palpation to the tips of the fingers.  I do believe that there is no more neurological.  She will be started on gabapentin 200 mg 3 times a day for discomfort as a prescription and follow-up with her surgeon.      Disposition is completed with shared decision making with the patient or guardian present with the patient. They were advised to follow up with PCP or recommended provider in 2-3 days for another evaluation and exam. I advised the patient to return or go to closest emergency room immediately if symptoms change, get worse, or new symptoms develop prior to follow up. I explained the plan and treatment course. Patient/guardian is in agreement with plan, treatment course, and follow up and state that they will comply.    Labs Reviewed - No data to display   Vascular US Upper Extremity Venous Duplex Left   Final  Result    No evidence for DVT within the left upper extremity.    Signed by Ankit Case MD                            Tests/Medications/Escalations of Care considered but not given: As in MDM    Patient care discussed with: N/A  Social Determinants affecting care: N/A    Final diagnosis and disposition as documented     Diagnoses as of 01/03/25 1846  Left arm swelling       Shared decision making was completed and determined that patient will be discharged. I discussed the differential; results and discharge plan with the patient and/or family/friend/caregiver if present.  I emphasized the importance of follow-up with the physician I referred them to in the timeframe recommended.  I explained reasons for the patient to return to the Emergency Department. They agreed that if they feel their condition is worsening or if they have any other concern they should call 911 immediately for further assistance. I gave the patient an opportunity to ask all questions they had and answered all of them accordingly. They understand return precautions and discharge instructions. The patient and/or family/friend/caregiver expressed understanding verbally and that they would comply.     Disposition: Discharge      This note has been transcribed using voice recognition and may contain grammatical errors, misplaced words, incorrect words, incorrect phrases or other errors.         Procedure  Procedures     Derik John PA-C  01/03/25 1847       Derik John PA-C  01/03/25 1847       Derik John PA-C  01/03/25 1854

## 2025-01-07 ENCOUNTER — OFFICE VISIT (OUTPATIENT)
Dept: ORTHOPEDIC SURGERY | Facility: HOSPITAL | Age: 75
End: 2025-01-07
Payer: MEDICARE

## 2025-01-07 ENCOUNTER — HOSPITAL ENCOUNTER (OUTPATIENT)
Dept: RADIOLOGY | Facility: HOSPITAL | Age: 75
Discharge: HOME | End: 2025-01-07
Payer: MEDICARE

## 2025-01-07 DIAGNOSIS — M25.532 LEFT WRIST PAIN: Primary | ICD-10-CM

## 2025-01-07 DIAGNOSIS — M25.532 LEFT WRIST PAIN: ICD-10-CM

## 2025-01-07 PROCEDURE — L3908 WHO COCK-UP NONMOLDE PRE OTS: HCPCS | Performed by: STUDENT IN AN ORGANIZED HEALTH CARE EDUCATION/TRAINING PROGRAM

## 2025-01-07 PROCEDURE — 1111F DSCHRG MED/CURRENT MED MERGE: CPT | Performed by: STUDENT IN AN ORGANIZED HEALTH CARE EDUCATION/TRAINING PROGRAM

## 2025-01-07 PROCEDURE — 1157F ADVNC CARE PLAN IN RCRD: CPT | Performed by: STUDENT IN AN ORGANIZED HEALTH CARE EDUCATION/TRAINING PROGRAM

## 2025-01-07 PROCEDURE — 73110 X-RAY EXAM OF WRIST: CPT | Mod: LEFT SIDE | Performed by: RADIOLOGY

## 2025-01-07 PROCEDURE — 99211 OFF/OP EST MAY X REQ PHY/QHP: CPT | Performed by: STUDENT IN AN ORGANIZED HEALTH CARE EDUCATION/TRAINING PROGRAM

## 2025-01-07 PROCEDURE — 73110 X-RAY EXAM OF WRIST: CPT | Mod: LT

## 2025-01-07 RX ORDER — OXYCODONE HYDROCHLORIDE 5 MG/1
5 TABLET ORAL EVERY 6 HOURS PRN
Qty: 28 TABLET | Refills: 0 | Status: SHIPPED | OUTPATIENT
Start: 2025-01-07 | End: 2025-01-14

## 2025-01-07 RX ORDER — GABAPENTIN 300 MG/1
300 CAPSULE ORAL 3 TIMES DAILY
Qty: 90 CAPSULE | Refills: 2 | Status: SHIPPED | OUTPATIENT
Start: 2025-01-07 | End: 2026-01-07

## 2025-01-07 NOTE — PROGRESS NOTES
[facility]  Hand and Upper Extremity Service  Post Operative visit        Date of surgery: 12/26/2024  Surgery(s) performed: Open reduction internal fixation of left distal radius.        Subjective report:   Patient presents for her first postop follow-up.  She has remained in her splint.  She did go to the emergency department on 1/3/2025 for continued hand and arm swelling with significant postoperative pain.  She notes she has been pretty miserable since her surgery.  Of note she was also in significant pain prior to her operation as she had been dealing with a left distal radius fracture which was tried to be treated nonoperatively by another surgeon.  She had a delayed presentation to my office for operative fixation.  She was approximately 4 weeks out from date of injury and had a pending malunion which needed significant mount of work to break up her fracture and realign her fracture fragments.    She had an ultrasound performed while in the emergency department which was negative for any DVTs.  She denies numbness and tingling.  She notes a burning type pain diffusely and circumferentially about her wrist and dorsum of her hand.  Denies numbness and tingling in the fingers.        Exam findings; left upper extremity  Mild to moderate swelling to the left wrist.  Moderate swelling to the dorsum of the hands and fingers.  Limited range of motion to her fingers secondary to pain, swelling and stiffness.  She can flex and extend the MP and IP joints however she is approximately 7 cm from the distal palmar crease.  Limited wrist range of motion secondary to pain.  She is not hypersensitive to light touch.  Incision is well-approximated without any erythema warmth or drainage.  Sutures are intact.  AIN, PIN, ulnar motors intact.  Sensation intact light touch radial, ulnar, median nerves     Radiograph findings: XR of the left wrist was taken reviewed in office today.  Status  post open reduction internal fixation of left distal radius fracture with near anatomic alignment and improved positioning.  No signs of hardware failure or loosening        Plan: We will transition her to a Velcro removable brace.  She may take this off for hygiene purposes and shower.  She may also take it off for gentle range of motion.  I will keep her sutures in for 1 more week.  She will follow-up in 1 week.  I also did discuss if she is not tolerating the brace and she may call my office and we will try to get her in to see our one of our cast techs for a plaster splint.  I did discuss trying to improve her finger range of motion and continue elevation.  I have referred her to occupational therapy.  I feel she would get significant improvement with the guidance of occupational therapy.  I have also refilled her prescription for oxycodone and gabapentin.  She will follow-up in 1 week        Medications Prescribed: Oxycodone 5 mg every 6 hours as needed and gabapentin 300 mg 3 times daily           Will Beucler,   [facility] [facility] [employer]  Department of Orthopaedic Surgery  Hand and Upper Extremity Surgery  [facility]     Dictation performed with the use of voice recognition software. Syntax and grammatical errors may exist.

## 2025-01-08 NOTE — OP NOTE
Left Distal Radius Fracture Open Reduction Internal Fixation (L) Operative Note     Date: 2024  OR Location: [facility]    Name: Uma Middleton, : 1950, Age: 74 y.o., MRN: [id number], Sex: female    Diagnosis  Pre-op Diagnosis      * Left wrist pain [M25.532] Post-op Diagnosis     * Left wrist pain [M25.532]     Procedures  Left Distal Radius Fracture Open Reduction Internal Fixation  00732 - UT OPTX DSTL RADL I-ARTIC FX/EPIPHYSL SEP 2 FRAG      Surgeons      * Will B Beucler - Primary    Resident/Fellow/Other Assistant:  Surgeons and Role:     * Lex Harris MD - Resident - Assisting    Staff:   Circulator: Sameera Garcia Person: Sriram Garcia Person: Huber  Circulator: Emmanuel    Anesthesia Staff: Anesthesiologist: Huey Gastelum MD; Mario Mcbride MD  CRNA: MANUEL Leon-SHAYLA    Procedure Summary  Anesthesia: General  ASA: III  Estimated Blood Loss: 20 mL  Intra-op Medications:   Administrations occurring from 1200 to 1430 on 24:   Medication Name Total Dose   sodium chloride 0.9 % irrigation solution 1,000 mL   BUPivacaine HCl (Marcaine) 0.5 % (5 mg/mL) 20 mL, lidocaine (Xylocaine) 20 mL syringe 10 mL   ceFAZolin (Ancef) vial 1 g 2 g   dexAMETHasone (Decadron) injection 4 mg/mL 4 mg   fentaNYL (Sublimaze) injection 50 mcg/mL 200 mcg   HYDROmorphone (Dilaudid) injection 1 mg/mL 0.8 mg   labetalol (Normodyne,Trandate) injection 30 mg   LR bolus Cannot be calculated   lidocaine PF (Xylocaine-MPF) local injection 2 % 40 mg   lubricating eye drops ophthalmic solution 2 drop   midazolam PF (Versed) injection 1 mg/mL 2 mg   propofol (Diprivan) injection 10 mg/mL 200 mg              Anesthesia Record               Intraprocedure I/O Totals          Intake    LR bolus 700.00 mL    Total Intake 700 mL       Output    Urine 0 mL    Est. Blood Loss 50 mL    Total Output 50 mL       Net    Net Volume 650 mL          Specimen: No specimens collected              Drains and/or Catheters: * None in log  *    Tourniquet Times:     Total Tourniquet Time Documented:  Arm - Upper (Left) - 129 minutes  Total: Arm - Upper (Left) - 129 minutes      Implants:  Implants       Type Name Action Serial No.      Screw SCREW, NON LOCKING, CORTICAL, 3.5 X 12MM, TI - SN/A - [id number] Implanted N/A     Screw SCREW, CORTICAL LOCKING, 3.5 X 14MM, TI - SN/A - [id number] Implanted N/A     Screw SCREW, LOCKING, PLYAXIAL, 2.5 X 16MM CANNULATED, COCR - SN/A - [id number] Implanted N/A     Screw SCREW, CORTICAL LOCKING, 3.5 X 10MM, TI - SN/A - [id number] Wasted N/A     Screw SCREW, CORTICAL NON LOCKING, 3.5mm x 11mm, TI Wasted N/A     Screw PLATE, GEMINUS, FOLAR DISTAL, STANDARD, 3 HOLE, LEFT - SN/A - [id number] Implanted N/A     Screw THREADED PEG, LOCKING, 2.3mm x 17mm, TI Implanted N/A     Screw THREADED PEG, LOCKING, 2.3mm x 18mm, TI Implanted N/A     Screw PEG, HIGH COMPRESSION LOCKING, 2.7 X 18MM, TI - SN/A - [id number] Implanted N/A     Screw SCREW, NON LOCKING, CORTICAL, 3.5 X 13MM, TI - SN/A - [id number] Implanted N/A     Screw SCREW, CORTICAL LOCKING, 3.5 X 12MM, TI - SN/A - [id number] Implanted N/A              Findings: Pending malunion with bridging callus present through the fracture site with a dorsally angulated distal radius fracture.  Intra-articular and extension.  She also notes ulnar fracture.    Indications: Uma Middleton is an 74 y.o. female who is having surgery for Left wrist pain [M25.532].  Patient sustained a displaced left distal radius fracture nearly 4 weeks prior.  She had an attempt of a closed reduction performed in office by a different orthopedic surgeon.  She was under the care of a different orthopedic surgeon in that region.  She had been scheduled for an open reduction internal fixation of her left distal radius which has been postponed by weather and availability.  She continued to have significant pain.  She was scheduled to have an open reduction internal fixation at a surgery center however her  cardiothoracic surgeon wanted her to have surgery at either [facility] or [facility] as she had underwent a cardiac bypass 6 weeks prior.  I had an extensive conversation with her and her  about continued nonoperative treatment and this would lead to a malunion however that may not be clinically significant.  At this point she wanted to proceed with surgery as she has continued pain and deformity and had already been scheduled.  Her cardiothoracic surgeon had cleared her for the procedure but wanted it done at a hospital.  She was subsequently referred to me and wanted operative fixation.      The patient was seen in the preoperative area. The risks, benefits, complications, treatment options, non-operative alternatives, expected recovery and outcomes were discussed with the patient. The possibilities of reaction to medication, pulmonary aspiration, injury to surrounding structures, bleeding, recurrent infection, the need for additional procedures, failure to diagnose a condition, and creating a complication requiring transfusion or operation were discussed with the patient. The patient concurred with the proposed plan, giving informed consent.  The site of surgery was properly noted/marked if necessary per policy. The patient has been actively warmed in preoperative area. Preoperative antibiotics have been ordered and given within 1 hours of incision. Venous thrombosis prophylaxis are not indicated.    Procedure Details: Indications for procedure:     I met and evaluated the patient in the preoperative holding area today prior to the patient receiving any sedation or other medications which may alter their mental status. I confirmed their identity via the facility's protocol. I reviewed the patient's history, physical exam, and recommendation for surgery. The indications for surgical versus nonsurgical management of the condition were discussed, including the inherent risks, benefits, prognosis of  the condition.  The risks of surgery include, but are not limited to, pain, bleeding, infection, tendon damage, nerve damage, vessel damage, and need for further surgery.  The risks also include wound healing problems, decreased long-term functionality of the wrist and hand, tendon irritation, tendon rupture, and possible need for therapy for an optimum outcome.  There is a risk of complex regional pain syndrome, incomplete recovery, incomplete relief or worsening of symptoms, and recurrence.  We also discussed that medical complications are possible during and after surgery related to either anesthesia or the surgical procedure itself. Specific discussion of the risks of the proposed anesthetic plan will be discussed by the patient's anesthesia provider. All risks were reviewed in layman´s terms. The patient was given ample time to ask appropriate questions and appeared to be satisfied with the answers provided. All questions were answered and no guarantees have been given regarding the patient's condition and treatment recommendations. The general plan for the postoperative rehabilitation course, including possible need for therapy, was reviewed at great length. Informed consent was signed by all appropriate parties. The surgical site was marked in ink by myself.        Procedure in Detail:  The patient was transported to the operating room and placed in the supine position on the operating table. All extremities and prominences were appropriately padded. Adequate anesthesia was induced. A non-sterile tourniquet was applied high on the left arm. The left arm was prepped and draped in standard sterile fashion. A time-out was conducted prior to beginning the operation to confirm the patient's identity, planned procedures, laterality of procedures, and that appropriate antibiotics had been administered within 30 minutes of incision. The left upper extremity was exsanguinated with an Esmarch bandage, and the  tourniquet was inflated to 250mmHg.     A volar FCR approach was used for surgical exposure. A approximately 9 cm incision was made beginning at the volar wrist crease continuing proximally along the FCR tendon.  Full thickness soft tissue flaps were elevated from the forearm fascia overlying the FCR tendon. Care was taken during dissection to preserve and protect the palmar cutaneous branch of the median nerve and the radial artery. The FCR tendon sheath was opened longitudinally from the volar wrist crease to approximately 11 centimeters proximal to this. The FCR tendon was carefully retracted ulnarly. The FPL musculotendinous unit was identified, septal insertions to the radial shaft were carefully released, and the tendon was retracted ulnarly. The pronator quadratus was identified and released from the radius via an L-shaped incision running longitudinally along the radial aspect of the radius and transversely just proximal to the origin of the volar extrinsic radiocarpal ligaments.  The pronator quadratus was elevated as a single flap and used to cushion any gentle retraction against the carpal canal contents. The fracture site was identified and there was bridging callus through the fracture line.  The callus was gently curetted from the volar surface of the distal radius.  A quarter-inch osteotome was placed within the fracture line and used to reestablish the fracture line.  This was advanced to the dorsal cortex of the distal radius.  This was done under fluoroscopy through the previous fracture line.    A brachioradialis tenotomy was performed under direct visualization, carefully protecting the 1st dorsal extensor compartment tendons, radial artery, and superficial branch of the radial nerve.    A [place] elevator was used to break up the bridging callus dorsally.  A bone reduction clamp was placed on the proximal aspect of the radial shaft the radial shaft was then pronated and the dorsal callus was  "broken up.  The distal fracture segment was fully mobilized.     Fracture reduction was facilitated with gentle traction and fracture site manipulation in order to restore the volar cortex and acceptable volar tilt. Intraoperative C-arm fluoroscopy was utilized to confirm acceptable fracture reduction in multiple planes.  Acceptable radial height, radial inclination, and volar tilt  were confirmed on orthogonal fluoroscopic views.  There was intra-articular extension into the distal radius with 2 separate fragments however these were well reduced.     A volar locking distal radius plating system was used for definitive fracture stabilization. The plate was first centered over the distal aspect of the radial diaphysis and stabilized using an appropriately drilled, sized, and inserted bicortical non-locking screw. Next the distal portion of the plate was utilized to aid in fracture reduction and stabilization, and was provisionally fixed distally using three unicortical 0.054\" Mode wires inserted through the corresponding wire holes within the volar locking plate. The distal screw holes in the plate were next filled by placing appropriately drilled, sized, and inserted unicortical locking screws. With the distal portion of the plate filled, attention was once again turned proximally where two additional bicortical screws were placed in the corresponding plate holes in standard fashion as described above. Following hardware placement intraoperative C-arm fluoroscopy was utilized in multiple planes to confirm maintenance of fracture reduction and adequate hardware placement.  The intraoperative C-arm images were reviewed.  Of note there is an old ulnar styloid/head fracture.  The DRUJ was noted to be stable in supination, neutral, and pronation. A Armijo shift test was performed and noted to be negative for palpable subluxation of the carpus or other evidence of instability. No crepitance or stepoffs noted with " loaded radiocarpal shear testing.     The tourniquet was released and meticulous hemostasis achieved with bipolar cautery.  The wound was closed 4-0 nylon suture in a double fashion for the skin.  Sterile dressings consisting of Xeroform, 4 x 4 gauze, and cast padding were applied.  A well-padded volar resting splint was applied with the MCPs free for uninhibited composite digital flexion.       The patient was transferred to the hospital bed and transported to the post-anesthesia care unit in good condition having tolerated the procedure well. All sponge, needle, and instrument counts were correct x2. Postoperatively, the digits were pink and well perfused and the hand compartments and soft tissues were supple. No complications were apparent.    Postoperative Plan:  The patient will be nonweightbearing on their operative site. Their dressing will be removed in office.   They will return to clinic in 2 weeks. X-rays are needed on return visit.  Complications:  None; patient tolerated the procedure well.    Disposition: PACU - hemodynamically stable.  Condition: stable                 Additional Details: Pending malunion with already bridging callus present.  This had to be osteotomized with 1/4 inch osteotome.  This distal radius fracture took longer than normal based on the level of complexity and being nearly 4 weeks old and having to osteotomize the pending malunion.    Attending Attestation: I performed the procedure.    Gallo Samuel  Phone Number: [phone]

## 2025-01-10 ENCOUNTER — EVALUATION (OUTPATIENT)
Dept: OCCUPATIONAL THERAPY | Facility: CLINIC | Age: 75
End: 2025-01-10
Payer: MEDICARE

## 2025-01-10 DIAGNOSIS — M25.532 LEFT WRIST PAIN: ICD-10-CM

## 2025-01-10 DIAGNOSIS — M25.642 JOINT STIFFNESS OF HAND, LEFT: Primary | ICD-10-CM

## 2025-01-10 PROCEDURE — 97166 OT EVAL MOD COMPLEX 45 MIN: CPT | Mod: GO | Performed by: OCCUPATIONAL THERAPIST

## 2025-01-10 PROCEDURE — 97110 THERAPEUTIC EXERCISES: CPT | Mod: GO | Performed by: OCCUPATIONAL THERAPIST

## 2025-01-10 NOTE — PROGRESS NOTES
"    Occupational Therapy Orthopedic Evaluation    Patient Name: Uma Middleton  MRN: [id number]  Today's Date: 1/12/2025  Time Calculation  Start Time: 1020  Stop Time: 1110  Time Calculation (min): 50 min  OT Evaluation Time Entry  OT Evaluation (Moderate) Time Entry: 30, OT Therapeutic Procedures Time Entry  Therapeutic Exercise Time Entry: 20,      Insurance:  Visit number: 1 of 50  Authorization info: no inquired  Insurance Type: Medicare B and [employer]  Medicare certification  -  Start: 1/10/25  End: 4/4/25    Current Problem  1. Joint stiffness of hand, left        2. Left wrist pain  Referral to Occupational Therapy          Referred by:   Dr. Samuel  Referred for:  L distal radius fx ORIF and significant wrist stiffness  Onset/DOI:  11/29/24  DOS: 12/24//24    Fall risk:  Low  Precautions:  NWB     Medical History Form: Reviewed (scanned into chart)    Subjective   Chief Complaint: extreme pain L wrist and L hand stiffness post-op`    Hand Dominance: Right    Pain:  6/10  Location: radial/volar wrist and dorsal forearm  Description: burning, tight, prickling, sharp, throbbing  Aggravating Factors:  N/A - pain is constant  Relieving Factors:   minimal pain relief from prescription pain meds    Previous Interventions/Treatments: None    Prior Level of Function (PLOF)  Patient previously indpendent with all ADLs/IADLs    ADL/IADL impairments  Bathing, Dressing, Hygiene/Grooming, Hair care, Sleep, Driving, Home mgt, and Meal prep    Patients Living Environment: Reviewed and no concern  Lives with:  - L[place]  Primary Language: English  Patient's Goal(s) for Therapy:   \"to make my hand/wrist work again and to not hurt\"    Red Flags: Do you have any of the following? No  Fever/chills, unexplained weight changes, dizziness/fainting, unexplained change in bowel or bladder functions, unexplained malaise or muscle weakness, night pain/sweats, numbness or tingling    Objective     LEFT HAND AROM (Degrees)   MCP PIP DIP " OTERO DPC   IF      7.5 cm   MF     7 cm   RF     6 cm   SF     5 cm     L thumb opposition to SF:  8cm    WRIST AROM (Degrees)   R L   Extension  -20 A   0 P   Flexion  20 A   25 P   Pronation  35 A   Supination  15 A     L hand figure 8:  39cm  R hand figure 8:  45cm      Outcome Measures:         QUICK DASH:   88.64%     Home Program:  Exercise Sets/Reps Comments   AROM ylw sponge squeeze     Proper wrist splint wear     Finger joints PROM     Elevation     Desensitization     Heat PRN                           Treatment Today    - OT eval completed  - Therapeutic ex:   - education, demo, and return demo of the entire HEP to ensure proper carryover to home x 20min    EDUCATION: Home exercise program, plan of care, activity modifications, joint protection, pain management, and injury pathology       Assessment:   Pt is 74-year-old female who sustained a left distal radius fracture on 11/29/24 and was repaired with ORIF on 12/24/24 and is presenting today for evaluation with complaints of decreased strength, decreased ROM, extreme  pain and significant swelling of her L wrist, hand, and fingers. She presents today with her  who is available for assistance with all ADLs/IADLs and self care tasks.  As a result of these impairments pt is having difficulty with upper body/lower body bathing and dressing tasks, difficulty with hair care tasks, is experiencing loss of sleep, and is having difficulty with home management and meal prep tasks.  Without skilled intervention patient is at risk for further loss of ROM, loss of strength, reduced ADL/IADL and self care function, and is at risk for requiring caregiver assistance for self care completion.  Patient to benefit from skilled services to address the impairments found in order to return to independent prior level of function.      Level of Complexity  MOD complexity    OT Rehab Potential  Good      Plan:     Planned Interventions include: therapeutic exercise,  self-care home management, manual therapy, therapeutic activities, , neuromuscular coordination, vasopneumatic, dry needling, and orthosis fabrication.  Frequency: 2 x Week  Duration: 12 Weeks    Goals: Set and discussed today         1) Patient will be able to demo 2cm from midpalm with all digits to perform dressing tasks with MIN A, in 4 weeks.         2) Patient will demo 40 degrees of L wrist supination to perform hair care with MIN A, in 6 weeks         3) Patient will demo at least 3cm less L hand swelling to imporve gross grasping, in 4 weeks.        Plan of care was developed with input and agreement by the patient.      Marques Mills, OT

## 2025-01-12 PROBLEM — M25.642 JOINT STIFFNESS OF HAND, LEFT: Status: ACTIVE | Noted: 2025-01-12

## 2025-01-13 ENCOUNTER — TREATMENT (OUTPATIENT)
Dept: OCCUPATIONAL THERAPY | Facility: CLINIC | Age: 75
End: 2025-01-13
Payer: MEDICARE

## 2025-01-13 DIAGNOSIS — M25.642 JOINT STIFFNESS OF HAND, LEFT: Primary | ICD-10-CM

## 2025-01-13 DIAGNOSIS — M25.532 LEFT WRIST PAIN: ICD-10-CM

## 2025-01-13 PROCEDURE — 97140 MANUAL THERAPY 1/> REGIONS: CPT | Mod: GO | Performed by: OCCUPATIONAL THERAPIST

## 2025-01-13 PROCEDURE — 97110 THERAPEUTIC EXERCISES: CPT | Mod: GO | Performed by: OCCUPATIONAL THERAPIST

## 2025-01-13 NOTE — PROGRESS NOTES
"    Occupational Therapy Orthopedic Treatment Note    Patient Name: Uma Middleton  MRN: [id number]  Today's Date: 1/13/2025  Time Calculation  Start Time: 0945  Stop Time: 1028  Time Calculation (min): 43 min   , OT Therapeutic Procedures Time Entry  Manual Therapy Time Entry: 30  Therapeutic Exercise Time Entry: 10,      Insurance:  Visit number: 2 of 50  Authorization info: no inquired  Insurance Type: Medicare B and [employer]  Medicare certification  -  Start: 1/10/25  End: 4/4/25    Current Problem  1. Joint stiffness of hand, left        2. Left wrist pain              Referred by:   Dr. Samuel  Referred for:  L distal radius fx ORIF and significant wrist stiffness  Onset/DOI:  11/29/24  DOS: 12/24//24    Fall risk:  Low  Precautions:  NWB     Medical History Form: Reviewed (scanned into chart)    Subjective   \"The burning is so intense nothing helps it.  Not even the stronger medication Its just so bad.  I am making effort to do the exercises despite the pain\"    Hand Dominance: Right    Pain:  6/10  Location: radial/volar wrist and dorsal forearm  Description: burning, tight, sharp, burning  Aggravating Factors:  N/A - pain is constant  Relieving Factors:   minimal pain relief from prescription pain meds    Previous Interventions/Treatments: None    Prior Level of Function (PLOF)  Patient previously indpendent with all ADLs/IADLs    ADL/IADL impairments  Bathing, Dressing, Hygiene/Grooming, Hair care, Sleep, Driving, Home mgt, and Meal prep    Patients Living Environment: Reviewed and no concern  Lives with: [facility]  Primary Language: English  Patient's Goal(s) for Therapy:   \"to make my hand/wrist work again and to not hurt\"    Red Flags: Do you have any of the following? No  Fever/chills, unexplained weight changes, dizziness/fainting, unexplained change in bowel or bladder functions, unexplained malaise or muscle weakness, night pain/sweats, numbness or tingling    Objective     LEFT HAND AROM " (Degrees)   MCP PIP DIP OTERO DPC   IF  0/25 0/35   7.5 cm   MF 0/25 0/35   7 cm   RF 0/25 0/40   6 cm   SF 0/20 0/40   5 cm     L thumb opposition to SF:  8cm    WRIST AROM (Degrees)   R L   Extension  0A   5 P   Flexion  20 A   25 P   Pronation  35 A   Supination  25 A     L hand figure 8:  39cm  R hand figure 8:  42.5cm      Outcome Measures:         QUICK DASH:   88.64%     Home Program:  Exercise Sets/Reps Comments   AROM ylw sponge squeeze     Proper wrist splint wear     Finger joints PROM     Elevation     Desensitization     Heat PRN                           Treatment Today    Manual:  - MEM and lymphatic drainage to reduce swelling x 20  - PROM wrist and all digits to increase tendon excursion and joint mobility x 10 min    Therapeutic ex:  - AROM blocking ex each digit and IP joints to increase mobility and tendon excursion x 5 min  - AROM gross flexion to promote flexion cascade each digit x 5 min        EDUCATION: Home exercise program, plan of care, activity modifications, joint protection, pain management, and injury pathology       Assessment:   Oh, despite the pain, she continues to have she has made a good amount of improvement since her evaluation four days ago. She exhibits 2.5 cm less hand swelling. Her IPJ is exhibiting greater mobility under active motion. And most importantly, her wrist is now resting at completely neutral, and even exhibit 5° of passive extension. At her evaluation her wrist was resting in, 20° of wrist flexion, causing considerable tightness of the flexor tendons and musculature. She was able to tolerate passive range of motion of her wrist with extreme caution to her pain. She was even able to tolerate up to 25° of passive supination.      Level of Complexity  MOD complexity    OT Rehab Potential  Good      Plan:     Planned Interventions include: therapeutic exercise, self-care home management, manual therapy, therapeutic activities, , neuromuscular coordination,  vasopneumatic, dry needling, and orthosis fabrication.  Frequency: 2 x Week  Duration: 12 Weeks    Goals: Set and discussed today         1) Patient will be able to demo 2cm from midpalm with all digits to perform dressing tasks with MIN A, in 4 weeks.         2) Patient will demo 40 degrees of L wrist supination to perform hair care with MIN A, in 6 weeks         3) Patient will demo at least 3cm less L hand swelling to imporve gross grasping, in 4 weeks.        Plan of care was developed with input and agreement by the patient.      Marques Mills, OT

## 2025-01-14 ENCOUNTER — OFFICE VISIT (OUTPATIENT)
Dept: ORTHOPEDIC SURGERY | Facility: HOSPITAL | Age: 75
End: 2025-01-14
Payer: MEDICARE

## 2025-01-14 DIAGNOSIS — M25.532 LEFT WRIST PAIN: Primary | ICD-10-CM

## 2025-01-14 PROCEDURE — 1125F AMNT PAIN NOTED PAIN PRSNT: CPT | Performed by: STUDENT IN AN ORGANIZED HEALTH CARE EDUCATION/TRAINING PROGRAM

## 2025-01-14 PROCEDURE — 1111F DSCHRG MED/CURRENT MED MERGE: CPT | Performed by: STUDENT IN AN ORGANIZED HEALTH CARE EDUCATION/TRAINING PROGRAM

## 2025-01-14 PROCEDURE — 99211 OFF/OP EST MAY X REQ PHY/QHP: CPT | Performed by: STUDENT IN AN ORGANIZED HEALTH CARE EDUCATION/TRAINING PROGRAM

## 2025-01-14 PROCEDURE — 1159F MED LIST DOCD IN RCRD: CPT | Performed by: STUDENT IN AN ORGANIZED HEALTH CARE EDUCATION/TRAINING PROGRAM

## 2025-01-14 PROCEDURE — 1157F ADVNC CARE PLAN IN RCRD: CPT | Performed by: STUDENT IN AN ORGANIZED HEALTH CARE EDUCATION/TRAINING PROGRAM

## 2025-01-14 RX ORDER — LIDOCAINE 50 MG/G
1 PATCH TOPICAL DAILY
Qty: 15 PATCH | Refills: 1 | Status: SHIPPED | OUTPATIENT
Start: 2025-01-14

## 2025-01-14 ASSESSMENT — PAIN SCALES - GENERAL: PAINLEVEL_OUTOF10: 6

## 2025-01-14 ASSESSMENT — PAIN - FUNCTIONAL ASSESSMENT: PAIN_FUNCTIONAL_ASSESSMENT: 0-10

## 2025-01-14 ASSESSMENT — PAIN DESCRIPTION - DESCRIPTORS: DESCRIPTORS: BURNING

## 2025-01-15 ENCOUNTER — TREATMENT (OUTPATIENT)
Dept: OCCUPATIONAL THERAPY | Facility: CLINIC | Age: 75
End: 2025-01-15
Payer: MEDICARE

## 2025-01-15 DIAGNOSIS — M25.532 LEFT WRIST PAIN: Primary | ICD-10-CM

## 2025-01-15 PROCEDURE — 97140 MANUAL THERAPY 1/> REGIONS: CPT | Mod: GO | Performed by: OCCUPATIONAL THERAPIST

## 2025-01-15 PROCEDURE — 97110 THERAPEUTIC EXERCISES: CPT | Mod: GO | Performed by: OCCUPATIONAL THERAPIST

## 2025-01-15 NOTE — PROGRESS NOTES
"    Occupational Therapy Orthopedic Treatment Note    Patient Name: Uma Middleton  MRN: [id number]  Today's Date: 1/17/2025  Time Calculation  Start Time: 0215  Stop Time: 0255  Time Calculation (min): 40 min   , OT Therapeutic Procedures Time Entry  Manual Therapy Time Entry: 28  Therapeutic Exercise Time Entry: 10,      Insurance:  Visit number: 3 of 50  Authorization info: no inquired  Insurance Type: Medicare B and [place]  Medicare certification  -  Start: 1/10/25  End: 4/4/25    Current Problem  1. Left wrist pain            Referred by:   Dr. Samuel  Referred for:  L distal radius fx ORIF and significant wrist stiffness  Onset/DOI:  11/29/24  DOS: 12/24//24    Fall risk:  Low  Precautions:  NWB     Medical History Form: Reviewed (scanned into chart)    Subjective   \"I saw my doctors yesterday and sutures were removed.  He said it may be CRPS but it could take a few weeks to figure that out.  My pain is absolutely miserable today\"\"    Hand Dominance: Right    Pain:  6/10  Location: radial/volar wrist and dorsal forearm  Description: burning, tight, sharp, burning  Aggravating Factors:  N/A - pain is constant  Relieving Factors:   minimal pain relief from prescription pain meds    Previous Interventions/Treatments: None    Prior Level of Function (PLOF)  Patient previously indpendent with all ADLs/IADLs    ADL/IADL impairments  Bathing, Dressing, Hygiene/Grooming, Hair care, Sleep, Driving, Home mgt, and Meal prep    Patients Living Environment: Reviewed and no concern  Lives with:  - Lalo  Primary Language: English  Patient's Goal(s) for Therapy:   \"to make my hand/wrist work again and to not hurt\"    Red Flags: Do you have any of the following? No  Fever/chills, unexplained weight changes, dizziness/fainting, unexplained change in bowel or bladder functions, unexplained malaise or muscle weakness, night pain/sweats, numbness or tingling    Objective     LEFT HAND AROM (Degrees)   MCP PIP DIP OTERO DPC   IF "  0/25 0/35   7.5 cm   MF 0/25 0/35   7 cm   RF 0/25 0/40   6 cm   SF 0/20 0/40   5 cm     L thumb opposition to SF:  8cm    WRIST AROM (Degrees)   R L   Extension  0A   5 P   Flexion  20 A   25 P   Pronation  35 A   Supination  25 A     L hand figure 8:  39cm  R hand figure 8:  42.5cm      Outcome Measures:         QUICK DASH:   88.64%     Home Program:  Exercise Sets/Reps Comments   AROM ylw sponge squeeze     Proper wrist splint wear     Finger joints PROM     Elevation     Desensitization     Heat PRN                           Treatment Today    Manual:  - MEM and lymphatic drainage to reduce swelling x 18 min  - PROM wrist and all digits to increase tendon excursion and joint mobility x 10 min    Therapeutic ex:  - AROM blocking ex each digit and IP joints to increase mobility and tendon excursion x 5 min  - AROM gross flexion to promote flexion cascade each digit x 5 min        EDUCATION: Home exercise program, plan of care, activity modifications, joint protection, pain management, and injury pathology       Assessment:   Patient experiences significant pain all throughout treatment today despite gentle approach with manual passive range of motion.  She does exhibit improvements with passive range of motion with wrist extension and finger flexion's however her constant pain and burning limits and impacts how much intervention we can do at one time.  She requires numerous extended periods of rest.  She did exhibit slightly more glossy skin potentially consistent with CRPS.        Level of Complexity  MOD complexity    OT Rehab Potential  Good      Plan:     Planned Interventions include: therapeutic exercise, self-care home management, manual therapy, therapeutic activities, , neuromuscular coordination, vasopneumatic, dry needling, and orthosis fabrication.  Frequency: 2 x Week  Duration: 12 Weeks    Goals: Set and discussed today         1) Patient will be able to demo 2cm from midpalm with all digits to  perform dressing tasks with MIN A, in 4 weeks.         2) Patient will demo 40 degrees of L wrist supination to perform hair care with MIN A, in 6 weeks         3) Patient will demo at least 3cm less L hand swelling to imporve gross grasping, in 4 weeks.        Plan of care was developed with input and agreement by the patient.      Marques Mills, OT

## 2025-01-17 ENCOUNTER — APPOINTMENT (OUTPATIENT)
Dept: OCCUPATIONAL THERAPY | Facility: HOSPITAL | Age: 75
End: 2025-01-17
Payer: MEDICARE

## 2025-01-20 ENCOUNTER — TREATMENT (OUTPATIENT)
Dept: OCCUPATIONAL THERAPY | Facility: CLINIC | Age: 75
End: 2025-01-20
Payer: MEDICARE

## 2025-01-20 DIAGNOSIS — M25.642 JOINT STIFFNESS OF HAND, LEFT: Primary | ICD-10-CM

## 2025-01-20 PROCEDURE — 97140 MANUAL THERAPY 1/> REGIONS: CPT | Mod: GO | Performed by: OCCUPATIONAL THERAPIST

## 2025-01-20 PROCEDURE — 97110 THERAPEUTIC EXERCISES: CPT | Mod: GO | Performed by: OCCUPATIONAL THERAPIST

## 2025-01-20 NOTE — PROGRESS NOTES
"    Occupational Therapy Orthopedic Treatment Note    Patient Name: Uma Middleton  MRN: [id number]  Today's Date: 1/22/2025  Time Calculation  Start Time: 1030  Stop Time: 1115  Time Calculation (min): 45 min   , OT Therapeutic Procedures Time Entry  Manual Therapy Time Entry: 30  Therapeutic Exercise Time Entry: 10,      Insurance:  Visit number: 4 of 50  Authorization info: no inquired  Insurance Type: Medicare B and [place]  Medicare certification  -  Start: 1/10/25  End: 4/4/25    Current Problem  1. Joint stiffness of hand, left              Referred by:   Dr. Samuel  Referred for:  L distal radius fx ORIF and significant wrist stiffness  Onset/DOI:  11/29/24  DOS: 12/24//24    Fall risk:  Low  Precautions:  NWB     Medical History Form: Reviewed (scanned into chart)    Subjective   \"The burning and the swelling have not changed.  It makes it hard to do anything.  Im not taking the oxy medication anymore because it hasn't helped\"    Hand Dominance: Right    Pain:  6/10  Location: radial/volar wrist and dorsal forearm  Description: burning, tight, sharp, burning  Aggravating Factors:  N/A - pain is constant  Relieving Factors:   minimal pain relief from prescription pain meds    Previous Interventions/Treatments: None    Prior Level of Function (PLOF)  Patient previously indpendent with all ADLs/IADLs    ADL/IADL impairments  Bathing, Dressing, Hygiene/Grooming, Hair care, Sleep, Driving, Home mgt, and Meal prep    Patients Living Environment: Reviewed and no concern  Lives with:  - Lalo  Primary Language: English  Patient's Goal(s) for Therapy:   \"to make my hand/wrist work again and to not hurt\"    Red Flags: Do you have any of the following? No  Fever/chills, unexplained weight changes, dizziness/fainting, unexplained change in bowel or bladder functions, unexplained malaise or muscle weakness, night pain/sweats, numbness or tingling    Objective     LEFT HAND AROM (Degrees)   MCP PIP DIP OTERO DPC   IF  " 0/25   0/35   7.5 cm   MF 0/25 0/35   7 cm   RF 0/25 0/40   6 cm   SF 0/20 0/40   5 cm     L thumb opposition to SF:  8cm    WRIST AROM (Degrees)   R L   Extension  0A   5 P   Flexion  20 A   25 P   Pronation  35 A   Supination  25 A     L hand figure 8:  39cm  R hand figure 8:  42.5cm      Outcome Measures:         QUICK DASH:   88.64%     Home Program:  Exercise Sets/Reps Comments   AROM ylw sponge squeeze     Proper wrist splint wear     Finger joints PROM     Elevation     Desensitization     Heat PRN                           Treatment Today    Manual:  - MEM and lymphatic drainage to reduce swelling x 10 min  - PROM wrist and all digits to increase tendon excursion and joint mobility x 10 min  - scar massage volar wrist surgical site 10    Therapeutic ex:  - AROM blocking ex each digit and IP joints to increase mobility and tendon excursion x 5 min  - AROM gross flexion to promote flexion cascade each digit x 5 min        EDUCATION: Home exercise program, plan of care, activity modifications, joint protection, pain management, and injury pathology       Assessment:   Patient experiences significant pain all throughout treatment today despite gentle approach with manual passive range of motion. Constant pain and burning continues to limit and impact how much intervention we can do at one time.  She requires numerous extended periods of rest.        Level of Complexity  MOD complexity    OT Rehab Potential  Good      Plan:     Planned Interventions include: therapeutic exercise, self-care home management, manual therapy, therapeutic activities, , neuromuscular coordination, vasopneumatic, dry needling, and orthosis fabrication.  Frequency: 2 x Week  Duration: 12 Weeks    Goals: Set and discussed today         1) Patient will be able to demo 2cm from midpalm with all digits to perform dressing tasks with MIN A, in 4 weeks.         2) Patient will demo 40 degrees of L wrist supination to perform hair care with MIN  A, in 6 weeks         3) Patient will demo at least 3cm less L hand swelling to imporve gross grasping, in 4 weeks.        Plan of care was developed with input and agreement by the patient.      Marques Mills, OT

## 2025-01-22 ENCOUNTER — TREATMENT (OUTPATIENT)
Dept: OCCUPATIONAL THERAPY | Facility: CLINIC | Age: 75
End: 2025-01-22
Payer: MEDICARE

## 2025-01-22 DIAGNOSIS — M25.532 LEFT WRIST PAIN: Primary | ICD-10-CM

## 2025-01-22 DIAGNOSIS — M25.642 JOINT STIFFNESS OF HAND, LEFT: ICD-10-CM

## 2025-01-22 PROCEDURE — 97535 SELF CARE MNGMENT TRAINING: CPT | Mod: GO | Performed by: OCCUPATIONAL THERAPIST

## 2025-01-22 PROCEDURE — 97112 NEUROMUSCULAR REEDUCATION: CPT | Mod: GO | Performed by: OCCUPATIONAL THERAPIST

## 2025-01-22 ASSESSMENT — ACTIVITIES OF DAILY LIVING (ADL): HOME_MANAGEMENT_TIME_ENTRY: 15

## 2025-01-22 NOTE — PROGRESS NOTES
"    Occupational Therapy Orthopedic Treatment Note    Patient Name: Uma Middleton  MRN: [id number]  Today's Date: 1/22/2025  Time Calculation  Start Time: 1100  Stop Time: 1135  Time Calculation (min): 35 min   , OT Therapeutic Procedures Time Entry  Neuromuscular Re-Education Time Entry: 13  Self Care/Home Management (ADLs) Time Entry: 15,      Insurance:  Visit number: 5 of 50  Authorization info: no inquired  Insurance Type: Medicare B and [place]  Medicare certification  -  Start: 1/10/25  End: 4/4/25    Current Problem  1. Left wrist pain        2. Joint stiffness of hand, left            Referred by:   Dr. Samuel  Referred for:  L distal radius fx ORIF and significant wrist stiffness  Onset/DOI:  11/29/24  DOS: 12/24//24    Fall risk:  Low  Precautions:  NWB     Medical History Form: Reviewed (scanned into chart)    Subjective   \"The burning and the swelling have not changed.  It makes it hard to do anything.  Im not taking the oxy medication anymore because it hasn't helped\"    Hand Dominance: Right    Pain:  6/10  Location: radial/volar wrist and dorsal forearm  Description: burning, tight, sharp, burning  Aggravating Factors:  N/A - pain is constant  Relieving Factors:   minimal pain relief from prescription pain meds    Previous Interventions/Treatments: None    Prior Level of Function (PLOF)  Patient previously indpendent with all ADLs/IADLs    ADL/IADL impairments  Bathing, Dressing, Hygiene/Grooming, Hair care, Sleep, Driving, Home mgt, and Meal prep    Patients Living Environment: Reviewed and no concern  Lives with:  - Lalo  Primary Language: English  Patient's Goal(s) for Therapy:   \"to make my hand/wrist work again and to not hurt\"    Red Flags: Do you have any of the following? No  Fever/chills, unexplained weight changes, dizziness/fainting, unexplained change in bowel or bladder functions, unexplained malaise or muscle weakness, night pain/sweats, numbness or tingling    Objective     LEFT " HAND AROM (Degrees)   MCP PIP DIP OTERO DPC   IF  0/25   0/35   7.5 cm   MF 0/25 0/35   7 cm   RF 0/25 0/40   6 cm   SF 0/20 0/40   5 cm     L thumb opposition to SF:  8cm    WRIST AROM (Degrees)   R L   Extension  0A   5 P   Flexion  20 A   25 P   Pronation  35 A   Supination  25 A     L hand figure 8:  39cm  R hand figure 8:  42.5cm      Outcome Measures:         QUICK DASH:   88.64%     Home Program:  Exercise Sets/Reps Comments   AROM ylw sponge squeeze     Proper wrist splint wear     Finger joints PROM     Elevation     Desensitization     Heat PRN                           Treatment Today    Neuro re-ed:  - desensitization of L hand within fluidotherapy and re-ed of finger and wrist proprioception x 13 min    Self care:  - wrapped each digit, hand, and wrist segments with gauze roll to reduce swelling x 1 5min        EDUCATION: Home exercise program, plan of care, activity modifications, joint protection, pain management, and injury pathology       Assessment:   Patient reports actually have decreased burning sensations within fluidotherapy during desensitization approach.  This is encouraging however still exhibits significant amount of stiffness and swelling.  Patient's hand was wrapped in gauze from her fingers to her wrist she understands to wear this application for the next 24 hours in hopes of reducing her finger and hand swelling.    Level of Complexity  MOD complexity    OT Rehab Potential  Good      Plan:     Planned Interventions include: therapeutic exercise, self-care home management, manual therapy, therapeutic activities, , neuromuscular coordination, vasopneumatic, dry needling, and orthosis fabrication.  Frequency: 2 x Week  Duration: 12 Weeks    Goals: Set and discussed today         1) Patient will be able to demo 2cm from midpalm with all digits to perform dressing tasks with MIN A, in 4 weeks.         2) Patient will demo 40 degrees of L wrist supination to perform hair care with MIN A, in  6 weeks         3) Patient will demo at least 3cm less L hand swelling to imporve gross grasping, in 4 weeks.        Plan of care was developed with input and agreement by the patient.      Marques Mills OT

## 2025-01-23 ENCOUNTER — APPOINTMENT (OUTPATIENT)
Dept: OPHTHALMOLOGY | Facility: CLINIC | Age: 75
End: 2025-01-23
Payer: MEDICARE

## 2025-01-23 DIAGNOSIS — H34.8112 STABLE CENTRAL RETINAL VEIN OCCLUSION OF RIGHT EYE (CMS-HCC): ICD-10-CM

## 2025-01-23 DIAGNOSIS — Z96.1 PSEUDOPHAKIA OF BOTH EYES: ICD-10-CM

## 2025-01-23 DIAGNOSIS — H35.89 MACULAR ATROPHY, RETINAL: Primary | ICD-10-CM

## 2025-01-23 DIAGNOSIS — H04.123 DRY EYES: ICD-10-CM

## 2025-01-23 DIAGNOSIS — H52.7 REFRACTIVE ERROR: ICD-10-CM

## 2025-01-23 PROBLEM — E11.65 TYPE 2 DIABETES MELLITUS WITH HYPERGLYCEMIA, WITHOUT LONG-TERM CURRENT USE OF INSULIN: Status: RESOLVED | Noted: 2023-12-22 | Resolved: 2025-01-23

## 2025-01-23 PROCEDURE — 92015 DETERMINE REFRACTIVE STATE: CPT | Performed by: OPHTHALMOLOGY

## 2025-01-23 PROCEDURE — 92134 CPTRZ OPH DX IMG PST SGM RTA: CPT | Performed by: OPHTHALMOLOGY

## 2025-01-23 PROCEDURE — 92015 DETERMINE REFRACTIVE STATE: CPT | Mod: MUE | Performed by: OPHTHALMOLOGY

## 2025-01-23 PROCEDURE — 99214 OFFICE O/P EST MOD 30 MIN: CPT | Performed by: OPHTHALMOLOGY

## 2025-01-23 RX ORDER — AMOXICILLIN 250 MG/1
CAPSULE ORAL
COMMUNITY
Start: 2025-01-15

## 2025-01-23 ASSESSMENT — REFRACTION_MANIFEST
OD_CYLINDER: -1.50
OS_ADD: +2.50
OS_CYLINDER: -1.50
OD_SPHERE: +1.25
OD_AXIS: 100
OS_SPHERE: +1.25
OD_SPHERE: +1.25
OS_CYLINDER: -1.25
OS_AXIS: 080
OS_AXIS: 080
OD_AXIS: 100
OD_ADD: +2.50
OS_SPHERE: +1.50
OD_CYLINDER: -1.25

## 2025-01-23 ASSESSMENT — REFRACTION_WEARINGRX
SPECS_TYPE: READING
OD_CYLINDER: -0.50
OS_SPHERE: +2.50
OS_CYLINDER: -0.50
OD_AXIS: 135
OS_AXIS: 077
OD_SPHERE: +2.25

## 2025-01-23 ASSESSMENT — SLIT LAMP EXAM - LIDS
COMMENTS: 1+ BLEPHARITIS
COMMENTS: 1+ BLEPHARITIS

## 2025-01-23 ASSESSMENT — VISUAL ACUITY
OD_SC: 20/60
OS_SC+: +1
METHOD: SNELLEN - SINGLE
OS_SC: 20/30
OD_SC+: -2
OD_PH_SC: 20/30
OD_PH_SC+: -1

## 2025-01-23 ASSESSMENT — KERATOMETRY
OS_K2POWER_DIOPTERS: 44.00
OD_AXISANGLE_DEGREES: 20
OS_AXISANGLE_DEGREES: 165
OD_K2POWER_DIOPTERS: 43.50
OS_K1POWER_DIOPTERS: 43.00
OS_AXISANGLE2_DEGREES: 75
OD_K1POWER_DIOPTERS: 42.75
METHOD_AUTO_MANUAL: AUTOMATED
OD_AXISANGLE2_DEGREES: 110

## 2025-01-23 ASSESSMENT — ENCOUNTER SYMPTOMS
EYES NEGATIVE: 0
GASTROINTESTINAL NEGATIVE: 0
ALLERGIC/IMMUNOLOGIC NEGATIVE: 0
RESPIRATORY NEGATIVE: 0
NEUROLOGICAL NEGATIVE: 0
HEMATOLOGIC/LYMPHATIC NEGATIVE: 0
ENDOCRINE NEGATIVE: 0
PSYCHIATRIC NEGATIVE: 0
MUSCULOSKELETAL NEGATIVE: 0
CARDIOVASCULAR NEGATIVE: 0
CONSTITUTIONAL NEGATIVE: 0

## 2025-01-23 ASSESSMENT — CUP TO DISC RATIO
OS_RATIO: 0.35
OD_RATIO: 0.45

## 2025-01-23 ASSESSMENT — PATIENT HEALTH QUESTIONNAIRE - PHQ9
1. LITTLE INTEREST OR PLEASURE IN DOING THINGS: NOT AT ALL
SUM OF ALL RESPONSES TO PHQ9 QUESTIONS 1 AND 2: 0
2. FEELING DOWN, DEPRESSED OR HOPELESS: NOT AT ALL

## 2025-01-23 ASSESSMENT — EXTERNAL EXAM - RIGHT EYE: OD_EXAM: BROW PTOSIS

## 2025-01-23 ASSESSMENT — TONOMETRY
OS_IOP_MMHG: 15
OD_IOP_MMHG: 15
IOP_METHOD: GOLDMANN APPLANATION

## 2025-01-23 ASSESSMENT — EXTERNAL EXAM - LEFT EYE: OS_EXAM: BROW PTOSIS

## 2025-01-23 ASSESSMENT — PAIN SCALES - GENERAL: PAINLEVEL_OUTOF10: 0-NO PAIN

## 2025-01-23 NOTE — PATIENT INSTRUCTIONS
Use artificial tears daily.  Glasses prescription given.  Follow up in one year for a full exam.

## 2025-01-28 ENCOUNTER — HOSPITAL ENCOUNTER (OUTPATIENT)
Dept: RADIOLOGY | Facility: HOSPITAL | Age: 75
Discharge: HOME | End: 2025-01-28
Payer: MEDICARE

## 2025-01-28 ENCOUNTER — TREATMENT (OUTPATIENT)
Dept: OCCUPATIONAL THERAPY | Facility: CLINIC | Age: 75
End: 2025-01-28
Payer: MEDICARE

## 2025-01-28 ENCOUNTER — OFFICE VISIT (OUTPATIENT)
Dept: ORTHOPEDIC SURGERY | Facility: HOSPITAL | Age: 75
End: 2025-01-28
Payer: MEDICARE

## 2025-01-28 DIAGNOSIS — M25.532 LEFT WRIST PAIN: Primary | ICD-10-CM

## 2025-01-28 DIAGNOSIS — G90.512 COMPLEX REGIONAL PAIN SYNDROME TYPE 1 OF LEFT UPPER EXTREMITY: ICD-10-CM

## 2025-01-28 DIAGNOSIS — M25.532 LEFT WRIST PAIN: ICD-10-CM

## 2025-01-28 DIAGNOSIS — M25.642 JOINT STIFFNESS OF HAND, LEFT: ICD-10-CM

## 2025-01-28 PROCEDURE — 97112 NEUROMUSCULAR REEDUCATION: CPT | Mod: GO | Performed by: OCCUPATIONAL THERAPIST

## 2025-01-28 PROCEDURE — 97140 MANUAL THERAPY 1/> REGIONS: CPT | Mod: GO | Performed by: OCCUPATIONAL THERAPIST

## 2025-01-28 PROCEDURE — 1036F TOBACCO NON-USER: CPT | Performed by: STUDENT IN AN ORGANIZED HEALTH CARE EDUCATION/TRAINING PROGRAM

## 2025-01-28 PROCEDURE — 1157F ADVNC CARE PLAN IN RCRD: CPT | Performed by: STUDENT IN AN ORGANIZED HEALTH CARE EDUCATION/TRAINING PROGRAM

## 2025-01-28 PROCEDURE — 73110 X-RAY EXAM OF WRIST: CPT | Mod: LT

## 2025-01-28 PROCEDURE — 73110 X-RAY EXAM OF WRIST: CPT | Mod: LEFT SIDE | Performed by: RADIOLOGY

## 2025-01-28 PROCEDURE — 1125F AMNT PAIN NOTED PAIN PRSNT: CPT | Performed by: STUDENT IN AN ORGANIZED HEALTH CARE EDUCATION/TRAINING PROGRAM

## 2025-01-28 PROCEDURE — 99211 OFF/OP EST MAY X REQ PHY/QHP: CPT | Performed by: STUDENT IN AN ORGANIZED HEALTH CARE EDUCATION/TRAINING PROGRAM

## 2025-01-28 PROCEDURE — 1159F MED LIST DOCD IN RCRD: CPT | Performed by: STUDENT IN AN ORGANIZED HEALTH CARE EDUCATION/TRAINING PROGRAM

## 2025-01-28 ASSESSMENT — PAIN - FUNCTIONAL ASSESSMENT: PAIN_FUNCTIONAL_ASSESSMENT: 0-10

## 2025-01-28 ASSESSMENT — PAIN SCALES - GENERAL: PAINLEVEL_OUTOF10: 7

## 2025-01-28 NOTE — PROGRESS NOTES
"    Occupational Therapy Orthopedic Treatment Note    Patient Name: Uma Middleton  MRN: [id number]  Today's Date: 1/28/2025  Time Calculation  Start Time: 1015  Stop Time: 1055  Time Calculation (min): 40 min   , OT Therapeutic Procedures Time Entry  Manual Therapy Time Entry: 25  Neuromuscular Re-Education Time Entry: 13,      Insurance:  Visit number: 6 of 50  Authorization info: no inquired  Insurance Type: Medicare B and [place]  Medicare certification  -  Start: 1/10/25  End: 4/4/25    Current Problem  1. Left wrist pain        2. Joint stiffness of hand, left              Referred by:   Dr. Samuel  Referred for:  L distal radius fx ORIF and significant wrist stiffness  Onset/DOI:  11/29/24  DOS: 12/24//24    Fall risk:  Low  Precautions:  NWB     Medical History Form: Reviewed (scanned into chart)    Subjective   \"The burning isnt as bad in the morning.  It progresses worse as the day goes on. My thumb is still numb.\"    Presents with significant finger stiffness still although some wrinkle return visible to the dorsal hand    Hand Dominance: Right    Pain:  5/10  Location: radial/volar wrist and dorsal forearm  Description: burning, tight, sharp, burning  Aggravating Factors:  N/A - pain is constant  Relieving Factors:   minimal pain relief from prescription pain meds    Previous Interventions/Treatments: None    Prior Level of Function (PLOF)  Patient previously indpendent with all ADLs/IADLs    ADL/IADL impairments  Bathing, Dressing, Hygiene/Grooming, Hair care, Sleep, Driving, Home mgt, and Meal prep    Patients Living Environment: Reviewed and no concern  Lives with:  - Lalo  Primary Language: English  Patient's Goal(s) for Therapy:   \"to make my hand/wrist work again and to not hurt\"    Red Flags: Do you have any of the following? No  Fever/chills, unexplained weight changes, dizziness/fainting, unexplained change in bowel or bladder functions, unexplained malaise or muscle weakness, night " pain/sweats, numbness or tingling    Objective     LEFT HAND AROM (Degrees)   MCP PIP DIP OTERO DPC   IF  0/25   0/35   7.5 cm   MF 0/25 0/35   7 cm   RF 0/25 0/40   6 cm   SF 0/20 0/40   5 cm     L thumb opposition to SF:  8cm    WRIST AROM (Degrees)   R L   Extension  0A   5 P   Flexion  20 A   25 P   Pronation  35 A   Supination  25 A     L hand figure 8:  39cm  R hand figure 8:  42.5cm      Outcome Measures:         QUICK DASH:   88.64%     Home Program:  Exercise Sets/Reps Comments   AROM ylw sponge squeeze     Proper wrist splint wear     Finger joints PROM     Elevation     Desensitization     Heat PRN                           Treatment Today    Neuro re-ed:  - desensitization of L hand within fluidotherapy and re-ed of finger and wrist proprioception x 13 min      Manual:  -Extensive scar massage, retrograde massage, MEM and passive motion to all digits x 25 minutes        EDUCATION: Home exercise program, plan of care, activity modifications, joint protection, pain management, and injury pathology       Assessment:   Patient's fingers are considerably more swollen than the dorsum of her hand although the dorsum of her hand does exhibit greater wrinkle return and presents compared to previous intervention.  She has considerably more pain with passive motion of her ring finger and small finger.  Demonstrates about 10 degrees of passive wrist extension although considerably painful and reports more burning sensations.  She is seeing her surgeon later today and is strongly recommended to discuss further treatment options to reduce her burning sensations.      Level of Complexity  MOD complexity    OT Rehab Potential  Good      Plan:     Planned Interventions include: therapeutic exercise, self-care home management, manual therapy, therapeutic activities, , neuromuscular coordination, vasopneumatic, dry needling, and orthosis fabrication.  Frequency: 2 x Week  Duration: 12 Weeks    Goals: Set and discussed  today         1) Patient will be able to demo 2cm from midpalm with all digits to perform dressing tasks with MIN A, in 4 weeks.         2) Patient will demo 40 degrees of L wrist supination to perform hair care with MIN A, in 6 weeks         3) Patient will demo at least 3cm less L hand swelling to imporve gross grasping, in 4 weeks.        Plan of care was developed with input and agreement by the patient.      Marques Mills, OT

## 2025-01-28 NOTE — PROGRESS NOTES
[facility]  Hand and Upper Extremity Service  Post Operative visit        Date of surgery: 12/26/2024  Surgery(s) performed: Open reduction internal fixation of left distal radius.        Subjective report:   Patient presents for her third postop follow-up.  Her sutures were removed at her last office visit.  She was given a Velcro movable brace.  She has been in occupational therapy.  She notes she has been pretty miserable since her surgery.  Of note she was also in significant pain prior to her operation as she had been dealing with a left distal radius fracture which was tried to be treated nonoperatively by another surgeon.  She had a delayed presentation to my office for operative fixation.  She is approximately 5 weeks from the above procedure.  She notes pain with light touch to the hand and distal forearm.  She notes continued burning pain she notes.  She notes color changes and swelling to her hand with limited range of motion.  At last visit we discussed that there is a concern she could develop CRPS.        Exam findings; left upper extremity  Mild to moderate swelling to the left wrist.  Moderate swelling to the dorsum of the hands and fingers.  Limited range of motion to her fingers secondary to pain, swelling and stiffness.  She can flex and extend the MP and IP joints however she is approximately 7 cm from the distal palmar crease.  Limited wrist range of motion secondary to pain.  She is has hypersensitivity to touch.  She does have a reddish hue to her hands and distal forearm..  Incision is well-approximated without any erythema warmth or drainage.  Sutures are intact.  AIN, PIN, ulnar motors intact.  Sensation intact light touch radial, ulnar, median nerves     Radiograph findings: XR of the left wrist was taken reviewed in office today.  Status post open reduction internal fixation of left distal radius fracture with near anatomic alignment and improved  positioning.  No signs of hardware failure or loosening        Plan:   At last visit she was given a Velcro movable brace however she cannot tolerate this as even light touch gives her significant pain.  She has continued swelling and very limited range of motion in her hands.  She does have a reddish hue to her skin and notes it does become purple.  She has significant stiffness to her fingers.  She is developing complex regional pain syndrome.  I have reached out to one of our pain management physicians for further evaluation and treatment.  She is still taking her oxycodone and gabapentin.  At last appointment we did increase this to 400 mg 3 times a day.  She notes minimal relief with any of this.  We also discussed possible acupuncture.  Her  has had acupuncture and had good relief with this.    I have reached out to pain management and they are planning to get her scheduled.        Gallo Samuel DO  [employer] [employer]  Department of Orthopaedic Surgery  Hand and Upper Extremity Surgery  [facility]     Dictation performed with the use of voice recognition software. Syntax and grammatical errors may exist.

## 2025-01-29 ENCOUNTER — OFFICE VISIT (OUTPATIENT)
Dept: PAIN MEDICINE | Facility: HOSPITAL | Age: 75
End: 2025-01-29
Payer: MEDICARE

## 2025-01-29 DIAGNOSIS — G90.512 COMPLEX REGIONAL PAIN SYNDROME TYPE 1 OF LEFT UPPER EXTREMITY: Primary | ICD-10-CM

## 2025-01-29 DIAGNOSIS — Z79.02 PLATELET INHIBITION DUE TO PLAVIX: ICD-10-CM

## 2025-01-29 PROCEDURE — 1125F AMNT PAIN NOTED PAIN PRSNT: CPT | Performed by: PAIN MEDICINE

## 2025-01-29 PROCEDURE — 99213 OFFICE O/P EST LOW 20 MIN: CPT | Performed by: PAIN MEDICINE

## 2025-01-29 PROCEDURE — 99203 OFFICE O/P NEW LOW 30 MIN: CPT | Performed by: PAIN MEDICINE

## 2025-01-29 PROCEDURE — 1157F ADVNC CARE PLAN IN RCRD: CPT | Performed by: PAIN MEDICINE

## 2025-01-29 ASSESSMENT — PAIN SCALES - GENERAL: PAINLEVEL_OUTOF10: 6

## 2025-01-29 NOTE — PROGRESS NOTES
Subjective   Patient ID: Uma Middleton is a 74 y.o. female with a past medical history of ORIF of left distal radius presenting with left forearm and hand pain.      HPI:   Patient had ORIF of left distal radius on 12/26/2024.  She was referred to pain management for concerns of CRPS.  She endorses hypersensitivity to pain in left hand/wrist, reddish color/temperature change, edema, limited range of motion in her fingers and wrist.  She is currently taking gabapentin 400 mg 3 times daily and Tylenol as needed for pain.  She was previously taking oxycodone but is no longer on it.  She is currently in physical therapy but sessions are drastically limited due to hypersensitivity and edema.      Physical Therapy:  In PT currently but very limited due to pain   Other Conservative Measures she has tried:  N/A  Classes of medications tried in the past: Acetaminophen, NSAIDs, Gabapentenoids, and Opioids    I have personally reviewed the OARRS report for Uma Middleton I have considered the risks of abuse, dependence, addiction and diversion    OARRS:  No data recorded  I have personally reviewed the OARRS report for Uma Middleton. I have considered the risks of abuse, dependence, addiction and diversion    Is the patient prescribed a combination of a benzodiazepine and opioid?  No  Controlled Substance Agreement:  Reviewed Controlled Substance Agreement including but not limited to the benefits, risks, and alternatives to treatment with a Controlled Substance medication(s).    Last Urine Drug Screen:  No results found for this or any previous visit (from the past 8760 hours).  N/A      Review of Systems   13-point ROS done and negative except for HPI.     Current Outpatient Medications   Medication Instructions    amLODIPine-benazepriL (Lotrel) 5-10 mg capsule 1 capsule, oral, Daily    amoxicillin (Amoxil) 250 mg capsule take one capsule by mouth every 8 hours until gone    aspirin 81 mg, oral, Daily    atorvastatin (LIPITOR)  80 mg, oral, Daily    clopidogrel (PLAVIX) 75 mg, oral, Daily    fluticasone (Flonase) 50 mcg/actuation nasal spray 1-2 sprays, As needed    gabapentin (NEURONTIN) 300 mg, oral, 3 times daily    hydrOXYzine HCL (ATARAX) 25 mg, Nightly PRN    isosorbide mononitrate ER (IMDUR) 30 mg, oral, Daily, Do not crush or chew.    lidocaine (Lidoderm) 5 % patch 1 patch, transdermal, Daily, Apply to painful area 12 hours per day, remove for 12 hours.    lidocaine (Lidoderm) 5 % patch 1 patch, transdermal, Daily, Remove & discard patch within 12 hours or as directed by MD.    metoprolol succinate XL (TOPROL-XL) 50 mg, oral, Daily    omeprazole (PRILOSEC) 40 mg, Daily    traMADol (ULTRAM) 50 mg, As needed       Past Medical History:   Diagnosis Date    Adhesive capsulitis of right shoulder 10/21/2013    Adhesive capsulitis of right shoulder    Angina pectoris     Anxiety     Aortic aneurysm without rupture, unspecified portion of aorta (CMS-HCC)     ASHD (arteriosclerotic heart disease)     Aleman esophagus     Cataract     Central retinal vein occlusion, right eye (CMS-HCC)     Chest pain     Cholelithiasis     Dermatochalasis of both upper eyelids     Dry eye syndrome of bilateral lacrimal glands     Elevated blood-pressure reading, without diagnosis of hypertension     Elevated blood pressure reading without diagnosis of hypertension    Esophageal disease     GERD (gastroesophageal reflux disease)     Hyperlipidemia     Hypertension     Inconclusive mammogram 01/23/2017    Breast density    Lattice degeneration of retina, bilateral     Other specified disorders of eustachian tube, bilateral 05/08/2017    Dysfunction of Eustachian tube, bilateral    Otitis media, unspecified, right ear 12/18/2015    Acute right otitis media    Personal history of other diseases of the respiratory system 10/18/2016    History of acute bronchitis with bronchospasm    PONV (postoperative nausea and vomiting)     Primary osteoarthritis, unspecified  hand 02/09/2015    Osteoarthritis, hand    Sacroiliitis, not elsewhere classified (CMS-HCC) 04/26/2016    Sacroiliitis    Sciatica, right side 04/26/2016    Sciatica, right    Snoring     Unspecified optic atrophy     Vitamin D deficiency         Past Surgical History:   Procedure Laterality Date    CARDIAC CATHETERIZATION N/A 10/14/2024    Procedure: Left Heart Cath;  Surgeon: Juan Cueto DO;  Location: [facility];  Service: Cardiovascular;  Laterality: N/A;  no pre auth required    CARDIAC SURGERY  11/08/2024    CATARACT EXTRACTION      CATARACT EXTRACTION W/  INTRAOCULAR LENS IMPLANT Bilateral     OD 07/18/2013 +15.5D, OS 12/05/2013 +15.5D    CHOLECYSTECTOMY      COLONOSCOPY      CORONARY ARTERY BYPASS GRAFT  11/8/2024    ESOPHAGOGASTRODUODENOSCOPY      OTHER SURGICAL HISTORY  06/25/2013    Treatment Of The Left Ankle    MN OSTEOTOMY MANDIBLE SEGMENTAL  1969    SHOULDER SURGERY          Family History   Problem Relation Name Age of Onset    Hypertension Mother Marilia     Heart disease Father Rd     Hypertension Father Rd         Allergies   Allergen Reactions    Fire Ant Shortness of breath    Erythromycin Itching     Itching with erythromycin ophthalmic ointment    Tea Tree Oil Hives        Objective     There were no vitals filed for this visit.     Physical Exam  General: NAD, well groomed, well nourished  Eyes: Non-icteric sclera, EOMI  Ears, Nose, Mouth, and Throat: External ears and nose appear to be without deformity or rash. No lesions or masses noted. Hearing is grossly intact.   Neck: Trachea midline  Respiratory: Nonlabored breathing   Cardiovascular: no peripheral edema   Skin: No rashes or open lesions/ulcers identified on skin.    LUE: Significant edema of the left wrist and hand.  Significantly limited range of motion in fingers and wrist.  Hypersensitivity to light touch.  Warmth in fingers and left wrist. Slight reddish discoloration of hand and wrist. Well-healed surgical  incision site.  Unable to assess strength and forearm due to hypersensitivity and edema.    Neurologic:   Cranial nerves grossly intact.   Strength 5/5 and symmetric plantar/dorsiflexion   Sensation: Normal to light touch throughout, pinprick  intact throughout.  DTRs:normal and symmetric throughout    Psychiatric: Alert, orientation to person, place, and time. Cooperative.    Imaging personally reviewed and independently interpreted:     Assessment/Plan   Uma Middleton is a 74 y.o. female with a past medical history of ORIF of left distal radius presenting with left forearm and hand pain.  Patient referred to pain management due to concerns of CRPS. endorses hypersensitivity to pain in left hand/wrist, reddish color/temperature change, edema, limited range of motion in her fingers and wrist.  Discussed with patient examination findings are concerning for CRPS.  Emphasized importance of early treatment with nerve block/gabapentin and physical therapy to improve functionality.  Discussed risks and benefits of stellate ganglion nerve block.  Patient agreeable for procedure.  Patient had recent cardiac surgery and is on Plavix.  Will reach out to cardiac surgeon to determine if Plavix can be discontinued.    Plan:  -Left stellate ganglion block with ultrasound and IV sedation.  - Coordinate with cardiac surgeon if Plavix can be discontinued for nerve block.  - Gabapentin 400 mg in morning and evening, 800 mg at night.      We discussed  the risks, benefits and alternatives of the procedure including but not limited to: , Lack of efficacy , Transiently worsening pain , Bleeding, Infection , and Nerve Damage    Follow up:     The patient was invited to contact us back anytime with any questions or concerns and follow-up with us in the office as needed.     Diagnoses and all orders for this visit:  Complex regional pain syndrome type 1 of left upper extremity  -     Referral to Pain Medicine      This note was generated  with the aid of dictation software, there may be typos despite my attempts at proofreading.     Patient seen and plan of care discussed with Dr. Pat Godfrey MD  PGY-1

## 2025-01-30 ENCOUNTER — TREATMENT (OUTPATIENT)
Dept: OCCUPATIONAL THERAPY | Facility: CLINIC | Age: 75
End: 2025-01-30
Payer: MEDICARE

## 2025-01-30 DIAGNOSIS — M25.642 JOINT STIFFNESS OF HAND, LEFT: Primary | ICD-10-CM

## 2025-01-30 PROCEDURE — 97140 MANUAL THERAPY 1/> REGIONS: CPT | Mod: GO | Performed by: OCCUPATIONAL THERAPIST

## 2025-01-30 PROCEDURE — 97112 NEUROMUSCULAR REEDUCATION: CPT | Mod: GO | Performed by: OCCUPATIONAL THERAPIST

## 2025-01-30 NOTE — PROGRESS NOTES
"    Occupational Therapy Orthopedic Treatment Note    Patient Name: Uma Middleton  MRN: [id number]  Today's Date: 1/30/2025  Time Calculation  Start Time: 1000  Stop Time: 1045  Time Calculation (min): 45 min   , OT Therapeutic Procedures Time Entry  Manual Therapy Time Entry: 25  Neuromuscular Re-Education Time Entry: 13,      Insurance:  Visit number: 7 of 50  Authorization info: no inquired  Insurance Type: Medicare B and [employer]  Medicare certification  -  Start: 1/10/25  End: 4/4/25    Current Problem  1. Joint stiffness of hand, left            Referred by:   Dr. Samuel  Referred for:  L distal radius fx ORIF and significant wrist stiffness  Onset/DOI:  11/29/24  DOS: 12/24//24    Fall risk:  Low  Precautions:  NWB     Medical History Form: Reviewed (scanned into chart)    Subjective   \"I saw my surgeon yesterday and he referred me to pain mgt and that doctor is strongly urging me to get a nerve block so we can work on my hand more aggressively\"    Hand Dominance: Right    Pain:  5/10  Location: radial/volar wrist and dorsal forearm  Description: burning, tight, sharp, burning  Aggravating Factors:  N/A - pain is constant  Relieving Factors:   minimal pain relief from prescription pain meds    Previous Interventions/Treatments: None    Prior Level of Function (PLOF)  Patient previously indpendent with all ADLs/IADLs    ADL/IADL impairments  Bathing, Dressing, Hygiene/Grooming, Hair care, Sleep, Driving, Home mgt, and Meal prep    Patients Living Environment: Reviewed and no concern  Lives with:  - Lalo  Primary Language: English  Patient's Goal(s) for Therapy:   \"to make my hand/wrist work again and to not hurt\"    Red Flags: Do you have any of the following? No  Fever/chills, unexplained weight changes, dizziness/fainting, unexplained change in bowel or bladder functions, unexplained malaise or muscle weakness, night pain/sweats, numbness or tingling    Objective     LEFT HAND AROM (Degrees)   MCP PIP " DIP OTERO DPC   IF  0/25   0/35   7.5 cm   MF 0/25 0/35   7 cm   RF 0/25 0/40   6 cm   SF 0/20 0/40   5 cm     L thumb opposition to SF:  8cm    WRIST AROM (Degrees)   R L   Extension  0A   5 P   Flexion  20 A   25 P   Pronation  35 A   Supination  25 A     L hand figure 8:  39cm  R hand figure 8:  42.5cm      Outcome Measures:         QUICK DASH:   88.64%     Home Program:  Exercise Sets/Reps Comments   AROM ylw sponge squeeze     Proper wrist splint wear     Finger joints PROM     Elevation     Desensitization     Heat PRN                           Treatment Today    Neuro re-ed:  - desensitization of L hand within fluidotherapy and re-ed of finger and wrist proprioception x 13 min      Manual:  - Extensive scar massage, retrograde massage, MEM and passive motion to all digits x 25 minutes        EDUCATION: Home exercise program, plan of care, activity modifications, joint protection, pain management, and injury pathology       Assessment:   Patient continues to have difficulty tolerating scar massage and manual edema mobilization specifically to the dorsal radial portion of her wrist and forearm citing the same burning sensations she continues to have.  We had a long discussion given her consideration about a nerve block.  She is encouraged to ask as many questions as possible to the pain management doctor although therapist examining agreement that it may benefit her to get a nerve block so we can be more aggressive and involved with progressing her range of motion and chronic wrist levels.  She understands under current circumstances progressing with therapy will continue to be challenging given the amount of pain and burning continues to have.      Level of Complexity  MOD complexity    OT Rehab Potential  Good      Plan:     Planned Interventions include: therapeutic exercise, self-care home management, manual therapy, therapeutic activities, , neuromuscular coordination, vasopneumatic, dry needling, and  orthosis fabrication.  Frequency: 2 x Week  Duration: 12 Weeks    Goals: Set and discussed today         1) Patient will be able to demo 2cm from midpalm with all digits to perform dressing tasks with MIN A, in 4 weeks.         2) Patient will demo 40 degrees of L wrist supination to perform hair care with MIN A, in 6 weeks         3) Patient will demo at least 3cm less L hand swelling to imporve gross grasping, in 4 weeks.        Plan of care was developed with input and agreement by the patient.      [place], OT

## 2025-02-04 ENCOUNTER — APPOINTMENT (OUTPATIENT)
Dept: ORTHOPEDIC SURGERY | Facility: HOSPITAL | Age: 75
End: 2025-02-04
Payer: MEDICARE

## 2025-02-05 NOTE — H&P
Pain Management H&P    History Of Present Illness  Uma Middleton is a 75 y.o. female presents for procedure state below. Endorses no changes in past medical history or medical health since last seen in clinic.      Past Medical History  She has a past medical history of Adhesive capsulitis of right shoulder (10/21/2013), Angina pectoris, Anxiety, Aortic aneurysm without rupture, unspecified portion of aorta (CMS-[facility]), ASHD (arteriosclerotic heart disease), Aleman esophagus, Cataract, Central retinal vein occlusion, right eye (CMS-[facility]), Chest pain, Cholelithiasis, Dermatochalasis of both upper eyelids, Dry eye syndrome of bilateral lacrimal glands, Elevated blood-pressure reading, without diagnosis of hypertension, Esophageal disease, GERD (gastroesophageal reflux disease), Hyperlipidemia, Hypertension, Inconclusive mammogram (01/23/2017), Lattice degeneration of retina, bilateral, Other specified disorders of eustachian tube, bilateral (05/08/2017), Otitis media, unspecified, right ear (12/18/2015), Personal history of other diseases of the respiratory system (10/18/2016), PONV (postoperative nausea and vomiting), Primary osteoarthritis, unspecified hand (02/09/2015), Sacroiliitis, not elsewhere classified (CMS-HCC) (04/26/2016), Sciatica, right side (04/26/2016), Snoring, Unspecified optic atrophy, and Vitamin D deficiency.    Surgical History  She has a past surgical history that includes Other surgical history (06/25/2013); Cataract extraction; Shoulder surgery; Cataract extraction w/  intraocular lens implant (Bilateral); pr osteotomy mandible segmental (1969); Cardiac catheterization (N/A, 10/14/2024); Esophagogastroduodenoscopy; Colonoscopy; Cholecystectomy; Cardiac surgery (11/08/2024); and Coronary artery bypass graft (11/8/2024).     Social History  She reports that she quit smoking about 15 years ago. Her smoking use included cigarettes. She started smoking about 43 years ago. She has a 28 pack-year  smoking history. She has been exposed to tobacco smoke. She has never used smokeless tobacco. She reports that she does not drink alcohol and does not use drugs.    Family History  Family History   Problem Relation Name Age of Onset    Hypertension Mother Marilia     Heart disease Father Rd     Hypertension Father Rd         Allergies  Fire ant, Erythromycin, and Tea tree oil    Review of Symptoms:   Constitutional: Negative for chills, diaphoresis or fever  HENT: Negative for neck swelling  Eyes:.  Negative for eye pain  Respiratory:.  Negative for cough, shortness of breath or wheezing    Cardiovascular:.  Negative for chest pain or palpitations  Gastrointestinal:.  Negative for abdominal pain, nausea and vomiting  Genitourinary:.  Negative for urgency  Musculoskeletal:  Positive for left upper extremity pain. Positive for joint pain. Denies falls within the past 3 months.  Skin: Negative for wounds or itching   Neurological: Negative for dizziness, seizures, loss of consciousness and weakness  Endo/Heme/Allergies: Does not bruise/bleed easily  Psychiatric/Behavioral: Negative for depression. The patient does not appear anxious.      Pre-sedation Evaluation  ASA class 3  Mallampati score 2     PHYSICAL EXAM  Vitals signs reviewed  Constitutional:       General: Not in acute distress     Appearance: Normal appearance. Not ill-appearing.  HENT:     Head: Normocephalic and atraumatic  Eyes:     Conjunctiva/sclera: Conjunctivae normal  Cardiovascular:     Rate and Rhythm: Normal rate and regular rhythm  Pulmonary:     Effort: No respiratory distress  Abdominal:     Palpations: Abdomen is soft  Musculoskeletal: PITT  Skin:     General: Skin is warm and dry  Neurological:     General: No focal deficit present  Psychiatric:         Mood and Affect: Mood normal         Behavior: Behavior normal     Last Recorded Vitals  There were no vitals taken for this visit.    Relevant Results  Current Outpatient Medications    Medication Instructions    amLODIPine-benazepriL (Lotrel) 5-10 mg capsule 1 capsule, oral, Daily    amoxicillin (Amoxil) 250 mg capsule take one capsule by mouth every 8 hours until gone    aspirin 81 mg, oral, Daily    atorvastatin (LIPITOR) 80 mg, oral, Daily    clopidogrel (PLAVIX) 75 mg, oral, Daily    fluticasone (Flonase) 50 mcg/actuation nasal spray 1-2 sprays, As needed    gabapentin (NEURONTIN) 300 mg, oral, 3 times daily    hydrOXYzine HCL (ATARAX) 25 mg, Nightly PRN    isosorbide mononitrate ER (IMDUR) 30 mg, oral, Daily, Do not crush or chew.    lidocaine (Lidoderm) 5 % patch 1 patch, transdermal, Daily, Apply to painful area 12 hours per day, remove for 12 hours.    lidocaine (Lidoderm) 5 % patch 1 patch, transdermal, Daily, Remove & discard patch within 12 hours or as directed by MD.    metoprolol succinate XL (TOPROL-XL) 50 mg, oral, Daily    omeprazole (PRILOSEC) 40 mg, Daily    traMADol (ULTRAM) 50 mg, As needed       No results found for this or any previous visit from the past 1000 days.     No image results found.       No diagnosis found.     ASSESSMENT/PLAN  Uma Middleton is a 75 y.o. female here for left stellate ganglion block with ultrasound    Patient denies any recent antibiotic use or infections, takes ASA 81 bu no other blood thinner use, and denies contrast or local anesthetic allergies     Risks, benefits, alternatives discussed. All questions answered to the best of my ability. Patient agrees to proceed.      Our plan is as follows:  - Proceed with aforementioned procedure          Cas Castaneda MD   Pain fellow

## 2025-02-06 ENCOUNTER — PREP FOR PROCEDURE (OUTPATIENT)
Dept: PAIN MEDICINE | Facility: CLINIC | Age: 75
End: 2025-02-06

## 2025-02-06 ENCOUNTER — HOSPITAL ENCOUNTER (OUTPATIENT)
Facility: HOSPITAL | Age: 75
Discharge: HOME | End: 2025-02-06
Payer: MEDICARE

## 2025-02-06 ENCOUNTER — TREATMENT (OUTPATIENT)
Dept: OCCUPATIONAL THERAPY | Facility: CLINIC | Age: 75
End: 2025-02-06
Payer: MEDICARE

## 2025-02-06 ENCOUNTER — APPOINTMENT (OUTPATIENT)
Facility: HOSPITAL | Age: 75
End: 2025-02-06
Payer: MEDICARE

## 2025-02-06 VITALS
DIASTOLIC BLOOD PRESSURE: 73 MMHG | HEART RATE: 65 BPM | SYSTOLIC BLOOD PRESSURE: 147 MMHG | RESPIRATION RATE: 16 BRPM | OXYGEN SATURATION: 97 % | TEMPERATURE: 97.5 F

## 2025-02-06 DIAGNOSIS — M25.642 JOINT STIFFNESS OF HAND, LEFT: Primary | ICD-10-CM

## 2025-02-06 DIAGNOSIS — G90.512 COMPLEX REGIONAL PAIN SYNDROME TYPE 1 OF LEFT UPPER EXTREMITY: ICD-10-CM

## 2025-02-06 PROCEDURE — 2500000004 HC RX 250 GENERAL PHARMACY W/ HCPCS (ALT 636 FOR OP/ED): Mod: JZ | Performed by: PAIN MEDICINE

## 2025-02-06 PROCEDURE — 76942 ECHO GUIDE FOR BIOPSY: CPT | Mod: RSC | Performed by: PAIN MEDICINE

## 2025-02-06 PROCEDURE — 3700000012 HC SEDATION LEVEL 5+ TIME - INITIAL 15 MINUTES 5/> YEARS

## 2025-02-06 PROCEDURE — 76942 ECHO GUIDE FOR BIOPSY: CPT | Performed by: PAIN MEDICINE

## 2025-02-06 PROCEDURE — 97035 APP MDLTY 1+ULTRASOUND EA 15: CPT | Mod: GO | Performed by: OCCUPATIONAL THERAPIST

## 2025-02-06 PROCEDURE — 97140 MANUAL THERAPY 1/> REGIONS: CPT | Mod: GO | Performed by: OCCUPATIONAL THERAPIST

## 2025-02-06 PROCEDURE — 99152 MOD SED SAME PHYS/QHP 5/>YRS: CPT | Performed by: PAIN MEDICINE

## 2025-02-06 PROCEDURE — 64450 NJX AA&/STRD OTHER PN/BRANCH: CPT | Performed by: PAIN MEDICINE

## 2025-02-06 RX ORDER — METHYLPREDNISOLONE ACETATE 40 MG/ML
INJECTION, SUSPENSION INTRA-ARTICULAR; INTRALESIONAL; INTRAMUSCULAR; SOFT TISSUE
Status: COMPLETED | OUTPATIENT
Start: 2025-02-06 | End: 2025-02-06

## 2025-02-06 RX ORDER — FENTANYL CITRATE 50 UG/ML
INJECTION, SOLUTION INTRAMUSCULAR; INTRAVENOUS
Status: COMPLETED | OUTPATIENT
Start: 2025-02-06 | End: 2025-02-06

## 2025-02-06 RX ORDER — MIDAZOLAM HYDROCHLORIDE 1 MG/ML
INJECTION, SOLUTION INTRAMUSCULAR; INTRAVENOUS
Status: COMPLETED | OUTPATIENT
Start: 2025-02-06 | End: 2025-02-06

## 2025-02-06 RX ORDER — BUPIVACAINE HYDROCHLORIDE 5 MG/ML
INJECTION, SOLUTION EPIDURAL; INTRACAUDAL
Status: COMPLETED | OUTPATIENT
Start: 2025-02-06 | End: 2025-02-06

## 2025-02-06 RX ADMIN — FENTANYL CITRATE 50 MCG: 50 INJECTION INTRAMUSCULAR; INTRAVENOUS at 14:33

## 2025-02-06 RX ADMIN — MIDAZOLAM 1 MG: 1 INJECTION INTRAMUSCULAR; INTRAVENOUS at 14:32

## 2025-02-06 RX ADMIN — BUPIVACAINE HYDROCHLORIDE 12 ML: 5 INJECTION, SOLUTION EPIDURAL; INTRACAUDAL; PERINEURAL at 14:43

## 2025-02-06 RX ADMIN — METHYLPREDNISOLONE ACETATE 40 MG: 40 INJECTION, SUSPENSION INTRA-ARTICULAR; INTRALESIONAL; INTRAMUSCULAR; SOFT TISSUE at 14:44

## 2025-02-06 ASSESSMENT — PAIN - FUNCTIONAL ASSESSMENT
PAIN_FUNCTIONAL_ASSESSMENT: 0-10
PAIN_FUNCTIONAL_ASSESSMENT: WONG-BAKER FACES
PAIN_FUNCTIONAL_ASSESSMENT: WONG-BAKER FACES

## 2025-02-06 ASSESSMENT — PAIN SCALES - GENERAL
PAINLEVEL_OUTOF10: 7
PAINLEVEL_OUTOF10: 3
PAINLEVEL_OUTOF10: 7
PAINLEVEL_OUTOF10: 7
PAINLEVEL_OUTOF10: 3

## 2025-02-06 NOTE — PROGRESS NOTES
"    Occupational Therapy Orthopedic Treatment Note    Patient Name: Uma Middleton  MRN: [id number]  Today's Date: 2/6/2025  Time Calculation  Start Time: 1105  Stop Time: 1145  Time Calculation (min): 40 min   , OT Therapeutic Procedures Time Entry  Manual Therapy Time Entry: 30, OT Modalities Time Entry  Ultrasound Time Entry: 8    Insurance:  Visit number: 8 of 50  Authorization info: no inquired  Insurance Type: Medicare B and [place]  Medicare certification  -  Start: 1/10/25  End: 4/4/25    Current Problem  1. Joint stiffness of hand, left            Referred by:   Dr. Samuel  Referred for:  L distal radius fx ORIF and significant wrist stiffness  Onset/DOI:  11/29/24  DOS: 12/24//24    Fall risk:  Low  Precautions:  NWB     Medical History Form: Reviewed (scanned into chart)    Subjective   \"I had one session of acupuncture.  The burning is still there.  I can't get a full nerve block because I have to remain on Plavix, per my cardiologist\"    Hand Dominance: Right    Pain:  5/10  Location: radial/volar wrist and dorsal forearm  Description: burning, tight, sharp, burning  Aggravating Factors:  N/A - pain is constant  Relieving Factors:   minimal pain relief from prescription pain meds    Previous Interventions/Treatments: None    Prior Level of Function (PLOF)  Patient previously indpendent with all ADLs/IADLs    ADL/IADL impairments  Bathing, Dressing, Hygiene/Grooming, Hair care, Sleep, Driving, Home mgt, and Meal prep    Patients Living Environment: Reviewed and no concern  Lives with:  - Lalo  Primary Language: English  Patient's Goal(s) for Therapy:   \"to make my hand/wrist work again and to not hurt\"    Red Flags: Do you have any of the following? No  Fever/chills, unexplained weight changes, dizziness/fainting, unexplained change in bowel or bladder functions, unexplained malaise or muscle weakness, night pain/sweats, numbness or tingling    Objective     LEFT HAND AROM (Degrees)   MCP PIP DIP OTERO " Reason For Visit  JOSE TRACYMOMONARCISA 28Sep2017)  Requested for: 18Ebj1244; Last   Rx:[id number] 60Mhp0678 to (Evaluate:43Htb1014)  Requested for: 92Utc4916; Last    Rx:[id number] 55Nph0086 to (Evaluate:90Gak4472)  Requested for: 65Qbu1143; Last    Rx:[id number] Ordered   13. True Metrix Meter w/Device Kit; USE AS DIRECTED;    Therapy: 23Phs7959 to (Evaluate:62Udb1529)  Requested for: 53Ukl6846; Last    Rx:[id number] Ordered   14. TrueTrack Test In Vitro Strip; USE 1 STRIP TWICE DAILY;    Therapy: 39Zxj8323 to (Evaluate:84Car0716)  Requested for: 00Zlu6756; Last    Rx:[id number] Ordered   15. Viagra 25 MG Oral Tablet; TAKE 1 TABLET AS NEEDED;    Therapy: 38Lpn7073 to (Last Rx:28Kkx2414)  Requested for: 57Dkx5751 Ordered   16. Warfarin Sodium 4 MG Oral Tablet (Coumadin); TAKE 1 TABLET BY MOUTH DAILY AS    DIRECTED;    Therapy: 75Smi8712 to (Evaluate:78Dwb4332)  Requested for: 18Mbk0269; Last    Rx:[id number] Ordered   17. Warfarin Sodium 5 MG Oral Tablet; TAKE BY MOUTH AS DIRECTED;    Therapy: 95Asv5873 to (Evaluate:96Olm2894)  Requested for: 12Bjy9145; Last    Rx:[id number] CST     DPC   IF  0/25   0/35   6 cm   MF 0/25 0/35   7 cm   RF 0/25 0/40   6 cm   SF 0/20 0/40   4 cm     L thumb opposition to SF:  8cm    WRIST AROM (Degrees)   R L   Extension  0A   5 P   Flexion  20 A   25 P   Pronation  35 A   Supination  25 A     L hand figure 8:  39cm  R hand figure 8:  42.5cm      Outcome Measures:         QUICK DASH:   88.64%     Home Program:  Exercise Sets/Reps Comments   AROM ylw sponge squeeze     Proper wrist splint wear     Finger joints PROM     Elevation     Desensitization     Heat PRN                           Treatment Today    Ultrasound:  - 3.3MHx x 1.2Wcm2 to volar wrist surgical; site to increase scar tissue extensibility x 8 min    Manual:  - Extensive scar massage, retrograde massage, MEM and passive motion to all digits x 30 minutes        EDUCATION: Home exercise program, plan of care, activity modifications, joint protection, pain management, and injury pathology          Assessment:   Patient moderately reactive to manual intervention today although her wrist showed evidence of slightly improved mobility beyond the neutral position.  She still experiences intense burning typical with each session. Her finger PROM seemed slightly improved and with measuring she has 1.5cm IF and 1cm SF improved active finger flexion. She is considering having a partial 3 day nerve block.        Level of Complexity  MOD complexity    OT Rehab Potential  Good      Plan:     Planned Interventions include: therapeutic exercise, self-care home management, manual therapy, therapeutic activities, , neuromuscular coordination, vasopneumatic, dry needling, and orthosis fabrication.  Frequency: 2 x Week  Duration: 12 Weeks    Goals: Set and discussed today         1) Patient will be able to demo 2cm from midpalm with all digits to perform dressing tasks with MIN A, in 4 weeks.         2) Patient will demo 40 degrees of L wrist supination to perform hair care with MIN A, in 6 weeks         3) Patient will  demo at least 3cm less L hand swelling to imporve gross grasping, in 4 weeks.        Plan of care was developed with input and agreement by the patient.      Marques Mills, OT

## 2025-02-06 NOTE — H&P
Pain Management H&P    History Of Present Illness  Uma Middleton is a 75 y.o. female presents for procedure state below. Endorses no changes in past medical history or medical health since last seen in clinic.      Past Medical History  She has a past medical history of Adhesive capsulitis of right shoulder (10/21/2013), Angina pectoris, Anxiety, Aortic aneurysm without rupture, unspecified portion of aorta (CMS-[facility]), ASHD (arteriosclerotic heart disease), Aleman esophagus, Cataract, Central retinal vein occlusion, right eye (CMS-[facility]), Chest pain, Cholelithiasis, Dermatochalasis of both upper eyelids, Dry eye syndrome of bilateral lacrimal glands, Elevated blood-pressure reading, without diagnosis of hypertension, Esophageal disease, GERD (gastroesophageal reflux disease), Hyperlipidemia, Hypertension, Inconclusive mammogram (01/23/2017), Lattice degeneration of retina, bilateral, Other specified disorders of eustachian tube, bilateral (05/08/2017), Otitis media, unspecified, right ear (12/18/2015), Personal history of other diseases of the respiratory system (10/18/2016), PONV (postoperative nausea and vomiting), Primary osteoarthritis, unspecified hand (02/09/2015), Sacroiliitis, not elsewhere classified (CMS-HCC) (04/26/2016), Sciatica, right side (04/26/2016), Snoring, Unspecified optic atrophy, and Vitamin D deficiency.    Surgical History  She has a past surgical history that includes Other surgical history (06/25/2013); Cataract extraction; Shoulder surgery; Cataract extraction w/  intraocular lens implant (Bilateral); pr osteotomy mandible segmental (1969); Cardiac catheterization (N/A, 10/14/2024); Esophagogastroduodenoscopy; Colonoscopy; Cholecystectomy; Cardiac surgery (11/08/2024); and Coronary artery bypass graft (11/8/2024).     Social History  She reports that she quit smoking about 15 years ago. Her smoking use included cigarettes. She started smoking about 43 years ago. She has a 28 pack-year  smoking history. She has been exposed to tobacco smoke. She has never used smokeless tobacco. She reports that she does not drink alcohol and does not use drugs.    Family History  Family History   Problem Relation Name Age of Onset    Hypertension Mother Marilia     Heart disease Father Rd     Hypertension Father Rd         Allergies  Fire ant, Erythromycin, and Tea tree oil    Review of Symptoms:   Constitutional: Negative for chills, diaphoresis or fever  HENT: Negative for neck swelling  Eyes:.  Negative for eye pain  Respiratory:.  Negative for cough, shortness of breath or wheezing    Cardiovascular:.  Negative for chest pain or palpitations  Gastrointestinal:.  Negative for abdominal pain, nausea and vomiting  Genitourinary:.  Negative for urgency  Musculoskeletal:  Positive for left upper extremity pain. Positive for joint pain. Denies falls within the past 3 months.  Skin: Negative for wounds or itching   Neurological: Negative for dizziness, seizures, loss of consciousness and weakness  Endo/Heme/Allergies: Does not bruise/bleed easily  Psychiatric/Behavioral: Negative for depression. The patient does not appear anxious.      Pre-sedation Evaluation  ASA class 3  Mallampati score 2     PHYSICAL EXAM  Vitals signs reviewed  Constitutional:       General: Not in acute distress     Appearance: Normal appearance. Not ill-appearing.  HENT:     Head: Normocephalic and atraumatic  Eyes:     Conjunctiva/sclera: Conjunctivae normal  Cardiovascular:     Rate and Rhythm: Normal rate and regular rhythm  Pulmonary:     Effort: No respiratory distress  Abdominal:     Palpations: Abdomen is soft  Musculoskeletal: PITT  Skin:     General: Skin is warm and dry  Neurological:     General: No focal deficit present  Psychiatric:         Mood and Affect: Mood normal         Behavior: Behavior normal     Last Recorded Vitals  There were no vitals taken for this visit.    Relevant Results  Current Outpatient Medications    Medication Instructions    amLODIPine-benazepriL (Lotrel) 5-10 mg capsule 1 capsule, oral, Daily    amoxicillin (Amoxil) 250 mg capsule take one capsule by mouth every 8 hours until gone    aspirin 81 mg, oral, Daily    atorvastatin (LIPITOR) 80 mg, oral, Daily    clopidogrel (PLAVIX) 75 mg, oral, Daily    fluticasone (Flonase) 50 mcg/actuation nasal spray 1-2 sprays, As needed    gabapentin (NEURONTIN) 300 mg, oral, 3 times daily    hydrOXYzine HCL (ATARAX) 25 mg, Nightly PRN    isosorbide mononitrate ER (IMDUR) 30 mg, oral, Daily, Do not crush or chew.    lidocaine (Lidoderm) 5 % patch 1 patch, transdermal, Daily, Apply to painful area 12 hours per day, remove for 12 hours.    lidocaine (Lidoderm) 5 % patch 1 patch, transdermal, Daily, Remove & discard patch within 12 hours or as directed by MD.    metoprolol succinate XL (TOPROL-XL) 50 mg, oral, Daily    omeprazole (PRILOSEC) 40 mg, Daily    traMADol (ULTRAM) 50 mg, As needed       No results found for this or any previous visit from the past 1000 days.     No image results found.       1. Complex regional pain syndrome type 1 of left upper extremity  Nerve Block    Nerve Block    US guided pain procedure    US guided pain procedure           ASSESSMENT/PLAN  Uma Middleton is a 75 y.o. female here for left radian and median nerve injection under ultrasound    Patient denies any recent antibiotic use or infections, takes ASA 81 bu no other blood thinner use, and denies contrast or local anesthetic allergies     Risks, benefits, alternatives discussed. All questions answered to the best of my ability. Patient agrees to proceed.      Our plan is as follows:  - Proceed with aforementioned procedure          Cas Castaneda MD   Pain fellow

## 2025-02-10 ENCOUNTER — TREATMENT (OUTPATIENT)
Dept: OCCUPATIONAL THERAPY | Facility: CLINIC | Age: 75
End: 2025-02-10
Payer: MEDICARE

## 2025-02-10 DIAGNOSIS — M25.532 LEFT WRIST PAIN: Primary | ICD-10-CM

## 2025-02-10 PROCEDURE — 97035 APP MDLTY 1+ULTRASOUND EA 15: CPT | Mod: GO | Performed by: OCCUPATIONAL THERAPIST

## 2025-02-10 PROCEDURE — 97140 MANUAL THERAPY 1/> REGIONS: CPT | Mod: GO | Performed by: OCCUPATIONAL THERAPIST

## 2025-02-10 NOTE — PROGRESS NOTES
"    Occupational Therapy Orthopedic Treatment Note    Patient Name: Uma Middleton  MRN: [id number]  Today's Date: 2/10/2025  Time Calculation  Start Time: 1015  Stop Time: 1056  Time Calculation (min): 41 min   , OT Therapeutic Procedures Time Entry  Manual Therapy Time Entry: 30, OT Modalities Time Entry  Ultrasound Time Entry: 8    Insurance:  Visit number: 9 of 50  Authorization info: no inquired  Insurance Type: Medicare B and [place]  Medicare certification  -  Start: 1/10/25  End: 4/4/25    Current Problem  1. Left wrist pain            Referred by:   Dr. Samuel  Referred for:  L distal radius fx ORIF and significant wrist stiffness  Onset/DOI:  11/29/24  DOS: 12/24//24    Fall risk:  Low  Precautions:  NWB     Medical History Form: Reviewed (scanned into chart)    Subjective   \"I was able to get the 3 day nerve block last Thursday.  It helped a little to stretch my fingers more but it never got rid of my burning pain.  That doctor recommending I consider the full nerve block because my hand is in such bad shape\"    Hand Dominance: Right    Pain:  5/10  Location: radial/volar wrist and dorsal forearm  Description: burning, tight, sharp, burning  Aggravating Factors:  N/A - pain is constant  Relieving Factors:   minimal pain relief from prescription pain meds    Previous Interventions/Treatments: None    Prior Level of Function (PLOF)  Patient previously indpendent with all ADLs/IADLs    ADL/IADL impairments  Bathing, Dressing, Hygiene/Grooming, Hair care, Sleep, Driving, Home mgt, and Meal prep    Patients Living Environment: Reviewed and no concern  Lives with:  - Lalo  Primary Language: English  Patient's Goal(s) for Therapy:   \"to make my hand/wrist work again and to not hurt\"    Red Flags: Do you have any of the following? No  Fever/chills, unexplained weight changes, dizziness/fainting, unexplained change in bowel or bladder functions, unexplained malaise or muscle weakness, night pain/sweats, " numbness or tingling    Objective     LEFT HAND AROM (Degrees)   MCP PIP DIP OTERO DPC   IF  0/45 0/35 0/10  6 cm   MF 0/40 0/40 0/15  7 cm   RF 0/35 0/45 0/15  6 cm   SF 0/20 0/45 0/20  4 cm     L thumb opposition to SF:  8cm    WRIST AROM (Degrees)   R L   Extension  0A   5 P   Flexion  20 A   25 P   Pronation  35 A   Supination  25 A     L hand figure 8:  39cm  R hand figure 8:  42.5cm      Outcome Measures:         QUICK DASH:   88.64%     Home Program:  Exercise Sets/Reps Comments   AROM ylw sponge squeeze     Proper wrist splint wear     Finger joints PROM     Elevation     Desensitization     Heat PRN                           Treatment Today    Ultrasound:  - 3.3MHx x 1.2Wcm2 to volar wrist surgical; site to increase scar tissue extensibility x 8 min    Manual:  - Extensive scar massage, retrograde massage, MEM and passive motion to all digits x 30 minutes  - reverse blocking 3x15  - wrist extension stretching x 10 min      EDUCATION: Home exercise program, plan of care, activity modifications, joint protection, pain management, and injury pathology          Assessment:   She still experiences intense burning typical with each session. Her active  motion has improved at every joint evel of fingers 2-5 anywhere from 5-15 degrees. This is encouraging progress and the most she has demonstrated atone time.  There of a partial cascade to her fingers. She understands to continue reverse blocking with her HEP.    Level of Complexity  MOD complexity    OT Rehab Potential  Good      Plan:     Planned Interventions include: therapeutic exercise, self-care home management, manual therapy, therapeutic activities, , neuromuscular coordination, vasopneumatic, dry needling, and orthosis fabrication.  Frequency: 2 x Week  Duration: 12 Weeks    Goals: Set and discussed today         1) Patient will be able to demo 2cm from midpalm with all digits to perform dressing tasks with MIN A, in 4 weeks.         2) Patient will demo 40  degrees of L wrist supination to perform hair care with MIN A, in 6 weeks         3) Patient will demo at least 3cm less L hand swelling to imporve gross grasping, in 4 weeks.        Plan of care was developed with input and agreement by the patient.      Marques Mills, OT

## 2025-02-11 NOTE — PROGRESS NOTES
[facility]  Hand and Upper Extremity Service  Post Operative visit        Date of surgery: 12/26/2024  Surgery(s) performed: Open reduction internal fixation of left distal radius.        Subjective report:   Patient presents for her first postop follow-up.  She has remained in her splint.  She did go to the emergency department on 1/3/2025 for continued hand and arm swelling with significant postoperative pain.  She notes she has been pretty miserable since her surgery.  Of note she was also in significant pain prior to her operation as she had been dealing with a left distal radius fracture which was tried to be treated nonoperatively by another surgeon.  She had a delayed presentation to my office for operative fixation.  She was approximately 4 weeks out from date of injury and had a pending malunion which needed significant mount of work to break up her fracture and realign her fracture fragments.    She had an ultrasound performed while in the emergency department which was negative for any DVTs.  She denies numbness and tingling.  She notes a burning type pain diffusely and circumferentially about her wrist and dorsum of her hand.  Denies numbness and tingling in the fingers.     1/14/2024 follow-up:  Patient presents for follow-up.  She notes no improvement in her pain.  Her pain is still severe and consistent.  She has been working on finger range of motion however she notes this is significantly limited.  She denies numbness in her finger.     Exam findings; left upper extremity  Mild to moderate swelling to the left wrist.  Moderate swelling to the dorsum of the hands and fingers.  Limited range of motion to her fingers secondary to pain, swelling and stiffness.  She can flex and extend the MP and IP joints however she is approximately 7 cm from the distal palmar crease.  No significant probing.  Limited wrist range of motion secondary to pain.  She is not hypersensitive  to light touch.  Incision is well-approximated without any erythema warmth or drainage.  Sutures are intact.  AIN, PIN, ulnar motors intact.  Sensation intact light touch radial, ulnar, median nerves     Radiograph findings: XR of the left wrist was  reviewed in office today.  X-rays are dated from 1/7/2025  Status post open reduction internal fixation of left distal radius fracture with near anatomic alignment and improved positioning.  No signs of hardware failure or loosening        Plan: Overall she has no significant improvement in her symptoms.  I did recommend her continue with occupational therapy.  I did discuss that it appears she may be developing CRPS however she does does not have the overall hyperemia or hypersensitivity to light touch.  We will continue to monitor this.  She should continue take her pain medicine as needed.  Her sutures removed in office today.  She will follow-up in 3 to 4 weeks with repeat x-rays        Medications Prescribed: Oxycodone 5 mg every 6 hours as needed and gabapentin 300 mg 3 times daily           Will BeucleDO bishop  [facility] [employer]  Department of Orthopaedic Surgery  Hand and Upper Extremity Surgery  [facility]     Dictation performed with the use of voice recognition software. Syntax and grammatical errors may exist.

## 2025-02-13 ENCOUNTER — TREATMENT (OUTPATIENT)
Dept: OCCUPATIONAL THERAPY | Facility: CLINIC | Age: 75
End: 2025-02-13
Payer: MEDICARE

## 2025-02-13 DIAGNOSIS — M25.532 LEFT WRIST PAIN: Primary | ICD-10-CM

## 2025-02-13 DIAGNOSIS — M25.642 JOINT STIFFNESS OF HAND, LEFT: ICD-10-CM

## 2025-02-13 PROCEDURE — 97140 MANUAL THERAPY 1/> REGIONS: CPT | Mod: GO | Performed by: OCCUPATIONAL THERAPIST

## 2025-02-13 PROCEDURE — 97035 APP MDLTY 1+ULTRASOUND EA 15: CPT | Mod: GO | Performed by: OCCUPATIONAL THERAPIST

## 2025-02-13 NOTE — PROGRESS NOTES
"    Occupational Therapy Orthopedic Treatment Note    Patient Name: Uma Middleton  MRN: [id number]  Today's Date: 2/13/2025  Time Calculation  Start Time: 1100  Stop Time: 1145  Time Calculation (min): 45 min   , OT Therapeutic Procedures Time Entry  Manual Therapy Time Entry: 32, OT Modalities Time Entry  Ultrasound Time Entry: 8    Insurance:  Visit number: 9 of 50  Authorization info: no inquired  Insurance Type: Medicare B and [employer]  Medicare certification  -  Start: 1/10/25  End: 4/4/25    Current Problem  No diagnosis found.      Referred by:   Dr. Samuel  Referred for:  L distal radius fx ORIF and significant wrist stiffness  Onset/DOI:  11/29/24  DOS: 12/24//24    Fall risk:  Low  Precautions:  NWB     Medical History Form: Reviewed (scanned into chart)    Subjective   \"I was able to get the 3 day nerve block last Thursday.  It helped a little to stretch my fingers more but it never got rid of my burning pain.  That doctor recommending I consider the full nerve block because my hand is in such bad shape\"    Hand Dominance: Right    Pain:  5/10  Location: radial/volar wrist and dorsal forearm  Description: burning, tight, sharp, burning  Aggravating Factors:  N/A - pain is constant  Relieving Factors:   minimal pain relief from prescription pain meds    Previous Interventions/Treatments: None    Prior Level of Function (PLOF)  Patient previously indpendent with all ADLs/IADLs    ADL/IADL impairments  Bathing, Dressing, Hygiene/Grooming, Hair care, Sleep, Driving, Home mgt, and Meal prep    Patients Living Environment: Reviewed and no concern  Lives with:  - Llao  Primary Language: English  Patient's Goal(s) for Therapy:   \"to make my hand/wrist work again and to not hurt\"    Red Flags: Do you have any of the following? No  Fever/chills, unexplained weight changes, dizziness/fainting, unexplained change in bowel or bladder functions, unexplained malaise or muscle weakness, night pain/sweats, numbness or " tingling    Objective     LEFT HAND AROM (Degrees)   MCP PIP DIP OTERO DPC   IF  0/45 0/35 0/10  6 cm   MF 0/40 0/40 0/15  7 cm   RF 0/35 0/45 0/15  6 cm   SF 0/20 0/45 0/20  4 cm     L thumb opposition to SF:  8cm    WRIST AROM (Degrees)   R L   Extension  0A   5 P   Flexion  20 A   25 P   Pronation  35 A   Supination  25 A     L hand figure 8:  39cm  R hand figure 8:  42.5cm      Outcome Measures:         QUICK DASH:   88.64%     Home Program:  Exercise Sets/Reps Comments   AROM ylw sponge squeeze     Proper wrist splint wear     Finger joints PROM     Elevation     Desensitization     Heat PRN                           Treatment Today    Ultrasound:  - 3.3MHx x 1.2Wcm2 to volar wrist surgical; site to increase scar tissue extensibility x 8 min    Manual:  - Extensive scar massage, retrograde massage, MEM and passive motion to all digits x 20 minutes  - reverse blocking 3x20  - wrist extension stretching x 10 min      EDUCATION: Home exercise program, plan of care, activity modifications, joint protection, pain management, and injury pathology          Assessment:   She still experiences intense burning typical with each session. She does exhibit evidence of digital cascade when performing reverse blocking but has difficulty actively doing this without hand over hand guidance. Wrist extension mobility seem sto be slightly improved with manual intervention although quite painful to tolerate.  Passive supination ROM is even more difficult to tolerate.  She is considering having a full nerve block completed to reduce her pain.    Level of Complexity  MOD complexity    OT Rehab Potential  Good      Plan:     Planned Interventions include: therapeutic exercise, self-care home management, manual therapy, therapeutic activities, , neuromuscular coordination, vasopneumatic, dry needling, and orthosis fabrication.  Frequency: 2 x Week  Duration: 12 Weeks    Goals: Set and discussed today         1) Patient will be able to  demo 2cm from midpalm with all digits to perform dressing tasks with MIN A, in 4 weeks.         2) Patient will demo 40 degrees of L wrist supination to perform hair care with MIN A, in 6 weeks         3) Patient will demo at least 3cm less L hand swelling to imporve gross grasping, in 4 weeks.        Plan of care was developed with input and agreement by the patient.      Marques Mills, OT

## 2025-02-17 ENCOUNTER — TREATMENT (OUTPATIENT)
Dept: OCCUPATIONAL THERAPY | Facility: CLINIC | Age: 75
End: 2025-02-17
Payer: MEDICARE

## 2025-02-17 DIAGNOSIS — M25.532 LEFT WRIST PAIN: ICD-10-CM

## 2025-02-17 DIAGNOSIS — M25.642 JOINT STIFFNESS OF HAND, LEFT: Primary | ICD-10-CM

## 2025-02-17 PROCEDURE — 97140 MANUAL THERAPY 1/> REGIONS: CPT | Mod: GO | Performed by: OCCUPATIONAL THERAPIST

## 2025-02-17 PROCEDURE — 97112 NEUROMUSCULAR REEDUCATION: CPT | Mod: GO | Performed by: OCCUPATIONAL THERAPIST

## 2025-02-17 NOTE — PROGRESS NOTES
"    Occupational Therapy Orthopedic Treatment Note and re-check    Patient Name: Uma Middleton  MRN: [id number]  Today's Date: 2/17/2025  Time Calculation  Start Time: 1015  Stop Time: 1055  Time Calculation (min): 40 min   , OT Therapeutic Procedures Time Entry  Manual Therapy Time Entry: 26  Neuromuscular Re-Education Time Entry: 12,      Insurance:  Visit number: 10 of 50  Authorization info: no inquired  Insurance Type: Medicare B and [employer]  Medicare certification  -  Start: 1/10/25  End: 4/4/25    Current Problem  1. Joint stiffness of hand, left        2. Left wrist pain              Referred by:   Dr. Samuel  Referred for:  L distal radius fx ORIF and significant wrist stiffness  Onset/DOI:  11/29/24  DOS: 12/24//24    Fall risk:  Low  Precautions:  NWB     Medical History Form: Reviewed (scanned into chart)    Subjective   \"I have a full nerve block scheduled for next week Thursday 2/17\"    Hand Dominance: Right    Pain:  5/10  Location: radial/volar wrist and dorsal forearm  Description: burning, tight, sharp, burning  Aggravating Factors:  N/A - pain is constant  Relieving Factors:   minimal pain relief from prescription pain meds    Previous Interventions/Treatments: None    Prior Level of Function (PLOF)  Patient previously indpendent with all ADLs/IADLs    ADL/IADL impairments  Bathing, Dressing, Hygiene/Grooming, Hair care, Sleep, Driving, Home mgt, and Meal prep    Patients Living Environment: Reviewed and no concern  Lives with: [facility]  Primary Language: English  Patient's Goal(s) for Therapy:   \"to make my hand/wrist work again and to not hurt\"    Red Flags: Do you have any of the following? No  Fever/chills, unexplained weight changes, dizziness/fainting, unexplained change in bowel or bladder functions, unexplained malaise or muscle weakness, night pain/sweats, numbness or tingling    Objective     LEFT HAND AROM (Degrees)   MCP PIP DIP OTERO DPC   IF  0/45 0/35 0/10  6 cm   MF 0/40 0/40 " 0/15  7 cm   RF 0/35 0/45 0/15  6 cm   SF 0/20 0/45 0/20  4 cm     L thumb opposition to SF:  8cm    WRIST AROM (Degrees)   R L   Extension  0A   10-15 P   Flexion  20 A   25 P   Pronation  35 A   Supination  25 A     L hand figure 8:  39cm  R hand figure 8:  42.5cm      Outcome Measures:         QUICK DASH:   88.64%     Home Program:  Exercise Sets/Reps Comments   AROM ylw sponge squeeze     Proper wrist splint wear     Finger joints PROM     Elevation     Desensitization     Heat PRN                           Treatment Today    Neuro re-ed:  - ASL AROM within fluidotherapy for desensitization x 12 min  - stress loading with green flexbar x 5 min    Manual:  - Extensive soft tissue massage, retrograde massage, MEM and passive motion to all digits x 20 minutes  - grade 102 carpal wrist mobs volar and dorsal to increase mobility  - intrinsic hand stretching x 5 min  - wrist extension stretching x 5 min  - reverse blocking 3x20        EDUCATION: Home exercise program, plan of care, activity modifications, joint protection, pain management, and injury pathology          Assessment:   She still experiences intense burning typical with each session.  Exhibited increased proximal finger swelling today compared to previous sessions.  Difficulty tolerating stretching at the wrist and intrinsic stretching due to increased pain.  Her fingers do seem to have improved passive flexion motion after manual intervention however actively still has considerably difficult time.  Her scar and surrounding soft tissue are soft in nature.  Passive wrist extension also is slightly improved to about 15 degrees although takes considerable manual work to reach this point.  Patient to benefit from receiving skilled services in order to reduce swelling and increased motion especially with upcoming nerve block which may allow us to be more aggressive.      Level of Complexity  MOD complexity    OT Rehab Potential  Good      Plan:     Planned  Interventions include: therapeutic exercise, self-care home management, manual therapy, therapeutic activities, , neuromuscular coordination, vasopneumatic, dry needling, and orthosis fabrication.  Frequency: 2 x Week  Duration: 12 Weeks    Goals: Set and discussed today         1) Patient will be able to demo 2cm from midpalm with all digits to perform dressing tasks with MIN A, in 4 weeks.         2) Patient will demo 40 degrees of L wrist supination to perform hair care with MIN A, in 6 weeks         3) Patient will demo at least 3cm less L hand swelling to imporve gross grasping, in 4 weeks.        Plan of care was developed with input and agreement by the patient.      Marques Mills OT

## 2025-02-20 ENCOUNTER — APPOINTMENT (OUTPATIENT)
Dept: OCCUPATIONAL THERAPY | Facility: CLINIC | Age: 75
End: 2025-02-20
Payer: MEDICARE

## 2025-02-23 NOTE — PATIENT INSTRUCTIONS
Call  the  Sleep Center ([phone]) to speak with a sleep testing center  to book your overnight sleep study procedure at one of our adult and pediatric-friendly sleep labs. Overnight sleep studies may be scheduled on a weekday or weekend.      We have child-life services on a case-by-case basis at the [facility] location. We also perform daytime testing for shift workers on a case by case basis.     For the study: Bring usual medications and nightly routine items for your sleep study.  You may wish to bring a snack and nightly routine items you feel would be important to help you sleep (e.g. favorite pillow). Bring your sleep logs/requested paperwork to your sleep study appointment.     Results of your sleep study will be given to the ordering clinician. Please contact their office for results or followup as directed by your clinician. For additional information about the sleep medicine services, please call [phone].     Locations for sleep studies are [facility] ([facility]), [facility], [facility], and [place].      How to use nasal sprays properly: If you have nasal congestion or allergies, improving your breathing through the nose is critical for treating sleep apnea and improving your sleep. Hence, intranasal steroid spray like Flonase or Nasocort (generic name is Fluticasone) might help. In some patients with sleep apnea, using intranasal steroid spray allowed them to tolerate CPAP mask better.     Intranasal steroids are usually prescribed as 2 sprays per nostril 1 hour before bedtime. If you administer intranasal steroids too close to bedtime, it might not work as well.  If you have nasal congestion or allergies during day despite using Flonase, try adding Azelastine nasal spray 2 sprays per nostril in the morning. Alternatively, can consider adding over-the-counter non-sedating antihistamine medications.     However, if you have  severe nasal congestion or allergies despite using both nasal sprays, consider seeing an allergy specialist to confirm which substance you are sensitive to and also get definitive treatment which may be in the form of injections, oral pills, different kind of nose sprays, and/or a combination of everything said.     Below are instructions on how to use intranasal steroid spray properly:  Sit up or stand up with your face down.  Shake the spray bottle and insert its tip in one nostril.  Point the spray towards your ear, and not towards the middle of your nose. Pointing the tip upward and in the middle of the nose can lead to discomfort and irritation of nasal mucosa which can lead to nose bleeding or coughing up tinges of blood.  Squeeze the spray bottle and administer the recommended amount of spray.   Don't sniff when you spray. Remove the spray bottle.  Pinch your nose and hold it for a count of 10.   Perform steps 1-6 on the other nostril.    No

## 2025-02-24 ENCOUNTER — TREATMENT (OUTPATIENT)
Dept: OCCUPATIONAL THERAPY | Facility: CLINIC | Age: 75
End: 2025-02-24
Payer: MEDICARE

## 2025-02-24 DIAGNOSIS — M25.532 LEFT WRIST PAIN: ICD-10-CM

## 2025-02-24 DIAGNOSIS — M25.642 JOINT STIFFNESS OF HAND, LEFT: Primary | ICD-10-CM

## 2025-02-24 PROCEDURE — 97112 NEUROMUSCULAR REEDUCATION: CPT | Mod: GO | Performed by: OCCUPATIONAL THERAPIST

## 2025-02-24 NOTE — PROGRESS NOTES
"    Occupational Therapy Orthopedic Treatment Note    Patient Name: Uma Middleton  MRN: [id number]  Today's Date: 2/24/2025  Time Calculation  Start Time: 0100  Stop Time: 0135  Time Calculation (min): 35 min   , OT Therapeutic Procedures Time Entry  Neuromuscular Re-Education Time Entry: 30,      Insurance:  Visit number: 11 of 50  Authorization info: no inquired  Insurance Type: Medicare B and [employer]  Medicare certification  -  Start: 1/10/25  End: 4/4/25    Current Problem  1. Joint stiffness of hand, left        2. Left wrist pain            Referred by:   Dr. Samuel  Referred for:  L distal radius fx ORIF and significant wrist stiffness  Onset/DOI:  11/29/24  DOS: 12/24//24    Fall risk:  Low  Precautions:  NWB     Medical History Form: Reviewed (scanned into chart)    Subjective   \"I am scheduled for a full arm nerve block this week Thursday. The burning just does not stop.  Sleeping is becoming nearly impossible\"    Hand Dominance: Right    Pain:  5/10  Location: radial/volar wrist and dorsal forearm  Description: burning, tight, sharp, burning  Aggravating Factors:  N/A - pain is constant  Relieving Factors:   minimal pain relief from prescription pain meds    Previous Interventions/Treatments: None    Prior Level of Function (PLOF)  Patient previously indpendent with all ADLs/IADLs    ADL/IADL impairments  Bathing, Dressing, Hygiene/Grooming, Hair care, Sleep, Driving, Home mgt, and Meal prep    Patients Living Environment: Reviewed and no concern  Lives with:  - Lalo  Primary Language: English  Patient's Goal(s) for Therapy:   \"to make my hand/wrist work again and to not hurt\"    Red Flags: Do you have any of the following? No  Fever/chills, unexplained weight changes, dizziness/fainting, unexplained change in bowel or bladder functions, unexplained malaise or muscle weakness, night pain/sweats, numbness or tingling    Objective     LEFT HAND AROM (Degrees)   MCP PIP DIP OTERO DPC   IF  0/45 0/35 0/10  " 6 cm   MF 0/40 0/40 0/15  7 cm   RF 0/35 0/45 0/15  6 cm   SF 0/20 0/45 0/20  4 cm     L thumb opposition to SF:  8cm    WRIST AROM (Degrees)   R L   Extension  0A   10-15 P   Flexion  20 A   25 P   Pronation  35 A   Supination  25 A     L hand figure 8:  39cm  R hand figure 8:  42.5cm      Outcome Measures:         QUICK DASH:   88.64%     Home Program:  Exercise Sets/Reps Comments   AROM ylw sponge squeeze     Proper wrist splint wear     Finger joints PROM     Elevation     Desensitization     Heat PRN                           Treatment Today    Neuro re-ed:  - ASL AROM within fluidotherapy for desensitization x 12 min  - stress loading with green flexbar x 5 min  - large knob puttycise to promote finger and wrist stress loading x 10 min  - reviewed HEP for carryover to home         EDUCATION: Home exercise program, plan of care, activity modifications, joint protection, pain management, and injury pathology          Assessment:   Patient exhibits difficulty performing most stress loading exercises due to burning pain and significantly reduced range of motion.  She understands to just continue her home program with hopes that the upcoming nerve block reduces her pain and we can begin more aggressive intervention at her next appointment.    Level of Complexity  MOD complexity    OT Rehab Potential  Good      Plan:     Planned Interventions include: therapeutic exercise, self-care home management, manual therapy, therapeutic activities, , neuromuscular coordination, vasopneumatic, dry needling, and orthosis fabrication.  Frequency: 2 x Week  Duration: 12 Weeks    Goals: Set and discussed today         1) Patient will be able to demo 2cm from midpalm with all digits to perform dressing tasks with MIN A, in 4 weeks.         2) Patient will demo 40 degrees of L wrist supination to perform hair care with MIN A, in 6 weeks         3) Patient will demo at least 3cm less L hand swelling to imporve gross grasping, in 4  weeks.        Plan of care was developed with input and agreement by the patient.      Marques Mills, OT

## 2025-02-24 NOTE — PROGRESS NOTES
"    Occupational Therapy Orthopedic Treatment Note    Patient Name: Uma Middleton  MRN: [id number]  Today's Date: 2/24/2025      ,  ,      Insurance:  Visit number: 11 of 50  Authorization info: no inquired  Insurance Type: Medicare B and [employer] Medicare certification  -  Start: 1/10/25  End: 4/4/25    Current Problem  1. Joint stiffness of hand, left        2. Left wrist pain            Referred by:   Dr. Samuel  Referred for:  L distal radius fx ORIF and significant wrist stiffness  Onset/DOI:  11/29/24  DOS: 12/24//24    Fall risk:  Low  Precautions:  NWB     Medical History Form: Reviewed (scanned into chart)    Subjective   \"I am scheduled for a full arm nerve block this week Thursday\"    Hand Dominance: Right    Pain:  5/10  Location: radial/volar wrist and dorsal forearm  Description: burning, tight, sharp, burning  Aggravating Factors:  N/A - pain is constant  Relieving Factors:   minimal pain relief from prescription pain meds    Previous Interventions/Treatments: None    Prior Level of Function (PLOF)  Patient previously indpendent with all ADLs/IADLs    ADL/IADL impairments  Bathing, Dressing, Hygiene/Grooming, Hair care, Sleep, Driving, Home mgt, and Meal prep    Patients Living Environment: Reviewed and no concern  Lives with:  - Lalo  Primary Language: English  Patient's Goal(s) for Therapy:   \"to make my hand/wrist work again and to not hurt\"    Red Flags: Do you have any of the following? No  Fever/chills, unexplained weight changes, dizziness/fainting, unexplained change in bowel or bladder functions, unexplained malaise or muscle weakness, night pain/sweats, numbness or tingling    Objective     LEFT HAND AROM (Degrees)   MCP PIP DIP OTERO DPC   IF  0/45 0/35 0/10  6 cm   MF 0/40 0/40 0/15  7 cm   RF 0/35 0/45 0/15  6 cm   SF 0/20 0/45 0/20  4 cm     L thumb opposition to SF:  8cm    WRIST AROM (Degrees)   R L   Extension  0A   10-15 P   Flexion  20 A   25 P   Pronation  35 A   Supination  25 " A     L hand figure 8:  39cm  R hand figure 8:  42.5cm      Outcome Measures:         QUICK DASH:   88.64%     Home Program:  Exercise Sets/Reps Comments   AROM ylw sponge squeeze     Proper wrist splint wear     Finger joints PROM     Elevation     Desensitization     Heat PRN                           Treatment Today    Neuro re-ed:  - ASL AROM within fluidotherapy for desensitization x 12 min  - stress loading with green flexbar x 5 min    Manual:  - Extensive soft tissue massage, retrograde massage, MEM and passive motion to all digits x 20 minutes  - grade 102 carpal wrist mobs volar and dorsal to increase mobility  - intrinsic hand stretching x 5 min  - wrist extension stretching x 5 min  - reverse blocking 3x20        EDUCATION: Home exercise program, plan of care, activity modifications, joint protection, pain management, and injury pathology          Assessment:   She still experiences intense burning typical with each session.  Exhibited increased proximal finger swelling today compared to previous sessions.  Difficulty tolerating stretching at the wrist and intrinsic stretching due to increased pain.  Her fingers do seem to have improved passive flexion motion after manual intervention however actively still has considerably difficult time.  Her scar and surrounding soft tissue are soft in nature.  Passive wrist extension also is slightly improved to about 15 degrees although takes considerable manual work to reach this point.  Patient to benefit from receiving skilled services in order to reduce swelling and increased motion especially with upcoming nerve block which may allow us to be more aggressive.      Level of Complexity  MOD complexity    OT Rehab Potential  Good      Plan:     Planned Interventions include: therapeutic exercise, self-care home management, manual therapy, therapeutic activities, , neuromuscular coordination, vasopneumatic, dry needling, and orthosis fabrication.  Frequency: 2 x  Week  Duration: 12 Weeks    Goals: Set and discussed today         1) Patient will be able to demo 2cm from midpalm with all digits to perform dressing tasks with MIN A, in 4 weeks.         2) Patient will demo 40 degrees of L wrist supination to perform hair care with MIN A, in 6 weeks         3) Patient will demo at least 3cm less L hand swelling to imporve gross grasping, in 4 weeks.        Plan of care was developed with input and agreement by the patient.      Marques Mills, OT

## 2025-02-25 ENCOUNTER — HOSPITAL ENCOUNTER (OUTPATIENT)
Dept: RADIOLOGY | Facility: HOSPITAL | Age: 75
Discharge: HOME | End: 2025-02-25
Payer: MEDICARE

## 2025-02-25 ENCOUNTER — APPOINTMENT (OUTPATIENT)
Dept: CARDIOLOGY | Facility: CLINIC | Age: 75
End: 2025-02-25
Payer: MEDICARE

## 2025-02-25 ENCOUNTER — OFFICE VISIT (OUTPATIENT)
Dept: ORTHOPEDIC SURGERY | Facility: HOSPITAL | Age: 75
End: 2025-02-25
Payer: MEDICARE

## 2025-02-25 ENCOUNTER — OFFICE VISIT (OUTPATIENT)
Dept: CARDIOLOGY | Facility: CLINIC | Age: 75
End: 2025-02-25
Payer: MEDICARE

## 2025-02-25 VITALS
SYSTOLIC BLOOD PRESSURE: 130 MMHG | BODY MASS INDEX: 34.26 KG/M2 | DIASTOLIC BLOOD PRESSURE: 80 MMHG | HEART RATE: 78 BPM | WEIGHT: 232 LBS | OXYGEN SATURATION: 97 %

## 2025-02-25 DIAGNOSIS — I10 PRIMARY HYPERTENSION: ICD-10-CM

## 2025-02-25 DIAGNOSIS — M25.532 LEFT WRIST PAIN: ICD-10-CM

## 2025-02-25 DIAGNOSIS — M25.532 LEFT WRIST PAIN: Primary | ICD-10-CM

## 2025-02-25 DIAGNOSIS — I25.10 ASHD (ARTERIOSCLEROTIC HEART DISEASE): Primary | ICD-10-CM

## 2025-02-25 PROCEDURE — 3079F DIAST BP 80-89 MM HG: CPT | Performed by: INTERNAL MEDICINE

## 2025-02-25 PROCEDURE — 1157F ADVNC CARE PLAN IN RCRD: CPT | Performed by: STUDENT IN AN ORGANIZED HEALTH CARE EDUCATION/TRAINING PROGRAM

## 2025-02-25 PROCEDURE — 99214 OFFICE O/P EST MOD 30 MIN: CPT | Performed by: INTERNAL MEDICINE

## 2025-02-25 PROCEDURE — 99211 OFF/OP EST MAY X REQ PHY/QHP: CPT | Performed by: STUDENT IN AN ORGANIZED HEALTH CARE EDUCATION/TRAINING PROGRAM

## 2025-02-25 PROCEDURE — 1125F AMNT PAIN NOTED PAIN PRSNT: CPT | Performed by: INTERNAL MEDICINE

## 2025-02-25 PROCEDURE — 1036F TOBACCO NON-USER: CPT | Performed by: STUDENT IN AN ORGANIZED HEALTH CARE EDUCATION/TRAINING PROGRAM

## 2025-02-25 PROCEDURE — 1157F ADVNC CARE PLAN IN RCRD: CPT | Performed by: INTERNAL MEDICINE

## 2025-02-25 PROCEDURE — 1159F MED LIST DOCD IN RCRD: CPT | Performed by: INTERNAL MEDICINE

## 2025-02-25 PROCEDURE — 3075F SYST BP GE 130 - 139MM HG: CPT | Performed by: INTERNAL MEDICINE

## 2025-02-25 PROCEDURE — 1159F MED LIST DOCD IN RCRD: CPT | Performed by: STUDENT IN AN ORGANIZED HEALTH CARE EDUCATION/TRAINING PROGRAM

## 2025-02-25 PROCEDURE — 1036F TOBACCO NON-USER: CPT | Performed by: INTERNAL MEDICINE

## 2025-02-25 PROCEDURE — 73110 X-RAY EXAM OF WRIST: CPT | Mod: LT

## 2025-02-25 ASSESSMENT — ENCOUNTER SYMPTOMS
CLAUDICATION: 0
COUGH: 0
NEAR-SYNCOPE: 0
WEIGHT GAIN: 0
ORTHOPNEA: 0
PND: 0
DIZZINESS: 0
SHORTNESS OF BREATH: 0
FEVER: 0
WEIGHT LOSS: 0
OCCASIONAL FEELINGS OF UNSTEADINESS: 0
IRREGULAR HEARTBEAT: 0
LOSS OF SENSATION IN FEET: 0
WHEEZING: 0
WEAKNESS: 0
DYSPNEA ON EXERTION: 0
DEPRESSION: 1
SYNCOPE: 0
PALPITATIONS: 0
DIAPHORESIS: 0
MYALGIAS: 0

## 2025-02-25 ASSESSMENT — PAIN SCALES - GENERAL: PAINLEVEL_OUTOF10: 6

## 2025-02-25 ASSESSMENT — LIFESTYLE VARIABLES: TOTAL SCORE: 0

## 2025-02-25 NOTE — ASSESSMENT & PLAN NOTE
Controlled. Continue CCB/ACE-I. Advised to check 2-3x/week. Lifestyle modifications were discussed. I advocated for mediterranean and plant based eating. We also discussed exercise. 150 minutes a week of moderate intensity exercise is recommended per our ACC/AHA guidelines

## 2025-02-25 NOTE — PROGRESS NOTES
[facility]  Hand and Upper Extremity Service  Post Operative visit        Date of surgery: 12/26/2024  Surgery(s) performed: Open reduction internal fixation of left distal radius.        Subjective report:   Patient presents for her third postop follow-up.   She was given a Velcro movable brace.  She has been in occupational therapy.  She notes she has been pretty miserable since her surgery.  Of note she was also in significant pain prior to her operation as she had been dealing with a left distal radius fracture which was tried to be treated nonoperatively by another surgeon.  She had a delayed presentation to my office for operative fixation.      2/25/2025 follow-up:  Patient presents for follow-up today.  She is approximately 2 months from the above surgery.  She has developed CRPS and is seeing both pain management and doing occupational therapy.  She notes continued significant stiffness to her fingers and wrist.  She is hypersensitive to light touch.  She notes continued swelling.  She overall does appear to be doing better than she did at her last follow-up visit.  She did have median nerve block and radial nerve block performed by pain management which did give her a few days of temporary relief.  She is planning to have a stellate ganglion sympathetic     Exam findings; left upper extremity  Mild to moderate swelling to the left wrist.  Moderate swelling to the dorsum of the hands and fingers.  Limited range of motion to her fingers secondary to pain, swelling and stiffness.  She can flex and extend the MP and IP joints however she is approximately 7 cm from the distal palmar crease.  Limited wrist range of motion secondary to pain.  She is has hypersensitivity to touch.  She does have a reddish hue to her hands and distal forearm..  Incision is well-approximated without any erythema warmth or drainage.  Sutures are intact.  AIN, PIN, ulnar motors intact.  Sensation  intact light touch radial, ulnar, median nerves     Radiograph findings: XR of the left wrist was taken reviewed in office today.  Status post open reduction internal fixation of left distal radius fracture with near anatomic alignment and improved positioning.  No signs of hardware failure or loosening        Plan:   Unfortunately she is still having a significant amount of pain.  She is currently being managed by pain management for this.  She is planning on having to have a repeat procedure on 2/27/2025 by pain management.  Hopefully this will provide her with more lasting relief.  She should continue with her occupational therapy.  I will see her back in 4 to 6 weeks.        Gallo Samuel DO  [employer] [employer]  Department of Orthopaedic Surgery  Hand and Upper Extremity Surgery  [facility]     Dictation performed with the use of voice recognition software. Syntax and grammatical errors may exist.

## 2025-02-25 NOTE — PROGRESS NOTES
Subjective      Chief Complaint   Patient presents with    Follow-up        74-year-old female whom I saw in September for presumed coronary artery disease based on her coronary artery calcium score.  She was eventually catheterized in response to a positive stress test this revealed significant ostial to proximal LAD disease as well as ostial first and second diagonal disease.  Ultimately the decision was made to refer her for minimally invasive surgical revascularization.  She underwent robotic Mid CAB at [facility]. I saw her in followup doing well from a cardiac standpoint. She did have some mild angina with exertion that we assumed could be from her unrevascularized diagonal. We elected to manage medically. She fell and broke her wrist, had to have it set and reset, then surgery. This has been complicated by CRPS.         Review of Systems   Constitutional: Negative for diaphoresis, fever, weight gain and weight loss.   Eyes:  Negative for visual disturbance.   Cardiovascular:  Negative for chest pain, claudication, dyspnea on exertion, irregular heartbeat, leg swelling, near-syncope, orthopnea, palpitations, paroxysmal nocturnal dyspnea and syncope.   Respiratory:  Negative for cough, shortness of breath and wheezing.    Musculoskeletal:  Negative for muscle weakness and myalgias.   Neurological:  Negative for dizziness and weakness.   All other systems reviewed and are negative.       Past Medical History:   Diagnosis Date    Adhesive capsulitis of right shoulder 10/21/2013    Adhesive capsulitis of right shoulder    Angina pectoris     Anxiety     Aortic aneurysm without rupture, unspecified portion of aorta (CMS-[facility])     ASHD (arteriosclerotic heart disease)     Aleman esophagus     Cataract     Central retinal vein occlusion, right eye (CMS-[facility])     Chest pain     Cholelithiasis     Dermatochalasis of both upper eyelids     Dry eye syndrome of bilateral lacrimal glands     Elevated blood-pressure reading,  without diagnosis of hypertension     Elevated blood pressure reading without diagnosis of hypertension    Esophageal disease     GERD (gastroesophageal reflux disease)     Hyperlipidemia     Hypertension     Inconclusive mammogram 01/23/2017    Breast density    Lattice degeneration of retina, bilateral     Other specified disorders of eustachian tube, bilateral 05/08/2017    Dysfunction of Eustachian tube, bilateral    Otitis media, unspecified, right ear 12/18/2015    Acute right otitis media    Personal history of other diseases of the respiratory system 10/18/2016    History of acute bronchitis with bronchospasm    PONV (postoperative nausea and vomiting)     Primary osteoarthritis, unspecified hand 02/09/2015    Osteoarthritis, hand    Sacroiliitis, not elsewhere classified (CMS-HCC) 04/26/2016    Sacroiliitis    Sciatica, right side 04/26/2016    Sciatica, right    Snoring     Unspecified optic atrophy     Vitamin D deficiency         Past Surgical History:   Procedure Laterality Date    CARDIAC CATHETERIZATION N/A 10/14/2024    Procedure: Left Heart Cath;  Surgeon: Juan Cueto DO;  Location: [facility] Cardiac Cath Lab;  Service: Cardiovascular;  Laterality: N/A;  no pre auth required    CARDIAC SURGERY  11/08/2024    CATARACT EXTRACTION      CATARACT EXTRACTION W/  INTRAOCULAR LENS IMPLANT Bilateral     OD 07/18/2013 +15.5D, OS 12/05/2013 +15.5D    CHOLECYSTECTOMY      COLONOSCOPY      CORONARY ARTERY BYPASS GRAFT  11/8/2024    ESOPHAGOGASTRODUODENOSCOPY      OTHER SURGICAL HISTORY  06/25/2013    Treatment Of The Left Ankle    PA OSTEOTOMY MANDIBLE SEGMENTAL  1969    SHOULDER SURGERY          Social History     Socioeconomic History    Marital status:      Spouse name: Not on file    Number of children: Not on file    Years of education: Not on file    Highest education level: Not on file   Occupational History    Not on file   Tobacco Use    Smoking status: Former     Current packs/day: 0.00      Average packs/day: 1 pack/day for 28.0 years (28.0 ttl pk-yrs)     Types: Cigarettes     Start date: 1982     Quit date: 1/1/2010     Years since quitting: 15.1     Passive exposure: Past    Smokeless tobacco: Never    Tobacco comments:     Quit 2011   Vaping Use    Vaping status: Never Used   Substance and Sexual Activity    Alcohol use: Not Currently     Comment: 1 cocktail/ mo maybe    Drug use: Never    Sexual activity: Defer   Other Topics Concern    Not on file   Social History Narrative    Not on file     Social Drivers of Health     Financial Resource Strain: Low Risk  (12/27/2024)    Overall Financial Resource Strain (CARDIA)     Difficulty of Paying Living Expenses: Not hard at all   Food Insecurity: No Food Insecurity (12/27/2024)    Hunger Vital Sign     Worried About Running Out of Food in the Last Year: Never true     Ran Out of Food in the Last Year: Never true   Transportation Needs: No Transportation Needs (12/27/2024)    PRAPARE - Transportation     Lack of Transportation (Medical): No     Lack of Transportation (Non-Medical): No   Physical Activity: Not on file   Stress: Not on file   Social Connections: Unknown (12/27/2024)    Social Connection and Isolation Panel [NHANES]     Frequency of Communication with Friends and Family: Not on file     Frequency of Social Gatherings with Friends and Family: Not on file     Attends Zoroastrian Services: Not on file     Active Member of Clubs or Organizations: Not on file     Attends Club or Organization Meetings: Not on file     Marital Status:    Intimate Partner Violence: Not At Risk (12/26/2024)    Humiliation, Afraid, Rape, and Kick questionnaire     Fear of Current or Ex-Partner: No     Emotionally Abused: No     Physically Abused: No     Sexually Abused: No   Housing Stability: Low Risk  (12/27/2024)    Housing Stability Vital Sign     Unable to Pay for Housing in the Last Year: No     Number of Times Moved in the Last Year: 0     Homeless in  the Last Year: No        Family History   Problem Relation Name Age of Onset    Hypertension Mother Marilia     Heart disease Father Rd     Hypertension Father Rd         OBJECTIVE:    Vitals:    02/25/25 1415   BP: 130/80   Pulse: 78   SpO2: 97%        Vitals reviewed.   Constitutional:       Appearance: Normal and healthy appearance. Not in distress.   Pulmonary:      Effort: Pulmonary effort is normal.      Breath sounds: Normal breath sounds.   Cardiovascular:      Normal rate. Regular rhythm. Normal S1. Normal S2.       Murmurs: There is no murmur.      No gallop.  No click.   Pulses:     Intact distal pulses.   Edema:     Peripheral edema absent.   Skin:     General: Skin is warm and dry.   Neurological:      General: No focal deficit present.          Lab Review:   Lab Results   Component Value Date     (L) 12/27/2024    K 4.1 12/27/2024     12/27/2024    CO2 26 12/27/2024    BUN 17 12/27/2024    CREATININE 0.80 12/27/2024    GLUCOSE 133 (H) 12/27/2024    CALCIUM 8.5 (L) 12/27/2024     Lab Results   Component Value Date    CHOL 185 04/19/2024    TRIG 133 04/19/2024    HDL 60.0 04/19/2024       Lab Results   Component Value Date    LDLCALC 98 04/19/2024        ASHD (arteriosclerotic heart disease)  Stable s/p surgical revasc. Continue DAPT and statin, ok to interrupt clopidogrel 5 days prior to surgery or pain management procedure. Checking lipids. Phase 2 CR when therapy for her wrist is complete    Primary hypertension  Controlled. Continue CCB/ACE-I. Advised to check 2-3x/week. Lifestyle modifications were discussed. I advocated for mediterranean and plant based eating. We also discussed exercise. 150 minutes a week of moderate intensity exercise is recommended per our ACC/AHA guidelines

## 2025-02-25 NOTE — ASSESSMENT & PLAN NOTE
Stable s/p surgical revasc. Continue DAPT and statin, ok to interrupt clopidogrel 5 days prior to surgery or pain management procedure. Checking lipids. Phase 2 CR when therapy for her wrist is complete

## 2025-02-27 ENCOUNTER — APPOINTMENT (OUTPATIENT)
Dept: OCCUPATIONAL THERAPY | Facility: CLINIC | Age: 75
End: 2025-02-27
Payer: MEDICARE

## 2025-02-27 ENCOUNTER — HOSPITAL ENCOUNTER (OUTPATIENT)
Facility: HOSPITAL | Age: 75
Discharge: HOME | End: 2025-02-27
Payer: MEDICARE

## 2025-02-27 VITALS
HEIGHT: 69 IN | WEIGHT: 220 LBS | SYSTOLIC BLOOD PRESSURE: 135 MMHG | TEMPERATURE: 97.7 F | RESPIRATION RATE: 16 BRPM | DIASTOLIC BLOOD PRESSURE: 61 MMHG | HEART RATE: 57 BPM | BODY MASS INDEX: 32.58 KG/M2 | OXYGEN SATURATION: 96 %

## 2025-02-27 DIAGNOSIS — G90.512 COMPLEX REGIONAL PAIN SYNDROME TYPE 1 OF LEFT UPPER EXTREMITY: ICD-10-CM

## 2025-02-27 PROCEDURE — 99152 MOD SED SAME PHYS/QHP 5/>YRS: CPT | Performed by: PAIN MEDICINE

## 2025-02-27 PROCEDURE — 64510 N BLOCK STELLATE GANGLION: CPT | Performed by: PAIN MEDICINE

## 2025-02-27 PROCEDURE — 2500000004 HC RX 250 GENERAL PHARMACY W/ HCPCS (ALT 636 FOR OP/ED): Performed by: PAIN MEDICINE

## 2025-02-27 PROCEDURE — 2500000004 HC RX 250 GENERAL PHARMACY W/ HCPCS (ALT 636 FOR OP/ED): Performed by: STUDENT IN AN ORGANIZED HEALTH CARE EDUCATION/TRAINING PROGRAM

## 2025-02-27 RX ORDER — MIDAZOLAM HYDROCHLORIDE 1 MG/ML
INJECTION, SOLUTION INTRAMUSCULAR; INTRAVENOUS
Status: COMPLETED | OUTPATIENT
Start: 2025-02-27 | End: 2025-02-27

## 2025-02-27 RX ORDER — FENTANYL CITRATE 50 UG/ML
INJECTION, SOLUTION INTRAMUSCULAR; INTRAVENOUS
Status: COMPLETED | OUTPATIENT
Start: 2025-02-27 | End: 2025-02-27

## 2025-02-27 RX ORDER — BUPIVACAINE HYDROCHLORIDE 5 MG/ML
INJECTION, SOLUTION EPIDURAL; INTRACAUDAL
Status: COMPLETED | OUTPATIENT
Start: 2025-02-27 | End: 2025-02-27

## 2025-02-27 RX ADMIN — MIDAZOLAM 1 MG: 1 INJECTION INTRAMUSCULAR; INTRAVENOUS at 09:18

## 2025-02-27 RX ADMIN — SODIUM CHLORIDE, SODIUM LACTATE, POTASSIUM CHLORIDE, AND CALCIUM CHLORIDE 500 ML: 600; 310; 30; 20 INJECTION, SOLUTION INTRAVENOUS at 09:01

## 2025-02-27 RX ADMIN — BUPIVACAINE HYDROCHLORIDE 6 ML: 5 INJECTION, SOLUTION EPIDURAL; INTRACAUDAL; PERINEURAL at 09:26

## 2025-02-27 RX ADMIN — FENTANYL CITRATE 100 MCG: 50 INJECTION INTRAMUSCULAR; INTRAVENOUS at 09:21

## 2025-02-27 ASSESSMENT — PAIN - FUNCTIONAL ASSESSMENT
PAIN_FUNCTIONAL_ASSESSMENT: 0-10
PAIN_FUNCTIONAL_ASSESSMENT: CPOT (CRITICAL CARE PAIN OBSERVATION TOOL)
PAIN_FUNCTIONAL_ASSESSMENT: CPOT (CRITICAL CARE PAIN OBSERVATION TOOL)
PAIN_FUNCTIONAL_ASSESSMENT: 0-10

## 2025-02-27 ASSESSMENT — PAIN SCALES - GENERAL
PAINLEVEL_OUTOF10: 6
PAINLEVEL_OUTOF10: 2
PAINLEVEL_OUTOF10: 3
PAINLEVEL_OUTOF10: 2
PAINLEVEL_OUTOF10: 3
PAINLEVEL_OUTOF10: 3

## 2025-02-27 NOTE — H&P
HISTORY AND PHYSICAL    History Of Present Illness  Uma Middleton is a 75 y.o. female presenting with chronic pain here for procedure as stated below.  Patient denies any changes in health since last visit in clinic.    Past Medical History  Past Medical History:   Diagnosis Date    Adhesive capsulitis of right shoulder 10/21/2013    Adhesive capsulitis of right shoulder    Angina pectoris     Anxiety     Aortic aneurysm without rupture, unspecified portion of aorta (CMS-HCC)     ASHD (arteriosclerotic heart disease)     Aleman esophagus     Cataract     Central retinal vein occlusion, right eye (CMS-[facility])     Chest pain     Cholelithiasis     Dermatochalasis of both upper eyelids     Dry eye syndrome of bilateral lacrimal glands     Elevated blood-pressure reading, without diagnosis of hypertension     Elevated blood pressure reading without diagnosis of hypertension    Esophageal disease     GERD (gastroesophageal reflux disease)     Hyperlipidemia     Hypertension     Inconclusive mammogram 01/23/2017    Breast density    Lattice degeneration of retina, bilateral     Other specified disorders of eustachian tube, bilateral 05/08/2017    Dysfunction of Eustachian tube, bilateral    Otitis media, unspecified, right ear 12/18/2015    Acute right otitis media    Personal history of other diseases of the respiratory system 10/18/2016    History of acute bronchitis with bronchospasm    PONV (postoperative nausea and vomiting)     Primary osteoarthritis, unspecified hand 02/09/2015    Osteoarthritis, hand    Sacroiliitis, not elsewhere classified (CMS-HCC) 04/26/2016    Sacroiliitis    Sciatica, right side 04/26/2016    Sciatica, right    Snoring     Unspecified optic atrophy     Vitamin D deficiency        Surgical History  Past Surgical History:   Procedure Laterality Date    CARDIAC CATHETERIZATION N/A 10/14/2024    Procedure: Left Heart Cath;  Surgeon: Juan Cueto DO;  Location: [facility] Cardiac Cath Lab;  Service:  Cardiovascular;  Laterality: N/A;  no pre auth required    CARDIAC SURGERY  11/08/2024    CATARACT EXTRACTION      CATARACT EXTRACTION W/  INTRAOCULAR LENS IMPLANT Bilateral     OD 07/18/2013 +15.5D, OS 12/05/2013 +15.5D    CHOLECYSTECTOMY      COLONOSCOPY      CORONARY ARTERY BYPASS GRAFT  11/8/2024    ESOPHAGOGASTRODUODENOSCOPY      OTHER SURGICAL HISTORY  06/25/2013    Treatment Of The Left Ankle    DC OSTEOTOMY MANDIBLE SEGMENTAL  1969    SHOULDER SURGERY          Social History  She reports that she quit smoking about 15 years ago. Her smoking use included cigarettes. She started smoking about 43 years ago. She has a 28 pack-year smoking history. She has been exposed to tobacco smoke. She has never used smokeless tobacco. She reports that she does not currently use alcohol. She reports that she does not use drugs.    Family History  Family History   Problem Relation Name Age of Onset    Hypertension Mother Marilia     Heart disease Father Rd     Hypertension Father Rd         Allergies  Fire ant, Erythromycin, and Tea tree oil    Review of Systems  12 point ROS done and negative except for the above.     Physical Exam   General: NAD, well groomed, well nourished  Eyes: Non-icteric sclera, EOMI  Ears, Nose, Mouth, and Throat: External ears and nose appear to be without deformity or rash. No lesions or masses noted. Hearing is grossly intact.   Neck: Trachea midline  Respiratory: Nonlabored breathing   Cardiovascular: No peripheral edema   Skin: No rashes or open lesions/ulcers identified on skin.    Last Recorded Vitals  There were no vitals taken for this visit.    Relevant Results  Current Outpatient Medications   Medication Instructions    amLODIPine-benazepriL (Lotrel) 5-10 mg capsule 1 capsule, oral, Daily    aspirin 81 mg, oral, Daily    atorvastatin (LIPITOR) 80 mg, oral, Daily    clopidogrel (PLAVIX) 75 mg, oral, Daily    fluticasone (Flonase) 50 mcg/actuation nasal spray 1-2 sprays, As needed     gabapentin (NEURONTIN) 300 mg, oral, 3 times daily    hydrOXYzine HCL (ATARAX) 25 mg, Nightly PRN    isosorbide mononitrate ER (IMDUR) 30 mg, oral, Daily, Do not crush or chew.    lidocaine (Lidoderm) 5 % patch 1 patch, transdermal, Daily, Apply to painful area 12 hours per day, remove for 12 hours.    lidocaine (Lidoderm) 5 % patch 1 patch, transdermal, Daily, Remove & discard patch within 12 hours or as directed by MD.    metoprolol succinate XL (TOPROL-XL) 50 mg, oral, Daily    omeprazole (PRILOSEC) 40 mg, Daily    traMADol (ULTRAM) 50 mg, As needed       No results found for this or any previous visit from the past 1000 days.     Nerve Block  Table formatting from the original result was not included.  Procedure  Nerve Block    Indication  Complex regional pain syndrome type 1 of left upper extremity    Medications  midazolam (Versed) injection 1 mg   fentaNYL PF (Sublimaze) injection 50 mcg   bupivacaine PF (Marcaine) 0.5 % (5 mg/mL) injection 12 mL   methylPREDNISolone acetate (DEPO-Medrol) injection 40 mg   (Totals for administrations occurring from 1429 to 1444 on 02/06/25)     Preprocedure  A history and physical has been performed, and patient medication   allergies have been reviewed. The patient's tolerance of previous   anesthesia has been reviewed. The risks and benefits of the procedure and   the sedation options and risks were discussed with the patient. All   questions were answered and informed consent obtained.    Details of the Procedure  Pre-Op Diagnosis: Left UE CRPS  Post-Procedure Diagnosis: Same as preop diagnosis  Procedure: Left radial nerve and medial nerve block at the elbow level   using ultrasound guidance   Surgeon: Herman Stewart MD, PhD   Resident/Fellow/Other Assistant: Cas Castaneda MD   Fellow     Procedure Note:       Uma Middleton is a 75 y.o. year old female with left upper extremity CRPS,   for left radial and median nerve blocks at the elbow with ultrasound   guidance.  The  patient requested IV sedation as she cannot really move her   arm and to help with positioning for the procedure.  Of note her cardiologist declined to discontinue her   anticoagulant/antiplatelet to proceed with left stellate ganglion block.  I talked to the patient in length about the potential hematoma development   and she is okay with this and she wants to proceed next with the stellate   ganglion block if this injection was only temporarily.    The patient was identified in the preoperative holding area and informed   consent was obtained.  The surgical area was marked according to protocol.    The patient was transported to the operating room on the [facility] and a   timeout was conducted. ASA Standard monitors were applied.      Targets: Left radial nerve and median nerve block at the elbow level     The patient was transferred to the operating room table and placed in   sitting position.  The arm/elbow was prepped and draped in the usual   fashion using ChloraPrep and sterile towels.  L12-3 Ultrasound transducer   was used.    Using ultrasound guidance, with the elbow flexed in 90 degrees, the radial   nerve was identified between the 2 muscle layers.  A 25-gauge  needle was then advanced under real-time ultrasound guidance   into close proximity to the nerve.  Thereafter, 3 cc 0.25% Bupivacaine and 20 mg methylprednisolone were   injected, spread of the injectate was monitored with real-time sonography.    There was no pain on injection. The needle was then removed.      The same was repeated and the left median nerve just next to the radial   artery.  Thereafter, 3 cc 0.25% Bupivacaine and 20 mg methylprednisolone were   injected, spread of the injectate was monitored with real-time sonography.    There was no pain on injection. The needle was then removed.    Hemostasis was ensured.  A Band-Aid was applied.      The procedure was done without any evidence of complications.   The patient tolerated the  procedure well, was transferred to the recovery   room, and later discharged in stable condition.     Anesthesia: Local and IV sedation     All medications injected were verified to be preservative free and not   .      Pre-procedure Pain Score: 7  Post-procedure pain Score:2    Herman Stewart MD, PhD       Procedure Provider  Herman Stewart MD PhD    Procedure Location  [facility]  [address]  [facility] [address] [phone]    Referring Provider  Herman Stewart MD PhD  [address] [place] [address]     No diagnosis found.        Assessment/Plan   Uma Middleton is a 75 y.o. female presenting with chronic pain here for left stellate ganglion block under ultrasound; she denies any recent antibiotic use or infections, she she stopped her Plavix 6 days ago, and she denies contrast or local anesthetic allergies.    ASA PS Classification: 3  Mallampati score: 3  VAS Pre-procedure: 6/10 (10 being the worst)  VAS Post-procedure: See procedure note.    Plan:  - We will proceed with the procedure as above. We discussed extensively the risks, benefits, and alternatives to the procedure. The patient's questions were addressed and answered in detail. The patient demonstrated understanding of the procedure, and is amenable to proceeding with it. The Risks of the procedure that were discussed with the patient include but are not limited to the following: A lack of efficacy, transient worsening of pain, bleeding, infection, nerve injury, nerve damage, neuritis or sunburn sensation. Informed consent obtained as attached to EMR.  - Patient to continue physician directed home exercises as tolerated.  - Patient will follow-up with us in clinic.      Joseph Ramos MD  Chronic Pain Fellow  [facility]

## 2025-02-27 NOTE — DISCHARGE INSTRUCTIONS
DISCHARGE INSTRUCTIONS FOR INJECTIONS     You underwent left stellate ganglion block under ultrasound  today    After most injections, it is recommended that you relax and limit your activity for the remainder of the day unless you have been told otherwise by your pain physician.  You should not drive a car, operate machinery, or make important legal decisions unless otherwise directed by your pain physician.  You may resume your normal activity, including exercise, tomorrow.      Keep a written pain diary of how much pain relief you experienced following the injection procedure and the length of time of pain relief you experienced pain relief. Following diagnostic injections like medial branch nerve blocks, sacroiliac joint blocks, stellate ganglion injections and other blocks, it is very important you record the specific amount of pain relief you experienced immediately after the injectionand how long it lasted. Your doctor will ask you for this information at your follow up visit.     For all injections, please keep the injection site dry and inspect the site for a couple of days. You may remove the Band-Aid the day of the injection at any time.     Some discomfort, bruising or slight swelling may occur at the injection site. This is not abnormal if it occurs.  If needed you may:    -Take over the counter medication such as Tylenol or Motrin.   -Apply an ice pack for 30 minutes, 2 to 3 times a day for the first 24 hours.     You may shower today; no soaking baths, hot tubs, whirlpools or swimming pools for two days.      If you are given steroids in your injection, it may take 3-5 days for the steroid medication to take effect. You may notice a worsening of your symptoms for 1-2 days after the injection. This is not abnormal.  You may use acetaminophen, ibuprofen, or prescription medication that your doctor may have prescribed for you if you need to do so.     A few common side effects of steroids include facial  flushing, sweating, restlessness, irritability,difficulty sleeping, increase in blood sugar, and increased blood pressure. If you have diabetes, please monitor your blood sugar at least once a day for at least 5 days. If you have poorly controlled high blood pressure, monitoryour blood pressure for at least 2 days and contact your primary care physician if these numbers are unusually high for you.      If you take aspirin or non-steroidal anti-inflammatory drugs (examples are Motrin, Advil, ibuprofen, Naprosyn, Voltaren, Relafen, etc.) you may restart these this evening, but stop taking it 3 days before your next appointment, unless instructed otherwiseby your physician.      You do not need to discontinue non-aspirin-containing pain medications prior to an injection (examples: Celebrex, tramadol, hydrocodone and acetaminophen).      If you take a blood thinning medication (Coumadin, Lovenox, Fragmin,Ticlid, Plavix, Pradaxa, etc.), please discuss this with your primary care physician/cardiologist and your pain physician. These medications MUST be discontinued before you can have an injection safely, without the risk of uncontrolled bleeding. If these medications are not discontinued for an appropriate period of time, you will not be able to receivean injection. Please adhere to instructions given to you about when to restart your blood thinning medication. If you have any questions please reach out to our team.    If you are taking Coumadin, please have your INR checked the morning of your procedure and bring the result to your appointment unless otherwise instructed. If your INR is over 1.2, your injection may need to be rescheduled to avoid uncontrolled bleeding from the needle placement.     Call [facility]  and ask for Pain Management at [phone] between 8am-4pm Monday - Friday if you are experiencing the following:    If you received an epidural or spinal injection:    -Headache that doesnot go away with  medicine, is worse when sitting or standing up, and is greatly relieved upon lying down.   -Severe pain worse than or different than your baseline pain.   -Chills or fever (101º F or greater).   -Drainage or signs of infection at the injection site     Go directly to the Emergency Department if you are experiencing the following and received an epidural or spinal injection:   -Abrupt weakness or progressive weakness in your legs that starts after you leave the clinic.   -Abrupt severe or worsening numbness in your legs.   -Inability to urinate after the injection or loss of bowel or bladder control without the urge to defecate or urinate.     If you have a clinical question that cannot wait until your next appointment, please call [phone] between 8am-4pm Monday - Friday or send a [employer] message. We do our best to return all non-emergency messages within 24 hours, Monday - Friday. A nurse or physician will return your message. You may also the appropriate nurse below, and they will do their best to answer your questions.  - For Dr. Basurto, call Alisha at [phone]  - For Dr. Abreu, call Mary at [phone]  - For Dr. Stewart, call Mary at [phone]  - For Dr. Bello, call Summer at [phone]  - For Dr. Ochoa, call Summer at [phone]    If you need to cancel an appointment, please call the scheduling staff at [phone] during normal business hours or leave a message at least 24 hours in advance.     If you are going to be sedated for your next procedure, you MUST have responsible adult who can legally drive accompany you home. You cannot eat or drink for at least eight hours prior to the planned procedure if you are going to receive sedation. You may take your non-blood thinning medications with a small sip of water.

## 2025-03-06 ENCOUNTER — TREATMENT (OUTPATIENT)
Dept: OCCUPATIONAL THERAPY | Facility: CLINIC | Age: 75
End: 2025-03-06
Payer: MEDICARE

## 2025-03-06 ENCOUNTER — OFFICE VISIT (OUTPATIENT)
Dept: PRIMARY CARE | Facility: CLINIC | Age: 75
End: 2025-03-06
Payer: MEDICARE

## 2025-03-06 VITALS
RESPIRATION RATE: 16 BRPM | BODY MASS INDEX: 34.87 KG/M2 | HEART RATE: 76 BPM | HEIGHT: 69 IN | SYSTOLIC BLOOD PRESSURE: 123 MMHG | WEIGHT: 235.4 LBS | DIASTOLIC BLOOD PRESSURE: 77 MMHG | OXYGEN SATURATION: 98 %

## 2025-03-06 DIAGNOSIS — G90.512 COMPLEX REGIONAL PAIN SYNDROME TYPE 1 OF LEFT UPPER EXTREMITY: ICD-10-CM

## 2025-03-06 DIAGNOSIS — M25.532 LEFT WRIST PAIN: ICD-10-CM

## 2025-03-06 DIAGNOSIS — M25.642 JOINT STIFFNESS OF HAND, LEFT: Primary | ICD-10-CM

## 2025-03-06 DIAGNOSIS — E11.9 TYPE 2 DIABETES MELLITUS WITHOUT COMPLICATION, WITHOUT LONG-TERM CURRENT USE OF INSULIN (MULTI): Primary | ICD-10-CM

## 2025-03-06 DIAGNOSIS — G62.9 NEUROPATHY: ICD-10-CM

## 2025-03-06 PROCEDURE — 1157F ADVNC CARE PLAN IN RCRD: CPT

## 2025-03-06 PROCEDURE — 99214 OFFICE O/P EST MOD 30 MIN: CPT

## 2025-03-06 PROCEDURE — 3008F BODY MASS INDEX DOCD: CPT

## 2025-03-06 PROCEDURE — 1125F AMNT PAIN NOTED PAIN PRSNT: CPT

## 2025-03-06 PROCEDURE — 3078F DIAST BP <80 MM HG: CPT

## 2025-03-06 PROCEDURE — 97112 NEUROMUSCULAR REEDUCATION: CPT | Mod: GO | Performed by: OCCUPATIONAL THERAPIST

## 2025-03-06 PROCEDURE — 1124F ACP DISCUSS-NO DSCNMKR DOCD: CPT

## 2025-03-06 PROCEDURE — 1159F MED LIST DOCD IN RCRD: CPT

## 2025-03-06 PROCEDURE — 3074F SYST BP LT 130 MM HG: CPT

## 2025-03-06 PROCEDURE — 97140 MANUAL THERAPY 1/> REGIONS: CPT | Mod: GO | Performed by: OCCUPATIONAL THERAPIST

## 2025-03-06 PROCEDURE — 1036F TOBACCO NON-USER: CPT

## 2025-03-06 RX ORDER — GABAPENTIN 300 MG/1
300 CAPSULE ORAL 4 TIMES DAILY
Qty: 120 CAPSULE | Refills: 2 | Status: SHIPPED | OUTPATIENT
Start: 2025-03-06 | End: 2025-06-04

## 2025-03-06 ASSESSMENT — PATIENT HEALTH QUESTIONNAIRE - PHQ9
6. FEELING BAD ABOUT YOURSELF - OR THAT YOU ARE A FAILURE OR HAVE LET YOURSELF OR YOUR FAMILY DOWN: MORE THAN HALF THE DAYS
9. THOUGHTS THAT YOU WOULD BE BETTER OFF DEAD, OR OF HURTING YOURSELF: NOT AT ALL
2. FEELING DOWN, DEPRESSED OR HOPELESS: MORE THAN HALF THE DAYS
7. TROUBLE CONCENTRATING ON THINGS, SUCH AS READING THE NEWSPAPER OR WATCHING TELEVISION: MORE THAN HALF THE DAYS
3. TROUBLE FALLING OR STAYING ASLEEP OR SLEEPING TOO MUCH: MORE THAN HALF THE DAYS
2. FEELING DOWN, DEPRESSED OR HOPELESS: NOT AT ALL
SUM OF ALL RESPONSES TO PHQ9 QUESTIONS 1 AND 2: 0
5. POOR APPETITE OR OVEREATING: MORE THAN HALF THE DAYS
1. LITTLE INTEREST OR PLEASURE IN DOING THINGS: MORE THAN HALF THE DAYS
SUM OF ALL RESPONSES TO PHQ9 QUESTIONS 1 AND 2: 4
10. IF YOU CHECKED OFF ANY PROBLEMS, HOW DIFFICULT HAVE THESE PROBLEMS MADE IT FOR YOU TO DO YOUR WORK, TAKE CARE OF THINGS AT HOME, OR GET ALONG WITH OTHER PEOPLE: SOMEWHAT DIFFICULT
4. FEELING TIRED OR HAVING LITTLE ENERGY: MORE THAN HALF THE DAYS
8. MOVING OR SPEAKING SO SLOWLY THAT OTHER PEOPLE COULD HAVE NOTICED. OR THE OPPOSITE, BEING SO FIGETY OR RESTLESS THAT YOU HAVE BEEN MOVING AROUND A LOT MORE THAN USUAL: NOT AT ALL
SUM OF ALL RESPONSES TO PHQ QUESTIONS 1-9: 14
1. LITTLE INTEREST OR PLEASURE IN DOING THINGS: NOT AT ALL

## 2025-03-06 ASSESSMENT — ENCOUNTER SYMPTOMS
OCCASIONAL FEELINGS OF UNSTEADINESS: 0
DEPRESSION: 0
LOSS OF SENSATION IN FEET: 0

## 2025-03-06 ASSESSMENT — PAIN SCALES - GENERAL: PAINLEVEL_OUTOF10: 5

## 2025-03-06 NOTE — PATIENT INSTRUCTIONS
Assessment & Plan  Type 2 diabetes mellitus without complication, without long-term current use of insulin (Multi)    Continue current medication.  Continue work on diet - recommend lots of fruits and vegetables, lean protein like chicken, turkey, fish, beans and Greek yogurt. Try to choose healthier carbohydrate options like oatmeal, wheat bread and pasta, sweet potatoes. Limit sugary treats.    Neuropathy    Orders:    gabapentin (Neurontin) 300 mg capsule; Take 1 capsule (300 mg) by mouth 4 times a day.  Begin gabapentin 300mg four times daily.   Continue follow up with pain management   Complex regional pain syndrome type 1 of left upper extremity    Continue follow up with OT as scheduled.

## 2025-03-06 NOTE — PROGRESS NOTES
"    Occupational Therapy Orthopedic Treatment Note    Patient Name: Uma Middleton  MRN: [id number]  Today's Date: 3/6/2025  Time Calculation  Start Time: 1100  Stop Time: 1140  Time Calculation (min): 40 min   , OT Therapeutic Procedures Time Entry  Manual Therapy Time Entry: 25  Neuromuscular Re-Education Time Entry: 15,      Insurance:  Visit number: 12 of 50  Authorization info: no inquired  Insurance Type: Medicare B and [place]  Medicare certification  -  Start: 1/10/25  End: 4/4/25    Current Problem  1. Joint stiffness of hand, left        2. Left wrist pain              Referred by:   Dr. Samuel  Referred for:  L distal radius fx ORIF and significant wrist stiffness  Onset/DOI:  11/29/24  DOS: 12/24//24    Fall risk:  Low  Precautions:  NWB     Medical History Form: Reviewed (scanned into chart)    Subjective   Patient underwent a stellate ganglion sympathetic nerve block one week ago on 2/27/25.    \"The block went really well. I certainly have less pain overall but the burning is still there, which is worse at night.  I am pleased overall that I did it.  Im trying to learn to do more with the motion I have available.\"    Hand Dominance: Right    Pain:  5/10  Location: radial/volar wrist and dorsal forearm  Description: burning, tight, sharp, burning  Aggravating Factors:  N/A - pain is constant  Relieving Factors:   minimal pain relief from prescription pain meds    Previous Interventions/Treatments: None    Prior Level of Function (PLOF)  Patient previously indpendent with all ADLs/IADLs    ADL/IADL impairments  Bathing, Dressing, Hygiene/Grooming, Hair care, Sleep, Driving, Home mgt, and Meal prep    Patients Living Environment: Reviewed and no concern  Lives with:  - Lalo  Primary Language: English  Patient's Goal(s) for Therapy:   \"to make my hand/wrist work again and to not hurt\"    Red Flags: Do you have any of the following? No  Fever/chills, unexplained weight changes, dizziness/fainting, " unexplained change in bowel or bladder functions, unexplained malaise or muscle weakness, night pain/sweats, numbness or tingling    Objective     LEFT HAND AROM (Degrees)   MCP PIP DIP OTERO DPC   IF  0/45 0/35 0/10  5.5 cm   MF 0/40 0/40 0/15  5.5 cm   RF 0/35 0/45 0/15  5 cm   SF 0/20 0/45 0/20  4 cm     L thumb opposition to SF:  8cm    WRIST AROM (Degrees)   R L   Extension  0 A   10-15 P   Flexion  20 A   25 P   Pronation  35 A   Supination  25 A     L hand figure 8:  39cm  R hand figure 8:  42.5cm      Outcome Measures:         QUICK DASH:   88.64%     Home Program:  Exercise Sets/Reps Comments   AROM ylw sponge squeeze     Proper wrist splint wear     Finger joints PROM     Elevation     Desensitization     Heat PRN                           Treatment Today    Neuro re-ed:  - ASL AROM within fluidotherapy for desensitization x 12 min  - reverse blocking to increase IPJ AROM x 3 min    Manual:  - scar massage, grade1-2 wrist mobz volar-dorsal, retrograde massage, MEM and passive motion to all digits x 25 minutes      EDUCATION: Home exercise program, plan of care, activity modifications, joint protection, pain management, and injury pathology       Assessment:   Patient exhibits very good progression of PROM of all digits since recent nerve block.  Although she still experience pain throughout intervention she reports it to be much more tolerable and not so intense which allows us to be more involved and slightly more aggressive.  With reverse blocking she exhibited a far greater pattern of flexion cascade which she has not been able to demonstrate since start of care.  This is highly encouraging as she also exhibits improved active flexion to her mid palm by 1 to 1.5 cm better.    Level of Complexity  MOD complexity    OT Rehab Potential  Good      Plan:     Planned Interventions include: therapeutic exercise, self-care home management, manual therapy, therapeutic activities, , neuromuscular coordination,  vasopneumatic, dry needling, and orthosis fabrication.  Frequency: 2 x Week  Duration: 12 Weeks    Goals: Set and discussed today         1) Patient will be able to demo 2cm from midpalm with all digits to perform dressing tasks with MIN A, in 4 weeks.         2) Patient will demo 40 degrees of L wrist supination to perform hair care with MIN A, in 6 weeks         3) Patient will demo at least 3cm less L hand swelling to imporve gross grasping, in 4 weeks.        Plan of care was developed with input and agreement by the patient.      Marques Mills, OT

## 2025-03-06 NOTE — ASSESSMENT & PLAN NOTE
Continue current medication.  Continue work on diet - recommend lots of fruits and vegetables, lean protein like chicken, turkey, fish, beans and Greek yogurt. Try to choose healthier carbohydrate options like oatmeal, wheat bread and pasta, sweet potatoes. Limit sugary treats.

## 2025-03-06 NOTE — ASSESSMENT & PLAN NOTE
Orders:    gabapentin (Neurontin) 300 mg capsule; Take 1 capsule (300 mg) by mouth 4 times a day.  Begin gabapentin 300mg four times daily.   Continue follow up with pain management

## 2025-03-06 NOTE — PROGRESS NOTES
Subjective   Patient ID: Uma Middleton is a 75 y.o. female who presents for 3 Month follow up.    HPI   December 26 patient presented to [facility] for left distal radius fracture ultimately had ORIF left distal radius with Dr. AD KATE ER.  Patient then went to [facility] for upper extremity concerns of left arm swelling prior to that she had gone to urgent care who advised her to go to the ER swelling was from the distal area that was exposed from the splint with pain to that oxycodone and Tylenol gabapentin for her discomfort at that time denied any other symptoms she was advised to follow-up with me 2 to 3 days after the encounter for an exam.  Ultrasound was done to rule out DVT negative she was advised to follow-up with the orthopedic surgeon on Tuesday.      Review of Systems  Review of Systems negative except as noted in HPI and Chief complaint.    Current Outpatient Medications:     amLODIPine-benazepriL (Lotrel) 5-10 mg capsule, Take 1 capsule by mouth once daily., Disp: 90 capsule, Rfl: 1    aspirin 81 mg chewable tablet, Chew 1 tablet (81 mg) once daily., Disp: 90 tablet, Rfl: 3    atorvastatin (Lipitor) 80 mg tablet, Take 1 tablet (80 mg) by mouth once daily., Disp: 90 tablet, Rfl: 1    clopidogrel (Plavix) 75 mg tablet, Take 1 tablet (75 mg) by mouth once daily., Disp: 90 tablet, Rfl: 1    fluticasone (Flonase) 50 mcg/actuation nasal spray, Administer 1-2 sprays into each nostril if needed. Uses at bedtime, Disp: , Rfl:     hydrOXYzine HCL (Atarax) 25 mg tablet, Take 1 tablet (25 mg) by mouth as needed at bedtime for anxiety., Disp: , Rfl:     isosorbide mononitrate ER (Imdur) 30 mg 24 hr tablet, Take 1 tablet (30 mg) by mouth once daily. Do not crush or chew., Disp: 90 tablet, Rfl: 3    lidocaine (Lidoderm) 5 % patch, Place 1 patch over 12 hours on the skin once daily. Apply to painful area 12 hours per day, remove for 12 hours., Disp: 14 patch, Rfl: 1    lidocaine (Lidoderm)  "5 % patch, Place 1 patch over 12 hours on the skin once daily. Remove & discard patch within 12 hours or as directed by MD., Disp: 15 patch, Rfl: 1    metoprolol succinate XL (Toprol-XL) 50 mg 24 hr tablet, Take 1 tablet (50 mg) by mouth once daily., Disp: 90 tablet, Rfl: 3    omeprazole (PriLOSEC) 40 mg DR capsule, Take 1 capsule (40 mg) by mouth once daily. Do not crush or chew., Disp: , Rfl:     traMADol (Ultram) 50 mg tablet, Take 1 tablet (50 mg) by mouth if needed for severe pain (7 - 10)., Disp: , Rfl:     gabapentin (Neurontin) 300 mg capsule, Take 1 capsule (300 mg) by mouth 4 times a day., Disp: 120 capsule, Rfl: 2    Objective   /77 (BP Location: Left arm, Patient Position: Sitting, BP Cuff Size: Adult)   Pulse 76   Resp 16   Ht 1.753 m (5' 9\")   Wt 107 kg (235 lb 6.4 oz)   SpO2 98%   BMI 34.76 kg/m²     Physical Exam  Vitals reviewed.   Cardiovascular:      Rate and Rhythm: Normal rate.   Musculoskeletal:      Left hand: Swelling and tenderness present. Decreased strength.      Cervical back: Normal range of motion.   Neurological:      General: No focal deficit present.      Mental Status: She is alert.         Assessment/Plan   Assessment & Plan  Type 2 diabetes mellitus without complication, without long-term current use of insulin (Multi)    Continue current medication.  Continue work on diet - recommend lots of fruits and vegetables, lean protein like chicken, turkey, fish, beans and Greek yogurt. Try to choose healthier carbohydrate options like oatmeal, wheat bread and pasta, sweet potatoes. Limit sugary treats.    Neuropathy    Orders:    gabapentin (Neurontin) 300 mg capsule; Take 1 capsule (300 mg) by mouth 4 times a day.  Begin gabapentin 300mg four times daily.   Continue follow up with pain management   Complex regional pain syndrome type 1 of left upper extremity    Continue follow up with OT as scheduled.     This note was dictated using DRAGON speech recognition software and " was corrected for spelling or grammatical errors, but despite proofreading several typographical errors might be present that might affect the meaning of the content.  Mary Lou Valerio CNP  Advanced care planning was discussed with Uma Middleton today. We reviewed code status, Medical Power of , and Living will. Pt has LW or POA.

## 2025-03-10 ENCOUNTER — TREATMENT (OUTPATIENT)
Dept: OCCUPATIONAL THERAPY | Facility: CLINIC | Age: 75
End: 2025-03-10
Payer: MEDICARE

## 2025-03-10 DIAGNOSIS — M25.532 LEFT WRIST PAIN: Primary | ICD-10-CM

## 2025-03-10 PROCEDURE — 97112 NEUROMUSCULAR REEDUCATION: CPT | Mod: GO | Performed by: OCCUPATIONAL THERAPIST

## 2025-03-10 PROCEDURE — 97140 MANUAL THERAPY 1/> REGIONS: CPT | Mod: GO | Performed by: OCCUPATIONAL THERAPIST

## 2025-03-10 NOTE — PROGRESS NOTES
"    Occupational Therapy Orthopedic Treatment Note    Patient Name: Uma Middleton  MRN: [id number]  Today's Date: 3/10/2025  Time Calculation  Start Time: 1015  Stop Time: 1055  Time Calculation (min): 40 min   , OT Therapeutic Procedures Time Entry  Manual Therapy Time Entry: 12  Neuromuscular Re-Education Time Entry: 26,      Insurance:  Visit number: 12 of 50  Authorization info: no inquired  Insurance Type: Medicare B and [place]  Medicare certification  -  Start: 1/10/25  End: 4/4/25    Current Problem  1. Left wrist pain          Referred by:   Dr. Samuel  Referred for:  L distal radius fx ORIF and significant wrist stiffness  Onset/DOI:  11/29/24  DOS: 12/24//24    Fall risk:  Low  Precautions:  NWB     Medical History Form: Reviewed (scanned into chart)    Subjective   \"The nerve block was definitely helpful and taken a good amount of pain away but the burning still remains.  It was so intense the other day\"    Hand Dominance: Right    Pain:  5/10  Location: radial/volar wrist and dorsal forearm  Description: burning, tight, sharp, burning  Aggravating Factors:  N/A - pain is constant  Relieving Factors:   minimal pain relief from prescription pain meds    Previous Interventions/Treatments: None    Prior Level of Function (PLOF)  Patient previously indpendent with all ADLs/IADLs    ADL/IADL impairments  Bathing, Dressing, Hygiene/Grooming, Hair care, Sleep, Driving, Home mgt, and Meal prep    Patients Living Environment: Reviewed and no concern  Lives with:  - Lalo  Primary Language: English  Patient's Goal(s) for Therapy:   \"to make my hand/wrist work again and to not hurt\"    Red Flags: Do you have any of the following? No  Fever/chills, unexplained weight changes, dizziness/fainting, unexplained change in bowel or bladder functions, unexplained malaise or muscle weakness, night pain/sweats, numbness or tingling    Objective     LEFT HAND AROM (Degrees)   MCP PIP DIP OTERO DPC   IF  0/45 0/35 0/10  5.5 " cm   MF 0/40 0/40 0/15  5.5 cm   RF 0/35 0/45 0/15  5 cm   SF 0/20 0/45 0/20  4 cm     L thumb opposition to SF:  8cm    WRIST AROM (Degrees)   R L   Extension  0 A   10-15 P   Flexion  20 A   25 P   Pronation  35 A   Supination  25 A     L hand figure 8:  39cm  R hand figure 8:  42.5cm      Outcome Measures:         QUICK DASH:   88.64%     Home Program:  Exercise Sets/Reps Comments   AROM ylw sponge squeeze     Proper wrist splint wear     Finger joints PROM     Elevation     Desensitization     Heat PRN                           Treatment Today    Neuro re-ed:  - ASL AROM within fluidotherapy for desensitization x 12 min  - reverse blocking to increase IPJ AROM x 5 min  - red flexbar to promote stress loading of hand and wrist 4x10  - large knob puttycise to promote stress loading of hand and wrist x 10min    Manual:  - scar massage, grade1-2 wrist mobz volar-dorsal, retrograde massage, MEM and passive motion to all digits x 12 minutes      EDUCATION: Home exercise program, plan of care, activity modifications, joint protection, pain management, and injury pathology       Assessment:   Patient demonstrates traits good passive motion cascade of all digits though posttreatment.  Still has considerable difficulty with active motion.  End feel of her wrist is quite firm in both extension and flexion still.    Level of Complexity  MOD complexity    OT Rehab Potential  Good      Plan:     Planned Interventions include: therapeutic exercise, self-care home management, manual therapy, therapeutic activities, , neuromuscular coordination, vasopneumatic, dry needling, and orthosis fabrication.  Frequency: 2 x Week  Duration: 12 Weeks    Goals: Set and discussed today         1) Patient will be able to demo 2cm from midpalm with all digits to perform dressing tasks with MIN A, in 4 weeks.         2) Patient will demo 40 degrees of L wrist supination to perform hair care with MIN A, in 6 weeks         3) Patient will demo at  least 3cm less L hand swelling to imporve gross grasping, in 4 weeks.        Plan of care was developed with input and agreement by the patient.      Marques Mills, OT

## 2025-03-13 ENCOUNTER — TREATMENT (OUTPATIENT)
Dept: OCCUPATIONAL THERAPY | Facility: CLINIC | Age: 75
End: 2025-03-13
Payer: MEDICARE

## 2025-03-13 DIAGNOSIS — M25.532 LEFT WRIST PAIN: Primary | ICD-10-CM

## 2025-03-13 DIAGNOSIS — M25.532 LEFT WRIST PAIN: ICD-10-CM

## 2025-03-13 PROCEDURE — 97112 NEUROMUSCULAR REEDUCATION: CPT | Mod: GO | Performed by: OCCUPATIONAL THERAPIST

## 2025-03-13 PROCEDURE — 97140 MANUAL THERAPY 1/> REGIONS: CPT | Mod: GO | Performed by: OCCUPATIONAL THERAPIST

## 2025-03-13 RX ORDER — LIDOCAINE 50 MG/G
1 PATCH TOPICAL DAILY
Qty: 15 PATCH | Refills: 1 | Status: SHIPPED | OUTPATIENT
Start: 2025-03-13

## 2025-03-13 NOTE — PROGRESS NOTES
"    Occupational Therapy Orthopedic Treatment Note    Patient Name: Uma Middleton  MRN: [id number]  Today's Date: 3/13/2025  Time Calculation  Start Time: 1100  Stop Time: 1140  Time Calculation (min): 40 min   , OT Therapeutic Procedures Time Entry  Manual Therapy Time Entry: 25  Neuromuscular Re-Education Time Entry: 15,      Insurance:  Visit number: 13 of 50  Authorization info: no inquired  Insurance Type: Medicare B and [place]  Medicare certification  -  Start: 1/10/25  End: 4/4/25    Current Problem  1. Left wrist pain            Referred by:   Dr. Samuel  Referred for:  L distal radius fx ORIF and significant wrist stiffness  Onset/DOI:  11/29/24  DOS: 12/24//24    Fall risk:  Low  Precautions:  NWB     Medical History Form: Reviewed (scanned into chart)    Subjective   \"Day had more burning traveling up the arm yesterday which never happens\"    Hand Dominance: Right    Pain:  5/10  Location: radial/volar wrist and dorsal forearm  Description: burning, tight, sharp, burning  Aggravating Factors:  N/A - pain is constant  Relieving Factors:   minimal pain relief from prescription pain meds    Previous Interventions/Treatments: None    Prior Level of Function (PLOF)  Patient previously indpendent with all ADLs/IADLs    ADL/IADL impairments  Bathing, Dressing, Hygiene/Grooming, Hair care, Sleep, Driving, Home mgt, and Meal prep    Patients Living Environment: Reviewed and no concern  Lives with: shyann May  Primary Language: English  Patient's Goal(s) for Therapy:   \"to make my hand/wrist work again and to not hurt\"    Red Flags: Do you have any of the following? No  Fever/chills, unexplained weight changes, dizziness/fainting, unexplained change in bowel or bladder functions, unexplained malaise or muscle weakness, night pain/sweats, numbness or tingling    Objective     LEFT HAND AROM (Degrees)   MCP PIP DIP OTERO DPC   IF  0/45 0/35 0/10  5.5 cm   MF 0/40 0/40 0/15  5.5 cm   RF 0/35 0/45 0/15  5 cm   SF 0/20 " 0/45 0/20  4 cm     L thumb opposition to SF:  8cm    WRIST AROM (Degrees)   R L   Extension  0 A   10-15 P   Flexion  20 A   25 P   Pronation  35 A   Supination  25 A     L hand figure 8:  39cm  R hand figure 8:  42.5cm      Outcome Measures:         QUICK DASH:   88.64%     Home Program:  Exercise Sets/Reps Comments   AROM ylw sponge squeeze     Proper wrist splint wear     Finger joints PROM     Elevation     Desensitization     Heat PRN     Reverse MCP blocking                      Treatment Today    Neuro re-ed:  - ASL AROM within fluidotherapy for desensitization x 10 min  - reverse MCP blocking to increase IPJ AROM x 5 min      Manual:  - scar massage, grade1-2 wrist mobz volar-dorsal, retrograde massage, MEM and passive motion to all digits x 25 minutes      EDUCATION: Home exercise program, plan of care, activity modifications, joint protection, pain management, and injury pathology       Assessment:   Patient demonstrates good passive motion cascade of all digits posttreatment following extensive manual intervention.  With reverse blocking she exhibits improved active motion of both FDS and FDP.  She was unable to demo this one month ago and is evidence of progress.  She understands to be perform reverse blocking a lot at home.    Level of Complexity  MOD complexity    OT Rehab Potential  Good      Plan:     Planned Interventions include: therapeutic exercise, self-care home management, manual therapy, therapeutic activities, , neuromuscular coordination, vasopneumatic, dry needling, and orthosis fabrication.  Frequency: 2 x Week  Duration: 12 Weeks    Goals: Set and discussed today         1) Patient will be able to demo 2cm from midpalm with all digits to perform dressing tasks with MIN A, in 4 weeks.         2) Patient will demo 40 degrees of L wrist supination to perform hair care with MIN A, in 6 weeks         3) Patient will demo at least 3cm less L hand swelling to imporve gross grasping, in 4  weeks.        Plan of care was developed with input and agreement by the patient.      Marques Mills, OT

## 2025-03-17 ENCOUNTER — TREATMENT (OUTPATIENT)
Dept: OCCUPATIONAL THERAPY | Facility: CLINIC | Age: 75
End: 2025-03-17
Payer: MEDICARE

## 2025-03-17 DIAGNOSIS — M25.642 JOINT STIFFNESS OF HAND, LEFT: ICD-10-CM

## 2025-03-17 DIAGNOSIS — M25.532 LEFT WRIST PAIN: Primary | ICD-10-CM

## 2025-03-17 PROCEDURE — 97112 NEUROMUSCULAR REEDUCATION: CPT | Mod: GO | Performed by: OCCUPATIONAL THERAPIST

## 2025-03-17 PROCEDURE — 97140 MANUAL THERAPY 1/> REGIONS: CPT | Mod: GO | Performed by: OCCUPATIONAL THERAPIST

## 2025-03-17 NOTE — PROGRESS NOTES
"    Occupational Therapy Orthopedic Treatment Note    Patient Name: Uma Middleton  MRN: [id number]  Today's Date: 3/17/2025  Time Calculation  Start Time: 1018  Stop Time: 1100  Time Calculation (min): 42 min   , OT Therapeutic Procedures Time Entry  Manual Therapy Time Entry: 25  Neuromuscular Re-Education Time Entry: 15,      Insurance:  Visit number: 14 of 50  Authorization info: no inquired  Insurance Type: Medicare B and [place]  Medicare certification  -  Start: 1/10/25  End: 4/4/25    Current Problem  1. Left wrist pain        2. Joint stiffness of hand, left              Referred by:   Dr. Samuel  Referred for:  L distal radius fx ORIF and significant wrist stiffness  Onset/DOI:  11/29/24  DOS: 12/24//24    Fall risk:  Low  Precautions:  NWB     Medical History Form: Reviewed (scanned into chart)    Subjective   \"My wrist and hand mobility are better in the morning and hurt more and gets worse as well is increased burning\"    Hand Dominance: Right    Pain:  5/10  Location: radial/volar wrist and dorsal forearm  Description: burning, tight, sharp, burning  Aggravating Factors:  N/A - pain is constant  Relieving Factors:   minimal pain relief from prescription pain meds    Previous Interventions/Treatments: None    Prior Level of Function (PLOF)  Patient previously indpendent with all ADLs/IADLs    ADL/IADL impairments  Bathing, Dressing, Hygiene/Grooming, Hair care, Sleep, Driving, Home mgt, and Meal prep    Patients Living Environment: Reviewed and no concern  L[facility] with:  [facility]  Primary Language: English  Patient's Goal(s) for Therapy:   \"to make my hand/wrist work again and to not hurt\"    Red Flags: Do you have any of the following? No  Fever/chills, unexplained weight changes, dizziness/fainting, unexplained change in bowel or bladder functions, unexplained malaise or muscle weakness, night pain/sweats, numbness or tingling    Objective     LEFT HAND AROM (Degrees)   MCP PIP DIP OTERO DPC   IF  0/45 " 0/35 0/10  5.5 cm   MF 0/40 0/40 0/15  5.5 cm   RF 0/35 0/45 0/15  5 cm   SF 0/20 0/45 0/20  4 cm     L thumb opposition to SF:  8cm    WRIST AROM (Degrees)   R L   Extension  0 A   10-15 P   Flexion  20 A   25 P   Pronation  35 A   Supination  25 A   40 P     L hand figure 8:  39cm  R hand figure 8:  42.5cm      Outcome Measures:         QUICK DASH:   88.64%     Home Program:  Exercise Sets/Reps Comments   AROM ylw sponge squeeze     Proper wrist splint wear     Finger joints PROM     Elevation     Desensitization     Heat PRN     Reverse MCP blocking                      Treatment Today    Neuro re-ed:  - ASL AROM within fluidotherapy for desensitization x 10 min  - reverse MCP blocking to increase IPJ AROM x 3 min  - AA supination to re-ed proprioception motor control 3x10      Manual:  - scar massage, grade1-2 wrist mobz volar-dorsal, retrograde massage, supination stretching, MEM and passive motion to all digits x 25 minutes      EDUCATION: Home exercise program, plan of care, activity modifications, joint protection, pain management, and injury pathology       Assessment:   Patient demonstrates improved supination passive motion to 40 degrees.  She was provided with education of manual technique to perform stretching at home to further progress this motion.    Level of Complexity  MOD complexity    OT Rehab Potential  Good      Plan:     Planned Interventions include: therapeutic exercise, self-care home management, manual therapy, therapeutic activities, , neuromuscular coordination, vasopneumatic, dry needling, and orthosis fabrication.  Frequency: 2 x Week  Duration: 12 Weeks    Goals: Set and discussed today         1) Patient will be able to demo 2cm from midpalm with all digits to perform dressing tasks with MIN A, in 4 weeks.         2) Patient will demo 40 degrees of L wrist supination to perform hair care with MIN A, in 6 weeks         3) Patient will demo at least 3cm less L hand swelling to imporve  gross grasping, in 4 weeks.        Plan of care was developed with input and agreement by the patient.      Marques Mills, OT

## 2025-03-20 ENCOUNTER — APPOINTMENT (OUTPATIENT)
Dept: OCCUPATIONAL THERAPY | Facility: CLINIC | Age: 75
End: 2025-03-20
Payer: MEDICARE

## 2025-03-31 ENCOUNTER — APPOINTMENT (OUTPATIENT)
Dept: OCCUPATIONAL THERAPY | Facility: CLINIC | Age: 75
End: 2025-03-31
Payer: MEDICARE

## 2025-03-31 DIAGNOSIS — M25.532 LEFT WRIST PAIN: ICD-10-CM

## 2025-03-31 PROCEDURE — 97140 MANUAL THERAPY 1/> REGIONS: CPT | Mod: GO | Performed by: OCCUPATIONAL THERAPIST

## 2025-03-31 PROCEDURE — 97035 APP MDLTY 1+ULTRASOUND EA 15: CPT | Mod: GO | Performed by: OCCUPATIONAL THERAPIST

## 2025-03-31 PROCEDURE — 97112 NEUROMUSCULAR REEDUCATION: CPT | Mod: GO | Performed by: OCCUPATIONAL THERAPIST

## 2025-03-31 NOTE — PROGRESS NOTES
"    Occupational Therapy Orthopedic Treatment Note    Patient Name: Uma Middleton  MRN: [id number]  Today's Date: 3/31/2025  Time Calculation  Start Time: 0100  Stop Time: 0145  Time Calculation (min): 45 min   , OT Therapeutic Procedures Time Entry  Manual Therapy Time Entry: 10  Neuromuscular Re-Education Time Entry: 20, OT Modalities Time Entry  Ultrasound Time Entry: 8    Insurance:  Visit number: 15 of 50  Authorization info: no inquired  Insurance Type: Medicare B and [place]  Medicare certification  -  Start: 1/10/25  End: 4/4/25    Current Problem  1. Left wrist pain  Follow Up In Occupational Therapy            Referred by:   Dr. Samuel  Referred for:  L distal radius fx ORIF and significant wrist stiffness  Onset/DOI:  11/29/24  DOS: 12/24//24    Fall risk:  Low  Precautions:  NWB     Medical History Form: Reviewed (scanned into chart)    Subjective   \"I can nearly make an OK sign with my index finger which is big progress.  But it also seem sot be burning more than ever.  I can just barely move my fingers now to squeeze the sponge.  There are changes occurring\"      Hand Dominance: Right    Pain:  5/10  Location: radial/volar wrist and dorsal forearm  Description: burning, tight, sharp, burning  Aggravating Factors:  N/A - pain is constant  Relieving Factors:   minimal pain relief from prescription pain meds    Previous Interventions/Treatments: None    Prior Level of Function (PLOF)  Patient previously indpendent with all ADLs/IADLs    ADL/IADL impairments  Bathing, Dressing, Hygiene/Grooming, Hair care, Sleep, Driving, Home mgt, and Meal prep    Patients Living Environment: Reviewed and no concern  Lives with:  - Lalo  Primary Language: English  Patient's Goal(s) for Therapy:   \"to make my hand/wrist work again and to not hurt\"    Red Flags: Do you have any of the following? No  Fever/chills, unexplained weight changes, dizziness/fainting, unexplained change in bowel or bladder functions, " unexplained malaise or muscle weakness, night pain/sweats, numbness or tingling    Objective     LEFT HAND AROM (Degrees)   MCP PIP DIP OTERO DPC   IF  0/45 0/35 0/10  4.5 cm   MF 0/40 0/40 0/15  5 cm   RF 0/35 0/45 0/15  4.5 cm   SF 0/20 0/45 0/20  4 cm     L thumb opposition to SF:  8cm    WRIST AROM (Degrees)   R L   Extension  0 A   10-15 P   Flexion  20 A   25 P   Pronation  35 A   Supination  25 A   40 P     L hand figure 8:  40.3cm  R hand figure 8:  39cm      Outcome Measures:         QUICK DASH:   88.64%     Home Program:  Exercise Sets/Reps Comments   AROM ylw sponge squeeze     Proper wrist splint wear     Finger joints PROM     Elevation     Desensitization     Heat PRN     Reverse MCP blocking                      Treatment Today    Ultrasound:  - 3.3MHz x 1.0Wcm2 to volar wrist surgical site to increase scar and tissue extensibility x 8 min    Neuro re-ed:  - ASL AROM within fluidotherapy for desensitization x 10 min  - reverse MCP blocking to increase IPJ AROM x 3 min  - AA supination to re-ed proprioception motor control 3x10  - AROM tip to tip and 3pt pinch for the first time promoting coordination to  a writing utensil 20x  - AROM opposition to IF and MF     Manual:  - scar massage, grade1-2 wrist mobz volar-dorsal, retrograde massage, supination stretching, MEM and passive motion to all digits x 10 minutes      EDUCATION: Home exercise program, plan of care, activity modifications, joint protection, pain management, and injury pathology       Assessment:   Patient demonstrated numerous improvements today the biggest improvement being her ability to oppose to her IF and MF.  Her scar and surrounding flexor muscle tissue is far more soft, although still quite tender.  She can tolerate PROM to her digits with simultaneous wrist ROM.  This results in improved active flexion of all of her digits. She was also able to use a tip to tip and 3pt pinch for the first time with improved coordination to   a writing utensil.       Level of Complexity  MOD complexity    OT Rehab Potential  Good      Plan:     Planned Interventions include: therapeutic exercise, self-care home management, manual therapy, therapeutic activities, , neuromuscular coordination, vasopneumatic, dry needling, and orthosis fabrication.  Frequency: 2 x Week  Duration: 12 Weeks    Goals: Set and discussed today         1) Patient will be able to demo 2cm from midpalm with all digits to perform dressing tasks with MIN A, in 4 weeks.         2) Patient will demo 40 degrees of L wrist supination to perform hair care with MIN A, in 6 weeks         3) Patient will demo at least 3cm less L hand swelling to imporve gross grasping, in 4 weeks.        Plan of care was developed with input and agreement by the patient.      Marques Mills, OT

## 2025-04-03 ENCOUNTER — TREATMENT (OUTPATIENT)
Dept: OCCUPATIONAL THERAPY | Facility: CLINIC | Age: 75
End: 2025-04-03
Payer: MEDICARE

## 2025-04-03 DIAGNOSIS — M25.642 JOINT STIFFNESS OF HAND, LEFT: Primary | ICD-10-CM

## 2025-04-03 DIAGNOSIS — M25.532 LEFT WRIST PAIN: ICD-10-CM

## 2025-04-03 PROCEDURE — 97112 NEUROMUSCULAR REEDUCATION: CPT | Mod: GO | Performed by: OCCUPATIONAL THERAPIST

## 2025-04-03 PROCEDURE — 97140 MANUAL THERAPY 1/> REGIONS: CPT | Mod: GO | Performed by: OCCUPATIONAL THERAPIST

## 2025-04-03 NOTE — PROGRESS NOTES
"    Occupational Therapy Orthopedic Treatment Note    Patient Name: Uma Middleton  MRN: [id number]  Today's Date: 4/3/2025  Time Calculation  Start Time: 1100  Stop Time: 1145  Time Calculation (min): 45 min   , OT Therapeutic Procedures Time Entry  Manual Therapy Time Entry: 20  Neuromuscular Re-Education Time Entry: 23,      Insurance:  Visit number: 16 of 50  Authorization info: no inquired  Insurance Type: Medicare B and [place]  Medicare certification  -  Start: 1/10/25  End: 4/4/25    Current Problem  1. Joint stiffness of hand, left        2. Left wrist pain            Referred by:   Dr. Samuel  Referred for:  L distal radius fx ORIF and significant wrist stiffness  Onset/DOI:  11/29/24  DOS: 12/24//24    Fall risk:  Low  Precautions:  NWB     Medical History Form: Reviewed (scanned into chart)    Subjective         Hand Dominance: Right    Pain:  5/10  Location: radial/volar wrist and dorsal forearm  Description: burning, tight, sharp, burning  Aggravating Factors:  N/A - pain is constant  Relieving Factors:   minimal pain relief from prescription pain meds    Previous Interventions/Treatments: None    Prior Level of Function (PLOF)  Patient previously indpendent with all ADLs/IADLs    ADL/IADL impairments  Bathing, Dressing, Hygiene/Grooming, Hair care, Sleep, Driving, Home mgt, and Meal prep    Patients Living Environment: Reviewed and no concern  Lives with: shyann May  Primary Language: English  Patient's Goal(s) for Therapy:   \"to make my hand/wrist work again and to not hurt\"    Red Flags: Do you have any of the following? No  Fever/chills, unexplained weight changes, dizziness/fainting, unexplained change in bowel or bladder functions, unexplained malaise or muscle weakness, night pain/sweats, numbness or tingling    Objective     LEFT HAND AROM (Degrees)   MCP PIP DIP OTERO DPC   IF  0/45 0/35 0/10  4.5 cm   MF 0/40 0/40 0/15  5 cm   RF 0/35 0/45 0/15  4.5 cm   SF 0/20 0/45 0/20  4 cm     L thumb " opposition to SF:  8cm    WRIST AROM (Degrees)   R L   Extension  0 A   10-15 P   Flexion  20 A   25 P   Pronation  35 A   Supination  25 A   40 P     L hand figure 8:  40.3cm  R hand figure 8:  39cm      Outcome Measures:         QUICK DASH:   88.64%     Home Program:  Exercise Sets/Reps Comments   AROM ylw sponge squeeze     Proper wrist splint wear     Finger joints PROM     Elevation     Desensitization     Heat PRN     Reverse MCP blocking                      Treatment Today    Neuro re-ed:  - ASL AROM within fluidotherapy for desensitization x 10 min  - reverse MCP blocking to increase IPJ AROM x 5 min  - AA supination to re-ed proprioception motor control 3x10  - AROM tip to tip and 3pt pinch for the first time promoting coordination to  a writing utensil 20x  - AROM tenodesis to re-ed wrist/hand proprioception x 50 rps  - AROM opposition to IF and MF x 5 min    Manual:  - scar massage, grade1-2 wrist mobz volar-dorsal, retrograde massage, supination stretching, MEM and passive motion to all digits x 20 minutes      EDUCATION: Home exercise program, plan of care, activity modifications, joint protection, pain management, and injury pathology       Assessment:   Still quite reactive to gentle scar massage and soft tissue mobilization.  Focus attention today on motor control of tenodesis motion of the hand.  With manual guidance and education she exhibited improved coordination where she completed this exercise and is encouraged to complete this at home.  Posttreatment she could oppose to her middle finger and demonstrated improved cascade of her digits compared to arrival status.  Responds well to manual intervention.  Supination is still quite stiff and uncomfortable to progress to endrange low has improved since start of care.  End ranges quite firm overall.      Level of Complexity  MOD complexity    OT Rehab Potential  Good      Plan:     Planned Interventions include: therapeutic exercise,  self-care home management, manual therapy, therapeutic activities, , neuromuscular coordination, vasopneumatic, dry needling, and orthosis fabrication.  Frequency: 2 x Week  Duration: 12 Weeks    Goals: Set and discussed today         1) Patient will be able to demo 2cm from midpalm with all digits to perform dressing tasks with MIN A, in 4 weeks.         2) Patient will demo 40 degrees of L wrist supination to perform hair care with MIN A, in 6 weeks         3) Patient will demo at least 3cm less L hand swelling to imporve gross grasping, in 4 weeks.        Plan of care was developed with input and agreement by the patient.      Marques Mills, OT

## 2025-04-07 ENCOUNTER — TREATMENT (OUTPATIENT)
Dept: OCCUPATIONAL THERAPY | Facility: CLINIC | Age: 75
End: 2025-04-07
Payer: MEDICARE

## 2025-04-07 DIAGNOSIS — M25.532 LEFT WRIST PAIN: ICD-10-CM

## 2025-04-07 PROCEDURE — 97112 NEUROMUSCULAR REEDUCATION: CPT | Mod: GO | Performed by: OCCUPATIONAL THERAPIST

## 2025-04-07 NOTE — PROGRESS NOTES
"    Occupational Therapy Orthopedic Treatment/ Progress Note    Patient Name: Uma Middleton  MRN: [id number]  Today's Date: 4/7/2025  Time Calculation  Start Time: 1215  Stop Time: 1257  Time Calculation (min): 42 min   , OT Therapeutic Procedures Time Entry  Neuromuscular Re-Education Time Entry: 40,      Insurance:  Visit number: 17 of 50  Authorization info: no inquired  Insurance Type: Medicare B and [place]  Medicare certification  -  Start: 4/7/25  End: 6/6/25    Current Problem  1. Left wrist pain  Follow Up In Occupational Therapy          Referred by:   Dr. Samuel  Referred for:  L distal radius fx ORIF and significant wrist stiffness  Onset/DOI:  11/29/24  DOS: 12/24//24    Fall risk:  Low  Precautions:  NWB     Medical History Form: Reviewed (scanned into chart)    Subjective   \"We have accomplished a lot since starting therapy.  I was able to open a half full jelly jar this weekend for the first time\"      Hand Dominance: Right    Pain:  5/10  Location: radial/volar wrist and dorsal forearm  Description: burning, tight, sharp, burning  Aggravating Factors:  N/A - pain is constant  Relieving Factors:   minimal pain relief from prescription pain meds    Previous Interventions/Treatments: None    Prior Level of Function (PLOF)  Patient previously indpendent with all ADLs/IADLs    ADL/IADL impairments  Bathing, Dressing, Hygiene/Grooming, Hair care, Sleep, Driving, Home mgt, and Meal prep    Patients Living Environment: Reviewed and no concern  Lives with:  - Lalo  Primary Language: English  Patient's Goal(s) for Therapy:   \"to make my hand/wrist work again and to not hurt\"    Red Flags: Do you have any of the following? No  Fever/chills, unexplained weight changes, dizziness/fainting, unexplained change in bowel or bladder functions, unexplained malaise or muscle weakness, night pain/sweats, numbness or tingling    Objective     LEFT HAND AROM (Degrees)   MCP PIP DIP OTERO DPC   IF  0/45 0/35 0/10  4.5 " cm   MF 0/40 0/40 0/15  5 cm   RF 0/35 0/45 0/15  4.5 cm   SF 0/20 0/45 0/20  4 cm     L thumb opposition to SF:  8cm    WRIST AROM (Degrees)   R L   Extension  0 A   10-15 P   Flexion  20 A   25 P   Pronation  35 A   Supination  25-27 A   40 P     L hand figure 8:  40.3cm  R hand figure 8:  39cm      Outcome Measures:         QUICK DASH:   88.64%     Home Program:  Exercise Sets/Reps Comments   AROM ylw sponge squeeze     Proper wrist splint wear     Finger joints PROM     Elevation     Desensitization     Heat PRN     Reverse MCP blocking                      Treatment Today    Neuro re-ed:  - ASL AROM within fluidotherapy for desensitization x 10 min  - reverse MCP blocking to increase IPJ AROM x 5 min  - AA supination to re-ed proprioception motor control 3x10  - AROM tip to tip and 3pt pinch for the first time promoting coordination to  a writing utensil 20x  - AROM tenodesis to re-ed wrist/hand proprioception x 50 rps  - AROM towel scrunches x 50 rps  - AROM opposition to IF and MF x 5 min  - supination wheel stretching x 50rps      EDUCATION: Home exercise program, plan of care, activity modifications, joint protection, pain management, and injury pathology       Assessment:   Pt utilized supination wheel today to progress ROM and following demonstrated about 27 degrees of active supination.  Sharath to perform towels crunches today now that she continues to exhibits good flexion cascade of digits consistently.  Thumb IPJ stills tiff although she is opposing to her MF faster than previous sessions.      Level of Complexity  MOD complexity    OT Rehab Potential  Good      Plan:     Planned Interventions include: therapeutic exercise, self-care home management, manual therapy, therapeutic activities, , neuromuscular coordination, vasopneumatic, dry needling, and orthosis fabrication.  Frequency: 2 x Week  Duration: 12 Weeks    Goals: Set and discussed today         1) Patient will be able to demo 2cm from  midpalm with all digits to perform dressing tasks with MIN A, in 4 weeks.         2) Patient will demo 40 degrees of L wrist supination to perform hair care with MIN A, in 6 weeks         3) Patient will demo at least 3cm less L hand swelling to imporve gross grasping, in 4 weeks.        Plan of care was developed with input and agreement by the patient.      Marques Mills, OT

## 2025-04-08 ENCOUNTER — OFFICE VISIT (OUTPATIENT)
Dept: ORTHOPEDIC SURGERY | Facility: HOSPITAL | Age: 75
End: 2025-04-08
Payer: MEDICARE

## 2025-04-08 DIAGNOSIS — M25.532 LEFT WRIST PAIN: ICD-10-CM

## 2025-04-08 DIAGNOSIS — G90.512 COMPLEX REGIONAL PAIN SYNDROME TYPE 1 OF LEFT UPPER EXTREMITY: Primary | ICD-10-CM

## 2025-04-08 PROCEDURE — 99213 OFFICE O/P EST LOW 20 MIN: CPT | Performed by: STUDENT IN AN ORGANIZED HEALTH CARE EDUCATION/TRAINING PROGRAM

## 2025-04-08 PROCEDURE — 1157F ADVNC CARE PLAN IN RCRD: CPT | Performed by: STUDENT IN AN ORGANIZED HEALTH CARE EDUCATION/TRAINING PROGRAM

## 2025-04-08 PROCEDURE — G2211 COMPLEX E/M VISIT ADD ON: HCPCS | Performed by: STUDENT IN AN ORGANIZED HEALTH CARE EDUCATION/TRAINING PROGRAM

## 2025-04-08 PROCEDURE — 1036F TOBACCO NON-USER: CPT | Performed by: STUDENT IN AN ORGANIZED HEALTH CARE EDUCATION/TRAINING PROGRAM

## 2025-04-08 PROCEDURE — 1159F MED LIST DOCD IN RCRD: CPT | Performed by: STUDENT IN AN ORGANIZED HEALTH CARE EDUCATION/TRAINING PROGRAM

## 2025-04-08 ASSESSMENT — PAIN SCALES - GENERAL: PAINLEVEL_OUTOF10: 5 - MODERATE PAIN

## 2025-04-08 ASSESSMENT — PAIN DESCRIPTION - DESCRIPTORS: DESCRIPTORS: TINGLING;NUMBNESS

## 2025-04-08 ASSESSMENT — PAIN - FUNCTIONAL ASSESSMENT: PAIN_FUNCTIONAL_ASSESSMENT: 0-10

## 2025-04-08 NOTE — PROGRESS NOTES
[facility]  Hand and Upper Extremity Service  Post Operative visit        Date of surgery: 12/26/2024  Surgery(s) performed: Open reduction internal fixation of left distal radius.        Subjective report:   Patient presents for her third postop follow-up.   She was given a Velcro movable brace.  She has been in occupational therapy.  She notes she has been pretty miserable since her surgery.  Of note she was also in significant pain prior to her operation as she had been dealing with a left distal radius fracture which was tried to be treated nonoperatively by another surgeon.  She had a delayed presentation to my office for operative fixation.      2/25/2025 follow-up:  Patient presents for follow-up today.  She is approximately 2 months from the above surgery.  She has developed CRPS and is seeing both pain management and doing occupational therapy.  She notes continued significant stiffness to her fingers and wrist.  She is hypersensitive to light touch.  She notes continued swelling.  She overall does appear to be doing better than she did at her last follow-up visit.  She did have median nerve block and radial nerve block performed by pain management which did give her a few days of temporary relief.  She is planning to have a stellate ganglion sympathetic    4/8/2025 follow-up:  Patient is over 3 months from the above surgery.  She has been going to pain management and continue with occupational therapy.  She notes improvement in her symptoms.  Her swelling has decreased however still present.  She continues to have edema, tingling burning and numbness that radiates to the forearm.  She notes stiffness to her fingers.  Even though she has the symptoms she is heading in the right direction.  Her pain is a 5 out of 10.     Exam findings; left upper extremity  Mild to moderate swelling to the left wrist.  Moderate swelling to the dorsum of the hands and fingers.  Limited range  of motion to her fingers secondary to pain, swelling and stiffness.  She can flex and extend the MP and IP joints however she is approximately 6 cm from the distal palmar crease.  Limited wrist range of motion secondary to pain.  She is has hypersensitivity to touch.  She does have a reddish hue to her hands and distal forearm..  Incision is well-approximated without any erythema warmth or drainage.  Sutures are intact.  AIN, PIN, ulnar motors intact.  Sensation intact light touch radial, ulnar, median nerves     Radiograph findings: XR of the left wrist was  reviewed in office today.  No new x-rays obtained.  Status post open reduction internal fixation of left distal radius fracture with near anatomic alignment and improved positioning.  No signs of hardware failure or loosening        Plan:   She is making improvements in her symptoms.  Her swelling erythema and motion have improved however still persistent.  We discussed continue working on her range of motion.  We will see her back in 3 months for repeat clinical examination and repeat x-rays        Will Beucler, DO  [employer]  Department of Orthopaedic Surgery  Hand and Upper Extremity Surgery  [facility]     Dictation performed with the use of voice recognition software. Syntax and grammatical errors may exist.

## 2025-04-10 ENCOUNTER — TREATMENT (OUTPATIENT)
Dept: OCCUPATIONAL THERAPY | Facility: CLINIC | Age: 75
End: 2025-04-10
Payer: MEDICARE

## 2025-04-10 DIAGNOSIS — M25.532 LEFT WRIST PAIN: ICD-10-CM

## 2025-04-10 PROCEDURE — 97140 MANUAL THERAPY 1/> REGIONS: CPT | Mod: GO | Performed by: OCCUPATIONAL THERAPIST

## 2025-04-10 PROCEDURE — 97112 NEUROMUSCULAR REEDUCATION: CPT | Mod: GO | Performed by: OCCUPATIONAL THERAPIST

## 2025-04-10 NOTE — PROGRESS NOTES
"    Occupational Therapy Orthopedic Treatment/ Progress Note    Patient Name: Uma Middleton  MRN: [id number]  Today's Date: 4/10/2025  Time Calculation  Start Time: 1015  Stop Time: 1100  Time Calculation (min): 45 min   , OT Therapeutic Procedures Time Entry  Manual Therapy Time Entry: 25  Neuromuscular Re-Education Time Entry: 20,      Insurance:  Visit number: 17 of 50  Authorization info: no inquired  Insurance Type: Medicare B and [place]  Medicare certification  -  Start: 4/7/25  End: 6/6/25    Current Problem  1. Left wrist pain  Follow Up In Occupational Therapy          Referred by:   Dr. Samuel  Referred for:  L distal radius fx ORIF and significant wrist stiffness  Onset/DOI:  11/29/24  DOS: 12/24//24    Fall risk:  Low  Precautions:  NWB     Medical History Form: Reviewed (scanned into chart)    Subjective   \"I saw my surgeon yesterday and he said he was pleased with the progress I have made in therapy to this point and wants me to keep going\"      Hand Dominance: Right    Pain:  5/10  Location: radial/volar wrist and dorsal forearm  Description: burning, tight, sharp, burning  Aggravating Factors:  N/A - pain is constant  Relieving Factors:   minimal pain relief from prescription pain meds    Previous Interventions/Treatments: None    Prior Level of Function (PLOF)  Patient previously indpendent with all ADLs/IADLs    ADL/IADL impairments  Bathing, Dressing, Hygiene/Grooming, Hair care, Sleep, Driving, Home mgt, and Meal prep    Patients Living Environment: Reviewed and no concern  Lives with:  - Lalo  Primary Language: English  Patient's Goal(s) for Therapy:   \"to make my hand/wrist work again and to not hurt\"    Red Flags: Do you have any of the following? No  Fever/chills, unexplained weight changes, dizziness/fainting, unexplained change in bowel or bladder functions, unexplained malaise or muscle weakness, night pain/sweats, numbness or tingling    Objective     LEFT HAND AROM (Degrees)   MCP " PIP DIP OTERO DPC   IF  0/45 0/35 0/10  4.5 cm   MF 0/40 0/40 0/15  5 cm   RF 0/35 0/45 0/15  4.5 cm   SF 0/20 0/45 0/20  4 cm     L thumb opposition to SF:  8cm    WRIST AROM (Degrees)   R L   Extension  0 A   10-15 P   Flexion  20 A   25 P   Pronation  35 A   Supination  25-27 A   40 P     L hand figure 8:  40.3cm  R hand figure 8:  39cm      Outcome Measures:         QUICK DASH:   88.64%     Home Program:  Exercise Sets/Reps Comments   AROM ylw sponge squeeze     Proper wrist splint wear     Finger joints PROM     Elevation     Desensitization     Heat PRN     Reverse MCP blocking                      Treatment Today    Manual:  - scar massage, grade1-2 wrist mobz volar-dorsal, retrograde massage, supination stretching, MEM and passive motion to all digits x 25 minutes    Neuro re-ed:  - ASL AROM within fluidotherapy for desensitization x 10 min  - reverse MCP blocking to increase IPJ AROM x 5 min  - AA supination to re-ed proprioception motor control 3x10  - AROM tip to tip and 3pt pinch for the first time promoting coordination to  a writing utensil 20x  - AROM tenodesis to re-ed wrist/hand proprioception x 50 rps  - AROM towel scrunches/ gross grasping beans for proprioceptive re-ed of movement patterns x 50 rps  - AROM opposition to IF and MF x 5 min  - supination wheel stretching x 50rps      EDUCATION: Home exercise program, plan of care, activity modifications, joint protection, pain management, and injury pathology       Assessment:   Tolerated manual intervention well within tolerable limits.  Responds well to STM and gentle stretching resulting in greater digital ROM.  Able to oppose to her MF again with less effort.  Reports to not have pain with STM to the palm as she one used to.      Level of Complexity  MOD complexity    OT Rehab Potential  Good      Plan:     Planned Interventions include: therapeutic exercise, self-care home management, manual therapy, therapeutic activities, , neuromuscular  coordination, vasopneumatic, dry needling, and orthosis fabrication.  Frequency: 2 x Week  Duration: 12 Weeks    Goals: Set and discussed today         1) Patient will be able to demo 2cm from midpalm with all digits to perform dressing tasks with MIN A, in 4 weeks.         2) Patient will demo 40 degrees of L wrist supination to perform hair care with MIN A, in 6 weeks         3) Patient will demo at least 3cm less L hand swelling to imporve gross grasping, in 4 weeks.        Plan of care was developed with input and agreement by the patient.      Marques Mills, OT

## 2025-04-14 ENCOUNTER — TREATMENT (OUTPATIENT)
Dept: OCCUPATIONAL THERAPY | Facility: CLINIC | Age: 75
End: 2025-04-14
Payer: MEDICARE

## 2025-04-14 DIAGNOSIS — M25.532 LEFT WRIST PAIN: ICD-10-CM

## 2025-04-14 PROCEDURE — 97140 MANUAL THERAPY 1/> REGIONS: CPT | Mod: GO | Performed by: OCCUPATIONAL THERAPIST

## 2025-04-14 PROCEDURE — 97110 THERAPEUTIC EXERCISES: CPT | Mod: GO | Performed by: OCCUPATIONAL THERAPIST

## 2025-04-14 NOTE — PROGRESS NOTES
"    Occupational Therapy Orthopedic Treatment Note    Patient Name: Uma Middleton  MRN: [id number]  Today's Date: 4/14/2025  Time Calculation  Start Time: 0100  Stop Time: 0142  Time Calculation (min): 42 min   , OT Therapeutic Procedures Time Entry  Manual Therapy Time Entry: 15  Therapeutic Exercise Time Entry: 25,      Insurance:  Visit number: 18 of 50  Authorization info: no inquired  Insurance Type: Medicare B and [place]  Medicare certification  -  Start: 4/7/25  End: 6/6/25    Current Problem  1. Left wrist pain  Follow Up In Occupational Therapy          Referred by:   Dr. Samuel  Referred for:  L distal radius fx ORIF and significant wrist stiffness  Onset/DOI:  11/29/24  DOS: 12/24//24    Fall risk:  Low  Precautions:  NWB     Medical History Form: Reviewed (scanned into chart)    Subjective   \"The stiffness and burning just really gets worse at night.  I could barely move my fingers last night. Today is much better again\"      Hand Dominance: Right    Pain:  5/10  Location: radial/volar wrist and dorsal forearm  Description: burning, tight, sharp, burning  Aggravating Factors:  N/A - pain is constant  Relieving Factors:   minimal pain relief from prescription pain meds    Previous Interventions/Treatments: None    Prior Level of Function (PLOF)  Patient previously indpendent with all ADLs/IADLs    ADL/IADL impairments  Bathing, Dressing, Hygiene/Grooming, Hair care, Sleep, Driving, Home mgt, and Meal prep    Patients Living Environment: Reviewed and no concern  Lives with:  - Lalo  Primary Language: English  Patient's Goal(s) for Therapy:   \"to make my hand/wrist work again and to not hurt\"    Red Flags: Do you have any of the following? No  Fever/chills, unexplained weight changes, dizziness/fainting, unexplained change in bowel or bladder functions, unexplained malaise or muscle weakness, night pain/sweats, numbness or tingling    Objective     LEFT HAND AROM (Degrees)   MCP PIP DIP OTERO DPC   IF  " 0/45 0/35 0/10  4.5 cm   MF 0/40 0/40 0/15  5 cm   RF 0/35 0/45 0/15  4.5 cm   SF 0/20 0/45 0/20  4 cm     L thumb opposition to SF:  8cm    WRIST AROM (Degrees)   R L   Extension  0 A   10-15 P   Flexion  20 A   25 P   Pronation  35 A   Supination  25-27 A   40 P     L hand figure 8:  40.3cm  R hand figure 8:  39cm      Outcome Measures:         QUICK DASH:   88.64%     Home Program:  Exercise Sets/Reps Comments   AROM ylw sponge squeeze     Proper wrist splint wear     Finger joints PROM     Elevation     Desensitization     Heat PRN     Reverse MCP blocking                      Treatment Today    Manual:  - scar massage, grade1-2 wrist mobz volar-dorsal, retrograde massage, supination stretching, MEM and passive motion to all digits x 15 minutes    Neuro re-ed:  - ASL AROM within fluidotherapy for desensitization x 10 min  - reverse MCP blocking to increase IPJ AROM x 5 min  - AA supination to re-ed proprioception motor control 3x10  - AROM tip to tip and 3pt pinch for the first time promoting coordination to  a writing utensil 20x  - AROM tenodesis to re-ed wrist/hand proprioception x 50 rps  - AROM towel scrunches/ gross grasping beans for proprioceptive re-ed of movement patterns x 50 rps  - AROM opposition to IF and MF x 5 min  - supination wheel stretching x 50rps      EDUCATION: Home exercise program, plan of care, activity modifications, joint protection, pain management, and injury pathology       Assessment:   Patient was able to execute a partial hook fist today while simultaneously performing active wrist motion.  Good progression of motion and motor control independent finger and wrist function.      Level of Complexity  MOD complexity    OT Rehab Potential  Good      Plan:     Planned Interventions include: therapeutic exercise, self-care home management, manual therapy, therapeutic activities, , neuromuscular coordination, vasopneumatic, dry needling, and orthosis fabrication.  Frequency: 2 x  Week  Duration: 12 Weeks    Goals: Set and discussed today         1) Patient will be able to demo 2cm from midpalm with all digits to perform dressing tasks with MIN A, in 4 weeks.         2) Patient will demo 40 degrees of L wrist supination to perform hair care with MIN A, in 6 weeks         3) Patient will demo at least 3cm less L hand swelling to imporve gross grasping, in 4 weeks.        Plan of care was developed with input and agreement by the patient.      Marques Mills, OT

## 2025-04-21 ENCOUNTER — TREATMENT (OUTPATIENT)
Dept: OCCUPATIONAL THERAPY | Facility: CLINIC | Age: 75
End: 2025-04-21
Payer: MEDICARE

## 2025-04-21 DIAGNOSIS — M25.532 LEFT WRIST PAIN: ICD-10-CM

## 2025-04-21 PROCEDURE — 97112 NEUROMUSCULAR REEDUCATION: CPT | Mod: GO | Performed by: OCCUPATIONAL THERAPIST

## 2025-04-21 PROCEDURE — 97140 MANUAL THERAPY 1/> REGIONS: CPT | Mod: GO | Performed by: OCCUPATIONAL THERAPIST

## 2025-04-21 NOTE — PROGRESS NOTES
"    Occupational Therapy Orthopedic Treatment Note    Patient Name: Uma Middleton  MRN: [id number]  Today's Date: 4/21/2025  Time Calculation  Start Time: 1015  Stop Time: 1100  Time Calculation (min): 45 min   , OT Therapeutic Procedures Time Entry  Manual Therapy Time Entry: 30  Neuromuscular Re-Education Time Entry: 15,      Insurance:  Visit number: 19 of 50  Authorization info: no inquired  Insurance Type: Medicare B and [place]  Medicare certification  -  Start: 4/7/25  End: 6/6/25    Current Problem  1. Left wrist pain  Follow Up In Occupational Therapy          Referred by:   Dr. Samuel  Referred for:  L distal radius fx ORIF and significant wrist stiffness  Onset/DOI:  11/29/24  DOS: 12/24//24    Fall risk:  Low  Precautions:  NWB     Medical History Form: Reviewed (scanned into chart)    Subjective   \"I can move my fingers so much better than I used to.  I even tried to hold a glass in my hand the other day without even thinking about it.  I did drop it though.\"      Hand Dominance: Right    Pain:  5/10  Location: radial/volar wrist and dorsal forearm  Description: burning, tight, sharp, burning  Aggravating Factors:  N/A - pain is constant  Relieving Factors:   minimal pain relief from prescription pain meds    Previous Interventions/Treatments: None    Prior Level of Function (PLOF)  Patient previously indpendent with all ADLs/IADLs    ADL/IADL impairments  Bathing, Dressing, Hygiene/Grooming, Hair care, Sleep, Driving, Home mgt, and Meal prep    Patients Living Environment: Reviewed and no concern  Lives with:  - Lalo  Primary Language: English  Patient's Goal(s) for Therapy:   \"to make my hand/wrist work again and to not hurt\"    Red Flags: Do you have any of the following? No  Fever/chills, unexplained weight changes, dizziness/fainting, unexplained change in bowel or bladder functions, unexplained malaise or muscle weakness, night pain/sweats, numbness or tingling    Objective     LEFT HAND AROM " (Degrees)   MCP PIP DIP OTERO DPC   IF  0/45 0/35 0/10  4.5 cm   MF 0/40 0/40 0/15  5 cm   RF 0/35 0/45 0/15  4.5 cm   SF 0/20 0/45 0/20  4 cm     L thumb opposition to SF:  8cm    WRIST AROM (Degrees)   R L   Extension  0 A   10-15 P   Flexion  20 A   25 P   Pronation  35 A   Supination  25-27 A   40 P     L hand figure 8:  40.3cm  R hand figure 8:  39cm      Outcome Measures:         QUICK DASH:   88.64%     Home Program:  Exercise Sets/Reps Comments   AROM ylw sponge squeeze     Proper wrist splint wear     Finger joints PROM     Elevation     Desensitization     Heat PRN     Reverse MCP blocking                      Treatment Today    Manual:  - scar massage, grade1-2 wrist mobz volar-dorsal, retrograde massage, supination stretching, MEM and passive motion to all digits x 30 minutes    Neuro re-ed:  - ASL AROM within fluidotherapy for desensitization x 10 min  - reverse MCP blocking to increase IPJ AROM x 5 min  - AA supination to re-ed proprioception motor control 3x10  - AROM tip to tip and 3pt pinch for the first time promoting coordination to  a writing utensil 20x  - AROM tenodesis to re-ed wrist/hand proprioception x 50 rps  - AROM opposition to IF and MF x 5 min  - supination wheel stretching x 50rps      EDUCATION: Home exercise program, plan of care, activity modifications, joint protection, pain management, and injury pathology       Assessment:   Patient was able to oppose to her RF DIP tip for the first time today.  Exhibits improved cascade of digits with reverse blocking ex.  Tends to execute a hook fist independently than with MCPs in blocked flexion position.      Level of Complexity  MOD complexity    OT Rehab Potential  Good      Plan:     Planned Interventions include: therapeutic exercise, self-care home management, manual therapy, therapeutic activities, , neuromuscular coordination, vasopneumatic, dry needling, and orthosis fabrication.  Frequency: 2 x Week  Duration: 12  Weeks    Goals: Set and discussed today         1) Patient will be able to demo 2cm from midpalm with all digits to perform dressing tasks with MIN A, in 4 weeks.         2) Patient will demo 40 degrees of L wrist supination to perform hair care with MIN A, in 6 weeks         3) Patient will demo at least 3cm less L hand swelling to imporve gross grasping, in 4 weeks.        Plan of care was developed with input and agreement by the patient.      Marques Mills, OT

## 2025-04-23 ENCOUNTER — OFFICE VISIT (OUTPATIENT)
Dept: PRIMARY CARE | Facility: CLINIC | Age: 75
End: 2025-04-23
Payer: MEDICARE

## 2025-04-23 VITALS
SYSTOLIC BLOOD PRESSURE: 122 MMHG | DIASTOLIC BLOOD PRESSURE: 60 MMHG | HEIGHT: 69 IN | WEIGHT: 235 LBS | OXYGEN SATURATION: 96 % | BODY MASS INDEX: 34.8 KG/M2 | HEART RATE: 65 BPM

## 2025-04-23 DIAGNOSIS — N64.4 BREAST PAIN, LEFT: ICD-10-CM

## 2025-04-23 DIAGNOSIS — M25.532 LEFT WRIST PAIN: ICD-10-CM

## 2025-04-23 DIAGNOSIS — F41.9 ANXIETY: ICD-10-CM

## 2025-04-23 DIAGNOSIS — G62.9 NEUROPATHY: ICD-10-CM

## 2025-04-23 DIAGNOSIS — Z95.1 S/P CABG X 1: ICD-10-CM

## 2025-04-23 DIAGNOSIS — G90.512 COMPLEX REGIONAL PAIN SYNDROME TYPE 1 OF LEFT UPPER EXTREMITY: ICD-10-CM

## 2025-04-23 DIAGNOSIS — E78.2 MIXED HYPERLIPIDEMIA: ICD-10-CM

## 2025-04-23 DIAGNOSIS — I10 PRIMARY HYPERTENSION: Primary | ICD-10-CM

## 2025-04-23 DIAGNOSIS — E11.9 TYPE 2 DIABETES MELLITUS WITHOUT COMPLICATION, WITHOUT LONG-TERM CURRENT USE OF INSULIN: ICD-10-CM

## 2025-04-23 DIAGNOSIS — I20.89 STABLE ANGINA PECTORIS (CMS-HCC): ICD-10-CM

## 2025-04-23 LAB — POC HEMOGLOBIN A1C: 6.5 % (ref 4.2–6.5)

## 2025-04-23 PROCEDURE — 99214 OFFICE O/P EST MOD 30 MIN: CPT

## 2025-04-23 PROCEDURE — 3008F BODY MASS INDEX DOCD: CPT

## 2025-04-23 PROCEDURE — 3044F HG A1C LEVEL LT 7.0%: CPT

## 2025-04-23 PROCEDURE — 3078F DIAST BP <80 MM HG: CPT

## 2025-04-23 PROCEDURE — 1157F ADVNC CARE PLAN IN RCRD: CPT

## 2025-04-23 PROCEDURE — 3074F SYST BP LT 130 MM HG: CPT

## 2025-04-23 PROCEDURE — 1036F TOBACCO NON-USER: CPT

## 2025-04-23 PROCEDURE — 83036 HEMOGLOBIN GLYCOSYLATED A1C: CPT

## 2025-04-23 PROCEDURE — 1125F AMNT PAIN NOTED PAIN PRSNT: CPT

## 2025-04-23 RX ORDER — CLOPIDOGREL BISULFATE 75 MG/1
75 TABLET ORAL DAILY
Qty: 90 TABLET | Refills: 1 | Status: SHIPPED | OUTPATIENT
Start: 2025-04-23 | End: 2025-10-20

## 2025-04-23 RX ORDER — AMLODIPINE AND BENAZEPRIL HYDROCHLORIDE 5; 10 MG/1; MG/1
1 CAPSULE ORAL DAILY
Qty: 90 CAPSULE | Refills: 1 | Status: SHIPPED | OUTPATIENT
Start: 2025-04-23 | End: 2025-10-20

## 2025-04-23 RX ORDER — HYDROXYZINE HYDROCHLORIDE 25 MG/1
25 TABLET, FILM COATED ORAL NIGHTLY PRN
Qty: 90 TABLET | Refills: 1 | Status: SHIPPED | OUTPATIENT
Start: 2025-04-23

## 2025-04-23 RX ORDER — ATORVASTATIN CALCIUM 80 MG/1
80 TABLET, FILM COATED ORAL DAILY
Qty: 90 TABLET | Refills: 1 | Status: SHIPPED | OUTPATIENT
Start: 2025-04-23 | End: 2025-10-20

## 2025-04-23 RX ORDER — GABAPENTIN 300 MG/1
300 CAPSULE ORAL 3 TIMES DAILY
Qty: 120 CAPSULE | Refills: 2 | Status: SHIPPED | OUTPATIENT
Start: 2025-04-23 | End: 2025-08-21

## 2025-04-23 RX ORDER — LIDOCAINE 50 MG/G
1 PATCH TOPICAL DAILY
Qty: 30 PATCH | Refills: 1 | Status: SHIPPED | OUTPATIENT
Start: 2025-04-23

## 2025-04-23 RX ORDER — ISOSORBIDE MONONITRATE 30 MG/1
30 TABLET, EXTENDED RELEASE ORAL DAILY
Qty: 90 TABLET | Refills: 3 | Status: SHIPPED | OUTPATIENT
Start: 2025-04-23 | End: 2026-04-23

## 2025-04-23 ASSESSMENT — ENCOUNTER SYMPTOMS
LOSS OF SENSATION IN FEET: 0
DEPRESSION: 0
OCCASIONAL FEELINGS OF UNSTEADINESS: 0

## 2025-04-23 ASSESSMENT — PAIN SCALES - GENERAL: PAINLEVEL_OUTOF10: 4

## 2025-04-23 NOTE — ASSESSMENT & PLAN NOTE
Decrease intake of saturated fats, fast food, sweets.  Increase intake of fresh fruit fresh vegetables and lean meats.  Increase healthy fats seeds, nuts, olive oil instead of butter.  walk 150 minutes/week for heart health.   Aim for 25-30 grams of fiber in your diet daily.  May consider adding Fish Oil supplement 1,200 mg per day or Omega 3 Supplement daily.

## 2025-04-23 NOTE — ASSESSMENT & PLAN NOTE
Orders:    POCT glycosylated hemoglobin (Hb A1C) manually resulted  Diabetes Type 2  A1C is now 6.9 from 6.4   Continue current medication.  Continue work on diet - recommend lots of fruits and vegetables, lean protein like chicken, turkey, fish, beans and Greek yogurt. Try to choose healthier carbohydrate options like oatmeal, wheat bread and pasta, sweet potatoes. Limit sugary treats.  Reevaluate in 3 months.

## 2025-04-23 NOTE — PROGRESS NOTES
"Subjective   Patient ID: Uma Middleton is a 75 y.o. female who presents for 6 MONTH FOLLOW-UP.    HPI   Patient denies any falls, urgent care, ER, hospitalization, new diagnoses, surgeries since they were here last.    Denies any issues with chest pain, chest pressure, shortness of breath, constipation, diarrhea, blood in stool, urinary urgency, frequency, blood in urine, muscle weakness in arms and legs, numbness or tingling in fingers or toes.    OT left hand, improved movement of left hand    DM   Denies recent steroid use  Increased stressors   \" Not worried about it \"       Review of Systems  Review of Systems negative except as noted in HPI and Chief complaint.  Current Medications[1]    Objective   /60 (BP Location: Left arm, Patient Position: Sitting, BP Cuff Size: Large adult)   Pulse 65   Ht 1.753 m (5' 9\")   Wt 107 kg (235 lb)   SpO2 96%   BMI 34.70 kg/m²     Physical Exam  Vitals reviewed.   Cardiovascular:      Rate and Rhythm: Normal rate.   Musculoskeletal:      Cervical back: Normal range of motion.   Neurological:      General: No focal deficit present.      Mental Status: She is alert.   Psychiatric:         Mood and Affect: Mood normal.         Assessment/Plan   Assessment & Plan  Primary hypertension  HYPERTENSION:   Decrease intake of processed foods, fast foods, lunch meat, canned soups, canned veggies.  Increase intake of fresh fruits, veggies, and lean meats. Monitor blood pressure at home, keep a log and bring this with you to your next appointment. Call the office if your blood pressure runs 150/90 or higher consistently.  Blood Pressure Technique:  Sit quietly in a chair for 5 minutes with back supported and feet on the floor  Then place left arm on a table or armrest so bicep area is at the same level of heart or left breast  Check three times in a row, disregard the highest reading and average the other two    Mixed hyperlipidemia      Decrease intake of saturated fats, fast " food, sweets.  Increase intake of fresh fruit fresh vegetables and lean meats.  Increase healthy fats seeds, nuts, olive oil instead of butter.  walk 150 minutes/week for heart health.   Aim for 25-30 grams of fiber in your diet daily.  May consider adding Fish Oil supplement 1,200 mg per day or Omega 3 Supplement daily.      Type 2 diabetes mellitus without complication, without long-term current use of insulin    Orders:    POCT glycosylated hemoglobin (Hb A1C) manually resulted  Diabetes Type 2  A1C is now 6.9 from 6.4   Continue current medication.  Continue work on diet - recommend lots of fruits and vegetables, lean protein like chicken, turkey, fish, beans and Greek yogurt. Try to choose healthier carbohydrate options like oatmeal, wheat bread and pasta, sweet potatoes. Limit sugary treats.  Reevaluate in 3 months.   Left wrist pain    Orders:    lidocaine (Lidoderm) 5 % patch; Place 1 patch over 12 hours on the skin once daily. Remove & discard patch within 12 hours or as directed by MD.  Wear lido patch on for 12 hours off for 12 hours as needed for pain   Breast pain, left    Orders:    BI mammo left diagnostic; Future    BI US breast complete left; Future  Please have mammo and US done at his time. This office will call you with results once received.   Complex regional pain syndrome type 1 of left upper extremity    Stable angina pectoris (CMS-HCC)    Orders:    isosorbide mononitrate ER (Imdur) 30 mg 24 hr tablet; Take 1 tablet (30 mg) by mouth once daily. Do not crush or chew.    S/P CABG x 1    Orders:    atorvastatin (Lipitor) 80 mg tablet; Take 1 tablet (80 mg) by mouth once daily.    clopidogrel (Plavix) 75 mg tablet; Take 1 tablet (75 mg) by mouth once daily.    Neuropathy    Orders:    gabapentin (Neurontin) 300 mg capsule; Take 1 capsule (300 mg) by mouth 3 times a day.    Anxiety    Orders:    hydrOXYzine HCL (Atarax) 25 mg tablet; Take 1 tablet (25 mg) by mouth as needed at bedtime for  anxiety.  Stress/Anxiety reduction interventions:  -Relaxation techniques- deep breathing, meditation, acupuncture, guided imagery, yoga  -3-3-3 sit down in a quiet room. Look around the room and name 3 items you see. Then close your eyes and listen and quietly name 3 items you hear around you. Lastly, move 3 different body parts in a [place] 10 times each  -Avoid caffeine, alcohol, illicit drug use  -Get enough sleep, 7-9 hours per night  -Eat a healthy diet, prioritizing lean proteins, fruits/vegetables, and whole grains  -Purposeful movement daily- walking, biking, strength training  -Speaking with a counselor can be very helpful. Consider a referral to behavioral health      This note was dictated using DRAGON speech recognition software and was corrected for spelling or grammatical errors, but despite proofreading several typographical errors might be present that might affect the meaning of the content.  Mary Lou Valerio, CNP           [1]   Current Outpatient Medications:     amLODIPine-benazepriL (Lotrel) 5-10 mg capsule, Take 1 capsule by mouth once daily., Disp: 90 capsule, Rfl: 1    aspirin 81 mg chewable tablet, Chew 1 tablet (81 mg) once daily., Disp: 90 tablet, Rfl: 3    atorvastatin (Lipitor) 80 mg tablet, Take 1 tablet (80 mg) by mouth once daily., Disp: 90 tablet, Rfl: 1    clopidogrel (Plavix) 75 mg tablet, Take 1 tablet (75 mg) by mouth once daily., Disp: 90 tablet, Rfl: 1    fluticasone (Flonase) 50 mcg/actuation nasal spray, Administer 1-2 sprays into each nostril if needed. Uses at bedtime, Disp: , Rfl:     gabapentin (Neurontin) 300 mg capsule, Take 1 capsule (300 mg) by mouth 3 times a day., Disp: 120 capsule, Rfl: 2    hydrOXYzine HCL (Atarax) 25 mg tablet, Take 1 tablet (25 mg) by mouth as needed at bedtime for anxiety., Disp: 90 tablet, Rfl: 1    isosorbide mononitrate ER (Imdur) 30 mg 24 hr tablet, Take 1 tablet (30 mg) by mouth once daily. Do not crush or chew., Disp: 90 tablet, Rfl: 3     lidocaine (Lidoderm) 5 % patch, Place 1 patch over 12 hours on the skin once daily. Remove & discard patch within 12 hours or as directed by MD., Disp: 30 patch, Rfl: 1    metoprolol succinate XL (Toprol-XL) 50 mg 24 hr tablet, Take 1 tablet (50 mg) by mouth once daily., Disp: 90 tablet, Rfl: 3    omeprazole (PriLOSEC) 40 mg DR capsule, Take 1 capsule (40 mg) by mouth once daily. Do not crush or chew., Disp: , Rfl:     traMADol (Ultram) 50 mg tablet, Take 1 tablet (50 mg) by mouth if needed for severe pain (7 - 10)., Disp: , Rfl:

## 2025-04-23 NOTE — ASSESSMENT & PLAN NOTE
Orders:    BI mammo left diagnostic; Future    BI US breast complete left; Future  Please have mammo and US done at his time. This office will call you with results once received.

## 2025-04-23 NOTE — ASSESSMENT & PLAN NOTE
Orders:    atorvastatin (Lipitor) 80 mg tablet; Take 1 tablet (80 mg) by mouth once daily.    clopidogrel (Plavix) 75 mg tablet; Take 1 tablet (75 mg) by mouth once daily.

## 2025-04-23 NOTE — ASSESSMENT & PLAN NOTE
Orders:    lidocaine (Lidoderm) 5 % patch; Place 1 patch over 12 hours on the skin once daily. Remove & discard patch within 12 hours or as directed by MD.  Wear lido patch on for 12 hours off for 12 hours as needed for pain

## 2025-04-24 ENCOUNTER — OFFICE VISIT (OUTPATIENT)
Dept: OPHTHALMOLOGY | Facility: CLINIC | Age: 75
End: 2025-04-24
Payer: MEDICARE

## 2025-04-24 DIAGNOSIS — H10.13 ALLERGIC CONJUNCTIVITIS OF BOTH EYES: Primary | ICD-10-CM

## 2025-04-24 PROCEDURE — 99213 OFFICE O/P EST LOW 20 MIN: CPT | Performed by: OPHTHALMOLOGY

## 2025-04-24 RX ORDER — OLOPATADINE HYDROCHLORIDE 1 MG/ML
1 SOLUTION OPHTHALMIC 2 TIMES DAILY
Qty: 5 ML | Refills: 0 | Status: SHIPPED | OUTPATIENT
Start: 2025-04-24

## 2025-04-24 ASSESSMENT — ENCOUNTER SYMPTOMS
ALLERGIC/IMMUNOLOGIC NEGATIVE: 0
EYES NEGATIVE: 0
HEMATOLOGIC/LYMPHATIC NEGATIVE: 0
CARDIOVASCULAR NEGATIVE: 0
MUSCULOSKELETAL NEGATIVE: 0
ENDOCRINE NEGATIVE: 0
RESPIRATORY NEGATIVE: 0
NEUROLOGICAL NEGATIVE: 0
GASTROINTESTINAL NEGATIVE: 0
PSYCHIATRIC NEGATIVE: 0
CONSTITUTIONAL NEGATIVE: 0

## 2025-04-24 ASSESSMENT — VISUAL ACUITY
OS_SC: 20/25
METHOD: SNELLEN - LINEAR
OD_PH_SC+: +1
OD_SC: 20/60
OD_PH_SC: 20/40

## 2025-04-24 ASSESSMENT — PAIN SCALES - GENERAL: PAINLEVEL_OUTOF10: 0-NO PAIN

## 2025-04-24 ASSESSMENT — EXTERNAL EXAM - LEFT EYE: OS_EXAM: BROW PTOSIS

## 2025-04-24 ASSESSMENT — EXTERNAL EXAM - RIGHT EYE: OD_EXAM: BROW PTOSIS

## 2025-04-24 NOTE — ASSESSMENT & PLAN NOTE
Suspect allergic irritation in nature. No obvious meibomian gland dysfunction (MGD) issues contributing. Will have trial allergy drop but may require steroid if not improving. Will call us with an update in few weeks.

## 2025-04-24 NOTE — PATIENT INSTRUCTIONS
Thank you so much for choosing me to provide your care today!    If you were dilated your vision may remain blurry   or light sensitive for several hours.    The nature of eye and vision problems can require frequent follow up, please make every effort to adhere to any future appointments.    If you have any issues, questions, or concerns,   please do not hesitate to reach out.    If you receive a survey in regards to your care today, please mention any exceptional care my office staff and/or technicians provided.    You can reach our office at this number:    [phone]    Please consider signing up for and utilizing [employer]!  This is the best way to directly reach me or other  providers

## 2025-04-24 NOTE — PROGRESS NOTES
Assessment/Plan   Problem List Items Addressed This Visit       Allergic conjunctivitis of both eyes - Primary    Suspect allergic irritation in nature. No obvious meibomian gland dysfunction (MGD) issues contributing. Will have trial allergy drop but may require steroid if not improving. Will call us with an update in few weeks.          Relevant Medications    olopatadine (Patanol) 0.1 % ophthalmic solution       Provided reassurance regarding above diagnoses and care received in the office visit today. Discussed outcomes and options along with the importance of treatment compliance. Understands the importance of any follow up visits. Patient instructed to call/communicate with our office if any new issues, questions, or concerns.     Will plan to see back as scheduled or sooner PRN

## 2025-05-14 DIAGNOSIS — Z95.1 S/P CABG (CORONARY ARTERY BYPASS GRAFT): Primary | ICD-10-CM

## 2025-05-22 ENCOUNTER — APPOINTMENT (OUTPATIENT)
Dept: RADIOLOGY | Facility: HOSPITAL | Age: 75
End: 2025-05-22
Payer: MEDICARE

## 2025-05-27 ENCOUNTER — HOSPITAL ENCOUNTER (OUTPATIENT)
Dept: RADIOLOGY | Facility: EXTERNAL LOCATION | Age: 75
Discharge: HOME | End: 2025-05-27

## 2025-05-27 ENCOUNTER — HOSPITAL ENCOUNTER (OUTPATIENT)
Dept: RADIOLOGY | Facility: CLINIC | Age: 75
Discharge: HOME | End: 2025-05-27
Payer: MEDICARE

## 2025-05-27 VITALS — WEIGHT: 235 LBS | BODY MASS INDEX: 34.8 KG/M2 | HEIGHT: 69 IN

## 2025-05-27 DIAGNOSIS — N64.4 BREAST PAIN, LEFT: ICD-10-CM

## 2025-05-27 PROCEDURE — 77066 DX MAMMO INCL CAD BI: CPT | Performed by: RADIOLOGY

## 2025-05-27 PROCEDURE — 77062 BREAST TOMOSYNTHESIS BI: CPT

## 2025-05-27 PROCEDURE — G0279 TOMOSYNTHESIS, MAMMO: HCPCS | Performed by: RADIOLOGY

## 2025-05-27 PROCEDURE — 76642 ULTRASOUND BREAST LIMITED: CPT | Performed by: RADIOLOGY

## 2025-05-27 PROCEDURE — 76642 ULTRASOUND BREAST LIMITED: CPT | Mod: LT

## 2025-05-30 ENCOUNTER — CLINICAL SUPPORT (OUTPATIENT)
Dept: CARDIAC REHAB | Facility: CLINIC | Age: 75
End: 2025-05-30
Payer: MEDICARE

## 2025-05-30 DIAGNOSIS — I20.89 STABLE ANGINA PECTORIS: ICD-10-CM

## 2025-06-02 ENCOUNTER — APPOINTMENT (OUTPATIENT)
Dept: CARDIAC REHAB | Facility: CLINIC | Age: 75
End: 2025-06-02
Payer: MEDICARE

## 2025-06-03 ENCOUNTER — CLINICAL SUPPORT (OUTPATIENT)
Dept: CARDIAC REHAB | Facility: CLINIC | Age: 75
End: 2025-06-03
Payer: MEDICARE

## 2025-06-03 DIAGNOSIS — Z95.1 S/P CABG (CORONARY ARTERY BYPASS GRAFT): ICD-10-CM

## 2025-06-03 PROCEDURE — 93798 PHYS/QHP OP CAR RHAB W/ECG: CPT | Performed by: INTERNAL MEDICINE

## 2025-06-04 ENCOUNTER — APPOINTMENT (OUTPATIENT)
Dept: CARDIAC REHAB | Facility: CLINIC | Age: 75
End: 2025-06-04
Payer: MEDICARE

## 2025-06-05 ENCOUNTER — CLINICAL SUPPORT (OUTPATIENT)
Dept: CARDIAC REHAB | Facility: CLINIC | Age: 75
End: 2025-06-05
Payer: MEDICARE

## 2025-06-05 ENCOUNTER — OFFICE VISIT (OUTPATIENT)
Dept: URGENT CARE | Age: 75
End: 2025-06-05
Payer: MEDICARE

## 2025-06-05 VITALS
HEIGHT: 69 IN | WEIGHT: 230 LBS | BODY MASS INDEX: 34.07 KG/M2 | SYSTOLIC BLOOD PRESSURE: 158 MMHG | RESPIRATION RATE: 18 BRPM | OXYGEN SATURATION: 99 % | TEMPERATURE: 97.9 F | HEART RATE: 90 BPM | DIASTOLIC BLOOD PRESSURE: 69 MMHG

## 2025-06-05 DIAGNOSIS — J04.0 LARYNGITIS: ICD-10-CM

## 2025-06-05 DIAGNOSIS — B34.9 VIRAL ILLNESS: ICD-10-CM

## 2025-06-05 DIAGNOSIS — Z95.1 S/P CABG (CORONARY ARTERY BYPASS GRAFT): ICD-10-CM

## 2025-06-05 DIAGNOSIS — R69 ILLNESS: Primary | ICD-10-CM

## 2025-06-05 LAB
POC HUMAN RHINOVIRUS PCR: NEGATIVE
POC INFLUENZA A VIRUS PCR: NEGATIVE
POC INFLUENZA B VIRUS PCR: NEGATIVE
POC RESPIRATORY SYNCYTIAL VIRUS PCR: NEGATIVE
POC STREPTOCOCCUS PYOGENES (GROUP A STREP) PCR: NEGATIVE

## 2025-06-05 ASSESSMENT — ENCOUNTER SYMPTOMS: VOICE CHANGE: 1

## 2025-06-05 NOTE — PROGRESS NOTES
"Subjective   Patient ID: Uma Middleton is a 75 y.o. female. They present today with a chief complaint of Illness (Entered by patient) and Sinusitis (Yesterday./).    History of Present Illness  Uma Middleton is a 75 y.o. female who presents to the clinic for 1 day of voice change/scratchy voice. Pt denies any chest pain, sob, N/V at this time in clinic.             Past Medical History  Allergies as of 06/05/2025 - Reviewed 06/05/2025   Allergen Reaction Noted    Fire ant Shortness of breath 04/27/2020    Erythromycin Itching 11/15/2023    Tea tree oil Hives 02/23/2017       Prescriptions Prior to Admission[1]     Medical History[2]    Surgical History[3]     reports that she quit smoking about 15 years ago. Her smoking use included cigarettes. She started smoking about 43 years ago. She has a 28 pack-year smoking history. She has been exposed to tobacco smoke. She has never used smokeless tobacco. She reports that she does not currently use alcohol. She reports that she does not use drugs.    Review of Systems  Review of Systems   HENT:  Positive for voice change.    All other systems reviewed and are negative.                                 Objective    Vitals:    06/05/25 1150   BP: 158/69   Pulse: 90   Resp: 18   Temp: 36.6 °C (97.9 °F)   TempSrc: Oral   SpO2: 99%   Weight: 104 kg (230 lb)   Height: 1.753 m (5' 9\")     No LMP recorded. Patient is postmenopausal.    Physical Exam  Constitutional:       Appearance: Normal appearance.   HENT:      Right Ear: Tympanic membrane normal.      Left Ear: Tympanic membrane normal.      Nose:      Right Sinus: No maxillary sinus tenderness or frontal sinus tenderness.      Left Sinus: No maxillary sinus tenderness or frontal sinus tenderness.   Cardiovascular:      Rate and Rhythm: Normal rate and regular rhythm.   Pulmonary:      Effort: Pulmonary effort is normal.      Breath sounds: Normal breath sounds.   Neurological:      General: No focal deficit present.      Mental " Status: She is alert and oriented to person, place, and time. Mental status is at baseline.   Psychiatric:         Mood and Affect: Mood normal.         Behavior: Behavior normal.         Procedures    Point of Care Test & Imaging Results from this visit  Results for orders placed or performed in visit on 06/05/25   POCT SPOTFIRE R/ST Panel Mini w/Strep A ([facility]) manually resulted   Result Value Ref Range    POC Group A Strep, PCR Negative Negative    POC Respiratory Syncytial Virus PCR Negative Negative    POC Influenza A Virus PCR Negative Negative    POC Influenza B Virus PCR Negative Negative    POC Human Rhinovirus PCR Negative Negative      Imaging  No results found.    Cardiology, Vascular, and Other Imaging  No other imaging results found for the past 2 days      Diagnostic study results (if any) were reviewed by ANTHONY Spence.    Assessment/Plan   Allergies, medications, history, and pertinent labs/EKGs/Imaging reviewed by ANTHONY Spence.     Medical Decision Making  Signs and symptoms consistent with acute laryngitis secondary toupper resp viral upper respiratory infection. Plan to treat with supportive measures and otherwise vocal rest and supportive measures at home. Return to clinic if symptoms change or worsen.      Orders and Diagnoses  Diagnoses and all orders for this visit:  Illness  -     POCT SPOTFIRE R/ST Panel Mini w/Strep A ([facility]) manually resulted  Laryngitis  Viral illness      Medical Admin Record      Patient disposition: Home    Electronically signed by ANTHONY Spence  1:25 PM           [1] (Not in a hospital admission)   [2]   Past Medical History:  Diagnosis Date    Adhesive capsulitis of right shoulder 10/21/2013    Adhesive capsulitis of right shoulder    Angina pectoris     Anxiety     Aortic aneurysm without rupture, unspecified portion of aorta     ASHD (arteriosclerotic heart disease)     Aleman esophagus     Cataract     Central retinal  vein occlusion, right eye     Chest pain     Cholelithiasis     Dermatochalasis of both upper eyelids     Dry eye syndrome of bilateral lacrimal glands     Elevated blood-pressure reading, without diagnosis of hypertension     Elevated blood pressure reading without diagnosis of hypertension    Esophageal disease     GERD (gastroesophageal reflux disease)     Hyperlipidemia     Hypertension     Inconclusive mammogram 01/23/2017    Breast density    Lattice degeneration of retina, bilateral     Other specified disorders of eustachian tube, bilateral 05/08/2017    Dysfunction of Eustachian tube, bilateral    Otitis media, unspecified, right ear 12/18/2015    Acute right otitis media    Personal history of other diseases of the respiratory system 10/18/2016    History of acute bronchitis with bronchospasm    PONV (postoperative nausea and vomiting)     Primary osteoarthritis, unspecified hand 02/09/2015    Osteoarthritis, hand    Sacroiliitis, not elsewhere classified 04/26/2016    Sacroiliitis    Sciatica, right side 04/26/2016    Sciatica, right    Snoring     Unspecified optic atrophy     Vitamin D deficiency    [3]   Past Surgical History:  Procedure Laterality Date    BREAST BIOPSY  2015    CARDIAC CATHETERIZATION N/A 10/14/2024    Procedure: Left Heart Cath;  Surgeon: Juan Cueto DO;  Location: [facility] Cardiac Cath Lab;  Service: Cardiovascular;  Laterality: N/A;  no pre auth required    CARDIAC SURGERY  11/08/2024    CATARACT EXTRACTION      CATARACT EXTRACTION W/  INTRAOCULAR LENS IMPLANT Bilateral     OD 07/18/2013 +15.5D, OS 12/05/2013 +15.5D    CHOLECYSTECTOMY      COLONOSCOPY      CORONARY ARTERY BYPASS GRAFT  11/8/2024    ESOPHAGOGASTRODUODENOSCOPY      OTHER SURGICAL HISTORY  06/25/2013    Treatment Of The Left Ankle    [place] OSTEOTOMY MANDIBLE SEGMENTAL  1969    SHOULDER SURGERY      WRIST SURGERY Left 12/2024

## 2025-06-05 NOTE — PATIENT INSTRUCTIONS
Warm tea/honey  Please stay hydrated  Cough drops/throat lozenge  If new symptoms arise please return to urgent care.  If worsening symptoms please go to local emergency department for further evaluation.  Please call your primary care doctor next 5 to 7 days.    Please follow up with your primary provider within one week if symptoms do not improve.  You may schedule an appointment online at [email or web address] or call [phone]. Go to the Emergency Department if symptoms significantly worsen or if you develop chest pain or shortness of breath.

## 2025-06-06 ENCOUNTER — APPOINTMENT (OUTPATIENT)
Dept: CARDIAC REHAB | Facility: CLINIC | Age: 75
End: 2025-06-06
Payer: MEDICARE

## 2025-06-09 ENCOUNTER — APPOINTMENT (OUTPATIENT)
Dept: CARDIAC REHAB | Facility: CLINIC | Age: 75
End: 2025-06-09
Payer: MEDICARE

## 2025-06-10 ENCOUNTER — CLINICAL SUPPORT (OUTPATIENT)
Dept: CARDIAC REHAB | Facility: CLINIC | Age: 75
End: 2025-06-10
Payer: MEDICARE

## 2025-06-10 DIAGNOSIS — Z95.1 S/P CABG (CORONARY ARTERY BYPASS GRAFT): ICD-10-CM

## 2025-06-10 PROCEDURE — 93798 PHYS/QHP OP CAR RHAB W/ECG: CPT | Performed by: INTERNAL MEDICINE

## 2025-06-11 ENCOUNTER — APPOINTMENT (OUTPATIENT)
Dept: CARDIAC REHAB | Facility: CLINIC | Age: 75
End: 2025-06-11
Payer: MEDICARE

## 2025-06-11 ENCOUNTER — APPOINTMENT (OUTPATIENT)
Dept: PRIMARY CARE | Facility: CLINIC | Age: 75
End: 2025-06-11
Payer: MEDICARE

## 2025-06-12 ENCOUNTER — CLINICAL SUPPORT (OUTPATIENT)
Dept: CARDIAC REHAB | Facility: CLINIC | Age: 75
End: 2025-06-12
Payer: MEDICARE

## 2025-06-12 DIAGNOSIS — Z95.1 S/P CABG (CORONARY ARTERY BYPASS GRAFT): ICD-10-CM

## 2025-06-12 PROCEDURE — 93798 PHYS/QHP OP CAR RHAB W/ECG: CPT | Performed by: INTERNAL MEDICINE

## 2025-06-17 ENCOUNTER — CLINICAL SUPPORT (OUTPATIENT)
Dept: CARDIAC REHAB | Facility: CLINIC | Age: 75
End: 2025-06-17
Payer: MEDICARE

## 2025-06-17 DIAGNOSIS — Z95.1 S/P CABG (CORONARY ARTERY BYPASS GRAFT): ICD-10-CM

## 2025-06-17 PROCEDURE — 93798 PHYS/QHP OP CAR RHAB W/ECG: CPT | Performed by: INTERNAL MEDICINE

## 2025-06-19 ENCOUNTER — CLINICAL SUPPORT (OUTPATIENT)
Dept: CARDIAC REHAB | Facility: CLINIC | Age: 75
End: 2025-06-19
Payer: MEDICARE

## 2025-06-19 DIAGNOSIS — Z95.1 S/P CABG (CORONARY ARTERY BYPASS GRAFT): ICD-10-CM

## 2025-06-19 PROCEDURE — 93798 PHYS/QHP OP CAR RHAB W/ECG: CPT | Performed by: INTERNAL MEDICINE

## 2025-06-23 ENCOUNTER — HOSPITAL ENCOUNTER (OUTPATIENT)
Dept: RADIOLOGY | Facility: HOSPITAL | Age: 75
Discharge: HOME | End: 2025-06-23
Payer: MEDICARE

## 2025-06-23 ENCOUNTER — OFFICE VISIT (OUTPATIENT)
Dept: ORTHOPEDIC SURGERY | Facility: HOSPITAL | Age: 75
End: 2025-06-23
Payer: MEDICARE

## 2025-06-23 DIAGNOSIS — G90.512 COMPLEX REGIONAL PAIN SYNDROME TYPE 1 OF LEFT UPPER EXTREMITY: ICD-10-CM

## 2025-06-23 DIAGNOSIS — G90.512 COMPLEX REGIONAL PAIN SYNDROME TYPE 1 OF LEFT UPPER EXTREMITY: Primary | ICD-10-CM

## 2025-06-23 PROCEDURE — 1125F AMNT PAIN NOTED PAIN PRSNT: CPT | Performed by: STUDENT IN AN ORGANIZED HEALTH CARE EDUCATION/TRAINING PROGRAM

## 2025-06-23 PROCEDURE — 99213 OFFICE O/P EST LOW 20 MIN: CPT | Performed by: STUDENT IN AN ORGANIZED HEALTH CARE EDUCATION/TRAINING PROGRAM

## 2025-06-23 PROCEDURE — 73110 X-RAY EXAM OF WRIST: CPT | Mod: LEFT SIDE | Performed by: STUDENT IN AN ORGANIZED HEALTH CARE EDUCATION/TRAINING PROGRAM

## 2025-06-23 PROCEDURE — 3044F HG A1C LEVEL LT 7.0%: CPT | Performed by: STUDENT IN AN ORGANIZED HEALTH CARE EDUCATION/TRAINING PROGRAM

## 2025-06-23 PROCEDURE — 99212 OFFICE O/P EST SF 10 MIN: CPT | Performed by: STUDENT IN AN ORGANIZED HEALTH CARE EDUCATION/TRAINING PROGRAM

## 2025-06-23 PROCEDURE — 73110 X-RAY EXAM OF WRIST: CPT | Mod: LT

## 2025-06-23 PROCEDURE — 1159F MED LIST DOCD IN RCRD: CPT | Performed by: STUDENT IN AN ORGANIZED HEALTH CARE EDUCATION/TRAINING PROGRAM

## 2025-06-23 ASSESSMENT — PAIN SCALES - GENERAL: PAINLEVEL_OUTOF10: 3

## 2025-06-23 ASSESSMENT — PAIN DESCRIPTION - DESCRIPTORS: DESCRIPTORS: SORE

## 2025-06-23 ASSESSMENT — PAIN - FUNCTIONAL ASSESSMENT: PAIN_FUNCTIONAL_ASSESSMENT: 0-10

## 2025-06-24 ENCOUNTER — CLINICAL SUPPORT (OUTPATIENT)
Dept: CARDIAC REHAB | Facility: CLINIC | Age: 75
End: 2025-06-24
Payer: MEDICARE

## 2025-06-24 DIAGNOSIS — Z95.1 S/P CABG (CORONARY ARTERY BYPASS GRAFT): ICD-10-CM

## 2025-06-26 ENCOUNTER — APPOINTMENT (OUTPATIENT)
Dept: CARDIAC REHAB | Facility: CLINIC | Age: 75
End: 2025-06-26
Payer: MEDICARE

## 2025-06-26 ENCOUNTER — DOCUMENTATION (OUTPATIENT)
Dept: CARDIAC REHAB | Facility: CLINIC | Age: 75
End: 2025-06-26
Payer: MEDICARE

## 2025-06-26 NOTE — PROGRESS NOTES
Cardiac Rehabilitation Individual Treatment Plan  30 Day Reassessment      Today's Date:   6/26/2025                       Program Location: [address], [place]    Name: Uma Middleton                            Medical Record Number: [id number]                          YOB: 1950    Referring Physician: No referring provider defined for this encounter. N/A  Primary Care Physician: Mary Lou Valerio APRN-CNP    Referring Diagnosis / Date of Diagnosis  CABG 11/8/24  N/A       AACVPR Risk Stratification:  Moderate  Fall Risk Results:  medium    Learning Assessment:  Cardiac Knowledge Test:       Pre: 14/15                           Post:        /15  Learning assessment/barriers: None   Preferred learning method: Auditory, Visual, and Reading handout      Education Classes: Schedule given with education manual, Medications 6/10/25    Psychosocial REASSESSMENT    Psychosocial Assessment  Pt reported/currently experiencing stress : Yes, Stress.   Current Stress Level (1-10 scale):  4/10  History of anxiety/depression: No  Currently seeing a mental health provider: No   Psychosocial Meds: No  Medication changes: N/A    Social Support: Yes, Whom:spouse     Psychosocial Test:  PHQ-9    PHQ-9 score:       Pre:  10    Mid: N/A    Post: N/A    PHQ-9 Interpretation:    10-14 PHQ-9 moderate depression     Psychosocial Plan   Goal Status:   In progress   Psychosocial Goals:  Improve PHQ-9 scores, Improve stress level, Learn breathing techniques and stretches, Maximize coping skills, Participate in music therapy cooldown   Psychosocial Interventions/Education:  Family support, Staff and patient discussion, Participated in Music Therapy cool down, Discussed coping strategies      Nutrition REASSESSMENT    Nutrition Assessment  Picture Your Plate Score:  Pre: 36  Post:  Picture Your Plate Interpretation:   PYP <40 unhealthy diet patterns   Special Diet:   Low fat and Low sodium   Dietary Goal:  Mediterranean  "Diet;  PYP >60     Weight Management:   Weight:  237 lb   Height:  69 \"  Entry BMI:  35  Weighing Daily: No   Goal Weight:   200    Heart Failure Assessment:   Most recent EF: 65%  Date: 11/8/24  Hx of Heart Failure: No    Heart Failure medications: No    Heart Failure medication changes: N/A     Comments: N/A    Nutrition Plan   Goal Status: In progress  Nutrition Goals:   BMI <30, goal weight 200#, follow low fat/low sodium diet, develop heart healthy diet, Mediterranean diet , improve PYP score    RD Recommendations:        Nutrition Interventions/Education: Heart healthy diet reviewed w/ pt.      Exercise REASSESSMENT    Exercise Assessment       Initial MET level determined by the exercise physiologist from 1:1 evaluation and/or 6MWT, physician approved as documented.    Initial Intake: Starting MET Level 2.3   Current MET level 2.4    Discharge MET level n/a    6-Min Walk Test:     PRE:     Feet:    1320       METS:  2.9                 POST:  Feet:    n/a       METS:  n/a                    Current Home Exercise:   No   Mode: na  Frequency (days/week):   na  Duration(mins/day):    na    Exercise Plan  Exercise Prescription based on 6MWT and/or evaluation   Frequency:   2-3   days/week   Duration:   40  minutes    Intensity: RPE (0-10 Jesenia Scale):  2-4 (light to somewhat hard)    Target HR: rest +30bpm  Intensity: na    SpO2 Range   >88%, interval training prn    O2 Flow Rate  N/A     Progression:   Aim to progress 0.2- 0.3 METs/week or as tolerated  Mode:  Aerobic exercise       Modality METS Load    Duration   1 Pre-Exercise/Rest      20:00   2 Treadmill 2.6 2.0 mph   0%   40:00   3 Sci RB 2.8 2.5 level   50-60RPM   40:00   4 NuStep 1.7 3 level   60-80SPM   40:00   5 Physiostepper na na level   30-50SPM   40:00   6 Recumbent Bike 2.5 2 level   50-60RPM   40:00   7 Magnum UBE N/a na load   40-50RPM   40:00   8 N/A      40:00   9 N/A      40:00   10 Final Cooldown      20:00     Resistance Training: " no      Home Exercise Prescription/Self Report Log given: no     Goal Status: In progress  Exercise Goals: Safe & comfortable with exercise equipment, Uses RPE Jesenia Scale (0-10) & exercises at RPE of 2-4, Increase 10-20% in functional capacity by next reassessment, Increase ADLs  Exercise Interventions/Education: Increased endurance by .04% SINCE LAST ASSESSMENT, Practice the RPE Jesenia Scale (0-10), Exercises within RPE 2-4, Oriented to exercise equipment, Purchased pulse watch/oximeter, Progress 0.2- 0.3 METs/week or as tolerated    Other Core Components/Risk Factors REASSESSMENT    Other Core Components Assessment  MEDICATION Adherence:              Taking medications as prescribed: yes   Uses pill box/organizer: Yes    Carries medication list: yes     HYPERTENSION  Management:  Hx of Hypertension: Yes   Resting BP: 136/76  Peak Exercise BP:   160/78  Current Medications: Yes - Amlodipine    Hypertension medication changes: No     TOBACCO/SMOKING Cessation:  Tobacco Use:    No  Date started:     Date quit:    Quit Date set:   Medications:   Comment:      DIABETES Management:  Diabetes:  Yes borderline  Medication: No  Medication changes: N/A  Hemoglobin A1C:   Lab Results   Component Value Date    HGBA1C 6.5 04/23/2025                 Pt monitors BS at home:  No  Comment:  N/A    HYPERLIPIDEMIA Management:      Lipids: see lab values below   Medications:  Yes - Atorvastatin  Medication changes:   No  Lab Results   Component Value Date    CHOL 185 04/19/2024    CHOL 166 01/19/2023     Lab Results   Component Value Date    HDL 60.0 04/19/2024    HDL 54 01/19/2023     Lab Results   Component Value Date    LDLCALC 98 04/19/2024    LDLCALC 81 01/19/2023     Lab Results   Component Value Date    TRIG 133 04/19/2024    TRIG 156 (H) 01/19/2023           Other Core Components Plan  Goal Status: In progress  Other Core Component Goals:   Achieve & maintain a resting blood pressure less than 130/80  Gain knowledge of cardiac  disease and lifestyle modifications by discharge  Compliant with medications & carries medication list  Cholesterol <180, HDL >45, LDL <70, Trig <150  Lifestyle modification for risk factor reduction, Aware of sx of hypo/hypertension, Following low-sodium diet, Self-monitoring of blood pressure, Follow-up total lipid panel, Following low saturated fat diet    Other Core Component Interventions/Education: Educational handouts provided, Encouraged low-sodium diet, Identifies s/sx hypo-hypertension, Uses weekly pill box/routine, Follow-up total lipid panel, AHA cholesterol handout given and reviewed, Encourage low saturated fat diet    Individual Patient Goals:  Increase strength & endurance  Aerobic exercise 3-6x per week  Increase endurance to walk w/ others.    General Comments:  Education class schedule given with manual by 1st session  Lecture handouts provided  Individual education & counseling    Rehab Staff Signature: Cici Mata RN

## 2025-06-27 ENCOUNTER — OFFICE VISIT (OUTPATIENT)
Dept: URGENT CARE | Age: 75
End: 2025-06-27
Payer: MEDICARE

## 2025-06-27 VITALS
RESPIRATION RATE: 20 BRPM | BODY MASS INDEX: 34.07 KG/M2 | HEIGHT: 69 IN | DIASTOLIC BLOOD PRESSURE: 81 MMHG | SYSTOLIC BLOOD PRESSURE: 136 MMHG | WEIGHT: 230 LBS | TEMPERATURE: 97.9 F | OXYGEN SATURATION: 98 % | HEART RATE: 98 BPM

## 2025-06-27 DIAGNOSIS — R05.1 ACUTE COUGH: ICD-10-CM

## 2025-06-27 DIAGNOSIS — J20.9 ACUTE BRONCHITIS, UNSPECIFIED ORGANISM: Primary | ICD-10-CM

## 2025-06-27 LAB
POC CORONAVIRUS SARS-COV-2 PCR: NEGATIVE
POC HUMAN RHINOVIRUS PCR: NEGATIVE
POC INFLUENZA A VIRUS PCR: NEGATIVE
POC INFLUENZA B VIRUS PCR: NEGATIVE
POC RESPIRATORY SYNCYTIAL VIRUS PCR: NEGATIVE

## 2025-06-27 RX ORDER — AZITHROMYCIN 250 MG/1
TABLET, FILM COATED ORAL
Qty: 6 TABLET | Refills: 0 | Status: SHIPPED | OUTPATIENT
Start: 2025-06-27

## 2025-06-27 RX ORDER — PREDNISONE 20 MG/1
TABLET ORAL
Qty: 10 TABLET | Refills: 0 | Status: SHIPPED | OUTPATIENT
Start: 2025-06-27

## 2025-06-27 RX ORDER — ALBUTEROL SULFATE 90 UG/1
2 INHALANT RESPIRATORY (INHALATION) EVERY 6 HOURS PRN
Qty: 18 G | Refills: 0 | Status: SHIPPED | OUTPATIENT
Start: 2025-06-27 | End: 2026-06-27

## 2025-06-27 NOTE — PROGRESS NOTES
"Subjective   Patient ID: Uma Middleton is a 75 y.o. female. They present today with a chief complaint of Illness (Entered by patient) and URI (Sunday).    History of Present Illness  Allergies: Reviewed.   Past medical history: Reviewed.   Past surgical history Reviewed.   Social history: Reviewed.    HPI: Patient is a pleasant 75-year-old white female, past medical history of hypertension, hyperlipidemia, diabetes, presents to clinic for chief complaint of cough.  Patient is reporting approximately 5 to 6-day history of persistent cough that is very spasmatic and dry.  She does notice intermittently productive of clear sputum.  She is began with some upper respiratory congestion rhinorrhea and postnasal drainage however notes this mostly has resolved.  She denies any fever or chills.  No chest pain or shortness of breath.  No abdominal pain, nausea, vomiting.  No numbness tingling or focal weakness.  She has been trying over-the-counter and at home remedies at home with minimal improvement.      Illness  URI      Past Medical History  Allergies as of 06/27/2025 - Reviewed 06/27/2025   Allergen Reaction Noted    Fire ant Shortness of breath 04/27/2020    Erythromycin Itching 11/15/2023    Tea tree oil Hives 02/23/2017       Prescriptions Prior to Admission[1]       Medical History[2]    Surgical History[3]     reports that she quit smoking about 15 years ago. Her smoking use included cigarettes. She started smoking about 43 years ago. She has a 28 pack-year smoking history. She has been exposed to tobacco smoke. She has never used smokeless tobacco. She reports that she does not currently use alcohol. She reports that she does not use drugs.    Review of Systems  Review of Systems                               Objective    Vitals:    06/27/25 1145   BP: 136/81   Pulse: 98   Resp: 20   Temp: 36.6 °C (97.9 °F)   TempSrc: Oral   SpO2: 98%   Weight: 104 kg (230 lb)   Height: 1.753 m (5' 9\")     No LMP recorded. Patient " is postmenopausal.    Physical Exam  General: Vitals Noted. No distress. Normocephalic.     HEENT: TMs normal, EOMI, normal conjunctiva, patent nares with erythematous edematous and appear nasal turbinates and clear rhinorrhea bilaterally.  Posterior oropharynx with signs of postnasal drainage without any erythema swelling or tonsillar exudate.  Uvula is in the midline and nonedematous.  No drooling.  No trismus.    Neck: Supple with no adenopathy.     Cardiac: Regular Rate and Rhythm. No murmur.     Pulmonary: Faint end expiratory wheezing throughout lung fields bilaterally no associated rales or rhonchi.  She is good chest wall scars with overall good air exchange.  No drooling tripoding stridor accessory muscle use.    Abdomen: Soft, non-tender, with normal bowel sounds.     Musculoskeletal: Moves all extremities, no effusion, no edema.     Skin: No obvious rashes.  Procedures    Point of Care Test & Imaging Results from this visit    Imaging  No results found.    Cardiology, Vascular, and Other Imaging  No other imaging results found for the past 2 days      Diagnostic study results (if any) were reviewed by Calvin Damon PA-C.    Assessment/Plan   Allergies, medications, history, and pertinent labs/EKGs/Imaging reviewed by Calvin Damon PA-C.     Medical Decision Making  Patient was seen eval in the clinic for complaint of cough.  On exam patient is nontoxic well-appearing respite comfortably no acute distress.  Vital signs are stable, afebrile. Heart is regular, belly soft and nontender.  Does have faint end expiratory wheezing throughout lung fields bilateral with no associated rales or rhonchi.  I feel she is developing an acute bronchitis given duration of symptoms and wheezing.  Will treat with a Z-Darius, 5-day course of prednisone, albuterol inhaler.  Vies to primary care physician the next week.  Will discharge home at this time.  Reviewed my impression, plan, strict return report ED  precautions with the patient.  She expresses understanding and agreement plan of care.    Orders and Diagnoses  Diagnoses and all orders for this visit:  Acute bronchitis, unspecified organism  -     azithromycin (Zithromax) 250 mg tablet; Take 2 tablets for one day then 1 tablet per day for 4 days  -     predniSONE (Deltasone) 20 mg tablet; Take two tablets per day for 5 days  -     albuterol 90 mcg/actuation inhaler; Inhale 2 puffs every 6 hours if needed for wheezing.  Acute cough  -     POCT SPOTFIRE R/ST Panel Mini w/COVID ([facility]) manually resulted        Medical Admin Record      Follow Up Instructions  No follow-ups on file.    Patient disposition: Home    Electronically signed by Calvin Damon PA-C  12:19 PM         [1] (Not in a hospital admission)  [2]   Past Medical History:  Diagnosis Date    Adhesive capsulitis of right shoulder 10/21/2013    Adhesive capsulitis of right shoulder    Angina pectoris     Anxiety     Aortic aneurysm without rupture, unspecified portion of aorta     ASHD (arteriosclerotic heart disease)     Aleman esophagus     Cataract     Central retinal vein occlusion, right eye     Chest pain     Cholelithiasis     Dermatochalasis of both upper eyelids     Dry eye syndrome of bilateral lacrimal glands     Elevated blood-pressure reading, without diagnosis of hypertension     Elevated blood pressure reading without diagnosis of hypertension    Esophageal disease     GERD (gastroesophageal reflux disease)     Hyperlipidemia     Hypertension     Inconclusive mammogram 01/23/2017    Breast density    Lattice degeneration of retina, bilateral     Other specified disorders of eustachian tube, bilateral 05/08/2017    Dysfunction of Eustachian tube, bilateral    Otitis media, unspecified, right ear 12/18/2015    Acute right otitis media    Personal history of other diseases of the respiratory system 10/18/2016    History of acute bronchitis with bronchospasm    PONV (postoperative  nausea and vomiting)     Primary osteoarthritis, unspecified hand 02/09/2015    Osteoarthritis, hand    Sacroiliitis, not elsewhere classified 04/26/2016    Sacroiliitis    Sciatica, right side 04/26/2016    Sciatica, right    Snoring     Unspecified optic atrophy     Vitamin D deficiency    [3]   Past Surgical History:  Procedure Laterality Date    BREAST BIOPSY  2015    CARDIAC CATHETERIZATION N/A 10/14/2024    Procedure: Left Heart Cath;  Surgeon: Juan Cueto DO;  Location: [facility] Cardiac Cath Lab;  Service: Cardiovascular;  Laterality: N/A;  no pre auth required    CARDIAC SURGERY  11/08/2024    CATARACT EXTRACTION      CATARACT EXTRACTION W/  INTRAOCULAR LENS IMPLANT Bilateral     OD 07/18/2013 +15.5D, OS 12/05/2013 +15.5D    CHOLECYSTECTOMY      COLONOSCOPY      CORONARY ARTERY BYPASS GRAFT  11/8/2024    ESOPHAGOGASTRODUODENOSCOPY      OTHER SURGICAL HISTORY  06/25/2013    Treatment Of The Left Ankle    [place] OSTEOTOMY MANDIBLE SEGMENTAL  1969    SHOULDER SURGERY      WRIST SURGERY Left 12/2024

## 2025-07-01 ENCOUNTER — CLINICAL SUPPORT (OUTPATIENT)
Dept: CARDIAC REHAB | Facility: CLINIC | Age: 75
End: 2025-07-01
Payer: MEDICARE

## 2025-07-01 DIAGNOSIS — Z95.1 S/P CABG (CORONARY ARTERY BYPASS GRAFT): ICD-10-CM

## 2025-07-01 PROCEDURE — 93798 PHYS/QHP OP CAR RHAB W/ECG: CPT | Performed by: INTERNAL MEDICINE

## 2025-07-03 ENCOUNTER — CLINICAL SUPPORT (OUTPATIENT)
Dept: CARDIAC REHAB | Facility: CLINIC | Age: 75
End: 2025-07-03
Payer: MEDICARE

## 2025-07-03 DIAGNOSIS — Z95.1 S/P CABG (CORONARY ARTERY BYPASS GRAFT): ICD-10-CM

## 2025-07-03 PROCEDURE — 93798 PHYS/QHP OP CAR RHAB W/ECG: CPT | Performed by: INTERNAL MEDICINE

## 2025-07-07 DIAGNOSIS — Z95.1 S/P CABG X 1: ICD-10-CM

## 2025-07-07 DIAGNOSIS — M25.532 LEFT WRIST PAIN: ICD-10-CM

## 2025-07-07 DIAGNOSIS — E78.2 MIXED HYPERLIPIDEMIA: Primary | ICD-10-CM

## 2025-07-07 RX ORDER — ATORVASTATIN CALCIUM 80 MG/1
80 TABLET, FILM COATED ORAL DAILY
Qty: 90 TABLET | Refills: 1 | Status: CANCELLED | OUTPATIENT
Start: 2025-07-07 | End: 2026-01-03

## 2025-07-07 RX ORDER — LIDOCAINE 50 MG/G
1 PATCH TOPICAL DAILY
Qty: 30 PATCH | Refills: 1 | Status: SHIPPED | OUTPATIENT
Start: 2025-07-07

## 2025-07-07 RX ORDER — ATORVASTATIN CALCIUM 40 MG/1
40 TABLET, FILM COATED ORAL DAILY
Qty: 100 TABLET | Refills: 1 | Status: SHIPPED | OUTPATIENT
Start: 2025-07-07 | End: 2026-01-23

## 2025-07-07 NOTE — PROGRESS NOTES
[facility]  Hand and Upper Extremity Service  Post Operative visit        Date of surgery: 12/26/2024  Surgery(s) performed: Open reduction internal fixation of left distal radius.        Subjective report:   Patient presents for her third postop follow-up.   She was given a Velcro movable brace.  She has been in occupational therapy.  She notes she has been pretty miserable since her surgery.  Of note she was also in significant pain prior to her operation as she had been dealing with a left distal radius fracture which was tried to be treated nonoperatively by another surgeon.  She had a delayed presentation to my office for operative fixation.      2/25/2025 follow-up:  Patient presents for follow-up today.  She is approximately 2 months from the above surgery.  She has developed CRPS and is seeing both pain management and doing occupational therapy.  She notes continued significant stiffness to her fingers and wrist.  She is hypersensitive to light touch.  She notes continued swelling.  She overall does appear to be doing better than she did at her last follow-up visit.  She did have median nerve block and radial nerve block performed by pain management which did give her a few days of temporary relief.  She is planning to have a stellate ganglion sympathetic    4/8/2025 follow-up:  Patient is over 3 months from the above surgery.  She has been going to pain management and continue with occupational therapy.  She notes improvement in her symptoms.  Her swelling has decreased however still present.  She continues to have edema, tingling burning and numbness that radiates to the forearm.  She notes stiffness to her fingers.  Even though she has the symptoms she is heading in the right direction.  Her pain is a 5 out of 10.    6/23/2025 follow-up:  Patient presents for follow-up today.  She continues with therapy and seeing pain management for CRPS.  She has had a stellate  ganglion block with good short-term relief.  She is considering doing a series of these.  Overall her function has not significantly improved.     Exam findings; left upper extremity  Mild to moderate swelling to the left wrist.  Moderate swelling to the dorsum of the hands and fingers.  Limited range of motion to her fingers secondary to pain, swelling and stiffness.  She can flex and extend the MP and IP joints however she is approximately 6 cm from the distal palmar crease.  Limited wrist range of motion secondary to pain.  She is has hypersensitivity to touch.  Incision is well-approximated without any erythema warmth or drainage.  Sutures are intact.  AIN, PIN, ulnar motors intact.  Sensation intact light touch radial, ulnar, median nerves     Radiograph findings: XR of the left wrist were taken and reviewed in office today.    Status post open reduction internal fixation of left distal radius fracture with near anatomic alignment and callus formation no signs of hardware failure or loosening        Plan:   Her symptoms still persist.  She is currently under the care of pain management and has received a stellate ganglion block and is considering a series of these.  We discussed that her fracture is healed and  may use her hand as tolerated.  I think it be beneficial for her to continue with therapy and with pain management.  She may follow-up with me on an as-needed basis        Will BeDO jojo  [employer]  Department of Orthopaedic Surgery  Hand and Upper Extremity Surgery  [facility]     Dictation performed with the use of voice recognition software. Syntax and grammatical errors may exist.

## 2025-07-07 NOTE — TELEPHONE ENCOUNTER
Patient wondering if you can decrease her atorvastatin to 40 mg again due to it giving her diarrhea every time she eats since starting the 80 mg dosage. Please advise. See Rx tab. Correct pharmacy selected.

## 2025-07-08 ENCOUNTER — CLINICAL SUPPORT (OUTPATIENT)
Dept: CARDIAC REHAB | Facility: CLINIC | Age: 75
End: 2025-07-08
Payer: MEDICARE

## 2025-07-08 DIAGNOSIS — Z95.1 S/P CABG (CORONARY ARTERY BYPASS GRAFT): ICD-10-CM

## 2025-07-08 PROCEDURE — 93798 PHYS/QHP OP CAR RHAB W/ECG: CPT | Performed by: INTERNAL MEDICINE

## 2025-07-10 ENCOUNTER — CLINICAL SUPPORT (OUTPATIENT)
Dept: CARDIAC REHAB | Facility: CLINIC | Age: 75
End: 2025-07-10
Payer: MEDICARE

## 2025-07-10 DIAGNOSIS — Z95.1 S/P CABG (CORONARY ARTERY BYPASS GRAFT): ICD-10-CM

## 2025-07-10 PROCEDURE — 93798 PHYS/QHP OP CAR RHAB W/ECG: CPT | Performed by: INTERNAL MEDICINE

## 2025-07-15 ENCOUNTER — CLINICAL SUPPORT (OUTPATIENT)
Dept: CARDIAC REHAB | Facility: CLINIC | Age: 75
End: 2025-07-15
Payer: MEDICARE

## 2025-07-15 DIAGNOSIS — Z95.1 S/P CABG (CORONARY ARTERY BYPASS GRAFT): ICD-10-CM

## 2025-07-15 PROCEDURE — 93798 PHYS/QHP OP CAR RHAB W/ECG: CPT | Performed by: INTERNAL MEDICINE

## 2025-07-17 ENCOUNTER — CLINICAL SUPPORT (OUTPATIENT)
Dept: CARDIAC REHAB | Facility: CLINIC | Age: 75
End: 2025-07-17
Payer: MEDICARE

## 2025-07-17 DIAGNOSIS — Z95.1 S/P CABG (CORONARY ARTERY BYPASS GRAFT): ICD-10-CM

## 2025-07-17 PROCEDURE — 93798 PHYS/QHP OP CAR RHAB W/ECG: CPT | Performed by: INTERNAL MEDICINE

## 2025-07-22 ENCOUNTER — CLINICAL SUPPORT (OUTPATIENT)
Dept: CARDIAC REHAB | Facility: CLINIC | Age: 75
End: 2025-07-22
Payer: MEDICARE

## 2025-07-22 DIAGNOSIS — Z95.1 S/P CABG (CORONARY ARTERY BYPASS GRAFT): ICD-10-CM

## 2025-07-22 PROCEDURE — 93798 PHYS/QHP OP CAR RHAB W/ECG: CPT | Performed by: INTERNAL MEDICINE

## 2025-07-22 NOTE — PROGRESS NOTES
....................Cardiac Rehabilitation Individual Treatment Plan  60 Day Reassessment      Today's Date:   7/22/2025                       Program Location: [address], [place]    Name: Uma Middleton                            Medical Record Number: [id number]                          YOB: 1950    Referring Physician: Juan Cueto,   [address]  [facility], [address]  [place] [id number] [phone]  Primary Care Physician: Mary Lou Valerio, APRN-CNP    Referring Diagnosis / Date of Diagnosis  1. CABG Date: 11/8/24  2. N/A Date: na      AACVPR Risk Stratification:  High  Fall Risk Results:  medium    Learning Assessment:  Cardiac Knowledge Test:        Pre: 14/15                           Post:        /15    Learning assessment/barriers: None   Preferred learning method: Auditory, Visual, and Reading handout      Education Classes: Schedule given with education manual, Medications 6/10/25, Risk Factors of Coronary Artery Disease 7/8/25    Psychosocial REASSESSMENT    Psychosocial Assessment  Pt reported/currently experiencing stress : No   Current Stress Level (1-10 scale):  4  History of anxiety/depression: No  Currently seeing a mental health provider: No   Psychosocial Meds: No  Medication changes: N/A    Social Support: Yes, Whom:spouse     Psychosocial Test:  PHQ-9    PHQ-9 score:       Pre:  10    Mid: N/A    Post: N/A    PHQ-9 Interpretation:    10-14 PHQ-9 moderate depression     Psychosocial Plan   Goal Status:   In progress   Psychosocial Goals:  Pt using learned techniques to decrease shortness of breath, thereby decreasing anxiety and depression and improving quality of life, Attend Art Therapy, Attend Chair Yoga, Improve PHQ-9 scores, Improve stress level, Learn breathing techniques and stretches, Maximize coping skills, Aware of signs/symptoms of depression and/or stress triggers, Participate in music therapy cooldown   Psychosocial  "Interventions/Education:  To be done in cardiac rehab, Staff and patient discussion, Participated in Music Therapy cool down 7/17/25, Discussed coping strategies, Individual education/counseling      Nutrition REASSESSMENT    Nutrition Assessment  Picture Your Plate Score:  Pre: 38  Post:  Picture Your Plate Interpretation:   PYP <40 unhealthy diet patterns   Special Diet:   Low fat and Low sodium   Dietary Goal:  Mediterranean Diet;  PYP >60     Weight Management:   Entry Weight: 237 lb  Current Weight:  235 lb   Height:  69 \"  Entry BMI:  5  Weighing Daily: No   Goal Weight:   200    Heart Failure Assessment:   Last EF: 11/8/2024: 65%  Hx of Heart Failure: No    Heart Failure medications: No    Heart Failure medication changes: N/A     Comments: N/A    Nutrition Plan   Goal Status: In progress  Nutrition Goals:   goal weight 200, follow low fat/low sodium diet, develop heart healthy diet, Mediterranean diet     RD Recommendations:        Nutrition Interventions/Education: sent PYP to dietician for scoring and recommendations, Picture Your Plate reviewed 7/1/25, Encouraged dietician lecture attendance      Exercise REASSESSMENT    Exercise Assessment       Initial MET level determined by the exercise physiologist from 1:1 evaluation and/or 6MWT, physician approved as documented.    Initial Intake: Starting MET Level 2.3     Current MET level 3.1      Discharge MET level n/a      6-Min Walk Test:     PRE:     Feet:    1320       METS:  2.9                 POST:  Feet:    n/a       METS:  n/a                    Current Home Exercise:   SRL on 7/15/25  Mode: walk  Frequency (days/week):   3  Duration(mins/day):    40    Exercise Plan  Exercise Prescription based on 6MWT and/or evaluation   Frequency:   2-3   days/week   Duration:   40  minutes    Intensity: RPE (0-10 Jesenia Scale):  2-4 (light to somewhat hard)    Target HR: 80-95bpm  Intensity: 65% age    SpO2 Range   >88%, interval training prn    O2 Flow Rate  " N/A     Progression:   Aim to progress 0.2- 0.3 METs/week or as tolerated  Mode:  Aerobic exercise       Modality METS Load    Duration   1 Pre-Exercise/Rest      20:00   2 Treadmill 2.4 1.8 mph   na%   40:00   3 Sci RB 4.0 4.5 level   50-60RPM   40:00   4 NuStep 2.4 5 level   60-80SPM   40:00   5 Physiostepper na na level   30-50SPM   40:00   6 Recumbent Bike 3.7 4.0 level   50-60RPM   40:00   7 Magnum UBE N/a 0 load   40-50RPM   40:00   8 N/A      40:00   9 N/A      40:00   10 Final Cooldown      20:00     Resistance Training: no      Home Exercise Prescription/Self Report Log given: yes, log given, type: walk, frequency 3, duration 20-40 min, intensity RPE 2-4     Goal Status: In progress  Exercise Goals: Safe & comfortable with exercise equipment, Uses RPE Jesenia Scale (0-10) & exercises at RPE of 2-4, Obtain 150 minutes/week of moderate intensity aerobic exercise, Increase ADLs  Exercise Interventions/Education: Increased endurance by 13% SINCE LAST ASSESSMENT, Practice the RPE Jesenia Scale (0-10), Exercises within RPE 2-4, Oriented to exercise equipment, Progress 0.2- 0.3 METs/week or as tolerated    Other Core Components/Risk Factors REASSESSMENT    Other Core Components Assessment  MEDICATION Adherence:              Taking medications as prescribed: yes   Uses pill box/organizer: No    Carries medication list: yes     HYPERTENSION  Management:  Hx of Hypertension: Yes   Resting BP: 118/86  Peak Exercise BP:   180/86  Current Medications: Yes - Amlodipine    Hypertension medication changes: No     TOBACCO/SMOKING Cessation:  Tobacco Use:    No  Date started:     Date quit: 2011   Quit Date set:   Medications:   Comment:      DIABETES Management:  Diabetes:  Yes borderline  Medication: No  Medication changes: N/A  Hemoglobin A1C:   Lab Results   Component Value Date    HGBA1C 6.5 04/23/2025                 Pt monitors BS at home:  No  Comment:  N/A    HYPERLIPIDEMIA Management:      Lipids: see lab values below    Medications:  Yes - Atorvastatin  Medication changes:   No  Lab Results   Component Value Date    CHOL 185 04/19/2024    CHOL 166 01/19/2023     Lab Results   Component Value Date    HDL 60.0 04/19/2024    HDL 54 01/19/2023     Lab Results   Component Value Date    LDLCALC 98 04/19/2024    LDLCALC 81 01/19/2023     Lab Results   Component Value Date    TRIG 133 04/19/2024    TRIG 156 (H) 01/19/2023           Other Core Components Plan  Goal Status: In progress  Other Core Component Goals:   Achieve & maintain a resting blood pressure less than 130/80  Gain knowledge of cardiac disease and lifestyle modifications by discharge  Compliant with medications & carries medication list  Cholesterol <180, HDL >45, LDL <70, Trig <150  Lifestyle modification for risk factor reduction, Aware of sx of hypo/hypertension, Following low-sodium diet, Self-monitoring of blood pressure, Follow-up total lipid panel, Following low saturated fat diet    Other Core Component Interventions/Education: Reports on self-report log provided, Educational handouts provided, Encouraged low-sodium diet, Home blood pressure cuff, Medication card provided, Follow-up total lipid panel, Encourage low saturated fat diet    Individual Patient Goals:  Increase strength & endurance  Aerobic exercise 3-6x per week  Increase endurance to keep up with others walking.    General Comments:  Education class schedule given with manual by 1st session  Lecture handouts provided  Individual education & counseling    Rehab Staff Signature: Cici Mata RN     Cardiac Rehabilitation Individual Treatment Plan  30 Day Reassessment      Today's Date:   7/22/2025                       Program Location: [address], [place]    Name: Uma Middleton                            Medical Record Number: [id number]                          YOB: 1950    Referring Physician: Juan Cueto,   [address]  [facility], [facility]  [address]  [place] [address] [phone]  Primary Care Physician: Mary Lou Valerio APRN-CNP    Referring Diagnosis / Date of Diagnosis  CABG 11/8/24  N/A       AACVPR Risk Stratification:  Moderate  Fall Risk Results:  medium    Learning Assessment:  Cardiac Knowledge Test:       Pre: 14/15                           Post:        /15  Learning assessment/barriers: None   Preferred learning method: Auditory, Visual, and Reading handout      Education Classes: Schedule given with education manual, Medications 6/10/25    Psychosocial REASSESSMENT    Psychosocial Assessment  Pt reported/currently experiencing stress : Yes, Stress.   Current Stress Level (1-10 scale):  4/10  History of anxiety/depression: No  Currently seeing a mental health provider: No   Psychosocial Meds: No  Medication changes: N/A    Social Support: Yes, Whom:spouse     Psychosocial Test:  PHQ-9    PHQ-9 score:       Pre:  10    Mid: N/A    Post: N/A    PHQ-9 Interpretation:    10-14 PHQ-9 moderate depression     Psychosocial Plan   Goal Status:   In progress   Psychosocial Goals:  Improve PHQ-9 scores, Improve stress level, Learn breathing techniques and stretches, Maximize coping skills, Participate in music therapy cooldown   Psychosocial Interventions/Education:  Family support, Staff and patient discussion, Participated in Music Therapy cool down, Discussed coping strategies      Nutrition REASSESSMENT    Nutrition Assessment  Picture Your Plate Score:  Pre: 36  Post:  Picture Your Plate Interpretation:   PYP <40 unhealthy diet patterns   Special Diet:   Low fat and Low sodium   Dietary Goal:  Mediterranean Diet;  PYP >60     Weight Management:   Weight:  237 lb   Height:  69 \"  Entry BMI:  35  Weighing Daily: No   Goal Weight:   200    Heart Failure Assessment:   Most recent EF: 65%  Date: 11/8/24  Hx of Heart Failure: No    Heart Failure medications: No    Heart Failure medication changes: N/A     Comments: N/A    Nutrition Plan   Goal " Status: In progress  Nutrition Goals:   BMI <30, goal weight 200#, follow low fat/low sodium diet, develop heart healthy diet, Mediterranean diet , improve PYP score    RD Recommendations:        Nutrition Interventions/Education: Heart healthy diet reviewed w/ pt.      Exercise REASSESSMENT    Exercise Assessment       Initial MET level determined by the exercise physiologist from 1:1 evaluation and/or 6MWT, physician approved as documented.    Initial Intake: Starting MET Level 2.3   Current MET level 2.4    Discharge MET level n/a    6-Min Walk Test:     PRE:     Feet:    1320       METS:  2.9                 POST:  Feet:    n/a       METS:  n/a                    Current Home Exercise:   No   Mode: na  Frequency (days/week):   na  Duration(mins/day):    na    Exercise Plan  Exercise Prescription based on 6MWT and/or evaluation   Frequency:   2-3   days/week   Duration:   40  minutes    Intensity: RPE (0-10 Jesenia Scale):  2-4 (light to somewhat hard)    Target HR: rest +30bpm  Intensity: na    SpO2 Range   >88%, interval training prn    O2 Flow Rate  N/A     Progression:   Aim to progress 0.2- 0.3 METs/week or as tolerated  Mode:  Aerobic exercise       Modality METS Load    Duration   1 Pre-Exercise/Rest      20:00   2 Treadmill 2.6 2.0 mph   0%   40:00   3 Sci RB 2.8 2.5 level   50-60RPM   40:00   4 NuStep 1.7 3 level   60-80SPM   40:00   5 Physiostepper na na level   30-50SPM   40:00   6 Recumbent Bike 2.5 2 level   50-60RPM   40:00   7 Magnum UBE N/a na load   40-50RPM   40:00   8 N/A      40:00   9 N/A      40:00   10 Final Cooldown      20:00     Resistance Training: no      Home Exercise Prescription/Self Report Log given: no     Goal Status: In progress  Exercise Goals: Safe & comfortable with exercise equipment, Uses RPE Jesenia Scale (0-10) & exercises at RPE of 2-4, Increase 10-20% in functional capacity by next reassessment, Increase ADLs  Exercise Interventions/Education: Increased endurance by .04%  SINCE LAST ASSESSMENT, Practice the RPE Jesenia Scale (0-10), Exercises within RPE 2-4, Oriented to exercise equipment, Purchased pulse watch/oximeter, Progress 0.2- 0.3 METs/week or as tolerated    Other Core Components/Risk Factors REASSESSMENT    Other Core Components Assessment  MEDICATION Adherence:              Taking medications as prescribed: yes   Uses pill box/organizer: Yes    Carries medication list: yes     HYPERTENSION  Management:  Hx of Hypertension: Yes   Resting BP: 136/76  Peak Exercise BP:   160/78  Current Medications: Yes - Amlodipine    Hypertension medication changes: No     TOBACCO/SMOKING Cessation:  Tobacco Use:    No  Date started:     Date quit:    Quit Date set:   Medications:   Comment:      DIABETES Management:  Diabetes:  Yes borderline  Medication: No  Medication changes: N/A  Hemoglobin A1C:   Lab Results   Component Value Date    HGBA1C 6.5 04/23/2025                 Pt monitors BS at home:  No  Comment:  N/A    HYPERLIPIDEMIA Management:      Lipids: see lab values below   Medications:  Yes - Atorvastatin  Medication changes:   No  Lab Results   Component Value Date    CHOL 185 04/19/2024    CHOL 166 01/19/2023     Lab Results   Component Value Date    HDL 60.0 04/19/2024    HDL 54 01/19/2023     Lab Results   Component Value Date    LDLCALC 98 04/19/2024    LDLCALC 81 01/19/2023     Lab Results   Component Value Date    TRIG 133 04/19/2024    TRIG 156 (H) 01/19/2023           Other Core Components Plan  Goal Status: In progress  Other Core Component Goals:   Achieve & maintain a resting blood pressure less than 130/80  Gain knowledge of cardiac disease and lifestyle modifications by discharge  Compliant with medications & carries medication list  Cholesterol <180, HDL >45, LDL <70, Trig <150  Lifestyle modification for risk factor reduction, Aware of sx of hypo/hypertension, Following low-sodium diet, Self-monitoring of blood pressure, Follow-up total lipid panel, Following low  saturated fat diet    Other Core Component Interventions/Education: Educational handouts provided, Encouraged low-sodium diet, Identifies s/sx hypo-hypertension, Uses weekly pill box/routine, Follow-up total lipid panel, AHA cholesterol handout given and reviewed, Encourage low saturated fat diet    Individual Patient Goals:  Increase strength & endurance  Aerobic exercise 3-6x per week  Increase endurance to walk w/ others.    General Comments:  Education class schedule given with manual by 1st session  Lecture handouts provided  Individual education & counseling    Rehab Staff Signature: Cici Mata RN

## 2025-07-24 ENCOUNTER — CLINICAL SUPPORT (OUTPATIENT)
Dept: CARDIAC REHAB | Facility: CLINIC | Age: 75
End: 2025-07-24
Payer: MEDICARE

## 2025-07-24 DIAGNOSIS — Z95.1 S/P CABG (CORONARY ARTERY BYPASS GRAFT): ICD-10-CM

## 2025-07-24 PROCEDURE — 93798 PHYS/QHP OP CAR RHAB W/ECG: CPT | Performed by: INTERNAL MEDICINE

## 2025-07-25 ENCOUNTER — HOSPITAL ENCOUNTER (OUTPATIENT)
Dept: RADIOLOGY | Facility: HOSPITAL | Age: 75
Discharge: HOME | End: 2025-07-25
Payer: MEDICARE

## 2025-07-25 ENCOUNTER — OFFICE VISIT (OUTPATIENT)
Dept: PRIMARY CARE | Facility: CLINIC | Age: 75
End: 2025-07-25
Payer: MEDICARE

## 2025-07-25 VITALS
RESPIRATION RATE: 16 BRPM | BODY MASS INDEX: 35.58 KG/M2 | WEIGHT: 240.2 LBS | OXYGEN SATURATION: 97 % | HEIGHT: 69 IN | SYSTOLIC BLOOD PRESSURE: 122 MMHG | DIASTOLIC BLOOD PRESSURE: 76 MMHG | HEART RATE: 74 BPM

## 2025-07-25 DIAGNOSIS — Z13.29 THYROID DISORDER SCREENING: ICD-10-CM

## 2025-07-25 DIAGNOSIS — R06.02 SOB (SHORTNESS OF BREATH): ICD-10-CM

## 2025-07-25 DIAGNOSIS — I10 PRIMARY HYPERTENSION: ICD-10-CM

## 2025-07-25 DIAGNOSIS — E55.9 VITAMIN D DEFICIENCY: ICD-10-CM

## 2025-07-25 DIAGNOSIS — R73.9 HYPERGLYCEMIA: ICD-10-CM

## 2025-07-25 DIAGNOSIS — E66.01 OBESITY, MORBID (MULTI): ICD-10-CM

## 2025-07-25 DIAGNOSIS — G62.9 NEUROPATHY: ICD-10-CM

## 2025-07-25 DIAGNOSIS — E78.2 MIXED HYPERLIPIDEMIA: ICD-10-CM

## 2025-07-25 DIAGNOSIS — E11.9 TYPE 2 DIABETES MELLITUS WITHOUT COMPLICATION, WITHOUT LONG-TERM CURRENT USE OF INSULIN: Primary | ICD-10-CM

## 2025-07-25 LAB — POC HEMOGLOBIN A1C: 6.5 % (ref 4.2–6.5)

## 2025-07-25 PROCEDURE — 3078F DIAST BP <80 MM HG: CPT

## 2025-07-25 PROCEDURE — 3044F HG A1C LEVEL LT 7.0%: CPT

## 2025-07-25 PROCEDURE — 99214 OFFICE O/P EST MOD 30 MIN: CPT

## 2025-07-25 PROCEDURE — 3074F SYST BP LT 130 MM HG: CPT

## 2025-07-25 PROCEDURE — 83036 HEMOGLOBIN GLYCOSYLATED A1C: CPT

## 2025-07-25 PROCEDURE — 1159F MED LIST DOCD IN RCRD: CPT

## 2025-07-25 PROCEDURE — 74022 RADEX COMPL AQT ABD SERIES: CPT

## 2025-07-25 PROCEDURE — 3008F BODY MASS INDEX DOCD: CPT

## 2025-07-25 PROCEDURE — 1125F AMNT PAIN NOTED PAIN PRSNT: CPT

## 2025-07-25 RX ORDER — GABAPENTIN 100 MG/1
100 CAPSULE ORAL NIGHTLY
Qty: 90 CAPSULE | Refills: 1 | Status: SHIPPED | OUTPATIENT
Start: 2025-07-25 | End: 2026-01-21

## 2025-07-25 ASSESSMENT — PAIN SCALES - GENERAL: PAINLEVEL_OUTOF10: 9

## 2025-07-25 NOTE — ASSESSMENT & PLAN NOTE
Orders:    POCT glycosylated hemoglobin (Hb A1C) manually resulted    Albumin-Creatinine Ratio, Urine Random  Diabetes Type 2  A1C is now 6.5 from 6.5   Continue current medication.  Continue work on diet - recommend lots of fruits and vegetables, lean protein like chicken, turkey, fish, beans and Greek yogurt. Try to choose healthier carbohydrate options like oatmeal, wheat bread and pasta, sweet potatoes. Limit sugary treats.

## 2025-07-25 NOTE — PROGRESS NOTES
"Subjective   Patient ID: Uma Middleton is a 75 y.o. female who presents for 3 month follow up    HPI   Patient denies any falls, urgent care, ER, hospitalization, new diagnoses, surgeries since they were here last.    Denies any issues with chest pain, chest pressure,  constipation, diarrhea, blood in stool, urinary urgency, frequency, blood in urine, muscle weakness in arms and legs, numbness or tingling in fingers or toes.    Pt for left hand  Card rehab continues     SOB with exertion or relaxation   Card rehab told her it could be from the weather  Feels it happened after recent URI  Pulse ox 94-95 or higher up to 98%     Hot flashes intermittently  This has been ongoing   Lightheadedness    Fatigue     Stopped taking gabapentin 300 mg she no longer wanted to take that high of a dose,.     DM   Diet controlled   She is not monitoring blood sugars.     HTN   Monitoring BP at home   200-90s at cards rehab  Highest at home 180/80s  generally 120/80 as it is here today    Review of Systems  Review of Systems negative except as noted in HPI and Chief complaint.  Current Medications[1]    Objective   /76 (BP Location: Left arm, Patient Position: Sitting, BP Cuff Size: Adult)   Pulse 74   Resp 16   Ht 1.753 m (5' 9\")   Wt 109 kg (240 lb 3.2 oz)   SpO2 97%   BMI 35.47 kg/m²     Physical Exam  Vitals reviewed.     Cardiovascular:      Rate and Rhythm: Normal rate.   Pulmonary:      Effort: Pulmonary effort is normal.     Musculoskeletal:      Cervical back: Normal range of motion.     Neurological:      General: No focal deficit present.      Mental Status: She is alert.     Psychiatric:         Mood and Affect: Mood normal.         Assessment/Plan   Assessment & Plan  Type 2 diabetes mellitus without complication, without long-term current use of insulin    Orders:    POCT glycosylated hemoglobin (Hb A1C) manually resulted    Albumin-Creatinine Ratio, Urine Random  Diabetes Type 2  A1C is now 6.5 from 6.5 "   Continue current medication.  Continue work on diet - recommend lots of fruits and vegetables, lean protein like chicken, turkey, fish, beans and Greek yogurt. Try to choose healthier carbohydrate options like oatmeal, wheat bread and pasta, sweet potatoes. Limit sugary treats.  Mixed hyperlipidemia    Decrease intake of saturated fats, fast food, sweets.  Increase intake of fresh fruit fresh vegetables and lean meats.  Increase healthy fats seeds, nuts, olive oil instead of butter.  walk 150 minutes/week for heart health.   Aim for 25-30 grams of fiber in your diet daily.  May consider adding Fish Oil supplement 1,200 mg per day or Omega 3 Supplement daily.      Primary hypertension    HYPERTENSION:   Decrease intake of processed foods, fast foods, lunch meat, canned soups, canned veggies.  Increase intake of fresh fruits, veggies, and lean meats. Monitor blood pressure at home, keep a log and bring this with you to your next appointment. Call the office if your blood pressure runs 150/90 or higher consistently.  Blood Pressure Technique:  Sit quietly in a chair for 5 minutes with back supported and feet on the floor  Then place left arm on a table or armrest so bicep area is at the same level of heart or left breast  Check three times in a row, disregard the highest reading and average the other two      Vitamin D deficiency    Orders:    Vitamin D 25-Hydroxy,Total (for eval of Vitamin D levels); Future    Thyroid disorder screening    Orders:    TSH with reflex to Free T4 if abnormal; Future    Hyperglycemia    Orders:    Comprehensive Metabolic Panel; Future  LAB Order/ BLOOD TESTS   I have ordered lab work for you to get done. This should be fasting. Nothing to eat or drink after midnight besides black tea,  black coffee, or water. If you do not hear from this office within two days of having your labs done, please call for your results.   Please call to schedule an appointment:   [facility] Scheduling phone  number is [phone]  You can also schedule an appointment online by logging into   [employer]    SOB (shortness of breath)    Orders:    XR abdomen 2 views w chest 1 view; Future  I have ordered an xray for you. Please have this done today this office will call you with results once received.   Neuropathy    Orders:    gabapentin (Neurontin) 100 mg capsule; Take 1 capsule (100 mg) by mouth once daily at bedtime.  begin gabapentin 100mg nightly for neuropathy  Obesity, morbid (Multi)         This note was dictated using DRAGON speech recognition software and was corrected for spelling or grammatical errors, but despite proofreading several typographical errors might be present that might affect the meaning of the content.  Mary Lou Valerio, CNP           [1]   Current Outpatient Medications:     albuterol 90 mcg/actuation inhaler, Inhale 2 puffs every 6 hours if needed for wheezing., Disp: 18 g, Rfl: 0    amLODIPine-benazepriL (Lotrel) 5-10 mg capsule, Take 1 capsule by mouth once daily., Disp: 90 capsule, Rfl: 1    aspirin 81 mg chewable tablet, Chew 1 tablet (81 mg) once daily., Disp: 90 tablet, Rfl: 3    atorvastatin (Lipitor) 40 mg tablet, Take 1 tablet (40 mg) by mouth once daily., Disp: 100 tablet, Rfl: 1    clopidogrel (Plavix) 75 mg tablet, Take 1 tablet (75 mg) by mouth once daily., Disp: 90 tablet, Rfl: 1    fluticasone (Flonase) 50 mcg/actuation nasal spray, Administer 1-2 sprays into each nostril if needed. Uses at bedtime, Disp: , Rfl:     hydrOXYzine HCL (Atarax) 25 mg tablet, Take 1 tablet (25 mg) by mouth as needed at bedtime for anxiety., Disp: 90 tablet, Rfl: 1    isosorbide mononitrate ER (Imdur) 30 mg 24 hr tablet, Take 1 tablet (30 mg) by mouth once daily. Do not crush or chew., Disp: 90 tablet, Rfl: 3    lidocaine (Lidoderm) 5 % patch, Place 1 patch over 12 hours on the skin once daily. Remove & discard patch within 12 hours or as directed by MD., Disp: 30 patch, Rfl: 1    metoprolol  succinate XL (Toprol-XL) 50 mg 24 hr tablet, Take 1 tablet (50 mg) by mouth once daily., Disp: 90 tablet, Rfl: 3    omeprazole (PriLOSEC) 40 mg DR capsule, Take 1 capsule (40 mg) by mouth once daily. Do not crush or chew., Disp: , Rfl:     traMADol (Ultram) 50 mg tablet, Take 1 tablet (50 mg) by mouth if needed for severe pain (7 - 10)., Disp: , Rfl:     gabapentin (Neurontin) 100 mg capsule, Take 1 capsule (100 mg) by mouth once daily at bedtime., Disp: 90 capsule, Rfl: 1

## 2025-07-25 NOTE — ASSESSMENT & PLAN NOTE
Orders:    gabapentin (Neurontin) 100 mg capsule; Take 1 capsule (100 mg) by mouth once daily at bedtime.  begin gabapentin 100mg nightly for neuropathy

## 2025-07-25 NOTE — PATIENT INSTRUCTIONS
Assessment & Plan  Type 2 diabetes mellitus without complication, without long-term current use of insulin    Orders:    POCT glycosylated hemoglobin (Hb A1C) manually resulted    Albumin-Creatinine Ratio, Urine Random  Diabetes Type 2  A1C is now 6.5 from 6.5   Continue current medication.  Continue work on diet - recommend lots of fruits and vegetables, lean protein like chicken, turkey, fish, beans and Greek yogurt. Try to choose healthier carbohydrate options like oatmeal, wheat bread and pasta, sweet potatoes. Limit sugary treats.  Mixed hyperlipidemia    Decrease intake of saturated fats, fast food, sweets.  Increase intake of fresh fruit fresh vegetables and lean meats.  Increase healthy fats seeds, nuts, olive oil instead of butter.  walk 150 minutes/week for heart health.   Aim for 25-30 grams of fiber in your diet daily.  May consider adding Fish Oil supplement 1,200 mg per day or Omega 3 Supplement daily.      Primary hypertension    HYPERTENSION:   Decrease intake of processed foods, fast foods, lunch meat, canned soups, canned veggies.  Increase intake of fresh fruits, veggies, and lean meats. Monitor blood pressure at home, keep a log and bring this with you to your next appointment. Call the office if your blood pressure runs 150/90 or higher consistently.  Blood Pressure Technique:  Sit quietly in a chair for 5 minutes with back supported and feet on the floor  Then place left arm on a table or armrest so bicep area is at the same level of heart or left breast  Check three times in a row, disregard the highest reading and average the other two      Vitamin D deficiency    Orders:    Vitamin D 25-Hydroxy,Total (for eval of Vitamin D levels); Future    Thyroid disorder screening    Orders:    TSH with reflex to Free T4 if abnormal; Future    Hyperglycemia    Orders:    Comprehensive Metabolic Panel; Future  LAB Order/ BLOOD TESTS   I have ordered lab work for you to get done. This should be fasting.  Nothing to eat or drink after midnight besides black tea,  black coffee, or water. If you do not hear from this office within two days of having your labs done, please call for your results.   Please call to schedule an appointment:   [facility] Scheduling phone number is [phone]  You can also schedule an appointment online by logging into   [employer]    SOB (shortness of breath)    Orders:    XR abdomen 2 views w chest 1 view; Future  I have ordered an xray for you. Please have this done today this office will call you with results once received.   Neuropathy    Orders:    gabapentin (Neurontin) 100 mg capsule; Take 1 capsule (100 mg) by mouth once daily at bedtime.  begin gabapentin 100mg nightly for neuropathy

## 2025-07-26 LAB
25(OH)D3+25(OH)D2 SERPL-MCNC: 22 NG/ML (ref 30–100)
ALBUMIN SERPL-MCNC: 4.5 G/DL (ref 3.6–5.1)
ALP SERPL-CCNC: 106 U/L (ref 37–153)
ALT SERPL-CCNC: 51 U/L (ref 6–29)
ANION GAP SERPL CALCULATED.4IONS-SCNC: 7 MMOL/L (CALC) (ref 7–17)
AST SERPL-CCNC: 34 U/L (ref 10–35)
BASOPHILS # BLD AUTO: 60 CELLS/UL (ref 0–200)
BASOPHILS NFR BLD AUTO: 1 %
BILIRUB SERPL-MCNC: 0.5 MG/DL (ref 0.2–1.2)
BUN SERPL-MCNC: 19 MG/DL (ref 7–25)
CALCIUM SERPL-MCNC: 9.4 MG/DL (ref 8.6–10.4)
CHLORIDE SERPL-SCNC: 105 MMOL/L (ref 98–110)
CHOLEST SERPL-MCNC: 159 MG/DL
CHOLEST/HDLC SERPL: 2.8 (CALC)
CO2 SERPL-SCNC: 27 MMOL/L (ref 20–32)
CREAT SERPL-MCNC: 0.84 MG/DL (ref 0.6–1)
EGFRCR SERPLBLD CKD-EPI 2021: 73 ML/MIN/1.73M2
EOSINOPHIL # BLD AUTO: 180 CELLS/UL (ref 15–500)
EOSINOPHIL NFR BLD AUTO: 3 %
ERYTHROCYTE [DISTWIDTH] IN BLOOD BY AUTOMATED COUNT: 13.9 % (ref 11–15)
GLUCOSE SERPL-MCNC: 131 MG/DL (ref 65–99)
HCT VFR BLD AUTO: 40.4 % (ref 35–45)
HDLC SERPL-MCNC: 57 MG/DL
HGB BLD-MCNC: 13 G/DL (ref 11.7–15.5)
LDLC SERPL CALC-MCNC: 79 MG/DL (CALC)
LYMPHOCYTES # BLD AUTO: 2544 CELLS/UL (ref 850–3900)
LYMPHOCYTES NFR BLD AUTO: 42.4 %
MCH RBC QN AUTO: 27.5 PG (ref 27–33)
MCHC RBC AUTO-ENTMCNC: 32.2 G/DL (ref 32–36)
MCV RBC AUTO: 85.6 FL (ref 80–100)
MONOCYTES # BLD AUTO: 450 CELLS/UL (ref 200–950)
MONOCYTES NFR BLD AUTO: 7.5 %
NEUTROPHILS # BLD AUTO: 2766 CELLS/UL (ref 1500–7800)
NEUTROPHILS NFR BLD AUTO: 46.1 %
NONHDLC SERPL-MCNC: 102 MG/DL (CALC)
PLATELET # BLD AUTO: 268 THOUSAND/UL (ref 140–400)
PMV BLD REES-ECKER: 10 FL (ref 7.5–12.5)
POTASSIUM SERPL-SCNC: 4.7 MMOL/L (ref 3.5–5.3)
PROT SERPL-MCNC: 7 G/DL (ref 6.1–8.1)
RBC # BLD AUTO: 4.72 MILLION/UL (ref 3.8–5.1)
SODIUM SERPL-SCNC: 139 MMOL/L (ref 135–146)
TRIGL SERPL-MCNC: 133 MG/DL
TSH SERPL-ACNC: 1.03 MIU/L (ref 0.4–4.5)
WBC # BLD AUTO: 6 THOUSAND/UL (ref 3.8–10.8)

## 2025-07-28 ENCOUNTER — RESULTS FOLLOW-UP (OUTPATIENT)
Dept: PRIMARY CARE | Facility: CLINIC | Age: 75
End: 2025-07-28
Payer: MEDICARE

## 2025-07-28 LAB
ALBUMIN/CREAT UR: 6 MG/G CREAT
CREAT UR-MCNC: 250 MG/DL (ref 20–275)
MICROALBUMIN UR-MCNC: 1.6 MG/DL

## 2025-07-29 ENCOUNTER — CLINICAL SUPPORT (OUTPATIENT)
Dept: CARDIAC REHAB | Facility: CLINIC | Age: 75
End: 2025-07-29
Payer: MEDICARE

## 2025-07-29 DIAGNOSIS — Z95.1 S/P CABG (CORONARY ARTERY BYPASS GRAFT): ICD-10-CM

## 2025-07-29 PROCEDURE — 93798 PHYS/QHP OP CAR RHAB W/ECG: CPT | Performed by: INTERNAL MEDICINE

## 2025-07-31 ENCOUNTER — OFFICE VISIT (OUTPATIENT)
Dept: URGENT CARE | Age: 75
End: 2025-07-31
Payer: MEDICARE

## 2025-07-31 ENCOUNTER — CLINICAL SUPPORT (OUTPATIENT)
Dept: CARDIAC REHAB | Facility: CLINIC | Age: 75
End: 2025-07-31
Payer: MEDICARE

## 2025-07-31 VITALS
OXYGEN SATURATION: 96 % | HEART RATE: 84 BPM | DIASTOLIC BLOOD PRESSURE: 80 MMHG | RESPIRATION RATE: 19 BRPM | SYSTOLIC BLOOD PRESSURE: 148 MMHG | TEMPERATURE: 97.2 F

## 2025-07-31 DIAGNOSIS — S05.01XA CORNEA ABRASION, RIGHT, INITIAL ENCOUNTER: Primary | ICD-10-CM

## 2025-07-31 DIAGNOSIS — Z95.1 S/P CABG (CORONARY ARTERY BYPASS GRAFT): ICD-10-CM

## 2025-07-31 PROCEDURE — 3077F SYST BP >= 140 MM HG: CPT | Performed by: NURSE PRACTITIONER

## 2025-07-31 PROCEDURE — 1159F MED LIST DOCD IN RCRD: CPT | Performed by: NURSE PRACTITIONER

## 2025-07-31 PROCEDURE — 93798 PHYS/QHP OP CAR RHAB W/ECG: CPT | Performed by: INTERNAL MEDICINE

## 2025-07-31 PROCEDURE — 65220 REMOVE FOREIGN BODY FROM EYE: CPT | Performed by: NURSE PRACTITIONER

## 2025-07-31 PROCEDURE — 1160F RVW MEDS BY RX/DR IN RCRD: CPT | Performed by: NURSE PRACTITIONER

## 2025-07-31 PROCEDURE — 99213 OFFICE O/P EST LOW 20 MIN: CPT | Performed by: NURSE PRACTITIONER

## 2025-07-31 PROCEDURE — 3079F DIAST BP 80-89 MM HG: CPT | Performed by: NURSE PRACTITIONER

## 2025-07-31 PROCEDURE — 3044F HG A1C LEVEL LT 7.0%: CPT | Performed by: NURSE PRACTITIONER

## 2025-07-31 RX ORDER — POLYMYXIN B SULFATE AND TRIMETHOPRIM 1; 10000 MG/ML; [USP'U]/ML
1 SOLUTION OPHTHALMIC EVERY 4 HOURS
Qty: 10 ML | Refills: 0 | Status: SHIPPED | OUTPATIENT
Start: 2025-07-31 | End: 2025-08-10

## 2025-07-31 ASSESSMENT — ENCOUNTER SYMPTOMS: EYE PAIN: 1

## 2025-07-31 NOTE — PROGRESS NOTES
Subjective   Patient ID: Uma Middleton is a 75 y.o. female. They present today with a chief complaint of Eye Problem (Something in right eye. Wasn't doing anything when she felt it. ).    History of Present Illness  Patient is a 75-year-old female presents today complaining of right eye discomfort due to possible foreign body.  Patient symptoms took place yesterday morning after she woke up.  She is not aware of any injury to the eye, reports no contact lenses, wears glasses only.  Patient reports no discharge, no pain behind the eye no swelling to the eyelids.  Patient denies visual changes but reports sensitivity to the bright light.     Past Medical History  Allergies as of 07/31/2025 - Reviewed 07/31/2025   Allergen Reaction Noted    Fire ant Shortness of breath 04/27/2020    Erythromycin Itching 11/15/2023    Tea tree oil Hives 02/23/2017       Prescriptions Prior to Admission[1]     Medical History[2]    Surgical History[3]     reports that she quit smoking about 15 years ago. Her smoking use included cigarettes. She started smoking about 43 years ago. She has a 28 pack-year smoking history. She has been exposed to tobacco smoke. She has never used smokeless tobacco. She reports that she does not currently use alcohol. She reports that she does not use drugs.    Review of Systems  Review of Systems   Eyes:  Positive for pain.                                  Objective    Vitals:    07/31/25 1536   BP: 148/80   Pulse: 84   Resp: 19   Temp: 36.2 °C (97.2 °F)   SpO2: 96%     No LMP recorded. Patient is postmenopausal.    Physical Exam  Vitals reviewed.   General: Vitals Noted. No distress. Normocephalic.  Cardiovascular:     Heart sounds: Normal heart sounds, S1 normal and S2 normal. No murmur heard.     No friction rub.   Pulmonary:      Effort: Pulmonary effort is normal.      Breath sounds:  Lungs clear to auscultation bilaterally   HEENT: Left and right TM is within normal limits. EOMI, normal conjunctiva to  the left, mild erythema to right conjunctiva, normal red reflexes, Normal OP. No maxillary and frontal sinus tenderness on palpation is present. Pharynx and tonsils are not hyperemic, and without exudate.   Neck: Supple with no adenopathy.  Lymphadenopathy:   No cervical adenopathy on palpation  Lower Body: No right inguinal adenopathy. No left inguinal adenopathy.   Abdominal:      Palpations: There is no hepatomegaly, splenomegaly or mass. Abdomen is soft, non-tender, and non-distended. No suprapubic tenderness. No CVA tenderness.   Skin:     Comments: no rash   Neurological:      Cranial Nerves: Cranial nerves 2-12 are intact.      Sensory: No sensory deficit.      Motor: Motor function is intact.      Deep Tendon Reflexes: Reflexes are normal and symmetric.       Ocular Foreign Body Removal    Date/Time: 7/31/2025 3:55 PM    Performed by: ANTHONY García  Authorized by: ANTHONY García    Consent:     Consent obtained:  Verbal    Consent given by:  Patient    Risks, benefits, and alternatives were discussed: yes      Risks discussed:  Bleeding, corneal damage, damage to surrounding structures, infection, pain and visual impairment    Alternatives discussed:  No treatment and delayed treatment  Universal protocol:     Patient identity confirmed:  Verbally with patient  Location:     Location:  R corneal    Depth:  Superficial  Pre-procedure details:     Imaging:  None    Correction: uncorrected    Anesthesia:     Local anesthetic:  Tetracaine drops  Procedure details:     Localization method:  Fluorescein, Wood's lamp and direct visualization (no foreign body was visualized)    Foreign bodies recovered:  None  Post-procedure details:     Confirmation:  No perforation shown by fluorescein    Dressing:  Antibiotic drops    Procedure completion:  Tolerated well, no immediate complications  Comments:      No foreign body located per examination, corneal abrasion noted to the right eye at 5 o 'clock        Point of Care Test & Imaging Results from this visit  No results found for this visit on 07/31/25.   Imaging  No results found.    Cardiology, Vascular, and Other Imaging  No other imaging results found for the past 2 days      Diagnostic study results (if any) were reviewed by ANTHONY García.    Assessment/Plan   Allergies, medications, history, and pertinent labs/EKGs/Imaging reviewed by ANTHONY García.     Medical Decision Making  Patient is alert and orient x 4 no acute distress.  Presents with concern for foreign body to right eye since yesterday.  Examination completed by using fluorescein staining with Woods lamp.  See procedure for detail.  No foreign body located upon examination, corneal abrasion noted at the 5 o'clock.  Eyedrops with antibiotic issued to the right eye as ordered below.  Patient was advised to follow-up with ophthalmology for further evaluation and management if symptoms persist beyond treatment.  Significant signs symptoms were discussed with the patient warranting representation to ophthalmology or ER in the setting of complications including but not limited to acute visual changes, acute pain behind the eye or orbital cellulitis.  Patient verbalized understanding and agrees with the plan.    Orders and Diagnoses  Diagnoses and all orders for this visit:  Cornea abrasion, right, initial encounter  -     polymyxin B sulf-trimethoprim (Polytrim) ophthalmic solution; Administer 1 drop into the right eye every 4 hours for 10 days.  Other orders  -     Ocular Foreign Body Removal      Medical Admin Record      Patient disposition: Home    Electronically signed by ANTHONY García  4:07 PM           [1] (Not in a hospital admission)   [2]   Past Medical History:  Diagnosis Date    Adhesive capsulitis of right shoulder 10/21/2013    Adhesive capsulitis of right shoulder    Angina pectoris     Anxiety     Aortic aneurysm without rupture, unspecified portion of  aorta     ASHD (arteriosclerotic heart disease)     Aleman esophagus     Cataract     Central retinal vein occlusion, right eye     Chest pain     Cholelithiasis     Dermatochalasis of both upper eyelids     Dry eye syndrome of bilateral lacrimal glands     Elevated blood-pressure reading, without diagnosis of hypertension     Elevated blood pressure reading without diagnosis of hypertension    Esophageal disease     GERD (gastroesophageal reflux disease)     Hyperlipidemia     Hypertension     Inconclusive mammogram 01/23/2017    Breast density    Lattice degeneration of retina, bilateral     Other specified disorders of eustachian tube, bilateral 05/08/2017    Dysfunction of Eustachian tube, bilateral    Otitis media, unspecified, right ear 12/18/2015    Acute right otitis media    Personal history of other diseases of the respiratory system 10/18/2016    History of acute bronchitis with bronchospasm    PONV (postoperative nausea and vomiting)     Primary osteoarthritis, unspecified hand 02/09/2015    Osteoarthritis, hand    Sacroiliitis, not elsewhere classified 04/26/2016    Sacroiliitis    Sciatica, right side 04/26/2016    Sciatica, right    Snoring     Unspecified optic atrophy     Vitamin D deficiency    [3]   Past Surgical History:  Procedure Laterality Date    BREAST BIOPSY  2015    CARDIAC CATHETERIZATION N/A 10/14/2024    Procedure: Left Heart Cath;  Surgeon: Juan Cueto DO;  Location: [facility] Card[facility];  Service: Cardiovascular;  Laterality: N/A;  no pre auth required    CARDIAC SURGERY  11/08/2024    CATARACT EXTRACTION      CATARACT EXTRACTION W/  INTRAOCULAR LENS IMPLANT Bilateral     OD 07/18/2013 +15.5D, OS 12/05/2013 +15.5D    CHOLECYSTECTOMY      COLONOSCOPY      CORONARY ARTERY BYPASS GRAFT  11/8/2024    ESOPHAGOGASTRODUODENOSCOPY      OTHER SURGICAL HISTORY  06/25/2013    Treatment Of The Left Ankle    [place] OSTEOTOMY MANDIBLE SEGMENTAL  1969    SHOULDER SURGERY      WRIST SURGERY Left  12/2024

## 2025-08-01 ENCOUNTER — OFFICE VISIT (OUTPATIENT)
Dept: OPHTHALMOLOGY | Facility: CLINIC | Age: 75
End: 2025-08-01
Payer: MEDICARE

## 2025-08-01 ENCOUNTER — TELEPHONE (OUTPATIENT)
Dept: OPHTHALMOLOGY | Facility: CLINIC | Age: 75
End: 2025-08-01

## 2025-08-01 DIAGNOSIS — S05.01XD ABRASION OF RIGHT CORNEA, SUBSEQUENT ENCOUNTER: Primary | ICD-10-CM

## 2025-08-01 PROBLEM — S05.01XA CORNEAL ABRASION, RIGHT: Status: ACTIVE | Noted: 2025-08-01

## 2025-08-01 PROCEDURE — 99213 OFFICE O/P EST LOW 20 MIN: CPT | Performed by: OPHTHALMOLOGY

## 2025-08-01 ASSESSMENT — EXTERNAL EXAM - LEFT EYE: OS_EXAM: BROW PTOSIS

## 2025-08-01 ASSESSMENT — ENCOUNTER SYMPTOMS
ALLERGIC/IMMUNOLOGIC NEGATIVE: 0
MUSCULOSKELETAL NEGATIVE: 0
CONSTITUTIONAL NEGATIVE: 0
RESPIRATORY NEGATIVE: 0
EYES NEGATIVE: 0
GASTROINTESTINAL NEGATIVE: 0
PSYCHIATRIC NEGATIVE: 0
NEUROLOGICAL NEGATIVE: 0
ENDOCRINE NEGATIVE: 0
HEMATOLOGIC/LYMPHATIC NEGATIVE: 0
CARDIOVASCULAR NEGATIVE: 0

## 2025-08-01 ASSESSMENT — VISUAL ACUITY
OS_SC: 20/20
OD_SC: 20/50
OD_SC+: -2
METHOD: SNELLEN - LINEAR

## 2025-08-01 ASSESSMENT — PAIN SCALES - GENERAL: PAINLEVEL_OUTOF10: 0-NO PAIN

## 2025-08-01 ASSESSMENT — EXTERNAL EXAM - RIGHT EYE: OD_EXAM: BROW PTOSIS

## 2025-08-01 NOTE — ASSESSMENT & PLAN NOTE
Unclear on initial etiology of abrasion but suspect on recurrent erosion spectrum. Removed loose epithelium at slit lamp to aid healing. Will have continue polytrim drops and was unable to obtain ciloxan ointment. Will have use genteal ointment a few times daily to help with comfort.

## 2025-08-01 NOTE — TELEPHONE ENCOUNTER
SPOKE WITH PT, WILL  SHANNA FROM PHARM. DISCUSSED CAN USE GTT AND SHANNA THEY HAVE IF NECESSARY PER [facility]

## 2025-08-01 NOTE — PATIENT INSTRUCTIONS
Thank you so much for choosing me to provide your care today!    If you were dilated your vision may remain blurry   or light sensitive for several hours.    The nature of eye and vision problems can require frequent follow up, please make every effort to adhere to any future appointments.    If you have any issues, questions, or concerns,   please do not hesitate to reach out.    If you receive a survey in regards to your care today, please mention any exceptional care my office staff and/or technicians provided.    You can reach our office at this number:    [phone]    Please consider signing up for and utilizing [employer]!  This is the best way to directly reach me or other  providers

## 2025-08-01 NOTE — PROGRESS NOTES
Assessment/Plan   Problem List Items Addressed This Visit       Corneal abrasion, right - Primary    Unclear on initial etiology of abrasion but suspect on recurrent erosion spectrum. Removed loose epithelium at slit lamp to aid healing. Will have continue polytrim drops and was unable to obtain ciloxan ointment. Will have use genteal ointment a few times daily to help with comfort.             Provided reassurance regarding above diagnoses and care received in the office visit today. Discussed outcomes and options along with the importance of treatment compliance. Understands the importance of any follow up visits. Patient instructed to call/communicate with our office if any new issues, questions, or concerns.     Will plan to see back in 1 week short check or sooner PRN

## 2025-08-06 ENCOUNTER — APPOINTMENT (OUTPATIENT)
Dept: OPHTHALMOLOGY | Facility: CLINIC | Age: 75
End: 2025-08-06
Payer: MEDICARE

## 2025-08-06 DIAGNOSIS — H04.123 DRY EYES: ICD-10-CM

## 2025-08-06 DIAGNOSIS — S05.01XD ABRASION OF RIGHT CORNEA, SUBSEQUENT ENCOUNTER: Primary | ICD-10-CM

## 2025-08-06 PROCEDURE — 99213 OFFICE O/P EST LOW 20 MIN: CPT | Performed by: OPHTHALMOLOGY

## 2025-08-06 ASSESSMENT — ENCOUNTER SYMPTOMS
ALLERGIC/IMMUNOLOGIC NEGATIVE: 0
CONSTITUTIONAL NEGATIVE: 0
PSYCHIATRIC NEGATIVE: 0
MUSCULOSKELETAL NEGATIVE: 0
CARDIOVASCULAR NEGATIVE: 0
RESPIRATORY NEGATIVE: 0
ENDOCRINE NEGATIVE: 0
NEUROLOGICAL NEGATIVE: 0
EYES NEGATIVE: 0
HEMATOLOGIC/LYMPHATIC NEGATIVE: 0
GASTROINTESTINAL NEGATIVE: 0

## 2025-08-06 ASSESSMENT — SLIT LAMP EXAM - LIDS
COMMENTS: NORMAL
COMMENTS: NORMAL

## 2025-08-06 ASSESSMENT — VISUAL ACUITY
OD_SC+: +1
OS_SC+: -2
OD_SC: 20/50
OS_SC: 20/25
METHOD: SNELLEN - LINEAR

## 2025-08-06 ASSESSMENT — EXTERNAL EXAM - RIGHT EYE: OD_EXAM: BROW PTOSIS

## 2025-08-06 ASSESSMENT — PAIN SCALES - GENERAL: PAINLEVEL_OUTOF10: 0-NO PAIN

## 2025-08-06 ASSESSMENT — EXTERNAL EXAM - LEFT EYE: OS_EXAM: BROW PTOSIS

## 2025-08-06 NOTE — PATIENT INSTRUCTIONS
Thank you so much for choosing me to provide your care today!    If you were dilated your vision may remain blurry   or light sensitive for several hours.    The nature of eye and vision problems can require frequent follow up, please make every effort to adhere to any future appointments.    If you have any issues, questions, or concerns,   please do not hesitate to reach out.    If you receive a survey in regards to your care today, please mention any exceptional care my office staff and/or technicians provided.    You can reach our office at this number:    [phone]    Please consider signing up for and utilizing [employer]!  This is the best way to directly reach me or other  providers

## 2025-08-06 NOTE — PROGRESS NOTES
Assessment/Plan   Problem List Items Addressed This Visit       Dry eyes    Advised on role of regular lubrication for best ocular comfort and vision.            Corneal abrasion, right - Primary    Epithelial defect healed. Suspect on spectrum of recurrent erosion overall. Advised on utility of regular lubrication. Advised thicker ointment or gel at night and tears a few times daily. Will report back in few weeks if residual discomfort hasn't resolved. FU as scheduled.             Provided reassurance regarding above diagnoses and care received in the office visit today. Discussed outcomes and options along with the importance of treatment compliance. Understands the importance of any follow up visits. Patient instructed to call/communicate with our office if any new issues, questions, or concerns.     Will plan to see back as scheduled or sooner PRN

## 2025-08-06 NOTE — ASSESSMENT & PLAN NOTE
Epithelial defect healed. Suspect on spectrum of recurrent erosion overall. Advised on utility of regular lubrication. Advised thicker ointment or gel at night and tears a few times daily. Will report back in few weeks if residual discomfort hasn't resolved. FU as scheduled.

## 2025-10-07 ENCOUNTER — APPOINTMENT (OUTPATIENT)
Dept: CARDIAC REHAB | Facility: CLINIC | Age: 75
End: 2025-10-07
Payer: MEDICARE

## 2025-10-14 ENCOUNTER — APPOINTMENT (OUTPATIENT)
Dept: CARDIAC REHAB | Facility: CLINIC | Age: 75
End: 2025-10-14
Payer: MEDICARE

## 2025-10-16 ENCOUNTER — APPOINTMENT (OUTPATIENT)
Dept: CARDIAC REHAB | Facility: CLINIC | Age: 75
End: 2025-10-16
Payer: MEDICARE

## 2026-01-28 ENCOUNTER — APPOINTMENT (OUTPATIENT)
Dept: OPHTHALMOLOGY | Facility: CLINIC | Age: 76
End: 2026-01-28
Payer: MEDICARE

## (undated) DEVICE — DRESSING, MEPILEX, BORDER, HEEL, 8.7 X 9.1 IN

## (undated) DEVICE — Device

## (undated) DEVICE — MARKER, SKIN, DUAL TIP INK W/9 LABEL AND REMOVABLE TIME OUT SLEEVE

## (undated) DEVICE — SHUNT, INTRACORONARY, 1.75 MM, CLEARVIEW

## (undated) DEVICE — GLOVE, SURGICAL, PROTEXIS PI MICRO, 7.5, PF, LF

## (undated) DEVICE — BAG, DECANTER

## (undated) DEVICE — CATHETER, EXPO, MODEL-D, MDL-D, 6FR FR5

## (undated) DEVICE — DRESSING, ISLAND, TELFA, 4 X 5 IN

## (undated) DEVICE — KIT, NAMIC STANDARD LEFT HEART, CUSTOM, LWMC

## (undated) DEVICE — TUBE SET, PNEUMOCLEAR, SMOKE EVACU, HIGH-FLOW

## (undated) DEVICE — GLIDESHEATH, SLENDER 6FR, 10CM, NITINOL KIT

## (undated) DEVICE — LUBRICANT, ELECTROLUBE, F/ELECTRODE TIPS

## (undated) DEVICE — IMPLANTABLE DEVICE: Type: IMPLANTABLE DEVICE | Site: WRIST | Status: NON-FUNCTIONAL

## (undated) DEVICE — DRAPE, FLUID WARMER

## (undated) DEVICE — DRESSING, ADHESIVE, ISLAND, TELFA, 2 X 3.75 IN, LF

## (undated) DEVICE — COVER, CART, 45 X 27 X 48 IN, CLEAR

## (undated) DEVICE — ELECTRODE, ELECTROSURGICAL, BLADE EXT 4 INCH, INSULATED

## (undated) DEVICE — SUTURE, VICRYL, 3-0, 18 IN, PS2, UNDYED

## (undated) DEVICE — KIT, BLOWER / MISTER II

## (undated) DEVICE — KIT, FAST START

## (undated) DEVICE — CLIP, LIGATING, W/ADHESIVE, WIDE SLOT, SMALL, TITANIUM

## (undated) DEVICE — DRIVER, UNIVERSAL QUICK CONNNECT, T10

## (undated) DEVICE — DRAIN, CHANNEL, BLAKE, HUBLESS, ROUND, 28FR

## (undated) DEVICE — CATHETER, INFINITI DIAGNOSTIC, 5 FR 100CM 3DRC, WILLIAMS RIGHT OR NO TORQUE

## (undated) DEVICE — SCISSORS, MONOPOLAR, CURVED, 8MM

## (undated) DEVICE — DRAPE, ARM XI

## (undated) DEVICE — CARE KIT, LAPAROSCOPIC, ADVANCED

## (undated) DEVICE — DRESSING, MEPILEX, BORDER, SACRUM, 8.7 X 9.8 IN

## (undated) DEVICE — GRASPER, MICRO, BIPOLAR, DAVINCI XI

## (undated) DEVICE — CANNULA, CARDIAC SUMP

## (undated) DEVICE — TR BAND, RADIAL COMPRESSION, LONG, 29CM

## (undated) DEVICE — COVER, TABLE, 44 X 75 IN, DISPOSABLE, LF, STERILE

## (undated) DEVICE — DRILL, SOLID SIDE CUTTING, 2.5 X 40MM

## (undated) DEVICE — SUTURE, PROLENE, 3-0, 36 IN, SH, DA, BLUE

## (undated) DEVICE — CATHETER, DRAINAGE, NASOGASTRIC, DOUBLE LUMEN, FUNNEL END, SUMP, SALEM, 18 FR, 48 IN, PVC, STERILE

## (undated) DEVICE — TRAY, SURESTEP, URINE METER, 14FR, SILICONE

## (undated) DEVICE — MARKER, SKIN, RULER AND LABEL PACK, CUSTOM

## (undated) DEVICE — SEAL, UNIVERSAL, 5-12MM

## (undated) DEVICE — CONNECTOR, STRAIGHT, 0.375 X 0.375 IN

## (undated) DEVICE — SUMP, INTRACARDIAC, MULTI PORT, VENT TIP, PEDIATRIC, 12 FR X 30 CM

## (undated) DEVICE — DRAPE, COLUMN, DAVINCI XI

## (undated) DEVICE — SURGISLEEVE, WOUND PROTECTOR, SMALL 2.5-6CM

## (undated) DEVICE — K-WIRE, 1.5 X 127MM

## (undated) DEVICE — GLIDEWIRE, ANGLE, STANDARD, .035 X 180CM, 1.5MM J-TIP

## (undated) DEVICE — GEL, ULTRASOUND, AQUASONIC 100, 20 GM, STERILE

## (undated) DEVICE — DRIVER, PEG, TORQUE LIMITING

## (undated) DEVICE — SUTURE, ETHIBOND, XTRA, 30 IN, 0, CT-1, GREEN

## (undated) DEVICE — ELECTRODE, ELECTROSURGICAL, BLADE, INSULATED, ENT/IMA, STERILE

## (undated) DEVICE — KNIFE, OPHTHALMIC, SLIT, 22.5 DEG

## (undated) DEVICE — TIP, SUCTION, YANKAUER, FLEXIBLE

## (undated) DEVICE — ARM PROTECTORS, FOAM

## (undated) DEVICE — APPLICATOR, CHLORAPREP, W/ORANGE TINT, 26ML

## (undated) DEVICE — CATHETER, EXPO, MODEL-D, 6FR FL3.5

## (undated) DEVICE — APPLIER, CLIP, SMALL XI

## (undated) DEVICE — DRESSING, ADHESIVE, ISLAND, TELFA, 4 X 14 IN

## (undated) DEVICE — APPLIER, CLIP MED-LG XI

## (undated) DEVICE — DRAPE, SHEET, UTILITY, NON ABSORBENT, 18 X 26 IN, LF

## (undated) DEVICE — DRAPE KIT, C-ARM, W/ PLATE & FOOT, DISP

## (undated) DEVICE — INSERT, CLAMP, SURGICAL, SOFT/TRACTION, STEALTH, 5 MM

## (undated) DEVICE — RETRACTOR, SUTURE, HOLDING, INSERT, OCTOBASE, DISPOSABLE

## (undated) DEVICE — SUTURE, ETHILON, 4-0, BLK, MONO, PS-2 18

## (undated) DEVICE — KIT, COLLECTION, CARDIO

## (undated) DEVICE — SUTURE, PROLENE, 4-0, 36 IN, RB1, DA, BLUE

## (undated) DEVICE — SUTURE, PROLENE, 7-0, 30 IN, BV1, DA, BLUE

## (undated) DEVICE — KIT, TOURNIQUET, 7"

## (undated) DEVICE — SUTURE, PROLENE 4-0, TAPER POINT, SH-1 BLUE 30 INCH

## (undated) DEVICE — SPONGE GAUZE, XRAY SC+RFID, 4X4 16 PLY, STERILE

## (undated) DEVICE — COVER, TABLE, UHC

## (undated) DEVICE — GLOVE, SURGICAL, PROTEXIS PI BLUE W/NEUTHERA, 7.5, PF, LF

## (undated) DEVICE — INSTRUMENT, DISSECTING, ENDOSCOPIC, PEANUT, 5 MM, STERILE

## (undated) DEVICE — DRAPE, INCISE, ANTIMICROBIAL, IOBAN 2, LARGE, 17 X 23 IN, DISPOSABLE, STERILE

## (undated) DEVICE — DRAPE, SHEET, THREE QUARTER, FAN FOLD, 57 X 77 IN

## (undated) DEVICE — PACK, ANGIO P2, CUSTOM, LAKE

## (undated) DEVICE — SYSTEM, OCTOPUS NUVO TISSUE STABILIZER

## (undated) DEVICE — COVER, MAYO STAND, W/PAD, 23 IN, DISPOSABLE, PLASTIC, LF, STERILE

## (undated) DEVICE — MANIFOLD, 4 PORT NEPTUNE STANDARD

## (undated) DEVICE — BANDAGE, ELASTIC, MATRIX, SELF-CLOSURE, 3 IN X 5 YD, LF

## (undated) DEVICE — COLLECTION UNIT, DRAINAGE, THORACIC, SINGLE TUBE, DRY SUCTION, ATS COMPATIBLE, OASIS 3600, LF

## (undated) DEVICE — SUTURE, PROLENE, 5-0, 36 IN, C-1, CV-11, BLUE

## (undated) DEVICE — CLIP, LIGATING, W/ADHESIVE PAD, MEDIUM, TITANIUM

## (undated) DEVICE — GOWN, ASTOUND, XL

## (undated) DEVICE — CLIP, SPRING, BULLDOG, FOGARTY, SOFT JAW, 6 MM, LF

## (undated) DEVICE — CONNECTOR, STRAIGHT, 0.5 X 0.5 IN

## (undated) DEVICE — CLEANER, ELECTROSURGICAL, TIP, 5 X 5 CM, LF

## (undated) DEVICE — GUIDEWIRE, J TIP, 3 MM, 0.035 IN X 260 CM, PTFE

## (undated) DEVICE — DRILL, SOLID SIDE CUTTING, 2.0 X 40MM

## (undated) DEVICE — SUTURE, PROLENE, 5-0, 36 IN, RB1, DA, BLUE

## (undated) DEVICE — GUIDES, ALMING, 1.5

## (undated) DEVICE — DRAPE PACK, UNIVERSAL II

## (undated) DEVICE — CLIPPER, SURGICAL BLADE ASSEMBLY, GENERAL PURPOSE, SINGLE USE

## (undated) DEVICE — SUTURE, MONOCRYL, 3-0, 18 IN, PS2, UNDYED

## (undated) DEVICE — SUTURE, VICRYL, 2-0, 27 IN, CT-1, VIOLET

## (undated) DEVICE — SUTURE, PROLENE, 6-0, 30 IN, RB-2, BLUE

## (undated) DEVICE — CAUTERY SPATULA, PERMANENT

## (undated) DEVICE — SUTURE, PROLENE, 4-0, 36 IN, BB, BLUE

## (undated) DEVICE — COVER, TIP HOT SHEARS ENDOWRIST

## (undated) DEVICE — SYRINGE, 20 CC, LUER LOCK, MONOJECT, W/O CAP, LF

## (undated) DEVICE — BLADE, SAW STERNUM, STERILE

## (undated) DEVICE — ELECTRODE, QUICK-COMBO, EDGE SYSTEM, REDI PACK